# Patient Record
Sex: FEMALE | Race: WHITE | NOT HISPANIC OR LATINO | Employment: OTHER | URBAN - METROPOLITAN AREA
[De-identification: names, ages, dates, MRNs, and addresses within clinical notes are randomized per-mention and may not be internally consistent; named-entity substitution may affect disease eponyms.]

---

## 2019-10-18 ENCOUNTER — HOSPITAL ENCOUNTER (INPATIENT)
Facility: MEDICAL CENTER | Age: 59
LOS: 2 days | DRG: 247 | End: 2019-10-20
Attending: EMERGENCY MEDICINE | Admitting: HOSPITALIST
Payer: COMMERCIAL

## 2019-10-18 ENCOUNTER — PATIENT OUTREACH (OUTPATIENT)
Dept: HEALTH INFORMATION MANAGEMENT | Facility: OTHER | Age: 59
End: 2019-10-18

## 2019-10-18 ENCOUNTER — APPOINTMENT (OUTPATIENT)
Dept: RADIOLOGY | Facility: MEDICAL CENTER | Age: 59
DRG: 247 | End: 2019-10-18
Attending: EMERGENCY MEDICINE
Payer: COMMERCIAL

## 2019-10-18 ENCOUNTER — HOSPITAL ENCOUNTER (OUTPATIENT)
Facility: MEDICAL CENTER | Age: 59
End: 2019-10-18

## 2019-10-18 ENCOUNTER — APPOINTMENT (OUTPATIENT)
Dept: CARDIOLOGY | Facility: MEDICAL CENTER | Age: 59
DRG: 247 | End: 2019-10-18
Payer: COMMERCIAL

## 2019-10-18 ENCOUNTER — APPOINTMENT (OUTPATIENT)
Dept: CARDIOLOGY | Facility: MEDICAL CENTER | Age: 59
DRG: 247 | End: 2019-10-18
Attending: INTERNAL MEDICINE
Payer: COMMERCIAL

## 2019-10-18 ENCOUNTER — APPOINTMENT (OUTPATIENT)
Dept: CARDIOLOGY | Facility: MEDICAL CENTER | Age: 59
DRG: 247 | End: 2019-10-18
Attending: EMERGENCY MEDICINE
Payer: COMMERCIAL

## 2019-10-18 DIAGNOSIS — R07.9 CHEST PAIN, UNSPECIFIED TYPE: ICD-10-CM

## 2019-10-18 DIAGNOSIS — I21.02 ST ELEVATION MYOCARDIAL INFARCTION INVOLVING LEFT ANTERIOR DESCENDING (LAD) CORONARY ARTERY (HCC): ICD-10-CM

## 2019-10-18 PROBLEM — Z72.0 TOBACCO USE: Status: ACTIVE | Noted: 2019-10-18

## 2019-10-18 PROBLEM — E78.5 DYSLIPIDEMIA: Status: ACTIVE | Noted: 2019-10-18

## 2019-10-18 PROBLEM — I10 ESSENTIAL HYPERTENSION: Status: ACTIVE | Noted: 2019-10-18

## 2019-10-18 PROBLEM — I21.4 NSTEMI (NON-ST ELEVATED MYOCARDIAL INFARCTION) (HCC): Status: ACTIVE | Noted: 2019-10-18

## 2019-10-18 PROBLEM — I21.3 STEMI (ST ELEVATION MYOCARDIAL INFARCTION) (HCC): Status: ACTIVE | Noted: 2019-10-18

## 2019-10-18 PROBLEM — E66.3 OVERWEIGHT (BMI 25.0-29.9): Status: ACTIVE | Noted: 2019-10-18

## 2019-10-18 LAB
ACT BLD: 186 SEC (ref 74–137)
ACT BLD: 213 SEC (ref 74–137)
ALBUMIN SERPL BCP-MCNC: 3.9 G/DL (ref 3.2–4.9)
ALBUMIN/GLOB SERPL: 1.4 G/DL
ALP SERPL-CCNC: 47 U/L (ref 30–99)
ALT SERPL-CCNC: 27 U/L (ref 2–50)
ANION GAP SERPL CALC-SCNC: 10 MMOL/L (ref 0–11.9)
ANION GAP SERPL CALC-SCNC: 7 MMOL/L (ref 0–11.9)
APTT PPP: 70.1 SEC (ref 24.7–36)
AST SERPL-CCNC: 89 U/L (ref 12–45)
BASOPHILS # BLD AUTO: 0.2 % (ref 0–1.8)
BASOPHILS # BLD: 0.02 K/UL (ref 0–0.12)
BILIRUB SERPL-MCNC: 0.5 MG/DL (ref 0.1–1.5)
BUN SERPL-MCNC: 13 MG/DL (ref 8–22)
BUN SERPL-MCNC: 13 MG/DL (ref 8–22)
CALCIUM SERPL-MCNC: 7.8 MG/DL (ref 8.5–10.5)
CALCIUM SERPL-MCNC: 8.5 MG/DL (ref 8.5–10.5)
CHLORIDE SERPL-SCNC: 108 MMOL/L (ref 96–112)
CHLORIDE SERPL-SCNC: 113 MMOL/L (ref 96–112)
CO2 SERPL-SCNC: 19 MMOL/L (ref 20–33)
CO2 SERPL-SCNC: 22 MMOL/L (ref 20–33)
CREAT SERPL-MCNC: 0.75 MG/DL (ref 0.5–1.4)
CREAT SERPL-MCNC: 0.76 MG/DL (ref 0.5–1.4)
EKG IMPRESSION: NORMAL
EOSINOPHIL # BLD AUTO: 0.01 K/UL (ref 0–0.51)
EOSINOPHIL NFR BLD: 0.1 % (ref 0–6.9)
ERYTHROCYTE [DISTWIDTH] IN BLOOD BY AUTOMATED COUNT: 46.3 FL (ref 35.9–50)
ERYTHROCYTE [DISTWIDTH] IN BLOOD BY AUTOMATED COUNT: 48.2 FL (ref 35.9–50)
GLOBULIN SER CALC-MCNC: 2.7 G/DL (ref 1.9–3.5)
GLUCOSE SERPL-MCNC: 109 MG/DL (ref 65–99)
GLUCOSE SERPL-MCNC: 118 MG/DL (ref 65–99)
HCT VFR BLD AUTO: 41.3 % (ref 37–47)
HCT VFR BLD AUTO: 42 % (ref 37–47)
HGB BLD-MCNC: 13.4 G/DL (ref 12–16)
HGB BLD-MCNC: 14.4 G/DL (ref 12–16)
IMM GRANULOCYTES # BLD AUTO: 0.13 K/UL (ref 0–0.11)
IMM GRANULOCYTES NFR BLD AUTO: 1 % (ref 0–0.9)
INR PPP: 0.94 (ref 0.87–1.13)
LIPASE SERPL-CCNC: 112 U/L (ref 11–82)
LYMPHOCYTES # BLD AUTO: 1.07 K/UL (ref 1–4.8)
LYMPHOCYTES NFR BLD: 8.1 % (ref 22–41)
MCH RBC QN AUTO: 32.1 PG (ref 27–33)
MCH RBC QN AUTO: 33.8 PG (ref 27–33)
MCHC RBC AUTO-ENTMCNC: 32.4 G/DL (ref 33.6–35)
MCHC RBC AUTO-ENTMCNC: 34.3 G/DL (ref 33.6–35)
MCV RBC AUTO: 98.6 FL (ref 81.4–97.8)
MCV RBC AUTO: 98.8 FL (ref 81.4–97.8)
MONOCYTES # BLD AUTO: 1.34 K/UL (ref 0–0.85)
MONOCYTES NFR BLD AUTO: 10.2 % (ref 0–13.4)
NEUTROPHILS # BLD AUTO: 10.56 K/UL (ref 2–7.15)
NEUTROPHILS NFR BLD: 80.4 % (ref 44–72)
NRBC # BLD AUTO: 0 K/UL
NRBC BLD-RTO: 0 /100 WBC
NT-PROBNP SERPL IA-MCNC: 354 PG/ML (ref 0–125)
PLATELET # BLD AUTO: 216 K/UL (ref 164–446)
PLATELET # BLD AUTO: 247 K/UL (ref 164–446)
PMV BLD AUTO: 10.5 FL (ref 9–12.9)
PMV BLD AUTO: 9.9 FL (ref 9–12.9)
POTASSIUM SERPL-SCNC: 4 MMOL/L (ref 3.6–5.5)
POTASSIUM SERPL-SCNC: 4.6 MMOL/L (ref 3.6–5.5)
PROT SERPL-MCNC: 6.6 G/DL (ref 6–8.2)
PROTHROMBIN TIME: 12.8 SEC (ref 12–14.6)
RBC # BLD AUTO: 4.18 M/UL (ref 4.2–5.4)
RBC # BLD AUTO: 4.26 M/UL (ref 4.2–5.4)
SODIUM SERPL-SCNC: 137 MMOL/L (ref 135–145)
SODIUM SERPL-SCNC: 142 MMOL/L (ref 135–145)
TROPONIN T SERPL-MCNC: 2262 NG/L (ref 6–19)
TROPONIN T SERPL-MCNC: 6277 NG/L (ref 6–19)
WBC # BLD AUTO: 12.9 K/UL (ref 4.8–10.8)
WBC # BLD AUTO: 13.1 K/UL (ref 4.8–10.8)

## 2019-10-18 PROCEDURE — 700111 HCHG RX REV CODE 636 W/ 250 OVERRIDE (IP)

## 2019-10-18 PROCEDURE — 700105 HCHG RX REV CODE 258: Performed by: INTERNAL MEDICINE

## 2019-10-18 PROCEDURE — 96366 THER/PROPH/DIAG IV INF ADDON: CPT

## 2019-10-18 PROCEDURE — 700111 HCHG RX REV CODE 636 W/ 250 OVERRIDE (IP): Performed by: EMERGENCY MEDICINE

## 2019-10-18 PROCEDURE — 700102 HCHG RX REV CODE 250 W/ 637 OVERRIDE(OP): Performed by: HOSPITALIST

## 2019-10-18 PROCEDURE — 99291 CRITICAL CARE FIRST HOUR: CPT

## 2019-10-18 PROCEDURE — 93458 L HRT ARTERY/VENTRICLE ANGIO: CPT | Mod: XU

## 2019-10-18 PROCEDURE — 80048 BASIC METABOLIC PNL TOTAL CA: CPT

## 2019-10-18 PROCEDURE — 85027 COMPLETE CBC AUTOMATED: CPT

## 2019-10-18 PROCEDURE — 99255 IP/OBS CONSLTJ NEW/EST HI 80: CPT | Mod: 25 | Performed by: INTERNAL MEDICINE

## 2019-10-18 PROCEDURE — 71045 X-RAY EXAM CHEST 1 VIEW: CPT

## 2019-10-18 PROCEDURE — 700101 HCHG RX REV CODE 250

## 2019-10-18 PROCEDURE — 96368 THER/DIAG CONCURRENT INF: CPT

## 2019-10-18 PROCEDURE — 93005 ELECTROCARDIOGRAM TRACING: CPT | Performed by: EMERGENCY MEDICINE

## 2019-10-18 PROCEDURE — 4A023N7 MEASUREMENT OF CARDIAC SAMPLING AND PRESSURE, LEFT HEART, PERCUTANEOUS APPROACH: ICD-10-PCS | Performed by: INTERNAL MEDICINE

## 2019-10-18 PROCEDURE — 84484 ASSAY OF TROPONIN QUANT: CPT | Mod: 91

## 2019-10-18 PROCEDURE — A9270 NON-COVERED ITEM OR SERVICE: HCPCS | Performed by: HOSPITALIST

## 2019-10-18 PROCEDURE — 99152 MOD SED SAME PHYS/QHP 5/>YRS: CPT | Performed by: INTERNAL MEDICINE

## 2019-10-18 PROCEDURE — 770022 HCHG ROOM/CARE - ICU (200)

## 2019-10-18 PROCEDURE — 700117 HCHG RX CONTRAST REV CODE 255: Performed by: INTERNAL MEDICINE

## 2019-10-18 PROCEDURE — B2151ZZ FLUOROSCOPY OF LEFT HEART USING LOW OSMOLAR CONTRAST: ICD-10-PCS | Performed by: INTERNAL MEDICINE

## 2019-10-18 PROCEDURE — 99291 CRITICAL CARE FIRST HOUR: CPT | Performed by: INTERNAL MEDICINE

## 2019-10-18 PROCEDURE — B2111ZZ FLUOROSCOPY OF MULTIPLE CORONARY ARTERIES USING LOW OSMOLAR CONTRAST: ICD-10-PCS | Performed by: INTERNAL MEDICINE

## 2019-10-18 PROCEDURE — 85610 PROTHROMBIN TIME: CPT

## 2019-10-18 PROCEDURE — 80053 COMPREHEN METABOLIC PANEL: CPT

## 2019-10-18 PROCEDURE — 027034Z DILATION OF CORONARY ARTERY, ONE ARTERY WITH DRUG-ELUTING INTRALUMINAL DEVICE, PERCUTANEOUS APPROACH: ICD-10-PCS | Performed by: INTERNAL MEDICINE

## 2019-10-18 PROCEDURE — 99223 1ST HOSP IP/OBS HIGH 75: CPT | Performed by: HOSPITALIST

## 2019-10-18 PROCEDURE — 93010 ELECTROCARDIOGRAM REPORT: CPT | Mod: 77 | Performed by: INTERNAL MEDICINE

## 2019-10-18 PROCEDURE — 93010 ELECTROCARDIOGRAM REPORT: CPT | Performed by: INTERNAL MEDICINE

## 2019-10-18 PROCEDURE — 93005 ELECTROCARDIOGRAM TRACING: CPT | Performed by: INTERNAL MEDICINE

## 2019-10-18 PROCEDURE — 92941 PRQ TRLML REVSC TOT OCCL AMI: CPT | Mod: LD | Performed by: INTERNAL MEDICINE

## 2019-10-18 PROCEDURE — 85730 THROMBOPLASTIN TIME PARTIAL: CPT

## 2019-10-18 PROCEDURE — 96365 THER/PROPH/DIAG IV INF INIT: CPT

## 2019-10-18 PROCEDURE — 93005 ELECTROCARDIOGRAM TRACING: CPT | Performed by: HOSPITALIST

## 2019-10-18 PROCEDURE — C1769 GUIDE WIRE: HCPCS

## 2019-10-18 PROCEDURE — 85347 COAGULATION TIME ACTIVATED: CPT | Mod: 91

## 2019-10-18 PROCEDURE — 83880 ASSAY OF NATRIURETIC PEPTIDE: CPT

## 2019-10-18 PROCEDURE — 700111 HCHG RX REV CODE 636 W/ 250 OVERRIDE (IP): Performed by: HOSPITALIST

## 2019-10-18 PROCEDURE — 93458 L HRT ARTERY/VENTRICLE ANGIO: CPT | Mod: 26,59 | Performed by: INTERNAL MEDICINE

## 2019-10-18 PROCEDURE — 85025 COMPLETE CBC W/AUTO DIFF WBC: CPT

## 2019-10-18 PROCEDURE — 83690 ASSAY OF LIPASE: CPT

## 2019-10-18 RX ORDER — BISACODYL 10 MG
10 SUPPOSITORY, RECTAL RECTAL
Status: DISCONTINUED | OUTPATIENT
Start: 2019-10-18 | End: 2019-10-20 | Stop reason: HOSPADM

## 2019-10-18 RX ORDER — CLOPIDOGREL BISULFATE 75 MG/1
75 TABLET ORAL DAILY
Status: DISCONTINUED | OUTPATIENT
Start: 2019-10-19 | End: 2019-10-20 | Stop reason: HOSPADM

## 2019-10-18 RX ORDER — MORPHINE SULFATE 4 MG/ML
2-4 INJECTION, SOLUTION INTRAMUSCULAR; INTRAVENOUS
Status: DISCONTINUED | OUTPATIENT
Start: 2019-10-18 | End: 2019-10-19

## 2019-10-18 RX ORDER — ONDANSETRON 4 MG/1
4 TABLET, ORALLY DISINTEGRATING ORAL EVERY 4 HOURS PRN
Status: DISCONTINUED | OUTPATIENT
Start: 2019-10-18 | End: 2019-10-20 | Stop reason: HOSPADM

## 2019-10-18 RX ORDER — HEPARIN SODIUM 1000 [USP'U]/ML
4000 INJECTION, SOLUTION INTRAVENOUS; SUBCUTANEOUS PRN
Status: DISCONTINUED | OUTPATIENT
Start: 2019-10-18 | End: 2019-10-18

## 2019-10-18 RX ORDER — ZOLPIDEM TARTRATE 5 MG/1
5 TABLET ORAL NIGHTLY PRN
COMMUNITY
End: 2019-10-18

## 2019-10-18 RX ORDER — NITROGLYCERIN 20 MG/100ML
0-200 INJECTION INTRAVENOUS ONCE
Status: DISCONTINUED | OUTPATIENT
Start: 2019-10-18 | End: 2019-10-18

## 2019-10-18 RX ORDER — ASPIRIN 300 MG/1
300 SUPPOSITORY RECTAL DAILY
Status: DISCONTINUED | OUTPATIENT
Start: 2019-10-19 | End: 2019-10-18

## 2019-10-18 RX ORDER — HEPARIN SODIUM,PORCINE 1000/ML
VIAL (ML) INJECTION
Status: COMPLETED
Start: 2019-10-18 | End: 2019-10-18

## 2019-10-18 RX ORDER — ATORVASTATIN CALCIUM 20 MG/1
20 TABLET, FILM COATED ORAL NIGHTLY
Status: ON HOLD | COMMUNITY
End: 2019-10-20

## 2019-10-18 RX ORDER — LEVOTHYROXINE SODIUM 88 UG/1
88 TABLET ORAL
COMMUNITY

## 2019-10-18 RX ORDER — LIDOCAINE HYDROCHLORIDE 20 MG/ML
INJECTION, SOLUTION INFILTRATION; PERINEURAL
Status: COMPLETED
Start: 2019-10-18 | End: 2019-10-18

## 2019-10-18 RX ORDER — ASPIRIN 325 MG
325 TABLET ORAL DAILY
Status: DISCONTINUED | OUTPATIENT
Start: 2019-10-19 | End: 2019-10-18

## 2019-10-18 RX ORDER — ATORVASTATIN CALCIUM 20 MG/1
20 TABLET, FILM COATED ORAL NIGHTLY
Status: DISCONTINUED | OUTPATIENT
Start: 2019-10-18 | End: 2019-10-19

## 2019-10-18 RX ORDER — ONDANSETRON 2 MG/ML
4 INJECTION INTRAMUSCULAR; INTRAVENOUS EVERY 4 HOURS PRN
Status: DISCONTINUED | OUTPATIENT
Start: 2019-10-18 | End: 2019-10-20 | Stop reason: HOSPADM

## 2019-10-18 RX ORDER — LISINOPRIL 10 MG/1
10 TABLET ORAL DAILY
COMMUNITY
End: 2019-10-18

## 2019-10-18 RX ORDER — POLYETHYLENE GLYCOL 3350 17 G/17G
1 POWDER, FOR SOLUTION ORAL
Status: DISCONTINUED | OUTPATIENT
Start: 2019-10-18 | End: 2019-10-20 | Stop reason: HOSPADM

## 2019-10-18 RX ORDER — ASPIRIN 81 MG/1
324 TABLET, CHEWABLE ORAL DAILY
Status: DISCONTINUED | OUTPATIENT
Start: 2019-10-19 | End: 2019-10-18

## 2019-10-18 RX ORDER — MORPHINE SULFATE 4 MG/ML
4 INJECTION, SOLUTION INTRAMUSCULAR; INTRAVENOUS ONCE
Status: COMPLETED | OUTPATIENT
Start: 2019-10-18 | End: 2019-10-18

## 2019-10-18 RX ORDER — SODIUM CHLORIDE 9 MG/ML
INJECTION, SOLUTION INTRAVENOUS CONTINUOUS
Status: DISCONTINUED | OUTPATIENT
Start: 2019-10-18 | End: 2019-10-18

## 2019-10-18 RX ORDER — AMOXICILLIN 250 MG
2 CAPSULE ORAL 2 TIMES DAILY
Status: DISCONTINUED | OUTPATIENT
Start: 2019-10-18 | End: 2019-10-20 | Stop reason: HOSPADM

## 2019-10-18 RX ORDER — PROMETHAZINE HYDROCHLORIDE 25 MG/1
12.5-25 SUPPOSITORY RECTAL EVERY 4 HOURS PRN
Status: DISCONTINUED | OUTPATIENT
Start: 2019-10-18 | End: 2019-10-20 | Stop reason: HOSPADM

## 2019-10-18 RX ORDER — HEPARIN SODIUM 200 [USP'U]/100ML
INJECTION, SOLUTION INTRAVENOUS
Status: COMPLETED
Start: 2019-10-18 | End: 2019-10-18

## 2019-10-18 RX ORDER — VERAPAMIL HYDROCHLORIDE 2.5 MG/ML
INJECTION, SOLUTION INTRAVENOUS
Status: COMPLETED
Start: 2019-10-18 | End: 2019-10-18

## 2019-10-18 RX ORDER — MORPHINE SULFATE 4 MG/ML
INJECTION, SOLUTION INTRAMUSCULAR; INTRAVENOUS
Status: DISPENSED
Start: 2019-10-18 | End: 2019-10-18

## 2019-10-18 RX ORDER — NITROGLYCERIN 20 MG/100ML
0-200 INJECTION INTRAVENOUS CONTINUOUS
Status: DISCONTINUED | OUTPATIENT
Start: 2019-10-18 | End: 2019-10-19

## 2019-10-18 RX ORDER — NITROGLYCERIN 0.4 MG/1
0.4 TABLET SUBLINGUAL ONCE
Status: DISPENSED | OUTPATIENT
Start: 2019-10-18 | End: 2019-10-19

## 2019-10-18 RX ORDER — MIDAZOLAM HYDROCHLORIDE 1 MG/ML
INJECTION INTRAMUSCULAR; INTRAVENOUS
Status: COMPLETED
Start: 2019-10-18 | End: 2019-10-18

## 2019-10-18 RX ORDER — LISINOPRIL 10 MG/1
10 TABLET ORAL DAILY
Status: DISCONTINUED | OUTPATIENT
Start: 2019-10-19 | End: 2019-10-18

## 2019-10-18 RX ORDER — CLONIDINE HYDROCHLORIDE 0.1 MG/1
0.1 TABLET ORAL EVERY 6 HOURS PRN
Status: DISCONTINUED | OUTPATIENT
Start: 2019-10-18 | End: 2019-10-19

## 2019-10-18 RX ORDER — SODIUM CHLORIDE 9 MG/ML
INJECTION, SOLUTION INTRAVENOUS CONTINUOUS
Status: DISPENSED | OUTPATIENT
Start: 2019-10-18 | End: 2019-10-18

## 2019-10-18 RX ORDER — HEPARIN SODIUM 5000 [USP'U]/100ML
INJECTION, SOLUTION INTRAVENOUS CONTINUOUS
Status: DISCONTINUED | OUTPATIENT
Start: 2019-10-18 | End: 2019-10-18

## 2019-10-18 RX ORDER — VENLAFAXINE HYDROCHLORIDE 75 MG/1
75 CAPSULE, EXTENDED RELEASE ORAL DAILY
COMMUNITY

## 2019-10-18 RX ORDER — PROCHLORPERAZINE EDISYLATE 5 MG/ML
5-10 INJECTION INTRAMUSCULAR; INTRAVENOUS EVERY 4 HOURS PRN
Status: DISCONTINUED | OUTPATIENT
Start: 2019-10-18 | End: 2019-10-20 | Stop reason: HOSPADM

## 2019-10-18 RX ORDER — ACETAMINOPHEN 325 MG/1
650 TABLET ORAL EVERY 6 HOURS PRN
Status: DISCONTINUED | OUTPATIENT
Start: 2019-10-18 | End: 2019-10-20 | Stop reason: HOSPADM

## 2019-10-18 RX ORDER — NITROGLYCERIN 0.4 MG/1
0.4 TABLET SUBLINGUAL
Status: DISCONTINUED | OUTPATIENT
Start: 2019-10-18 | End: 2019-10-20 | Stop reason: HOSPADM

## 2019-10-18 RX ORDER — VENLAFAXINE HYDROCHLORIDE 75 MG/1
75 CAPSULE, EXTENDED RELEASE ORAL DAILY
Status: DISCONTINUED | OUTPATIENT
Start: 2019-10-19 | End: 2019-10-20 | Stop reason: HOSPADM

## 2019-10-18 RX ORDER — PROMETHAZINE HYDROCHLORIDE 25 MG/1
12.5-25 TABLET ORAL EVERY 4 HOURS PRN
Status: DISCONTINUED | OUTPATIENT
Start: 2019-10-18 | End: 2019-10-20 | Stop reason: HOSPADM

## 2019-10-18 RX ADMIN — NITROGLYCERIN 0.4 MG: 0.4 TABLET, ORALLY DISINTEGRATING SUBLINGUAL at 07:38

## 2019-10-18 RX ADMIN — NITROGLYCERIN 0.4 MG: 0.4 TABLET, ORALLY DISINTEGRATING SUBLINGUAL at 21:50

## 2019-10-18 RX ADMIN — HEPARIN SODIUM 2000 UNITS: 200 INJECTION, SOLUTION INTRAVENOUS at 07:41

## 2019-10-18 RX ADMIN — NITROGLYCERIN 10 ML: 20 INJECTION INTRAVENOUS at 08:02

## 2019-10-18 RX ADMIN — SODIUM CHLORIDE: 9 INJECTION, SOLUTION INTRAVENOUS at 06:49

## 2019-10-18 RX ADMIN — NITROGLYCERIN 0.4 MG: 0.4 TABLET, ORALLY DISINTEGRATING SUBLINGUAL at 17:11

## 2019-10-18 RX ADMIN — ATORVASTATIN CALCIUM 20 MG: 20 TABLET, FILM COATED ORAL at 19:49

## 2019-10-18 RX ADMIN — MIDAZOLAM HYDROCHLORIDE 1.5 MG: 1 INJECTION, SOLUTION INTRAMUSCULAR; INTRAVENOUS at 08:16

## 2019-10-18 RX ADMIN — ACETAMINOPHEN 650 MG: 325 TABLET, FILM COATED ORAL at 16:54

## 2019-10-18 RX ADMIN — HEPARIN SODIUM: 1000 INJECTION, SOLUTION INTRAVENOUS; SUBCUTANEOUS at 08:05

## 2019-10-18 RX ADMIN — FENTANYL CITRATE 75 MCG: 50 INJECTION INTRAMUSCULAR; INTRAVENOUS at 08:07

## 2019-10-18 RX ADMIN — NITROGLYCERIN 0.4 MG: 0.4 TABLET, ORALLY DISINTEGRATING SUBLINGUAL at 07:12

## 2019-10-18 RX ADMIN — MORPHINE SULFATE 4 MG: 4 INJECTION INTRAVENOUS at 07:11

## 2019-10-18 RX ADMIN — IOHEXOL 85 ML: 350 INJECTION, SOLUTION INTRAVENOUS at 08:11

## 2019-10-18 RX ADMIN — VERAPAMIL HYDROCHLORIDE 2.5 MG: 2.5 INJECTION INTRAVENOUS at 08:02

## 2019-10-18 RX ADMIN — LIDOCAINE HYDROCHLORIDE: 20 INJECTION, SOLUTION INFILTRATION; PERINEURAL at 08:01

## 2019-10-18 RX ADMIN — NITROGLYCERIN 5 MCG/MIN: 20 INJECTION INTRAVENOUS at 07:18

## 2019-10-18 RX ADMIN — HEPARIN SODIUM 950 UNITS: 5000 INJECTION, SOLUTION INTRAVENOUS at 06:00

## 2019-10-18 RX ADMIN — SODIUM CHLORIDE: 9 INJECTION, SOLUTION INTRAVENOUS at 09:01

## 2019-10-18 SDOH — HEALTH STABILITY: MENTAL HEALTH: HOW MANY STANDARD DRINKS CONTAINING ALCOHOL DO YOU HAVE ON A TYPICAL DAY?: 10 OR MORE

## 2019-10-18 SDOH — HEALTH STABILITY: MENTAL HEALTH: HOW OFTEN DO YOU HAVE 6 OR MORE DRINKS ON ONE OCCASION?: DAILY OR ALMOST DAILY

## 2019-10-18 SDOH — HEALTH STABILITY: MENTAL HEALTH: HOW OFTEN DO YOU HAVE A DRINK CONTAINING ALCOHOL?: 4 OR MORE TIMES A WEEK

## 2019-10-18 ASSESSMENT — COGNITIVE AND FUNCTIONAL STATUS - GENERAL
MOBILITY SCORE: 18
SUGGESTED CMS G CODE MODIFIER MOBILITY: CJ
STANDING UP FROM CHAIR USING ARMS: A LITTLE
SUGGESTED CMS G CODE MODIFIER MOBILITY: CK
DRESSING REGULAR LOWER BODY CLOTHING: A LITTLE
CLIMB 3 TO 5 STEPS WITH RAILING: A LITTLE
WALKING IN HOSPITAL ROOM: A LITTLE
STANDING UP FROM CHAIR USING ARMS: A LITTLE
TOILETING: A LITTLE
DAILY ACTIVITIY SCORE: 22
TURNING FROM BACK TO SIDE WHILE IN FLAT BAD: A LITTLE
MOVING FROM LYING ON BACK TO SITTING ON SIDE OF FLAT BED: A LITTLE
MOBILITY SCORE: 20
MOVING TO AND FROM BED TO CHAIR: A LITTLE
MOVING FROM LYING ON BACK TO SITTING ON SIDE OF FLAT BED: A LITTLE
MOVING TO AND FROM BED TO CHAIR: A LITTLE
CLIMB 3 TO 5 STEPS WITH RAILING: A LITTLE
SUGGESTED CMS G CODE MODIFIER DAILY ACTIVITY: CJ

## 2019-10-18 ASSESSMENT — ENCOUNTER SYMPTOMS
MUSCULOSKELETAL NEGATIVE: 1
CHILLS: 1
EYES NEGATIVE: 1
NEUROLOGICAL NEGATIVE: 1
DIAPHORESIS: 1
GASTROINTESTINAL NEGATIVE: 1
RESPIRATORY NEGATIVE: 1
PSYCHIATRIC NEGATIVE: 1

## 2019-10-18 ASSESSMENT — LIFESTYLE VARIABLES: EVER_SMOKED: YES

## 2019-10-18 NOTE — PROGRESS NOTES
Cardiovascular Nurse Navigator ([phone]) Note:    STEMI with PCI today. Left ventriculography in cath shows EF of 60%. No HF diagnosis.    Reviewed ACS medications:  · DAPT: aspirin + clopidogrel  · Beta-Blocker:  Not currently prescribed, will need to be addressed: prescribed or a provider note indicating why not prescribed, prior to discharge.  · Statin:  atorvastatin 20mg - this should be considered for high intensity criteria before dc  · Consider for aldosterone blockade?  no -- EF is 60%  · Consider for ACE-I/ARB/ARNI?  no -- EF is 60%    Meds to Beds BEDSIDE NURSING RESPONSIBILITIES:    Please initiate Meds to Beds for dual antiplatelet therapy:     1. Obtain outpatient order for P2Y12 inhibitor (ticagrelor, clopidogrel, prasugrel) from physician   2. Call [phone] (or, if after hours/weekend, [phone] and request 'on-call anti-coag pharmacist') patient should have med in hand at time of discharge.    Intensive Cardiac Rehab (ICR) Referral:  Referred on 10/18/19; has current inpatient orders for nutrition consult & PT for Phase I ICR    Demographics  Patient resides in: [place], [place]  Insurance: [facility]    Inpatient & Discharge Patient Education:  Bedside nursing to continually provide patient education on ACS meds, signs and symptoms to monitor for, and risk factor modification.     Also at discharge please complete the “Acute Coronary Syndrome” special instructions on the AVS.          Follow up care    Spoke with hospital Anton requested cardiology f/u.    Follow-up With  Details  Why  Contact Info   Cardiac Rehab       Your physician has referred you to Intensive Cardiac Rehab which is very important for your recovery. Please speak with your physician in your home town about a local cardiac rehab program that you can attend. Thank you.       Thank you and please call with questions.

## 2019-10-18 NOTE — CONSULTS
Reason of Consult: STEMI    Consulting Physician: Dr. Finley, ERP    HPI:  59-year-old female with a history of hypertension and hyperlipidemia as well as family history precocious CAD and tobacco abuse but no alcohol abuse presented to [facility] with 2 days of constant unremitting substernal burning chest discomfort she felt had been GERD.  It had a sudden and precipitous worsening chest prior to her presentation at [facility].  When she arrived she had anterior Q waves and ST elevation that were borderline for STEMI criteria.  Initial troponin after 2 days of symptoms was normal.  She received thrombolytic therapy, aspirin, 300 mg of Plavix, and heparin bolus and infusion prior to transport and contacting cardiology.  In route she had intermittent AI VR and transient left bundle branch block.  Her epigastric discomfort resolved.  On arrival she is asymptomatic her Q waves anteriorly persist however her ST segments have improved.  She is hemodynamically and electrically stable.    Past Medical History:   Diagnosis Date   • Essential hypertension    • Mixed hyperlipidemia        Social History     Socioeconomic History   • Marital status: Not on file     Spouse name: Not on file   • Number of children: Not on file   • Years of education: Not on file   • Highest education level: Not on file   Occupational History   • Not on file   Social Needs   • Financial resource strain: Not on file   • Food insecurity:     Worry: Not on file     Inability: Not on file   • Transportation needs:     Medical: Not on file     Non-medical: Not on file   Tobacco Use   • Smoking status: Not on file   Substance and Sexual Activity   • Alcohol use: Not on file   • Drug use: Not on file   • Sexual activity: Not on file   Lifestyle   • Physical activity:     Days per week: Not on file     Minutes per session: Not on file   • Stress: Not on file   Relationships   • Social connections:     Talks on phone: Not on file     Gets  "together: Not on file     Attends Tenriism service: Not on file     Active member of club or organization: Not on file     Attends meetings of clubs or organizations: Not on file     Relationship status: Not on file   • Intimate partner violence:     Fear of current or ex partner: Not on file     Emotionally abused: Not on file     Physically abused: Not on file     Forced sexual activity: Not on file   Other Topics Concern   • Not on file   Social History Narrative   • Not on file       No current facility-administered medications on file prior to encounter.      No current outpatient medications on file prior to encounter.       Current Facility-Administered Medications   Medication Dose Frequency Provider Last Rate Last Dose   • heparin injection 4,000 Units  4,000 Units PRN Franck Norton M.D.        And   • heparin infusion 25,000 units in 500 mL 0.45% NACL   Continuous Franck Norton M.D.         This patient's medications have not been reviewed.    Allergies: Codeine    Family History   Problem Relation Age of Onset   • Heart Disease Mother        ROS: As per HPI all other systems reviewed and negative     Physical Exam   Height 1.7 m (5' 6.93\"), weight 84 kg (185 lb 3 oz).    Constitutional: Morbidly obese.  Appears much older than stated age.  Appears well-developed.   HENT: Normocephalic and atraumatic. No scleral icterus.   Neck: No JVD present.   Cardiovascular: Normal rate. Exam reveals no gallop and no friction rub. No murmur heard.   Pulmonary/Chest: CTAB   Abdominal: S/NT/ND BS+   Musculoskeletal:  Pulses present. No atrophy. Strength normal.  Extremities: Exhibits no edema. No clubbing or cyanosis.   Skin: Skin is warm and dry.   Neuro: Non-focal, CN 2-12 intact grossly    No intake or output data in the 24 hours ending 10/18/19 0602                              EKG (10/18/2019 ):  I have personally reviewed the EKG this visit and discussed with the patient. It shows sinus rhythm with anterior " Q waves.  AIVR/LBBB    Imaging reviewed    Impressions:  1.  Acute coronary syndrome  2.  Morbid obesity  3.  Hypertension  4.  Tobacco abuse  5.  Hyperlipidemia    Recommendations:  It is unusual that her 2 days of chest discomfort did not result in an elevated troponin however she may be describing it poorly as it is after hours.  She did have a sudden worsening of her symptoms which resulted in her receiving thrombolytic therapy.  Her initial EKG from outside does show changes when compared with her more improved EKG after thrombolytic therapy.  I therefore think further evaluation and timely coronary angiography although not emergent is recommended.  She also requires aggressive guideline-directed medical therapy.    1.  Please keep n.p.o. for coronary angiography later today  2.  Aspirin, Plavix, high-dose statin, Lopressor, ACE inhibitor as tolerated  3.  Echocardiogram    Should she have recurrence of symptoms or any hemodynamic or electrical decompensation she will be taken urgently this morning for coronary angiography.  Otherwise we will plan within 12 hours.    Discussed with the referring physician and bedside nursing.    Thank you for this interesting consultation. It was my pleasure to see Marisol Brown today.    Jacky Capellan MD, FACC, [facility]  Division of Interventional Cardiology  [facility]

## 2019-10-18 NOTE — ED NOTES
Nae  from Lab called with critical result of Troponin 2262 at 0705. Critical lab result read back to Nae.   Dr. Norton notified of critical lab result at 0705.  Critical lab result read back by Dr. Norton.

## 2019-10-18 NOTE — DISCHARGE PLANNING
Medical Social Work     Referral: STEMI    SW responded to a STEMI. The pt was [facility]. The pt name is Marisol Brown (: 58). SW spoke to the pt in the trauma bay and the pt requested SW call her mom Marilyn [phone]. YOSVANY was able to make contact with the pt mother Marilyn and update her that the pt arrived to [facility].     Plan: YOSVANY will remain available for pt and family support.

## 2019-10-18 NOTE — ED NOTES
Patient denies CP and SOB, in NAD, on monitor showing PVC run,      Placed pads on patient, crash cart in room

## 2019-10-18 NOTE — ED NOTES
0700  Bedside report from Yunier RITTER  Pt started c/o severe mid chest pain radiating to her back.  MD notified. Repeat EKG done and shown to erp/Dr. Galvez  Medicated for pain per mar order. Placed on 3l/nc  0715  Cardiologist Dr. Dsouza at bedside also notified. ekg shown.  Cath lab paged. Pt prepped for cath lab assisted by ED tech.  Nitro gtt started.

## 2019-10-18 NOTE — H&P
Hospital Medicine History & Physical Note    Date of Service  10/18/2019    Primary Care Physician  No primary care provider on file.    Consultants  Cardiology Dr. Capellan    Code Status  Full code     Chief Complaint  Chest pressure     History of Presenting Illness  59 y.o. female with history of dyslipidemia on statin therapy, essential hypertension on medical regimen, and apparent hypothyroidism, was in her usual state of health until 2 days prior to admission.  She began to have chest pressure this is in the center of her chest, and was constant in nature with no exacerbating or alleviating factors.  On the morning of admission however, the pressure became severe, and woke her from sleep, was accompanied by diaphoresis and chills, and urge to defecate.  She was subsequently brought to an outside hospital, whereupon she was found to have a possible STEMI.  She was given tenecteplase, as well as started on a heparin infusion, and transferred to this facility for higher level of care.  On arrival, she did not have evidence of an ST elevated myocardial infarction so she was monitored in the emergency department.    In the interim, she developed again severe chest pain radiating down her arms, again associated with diaphoresis, nausea, and urge to defecate.  Repeat electrocardiogram was significant for ST elevated myocardial infarction.    Review of Systems  Review of Systems   Constitutional: Positive for chills and diaphoresis.   HENT: Negative.    Eyes: Negative.    Respiratory: Negative.    Cardiovascular: Positive for chest pain.   Gastrointestinal: Negative.    Genitourinary: Negative.    Musculoskeletal: Negative.    Skin: Negative.    Neurological: Negative.    Endo/Heme/Allergies: Negative.    Psychiatric/Behavioral: Negative.        Past Medical History   has a past medical history of Depression, Essential hypertension, Hypercholesteremia, Hypothyroidism, and Mixed hyperlipidemia.    Surgical History    has a past surgical history that includes tubal ligation; dental extraction(s); and carpal tunnel release.     Family History  family history includes Dementia in her father; Heart Disease in her mother; Hyperlipidemia in her brother and sister.     Social History   reports that she has been smoking cigarettes. She started smoking about 45 years ago. She has been smoking about 1.00 pack per day. She has never used smokeless tobacco. She reports that she drinks about 7.2 oz of alcohol per week. She reports that she has current or past drug history. Drug: Inhaled.    Allergies  Allergies   Allergen Reactions   • Codeine        Medications  Prior to Admission Medications   Prescriptions Last Dose Informant Patient Reported? Taking?   albuterol (PROVENTIL) 2.5mg/0.5ml Nebu Soln   Yes Yes   Si.5 mg by Nebulization route every four hours as needed for Shortness of Breath.   atorvastatin (LIPITOR) 20 MG Tab   Yes Yes   Sig: Take 20 mg by mouth every evening.   levothyroxine (SYNTHROID) 100 MCG Tab   Yes Yes   Sig: Take 85 mcg by mouth Every morning on an empty stomach.   lisinopril (PRINIVIL) 10 MG Tab   Yes Yes   Sig: Take 10 mg by mouth every day.   venlafaxine XR (EFFEXOR XR) 75 MG CAPSULE SR 24 HR   Yes Yes   Sig: Take 75 mg by mouth every day.   zolpidem (AMBIEN) 5 MG Tab   Yes Yes   Sig: Take 5 mg by mouth at bedtime as needed for Sleep.      Facility-Administered Medications: None       Physical Exam  Temp:  [36.1 °C (97 °F)] 36.1 °C (97 °F)  Pulse:  [76-87] 76  Resp:  [14-16] 14  BP: (116-184)/() 165/102  SpO2:  [94 %-97 %] 95 %    Physical Exam   Constitutional: She is oriented to person, place, and time. She appears well-developed and well-nourished. No distress.   HENT:   Head: Normocephalic and atraumatic.   Eyes: Pupils are equal, round, and reactive to light. Conjunctivae are normal.   Neck: Normal range of motion. Neck supple. No tracheal deviation present. No thyromegaly present.   Cardiovascular:  Normal rate, regular rhythm and normal heart sounds. Exam reveals no gallop and no friction rub.   No murmur heard.  Pulmonary/Chest: Effort normal and breath sounds normal. No respiratory distress. She has no wheezes. She has no rales.   Abdominal: Soft. Bowel sounds are normal. She exhibits no distension. There is no tenderness. There is no rebound.   Musculoskeletal: Normal range of motion. She exhibits no edema.   Lymphadenopathy:     She has no cervical adenopathy.   Neurological: She is alert and oriented to person, place, and time. No cranial nerve deficit.   Skin: Skin is warm and dry. She is not diaphoretic.   Psychiatric: She has a normal mood and affect.   Nursing note and vitals reviewed.      Laboratory:  Recent Labs     10/18/19  0549   WBC 13.1*   RBC 4.26   HEMOGLOBIN 14.4   HEMATOCRIT 42.0   MCV 98.6*   MCH 33.8*   MCHC 34.3   RDW 46.3   PLATELETCT 216   MPV 10.5     Recent Labs     10/18/19  0549   SODIUM 142   POTASSIUM 4.6   CHLORIDE 113*   CO2 19*   GLUCOSE 118*   BUN 13   CREATININE 0.75   CALCIUM 7.8*     Recent Labs     10/18/19  0549   ALTSGPT 27   ASTSGOT 89*   ALKPHOSPHAT 47   TBILIRUBIN 0.5   LIPASE 112*   GLUCOSE 118*     Recent Labs     10/18/19  0549   APTT 70.1*   INR 0.94     Recent Labs     10/18/19  0549   NTPROBNP 354*         Recent Labs     10/18/19  0549   TROPONINT 2262*       Urinalysis:    No results found     Imaging:  DX-CHEST-PORTABLE (1 VIEW)   Final Result         1.  No focal infiltrates.   2.  Perihilar interstitial prominence and bronchial wall cuffing, appearance suggests changes of underlying bronchial inflammation, consider bronchitis.      CL-LEFT HEART CATHETERIZATION WITH POSSIBLE INTERVENTION    (Results Pending)   CL-LEFT HEART CATHETERIZATION WITH POSSIBLE INTERVENTION    (Results Pending)         Assessment/Plan:  I anticipate this patient will require at least two midnights for appropriate medical management, necessitating inpatient admission.    * STEMI  (ST elevation myocardial infarction) (HCC)  Assessment & Plan  Initially called prior to arrival, but then called off due to lack of ST elevations.  In ED, post evaluation, actively having chest pain, with ST elevations in V2-3.  Plan for STAT cardiology reconsultation, initiating nitroglycerin infusion, moprhine, and heparin infusion ongoing.      Overweight (BMI 25.0-29.9)  Assessment & Plan  Body mass index is 29.07 kg/m².      Tobacco use  Assessment & Plan  Ongoing.  Monitor.  Will need cessation.     Essential hypertension  Assessment & Plan  Controlled with current medication regimen.     Dyslipidemia  Assessment & Plan  On statin therapy which will be continued.          VTE prophylaxis: SCD, heparin infusion

## 2019-10-18 NOTE — CATH LAB
Immediate Post-Operative Note      PreOp Diagnosis: STEMI  Proximal to mid LAD has 99% stenosis. S/p ROSS 3.0X16mm Synergy.  Non-obstructive residual disease.  Normal LVEF, 60%. LVEDP 33.    See full report in epic under results review (completed)        Jose Antonio Hoang M.D.  10/18/2019 10:24 AM

## 2019-10-18 NOTE — ASSESSMENT & PLAN NOTE
Dual antiplatelet therapy: Aspirin and Plavix  High intensity statin: Atorvastatin 80mg  Contineu Metoprolol and Lisinopril  Continuous hemodynamic monitoring as he is at risk for life-threatening arrhythmia after revascularization

## 2019-10-18 NOTE — ED NOTES
Cath lab pad placed. EKG dots applied to pt.  Placed on cardiac transport monitor.  Transported to Cath Lab by 2 RN's/ED tech and Cardiologist.

## 2019-10-18 NOTE — ED TRIAGE NOTES
Chief Complaint   Patient presents with   • Sent by MD     Flight STEMI transfer from [facility],      Patient roomed to Red 1, AOx4, in NAD,  Heprin drip started reviewed protocol with pharmacy at bedside.     Patient woke up to substernal chest pain approx. 0300.  EMS gave 2 dose SL nitor, and 324mg aspirin.  ED in [facility] gave 2mg morphine, 4mg Zorfan, 300mg Plavix, at 0447 Heparin 4000 unit bolus and heparin drip 8,4mL/hr (10units/kg),  And TNK 45mg.      4 PIV: 20g R hand, 20g L hand, 18g RAC, 18 LAC

## 2019-10-18 NOTE — PROGRESS NOTES
Patient was admitted this morning by my partner Dr. Galvez  Discussed with Dr. Dsouza of cardiology, now that patient has a stent in place stop heparin, continue dual antiplatelet therapy  All other plans as per Dr. Galvez's H&P

## 2019-10-18 NOTE — PROGRESS NOTES
Brief critical care cardiology progress note:    Called to bedside this morning for recurrent chest pain and ST elevations on EKG. EKG reviewed and did confirm recurrence of anterior ST elevations. Patient was noted to have worsening angina and hypertension.    I did active the STEMI alert and discussed with Dr. Hoang the need for emergency cardiac catheterization. The patient was severely ill and at risk of cardiogenic shock and death.    For her hypertensive emergency, I personally titrated nitroglycerin gtt and gave repeat sublingual nitroglyerin.     She underwent successful PCI to the proximal LAD and will be monitored in [facility] post-cath.     Cardiology Critical Care Documentation    This patient is critically ill secondary to STEMI.  I have spent a total of 36 minutes directly providing and coordinating critical care to this patient including carrying out the plan stated above in my note. There has been no overlap with other physicians or procedures.

## 2019-10-18 NOTE — ED PROVIDER NOTES
ED Provider Note    CHIEF COMPLAINT  Chief Complaint   Patient presents with   • Sent by MD Wong STEMI transfer from [place] campus  Marisol Brown is a 59 y.o. female who presents to the emergency department as a transfer from outside hospital with possible ST elevation MI.  Patient has had constant chest pain over the last 2 days.  She thought it was just indigestion.  It got worse last night.  She went into the other hospital.  She had EKG performed that were concerning.  She was given thrombolytics, nitroglycerin, heparin.  She was transferred here for cardiology availability.  She states she does not have any chest pain at this time.  She reports when she did have pain she had associated sweats and shortness of breath.  It was worse with exertion.  No tearing pain or radiation of the back.  No leg swelling or leg pain or hemoptysis.  No pleuritic symptoms.    REVIEW OF SYSTEMS  As per HPI, otherwise a 10 point review of systems is negative    PAST MEDICAL HISTORY  Past Medical History:   Diagnosis Date   • Depression    • Essential hypertension    • Hypercholesteremia    • Hypothyroidism    • Mixed hyperlipidemia        SOCIAL HISTORY  Social History     Tobacco Use   • Smoking status: Current Every Day Smoker     Packs/day: 1.00     Types: Cigarettes     Start date: 1974   • Smokeless tobacco: Never Used   Substance Use Topics   • Alcohol use: Yes     Alcohol/week: 7.2 oz     Types: 12 Cans of beer per week     Frequency: 4 or more times a week     Drinks per session: 10 or more     Binge frequency: Daily or almost daily   • Drug use: Yes     Types: Inhaled       SURGICAL HISTORY  Past Surgical History:   Procedure Laterality Date   • CARPAL TUNNEL RELEASE      Right   • DENTAL EXTRACTION(S)     • TUBAL LIGATION         CURRENT MEDICATIONS  Home Medications     Reviewed by Anamaria Sanchez (Pharmacy Tech) on 10/18/19 at 0716  Med List Status: Complete   Medication Last Dose Status  "  atorvastatin (LIPITOR) 20 MG Tab 10/17/2019 Active   levothyroxine (SYNTHROID) 88 MCG Tab 10/17/2019 Active   venlafaxine XR (EFFEXOR XR) 75 MG CAPSULE SR 24 HR 10/17/2019 Active                ALLERGIES  Allergies   Allergen Reactions   • Codeine        PHYSICAL EXAM  VITAL SIGNS: BP (!) 165/102   Pulse 76   Temp 36.1 °C (97 °F) (Temporal)   Resp 14   Ht 1.7 m (5' 6.93\")   Wt 84 kg (185 lb 3 oz)   SpO2 95%   BMI 29.07 kg/m²    Constitutional: Awake and alert  HENT:  Atraumatic, Normocephalic.  Eyes: Normal inspection  Neck: Supple  Cardiovascular: Normal heart rate, Normal rhythm.  Symmetric peripheral pulses.   Thorax & Lungs: No respiratory distress, No wheezing, No rales, No rhonchi, No chest tenderness.   Abdomen: Bowel sounds normal, soft, non-distended, nontender, no mass  Skin: Warm, Dry, No rash.   Back: No tenderness, No CVA tenderness.   Extremities: No clubbing, cyanosis, edema, no Homans or cords   Neurologic: Grossly normal   Psychiatric: Anxious appearing    RADIOLOGY/PROCEDURES  DX-CHEST-PORTABLE (1 VIEW)   Final Result         1.  No focal infiltrates.   2.  Perihilar interstitial prominence and bronchial wall cuffing, appearance suggests changes of underlying bronchial inflammation, consider bronchitis.      CL-LEFT HEART CATHETERIZATION WITH POSSIBLE INTERVENTION    (Results Pending)   CL-LEFT HEART CATHETERIZATION WITH POSSIBLE INTERVENTION    (Results Pending)        Imaging is interpreted by radiologist    Labs:  Results for orders placed or performed during the hospital encounter of 10/18/19   TROPONIN   Result Value Ref Range    Troponin T 2262 (H) 6 - 19 ng/L   proBrain Natriuretic Peptide, NT   Result Value Ref Range    NT-proBNP 354 (H) 0 - 125 pg/mL   CBC WITH DIFFERENTIAL   Result Value Ref Range    WBC 13.1 (H) 4.8 - 10.8 K/uL    RBC 4.26 4.20 - 5.40 M/uL    Hemoglobin 14.4 12.0 - 16.0 g/dL    Hematocrit 42.0 37.0 - 47.0 %    MCV 98.6 (H) 81.4 - 97.8 fL    MCH 33.8 (H) 27.0 - " 33.0 pg    MCHC 34.3 33.6 - 35.0 g/dL    RDW 46.3 35.9 - 50.0 fL    Platelet Count 216 164 - 446 K/uL    MPV 10.5 9.0 - 12.9 fL    Neutrophils-Polys 80.40 (H) 44.00 - 72.00 %    Lymphocytes 8.10 (L) 22.00 - 41.00 %    Monocytes 10.20 0.00 - 13.40 %    Eosinophils 0.10 0.00 - 6.90 %    Basophils 0.20 0.00 - 1.80 %    Immature Granulocytes 1.00 (H) 0.00 - 0.90 %    Nucleated RBC 0.00 /100 WBC    Neutrophils (Absolute) 10.56 (H) 2.00 - 7.15 K/uL    Lymphs (Absolute) 1.07 1.00 - 4.80 K/uL    Monos (Absolute) 1.34 (H) 0.00 - 0.85 K/uL    Eos (Absolute) 0.01 0.00 - 0.51 K/uL    Baso (Absolute) 0.02 0.00 - 0.12 K/uL    Immature Granulocytes (abs) 0.13 (H) 0.00 - 0.11 K/uL    NRBC (Absolute) 0.00 K/uL   COMP METABOLIC PANEL   Result Value Ref Range    Sodium 142 135 - 145 mmol/L    Potassium 4.6 3.6 - 5.5 mmol/L    Chloride 113 (H) 96 - 112 mmol/L    Co2 19 (L) 20 - 33 mmol/L    Anion Gap 10.0 0.0 - 11.9    Glucose 118 (H) 65 - 99 mg/dL    Bun 13 8 - 22 mg/dL    Creatinine 0.75 0.50 - 1.40 mg/dL    Calcium 7.8 (L) 8.5 - 10.5 mg/dL    AST(SGOT) 89 (H) 12 - 45 U/L    ALT(SGPT) 27 2 - 50 U/L    Alkaline Phosphatase 47 30 - 99 U/L    Total Bilirubin 0.5 0.1 - 1.5 mg/dL    Albumin 3.9 3.2 - 4.9 g/dL    Total Protein 6.6 6.0 - 8.2 g/dL    Globulin 2.7 1.9 - 3.5 g/dL    A-G Ratio 1.4 g/dL   LIPASE   Result Value Ref Range    Lipase 112 (H) 11 - 82 U/L   PROTHROMBIN TIME   Result Value Ref Range    PT 12.8 12.0 - 14.6 sec    INR 0.94 0.87 - 1.13   APTT   Result Value Ref Range    APTT 70.1 (H) 24.7 - 36.0 sec   ESTIMATED GFR   Result Value Ref Range    GFR If African American >60 >60 mL/min/1.73 m 2    GFR If Non African American >60 >60 mL/min/1.73 m 2   EKG   Result Value Ref Range    Report       [facility] Emergency Dept.    Test Date:  2019-10-18  Pt Name:    WESLEY ANN             Department: ER  MRN:        [id number]                      Room:       RD 01  Gender:     Female                        Technician: [id number]  :        1960                   Requested By:ER TRIAGE PROTOCOL  Order #:    [id number]                    Reading MD:    Measurements  Intervals                                Axis  Rate:       83                           P:          85  NJ:         125                          QRS:        87  QRSD:       123                          T:          -52  QT:         403  QTc:        474    Interpretive Statements  Sinus rhythm  Ventricular premature complex  Nonspecific intraventricular conduction delay  Abnormal lateral Q waves  Anterior infarct, old  Nonspecific repol abnormality, inferior leads  No previous ECG available for comparison         COURSE & MEDICAL DECISION MAKING  Patient presented to the ER with potential ST elevation MI.  Dr. Catie garzon, cardiology, was present on patient's arrival.  Reviewed outside EKGs that were more consistent with completed infarct.  Repeat EKG shows idioventricular rhythm.  Catheterization lab was canceled.  Her vital signs are stable.  She was chest pain-free after TNK.  We continued her heparin drip.    Laboratory data was collected showing significant increase in the troponin.  Repeat troponin 2200.  Previous troponin was 0.07.    Plan was for patient to go to catheterization lab today for non-STEMI.    While waiting for admission in the ER at about 7:20 AM the patient started having increased pain.  Repeat EKG was obtained that showed alarming ST elevation in the anterior lateral precordial leads.  I paged cardiology.  I ordered nitroglycerin tablet and patient's chest pain gradually improved.  Exchanged messages with Dr. Dsouza, cardiology, who reviewed repeat EKG.    Nitroglycerin tablet was administered and ordered nitroglycerin infusion.  The patient's chest pain gradually improved.    Dr. Dsouza evaluated the patient in the emergency department and the patient was subsequently transferred immediately to the catheterization lab.    FINAL  IMPRESSION  1.  ST elevation myocardial infarction    CRITICAL CARE TIME 35 minutes  There was a very real possibility of deterioration of the patient's condition.  This patient required the highest level of care.  I provided critical care services which included: review of the medical record, treatment orders, ordering and reviewing test results, frequent reevaluation of the patient's condition and response to treatment, as well as discussing the case with appropriate personnel and various consultants. The critical care time associated with the care of this patient is exclusive of any procedures or specific interventions.        This dictation was created using voice recognition software. The accuracy of the dictation is limited to the abilities of the software.  The nursing notes were reviewed and certain aspects of this information were incorporated into this note.      Electronically signed by: Franck Norton, 10/18/2019 7:44 AM

## 2019-10-18 NOTE — ED NOTES
Med Rec completed per patient and home pharmacy   Allergies reviewed  No ORAL antibiotics in last 14 days    Lisinopril 10 mg has not been picked up since 4/2019 for a 60 day supply

## 2019-10-19 LAB
ANION GAP SERPL CALC-SCNC: 7 MMOL/L (ref 0–11.9)
BASOPHILS # BLD AUTO: 0.1 % (ref 0–1.8)
BASOPHILS # BLD: 0.01 K/UL (ref 0–0.12)
BUN SERPL-MCNC: 11 MG/DL (ref 8–22)
CALCIUM SERPL-MCNC: 8.4 MG/DL (ref 8.5–10.5)
CHLORIDE SERPL-SCNC: 114 MMOL/L (ref 96–112)
CHOLEST SERPL-MCNC: 170 MG/DL (ref 100–199)
CO2 SERPL-SCNC: 18 MMOL/L (ref 20–33)
CREAT SERPL-MCNC: 0.74 MG/DL (ref 0.5–1.4)
EOSINOPHIL # BLD AUTO: 0.01 K/UL (ref 0–0.51)
EOSINOPHIL NFR BLD: 0.1 % (ref 0–6.9)
ERYTHROCYTE [DISTWIDTH] IN BLOOD BY AUTOMATED COUNT: 49.3 FL (ref 35.9–50)
GLUCOSE SERPL-MCNC: 104 MG/DL (ref 65–99)
HCT VFR BLD AUTO: 43.6 % (ref 37–47)
HDLC SERPL-MCNC: 48 MG/DL
HGB BLD-MCNC: 14.4 G/DL (ref 12–16)
IMM GRANULOCYTES # BLD AUTO: 0.04 K/UL (ref 0–0.11)
IMM GRANULOCYTES NFR BLD AUTO: 0.3 % (ref 0–0.9)
LDLC SERPL CALC-MCNC: 87 MG/DL
LYMPHOCYTES # BLD AUTO: 1.93 K/UL (ref 1–4.8)
LYMPHOCYTES NFR BLD: 16.5 % (ref 22–41)
MCH RBC QN AUTO: 33.6 PG (ref 27–33)
MCHC RBC AUTO-ENTMCNC: 33 G/DL (ref 33.6–35)
MCV RBC AUTO: 101.9 FL (ref 81.4–97.8)
MONOCYTES # BLD AUTO: 1.07 K/UL (ref 0–0.85)
MONOCYTES NFR BLD AUTO: 9.1 % (ref 0–13.4)
NEUTROPHILS # BLD AUTO: 8.67 K/UL (ref 2–7.15)
NEUTROPHILS NFR BLD: 73.9 % (ref 44–72)
NRBC # BLD AUTO: 0 K/UL
NRBC BLD-RTO: 0 /100 WBC
PLATELET # BLD AUTO: 234 K/UL (ref 164–446)
PMV BLD AUTO: 9.7 FL (ref 9–12.9)
POTASSIUM SERPL-SCNC: 3.7 MMOL/L (ref 3.6–5.5)
RBC # BLD AUTO: 4.28 M/UL (ref 4.2–5.4)
SODIUM SERPL-SCNC: 139 MMOL/L (ref 135–145)
TRIGL SERPL-MCNC: 173 MG/DL (ref 0–149)
TROPONIN T SERPL-MCNC: 3629 NG/L (ref 6–19)
WBC # BLD AUTO: 11.7 K/UL (ref 4.8–10.8)

## 2019-10-19 PROCEDURE — 90686 IIV4 VACC NO PRSV 0.5 ML IM: CPT | Performed by: HOSPITALIST

## 2019-10-19 PROCEDURE — 700102 HCHG RX REV CODE 250 W/ 637 OVERRIDE(OP): Performed by: HOSPITALIST

## 2019-10-19 PROCEDURE — 3E02340 INTRODUCTION OF INFLUENZA VACCINE INTO MUSCLE, PERCUTANEOUS APPROACH: ICD-10-PCS | Performed by: HOSPITALIST

## 2019-10-19 PROCEDURE — 80061 LIPID PANEL: CPT

## 2019-10-19 PROCEDURE — 700111 HCHG RX REV CODE 636 W/ 250 OVERRIDE (IP): Performed by: HOSPITALIST

## 2019-10-19 PROCEDURE — 700102 HCHG RX REV CODE 250 W/ 637 OVERRIDE(OP): Performed by: NURSE PRACTITIONER

## 2019-10-19 PROCEDURE — 84484 ASSAY OF TROPONIN QUANT: CPT

## 2019-10-19 PROCEDURE — A9270 NON-COVERED ITEM OR SERVICE: HCPCS | Performed by: HOSPITALIST

## 2019-10-19 PROCEDURE — A9270 NON-COVERED ITEM OR SERVICE: HCPCS | Performed by: INTERNAL MEDICINE

## 2019-10-19 PROCEDURE — 770020 HCHG ROOM/CARE - TELE (206)

## 2019-10-19 PROCEDURE — 99233 SBSQ HOSP IP/OBS HIGH 50: CPT | Performed by: HOSPITALIST

## 2019-10-19 PROCEDURE — 80048 BASIC METABOLIC PNL TOTAL CA: CPT

## 2019-10-19 PROCEDURE — 85025 COMPLETE CBC W/AUTO DIFF WBC: CPT

## 2019-10-19 PROCEDURE — 700102 HCHG RX REV CODE 250 W/ 637 OVERRIDE(OP): Performed by: INTERNAL MEDICINE

## 2019-10-19 PROCEDURE — A9270 NON-COVERED ITEM OR SERVICE: HCPCS | Performed by: NURSE PRACTITIONER

## 2019-10-19 PROCEDURE — 99233 SBSQ HOSP IP/OBS HIGH 50: CPT | Performed by: INTERNAL MEDICINE

## 2019-10-19 RX ORDER — CALCIUM CARBONATE 500 MG/1
500 TABLET, CHEWABLE ORAL EVERY 4 HOURS PRN
Status: DISCONTINUED | OUTPATIENT
Start: 2019-10-19 | End: 2019-10-20 | Stop reason: HOSPADM

## 2019-10-19 RX ORDER — FAMOTIDINE 20 MG/1
20 TABLET, FILM COATED ORAL DAILY
Status: DISCONTINUED | OUTPATIENT
Start: 2019-10-19 | End: 2019-10-20 | Stop reason: HOSPADM

## 2019-10-19 RX ORDER — ATORVASTATIN CALCIUM 80 MG/1
80 TABLET, FILM COATED ORAL NIGHTLY
Status: DISCONTINUED | OUTPATIENT
Start: 2019-10-19 | End: 2019-10-20 | Stop reason: HOSPADM

## 2019-10-19 RX ORDER — POTASSIUM CHLORIDE 20 MEQ/1
40 TABLET, EXTENDED RELEASE ORAL ONCE
Status: COMPLETED | OUTPATIENT
Start: 2019-10-19 | End: 2019-10-19

## 2019-10-19 RX ORDER — LISINOPRIL 5 MG/1
5 TABLET ORAL
Status: DISCONTINUED | OUTPATIENT
Start: 2019-10-19 | End: 2019-10-20 | Stop reason: HOSPADM

## 2019-10-19 RX ADMIN — LEVOTHYROXINE SODIUM 87.5 MCG: 25 TABLET ORAL at 06:11

## 2019-10-19 RX ADMIN — FAMOTIDINE 20 MG: 20 TABLET ORAL at 11:22

## 2019-10-19 RX ADMIN — CLOPIDOGREL BISULFATE 75 MG: 75 TABLET ORAL at 06:11

## 2019-10-19 RX ADMIN — VENLAFAXINE HYDROCHLORIDE 75 MG: 75 CAPSULE, EXTENDED RELEASE ORAL at 06:11

## 2019-10-19 RX ADMIN — METOPROLOL TARTRATE 25 MG: 25 TABLET, FILM COATED ORAL at 12:53

## 2019-10-19 RX ADMIN — INFLUENZA A VIRUS A/BRISBANE/02/2018 IVR-190 (H1N1) ANTIGEN (FORMALDEHYDE INACTIVATED), INFLUENZA A VIRUS A/KANSAS/14/2017 X-327 (H3N2) ANTIGEN (FORMALDEHYDE INACTIVATED), INFLUENZA B VIRUS B/PHUKET/3073/2013 ANTIGEN (FORMALDEHYDE INACTIVATED), AND INFLUENZA B VIRUS B/MARYLAND/15/2016 BX-69A ANTIGEN (FORMALDEHYDE INACTIVATED) 0.5 ML: 15; 15; 15; 15 INJECTION, SUSPENSION INTRAMUSCULAR at 12:55

## 2019-10-19 RX ADMIN — LISINOPRIL 5 MG: 5 TABLET ORAL at 12:53

## 2019-10-19 RX ADMIN — POTASSIUM CHLORIDE 40 MEQ: 1500 TABLET, EXTENDED RELEASE ORAL at 11:22

## 2019-10-19 RX ADMIN — ASPIRIN 81 MG: 81 TABLET, COATED ORAL at 06:11

## 2019-10-19 RX ADMIN — ATORVASTATIN CALCIUM 80 MG: 80 TABLET, FILM COATED ORAL at 20:10

## 2019-10-19 RX ADMIN — ANTACID TABLETS 500 MG: 500 TABLET, CHEWABLE ORAL at 08:56

## 2019-10-19 ASSESSMENT — ENCOUNTER SYMPTOMS
SPUTUM PRODUCTION: 0
COUGH: 0
ORTHOPNEA: 0
DIZZINESS: 0
STRIDOR: 0
NAUSEA: 0
CHOKING: 0
APNEA: 0
VOMITING: 0
HEMOPTYSIS: 0
CHILLS: 0
WHEEZING: 0
CHEST TIGHTNESS: 0
PALPITATIONS: 0
SHORTNESS OF BREATH: 0

## 2019-10-19 ASSESSMENT — PATIENT HEALTH QUESTIONNAIRE - PHQ9
6. FEELING BAD ABOUT YOURSELF - OR THAT YOU ARE A FAILURE OR HAVE LET YOURSELF OR YOUR FAMILY DOWN: NOT AL ALL
4. FEELING TIRED OR HAVING LITTLE ENERGY: NOT AT ALL
7. TROUBLE CONCENTRATING ON THINGS, SUCH AS READING THE NEWSPAPER OR WATCHING TELEVISION: NOT AT ALL
SUM OF ALL RESPONSES TO PHQ QUESTIONS 1-9: 4
3. TROUBLE FALLING OR STAYING ASLEEP OR SLEEPING TOO MUCH: NOT AT ALL
5. POOR APPETITE OR OVEREATING: NEARLY EVERY DAY
1. LITTLE INTEREST OR PLEASURE IN DOING THINGS: SEVERAL DAYS
SUM OF ALL RESPONSES TO PHQ9 QUESTIONS 1 AND 2: 1
8. MOVING OR SPEAKING SO SLOWLY THAT OTHER PEOPLE COULD HAVE NOTICED. OR THE OPPOSITE, BEING SO FIGETY OR RESTLESS THAT YOU HAVE BEEN MOVING AROUND A LOT MORE THAN USUAL: NOT AT ALL
9. THOUGHTS THAT YOU WOULD BE BETTER OFF DEAD, OR OF HURTING YOURSELF: NOT AT ALL
2. FEELING DOWN, DEPRESSED, IRRITABLE, OR HOPELESS: NOT AT ALL

## 2019-10-19 ASSESSMENT — LIFESTYLE VARIABLES
AVERAGE NUMBER OF DAYS PER WEEK YOU HAVE A DRINK CONTAINING ALCOHOL: 7
TOTAL SCORE: 2
EVER FELT BAD OR GUILTY ABOUT YOUR DRINKING: YES
DOES PATIENT WANT TO TALK TO SOMEONE ABOUT QUITTING: YES
HAVE PEOPLE ANNOYED YOU BY CRITICIZING YOUR DRINKING: NO
EVER HAD A DRINK FIRST THING IN THE MORNING TO STEADY YOUR NERVES TO GET RID OF A HANGOVER: NO
ALCOHOL_USE: YES
HOW MANY TIMES IN THE PAST YEAR HAVE YOU HAD 5 OR MORE DRINKS IN A DAY: 365
CONSUMPTION TOTAL: POSITIVE
DOES PATIENT WANT TO STOP DRINKING: YES
TOTAL SCORE: 2
ON A TYPICAL DAY WHEN YOU DRINK ALCOHOL HOW MANY DRINKS DO YOU HAVE: 6
HAVE YOU EVER FELT YOU SHOULD CUT DOWN ON YOUR DRINKING: YES
TOTAL SCORE: 2

## 2019-10-19 NOTE — PROGRESS NOTES
Pt received from T628. Tele monitor in place, monitors notified and confirmed pt is on the monitor. VSS. Pt oriented to room. Educated on use of the call light. Pt demonstrated use of the call light. Discussed POC. All questions answered. All personal belongings in duffle bag, including glasses and cell phone, brought with patient to new room.

## 2019-10-19 NOTE — PROGRESS NOTES
critical result of Troponin of 6277.  Dr. Marrero notified of critical lab result at 1834.  Critical lab result read back by Dr. Marrero.

## 2019-10-19 NOTE — PROGRESS NOTES
Cardiology Follow Up Progress Note    Date of Service  10/19/2019    Attending Physician  César Colin M.D.    Admitted with chest pressure accompanied by diaphoresis and chills, presented to [facility] with 2 days of constant substernal burning chest discomfort, found to have STEMI, received thrombolytics, was transferred to [facility] for further care      Cardiology consultation for ST elevation anterolateral MI      History of dyslipidemia, hypertension, hypothyroid, tobacco use, positive family history of CAD      Labs reviewed  Sodium, potassium, creatinine stable  Triglycerides 173  LDL 87  Troponin T peaked at 6200      Blood pressure 130/84  Sinus rhythm      Interim Events    10/19/19 sitting up in a chair, daughter at bedside, questions answered, no overnight cardiac events, denies angina, encourage ambulation, transfer to telemetry.    Review of Systems  Review of Systems   HENT: Negative for ear discharge.    Respiratory: Negative for apnea, cough, choking, chest tightness, shortness of breath, wheezing and stridor.    Cardiovascular: Negative for chest pain and leg swelling.       Vital signs in last 24 hours  Temp:  [35.8 °C (96.5 °F)-36.6 °C (97.8 °F)] 36.4 °C (97.6 °F)  Pulse:  [] 93  Resp:  [11-35] 19  BP: (105-148)/(62-91) 131/84  SpO2:  [91 %-98 %] 98 %    Physical Exam  Physical Exam   Constitutional: She is oriented to person, place, and time.   Crying, understandably emotional with recent heart attack   HENT:   Head: Normocephalic.   Eyes: Conjunctivae are normal.   Neck: No JVD present. No thyromegaly present.   Cardiovascular: Normal rate and regular rhythm.   Pulses:       Carotid pulses are 2+ on the right side, and 2+ on the left side.       Radial pulses are 2+ on the right side, and 2+ on the left side.   Pulmonary/Chest: She has no wheezes.   Abdominal: Soft.   Musculoskeletal: She exhibits no edema.   Neurological: She is alert and oriented to person, place, and time.   Skin:  Skin is warm and dry.   Right radial cath site without evidence of hematoma       Lab Review  Lab Results   Component Value Date/Time    WBC 11.7 (H) 10/19/2019 12:45 AM    RBC 4.28 10/19/2019 12:45 AM    HEMOGLOBIN 14.4 10/19/2019 12:45 AM    HEMATOCRIT 43.6 10/19/2019 12:45 AM    .9 (H) 10/19/2019 12:45 AM    MCH 33.6 (H) 10/19/2019 12:45 AM    MCHC 33.0 (L) 10/19/2019 12:45 AM    MPV 9.7 10/19/2019 12:45 AM      Lab Results   Component Value Date/Time    SODIUM 139 10/19/2019 12:45 AM    POTASSIUM 3.7 10/19/2019 12:45 AM    CHLORIDE 114 (H) 10/19/2019 12:45 AM    CO2 18 (L) 10/19/2019 12:45 AM    GLUCOSE 104 (H) 10/19/2019 12:45 AM    BUN 11 10/19/2019 12:45 AM    CREATININE 0.74 10/19/2019 12:45 AM      Lab Results   Component Value Date/Time    ASTSGOT 89 (H) 10/18/2019 05:49 AM    ALTSGPT 27 10/18/2019 05:49 AM     Lab Results   Component Value Date/Time    CHOLSTRLTOT 170 10/19/2019 12:45 AM    LDL 87 10/19/2019 12:45 AM    HDL 48 10/19/2019 12:45 AM    TRIGLYCERIDE 173 (H) 10/19/2019 12:45 AM    TROPONINT 3629 (H) 10/19/2019 12:45 AM       Recent Labs     10/18/19  0549   NTPROBNP 354*       Cardiac Imaging and Procedures Review      EKG: 10/18/19 ST elevation in the anterolateral leads    Echocardiogram: ordered this morning    Cardiac Catheterization: 10/18/19, single-vessel CAD, proximal to mid LAD 99% stenosis, distal LAD has diffuse moderate disease, left circumflex has 50% nonobstructive CAD, underwent PTCA/PCI to mid LAD, EF 60%    Imaging      Chest X-Ray:  10/18/19  Perihilar interstitial prominence and bronchial wall cuffing, appearance suggests changes of underlying bronchial inflammation, consider bronchitis.        Assessment/Plan    ST elevation anterior MI, received thrombolytics in outside facility  Underwent successful PTCA/PCI 10/18/19 to mid LAD, EF 60% (single-vessel CAD)  Hypertension,  add metoprolol and lisinopril  Hyperlipidemia, continue atorvastatin  Tobacco abuse (48-year  pack history), discussed cessation at length  Likely COPD, outpatient follow-up          Continue with DAPT ( ASA 81 mg & Plavix 75 mg )  High intensity statin, increase Lipitor to 80 mg  Add beta-blockers  Consider ACE-I  Follow-up on TTE  Transfer to telemetry  Plan for discharge on Monday  Cardiac rehab        Please contact me with any questions.    TAMMY Boyer.   Cardiologist, [facility]  [phone]

## 2019-10-19 NOTE — PROGRESS NOTES
Patient complaining of reflux pain in the middle of her chest that radiates to the same spot on her back.  She states that this is the same sensation she had last night before being ruled in for a STEMI.  Dr. Colin notified, stat EKG ordered, will administer nitro SL and inform cardiology.

## 2019-10-19 NOTE — PROGRESS NOTES
After one SL nitro, patient states symptoms are gone.  Dr. Marrero called back from cardiology.  MD updated, labs ordered.

## 2019-10-19 NOTE — PROGRESS NOTES
[facility] Medicine Daily Progress Note    Date of Service  10/19/2019    Chief Complaint  59 y.o. female admitted 10/18/2019 with chest pressure.    Hospital Course    Ms. Brown has a history of hypertension, dyslipidemia, and hypothyroidism.  Patient developed chest pressure 2 days prior to admission, this became more severe and she came to the emergency room at an outside facility.  Patient had evidence of ST segment myocardial infarction and she was treated with thrombolytics with resolution of the pain. She was transferred to [facility] for cardiology coverage and higher level of care.  The patient was evaluated with by cardiology and decision was made to defer catheterization as she was pain-free and EKG was more consistent with completed infarct.  Patient's pain returned later that morning, EKG was concerning for ongoing ischemia and She Was Taken to the cath lab.  Findings included a proximal to mid LAD 99% stenosis and a drug-eluting stent was placed.        Interval Problem Update  Some mild chest discomfort overnight, relief with nitroglycerin, no EKG changes  Breathing is fine, on room air, minimal cough  No swelling in LE  Radial access site is not painful or swollen  We discussed importance of dual antiplatelet therapy and likely follow-up with [facility] even though she lives in [place]    Consultants/Specialty  Cardiology    Code Status  Full Code    Disposition  OK to telemetry, orders written on 10/19/2109    Review of Systems  Review of Systems   Constitutional: Negative for chills and malaise/fatigue.   Respiratory: Negative for cough, hemoptysis and sputum production.    Cardiovascular: Positive for chest pain. Negative for palpitations and orthopnea.   Gastrointestinal: Negative for nausea and vomiting.   Genitourinary: Negative for dysuria and urgency.   Skin: Negative for itching and rash.   Neurological: Negative for dizziness.   All other systems reviewed and  are negative.       Physical Exam  Temp:  [35.7 °C (96.3 °F)-36.6 °C (97.8 °F)] 36.6 °C (97.8 °F)  Pulse:  [] 89  Resp:  [11-35] 23  BP: (105-148)/() 133/78  SpO2:  [91 %-98 %] 95 %    Physical Exam   Constitutional: She is oriented to person, place, and time. She appears well-developed and well-nourished. No distress.   HENT:   Head: Normocephalic and atraumatic.   Eyes: Conjunctivae are normal. Right eye exhibits no discharge. Left eye exhibits no discharge.   Neck: Normal range of motion. Neck supple.   Cardiovascular: Normal rate, regular rhythm and intact distal pulses.   No murmur heard.  Pulmonary/Chest: Effort normal and breath sounds normal. No respiratory distress. She has no wheezes. She has no rales.   Abdominal: Soft. Bowel sounds are normal. She exhibits no distension. There is no tenderness. There is no rebound.   Musculoskeletal: Normal range of motion. She exhibits no edema.   Right radial wrist site with no signs of hematoma   Neurological: She is alert and oriented to person, place, and time. No cranial nerve deficit.   Skin: She is not diaphoretic.   Psychiatric: She has a normal mood and affect. Her behavior is normal.       Fluids    Intake/Output Summary (Last 24 hours) at 10/19/2019 0750  Last data filed at 10/19/2019 0400  Gross per 24 hour   Intake 2157.69 ml   Output 1050 ml   Net 1107.69 ml       Laboratory  Recent Labs     10/18/19  0549 10/18/19  1744 10/19/19  0045   WBC 13.1* 12.9* 11.7*   RBC 4.26 4.18* 4.28   HEMOGLOBIN 14.4 13.4 14.4   HEMATOCRIT 42.0 41.3 43.6   MCV 98.6* 98.8* 101.9*   MCH 33.8* 32.1 33.6*   MCHC 34.3 32.4* 33.0*   RDW 46.3 48.2 49.3   PLATELETCT 216 247 234   MPV 10.5 9.9 9.7     Recent Labs     10/18/19  0549 10/18/19  1744 10/19/19  0045   SODIUM 142 137 139   POTASSIUM 4.6 4.0 3.7   CHLORIDE 113* 108 114*   CO2 19* 22 18*   GLUCOSE 118* 109* 104*   BUN 13 13 11   CREATININE 0.75 0.76 0.74   CALCIUM 7.8* 8.5 8.4*     Recent Labs     10/18/19  0549    APTT 70.1*   INR 0.94         Recent Labs     10/19/19  0045   TRIGLYCERIDE 173*   HDL 48   LDL 87       Imaging  CL-LEFT HEART CATHETERIZATION WITH POSSIBLE INTERVENTION   Final Result      DX-CHEST-PORTABLE (1 VIEW)   Final Result         1.  No focal infiltrates.   2.  Perihilar interstitial prominence and bronchial wall cuffing, appearance suggests changes of underlying bronchial inflammation, consider bronchitis.      EC-ECHOCARDIOGRAM COMPLETE W/O CONT    (Results Pending)        Assessment/Plan  * STEMI (ST elevation myocardial infarction) ([facility])- (present on admission)  Assessment & Plan  Dual antiplatelet therapy: Aspirin and Plavix  High intensity statin: Atorvastatin 80mg  Contineu Metoprolol and Lisinopril  Continuous hemodynamic monitoring as he is at risk for life-threatening arrhythmia after revascularization      Essential hypertension- (present on admission)  Assessment & Plan  Metoprolol and lisinopril    Dyslipidemia- (present on admission)  Assessment & Plan  Known hyperlipidemia, her statin therapy has been intensified    Overweight (BMI 25.0-29.9)- (present on admission)  Assessment & Plan  Body mass index is 29.07 kg/m².      Tobacco use- (present on admission)  Assessment & Plan  Cessation recommended       VTE prophylaxis: SCD's

## 2019-10-20 ENCOUNTER — APPOINTMENT (OUTPATIENT)
Dept: CARDIOLOGY | Facility: MEDICAL CENTER | Age: 59
DRG: 247 | End: 2019-10-20
Attending: NURSE PRACTITIONER
Payer: COMMERCIAL

## 2019-10-20 VITALS
HEART RATE: 78 BPM | HEIGHT: 67 IN | SYSTOLIC BLOOD PRESSURE: 106 MMHG | DIASTOLIC BLOOD PRESSURE: 74 MMHG | OXYGEN SATURATION: 97 % | BODY MASS INDEX: 30.9 KG/M2 | TEMPERATURE: 97.2 F | WEIGHT: 196.87 LBS | RESPIRATION RATE: 18 BRPM

## 2019-10-20 PROBLEM — I25.10 CORONARY ARTERY DISEASE DUE TO LIPID RICH PLAQUE: Status: ACTIVE | Noted: 2019-10-20

## 2019-10-20 PROBLEM — I25.83 CORONARY ARTERY DISEASE DUE TO LIPID RICH PLAQUE: Status: ACTIVE | Noted: 2019-10-20

## 2019-10-20 PROBLEM — I21.3 STEMI (ST ELEVATION MYOCARDIAL INFARCTION) (HCC): Status: RESOLVED | Noted: 2019-10-18 | Resolved: 2019-10-20

## 2019-10-20 LAB
ANION GAP SERPL CALC-SCNC: 7 MMOL/L (ref 0–11.9)
BUN SERPL-MCNC: 15 MG/DL (ref 8–22)
CALCIUM SERPL-MCNC: 8.5 MG/DL (ref 8.5–10.5)
CHLORIDE SERPL-SCNC: 109 MMOL/L (ref 96–112)
CO2 SERPL-SCNC: 20 MMOL/L (ref 20–33)
CREAT SERPL-MCNC: 0.83 MG/DL (ref 0.5–1.4)
EST. AVERAGE GLUCOSE BLD GHB EST-MCNC: 100 MG/DL
GLUCOSE SERPL-MCNC: 104 MG/DL (ref 65–99)
HBA1C MFR BLD: 5.1 % (ref 0–5.6)
LV EJECT FRACT  99904: 35
LV EJECT FRACT MOD 2C 99903: 41.68
LV EJECT FRACT MOD 4C 99902: 27.11
LV EJECT FRACT MOD BP 99901: 31.6
POTASSIUM SERPL-SCNC: 4 MMOL/L (ref 3.6–5.5)
SODIUM SERPL-SCNC: 136 MMOL/L (ref 135–145)

## 2019-10-20 PROCEDURE — 700102 HCHG RX REV CODE 250 W/ 637 OVERRIDE(OP): Performed by: NURSE PRACTITIONER

## 2019-10-20 PROCEDURE — 93306 TTE W/DOPPLER COMPLETE: CPT | Mod: 26 | Performed by: INTERNAL MEDICINE

## 2019-10-20 PROCEDURE — A9270 NON-COVERED ITEM OR SERVICE: HCPCS | Performed by: INTERNAL MEDICINE

## 2019-10-20 PROCEDURE — 700102 HCHG RX REV CODE 250 W/ 637 OVERRIDE(OP): Performed by: HOSPITALIST

## 2019-10-20 PROCEDURE — 93306 TTE W/DOPPLER COMPLETE: CPT

## 2019-10-20 PROCEDURE — 83036 HEMOGLOBIN GLYCOSYLATED A1C: CPT

## 2019-10-20 PROCEDURE — 700102 HCHG RX REV CODE 250 W/ 637 OVERRIDE(OP): Performed by: INTERNAL MEDICINE

## 2019-10-20 PROCEDURE — 80048 BASIC METABOLIC PNL TOTAL CA: CPT

## 2019-10-20 PROCEDURE — A9270 NON-COVERED ITEM OR SERVICE: HCPCS | Performed by: NURSE PRACTITIONER

## 2019-10-20 PROCEDURE — 99239 HOSP IP/OBS DSCHRG MGMT >30: CPT | Performed by: HOSPITALIST

## 2019-10-20 PROCEDURE — A9270 NON-COVERED ITEM OR SERVICE: HCPCS | Performed by: HOSPITALIST

## 2019-10-20 PROCEDURE — 99233 SBSQ HOSP IP/OBS HIGH 50: CPT | Performed by: INTERNAL MEDICINE

## 2019-10-20 PROCEDURE — 36415 COLL VENOUS BLD VENIPUNCTURE: CPT

## 2019-10-20 RX ORDER — CLOPIDOGREL BISULFATE 75 MG/1
75 TABLET ORAL DAILY
Qty: 30 TAB | Refills: 1 | Status: SHIPPED | OUTPATIENT
Start: 2019-10-20 | End: 2019-10-20 | Stop reason: SDUPTHER

## 2019-10-20 RX ORDER — CLOPIDOGREL BISULFATE 75 MG/1
75 TABLET ORAL DAILY
Qty: 30 TAB | Refills: 1 | Status: ON HOLD | OUTPATIENT
Start: 2019-10-20 | End: 2023-04-13

## 2019-10-20 RX ORDER — ATORVASTATIN CALCIUM 80 MG/1
80 TABLET, FILM COATED ORAL DAILY
Qty: 30 TAB | Refills: 0 | Status: ON HOLD | OUTPATIENT
Start: 2019-10-20 | End: 2023-04-11

## 2019-10-20 RX ORDER — METOPROLOL SUCCINATE 50 MG/1
50 TABLET, EXTENDED RELEASE ORAL EVERY EVENING
Status: DISCONTINUED | OUTPATIENT
Start: 2019-10-20 | End: 2019-10-20 | Stop reason: HOSPADM

## 2019-10-20 RX ORDER — SPIRONOLACTONE 25 MG/1
12.5 TABLET ORAL
Status: DISCONTINUED | OUTPATIENT
Start: 2019-10-20 | End: 2019-10-20 | Stop reason: HOSPADM

## 2019-10-20 RX ORDER — METOPROLOL SUCCINATE 50 MG/1
50 TABLET, EXTENDED RELEASE ORAL EVERY EVENING
Qty: 30 TAB | Refills: 11 | Status: ON HOLD | OUTPATIENT
Start: 2019-10-20 | End: 2023-04-11

## 2019-10-20 RX ORDER — NICOTINE 21 MG/24HR
21 PATCH, TRANSDERMAL 24 HOURS TRANSDERMAL
Status: DISCONTINUED | OUTPATIENT
Start: 2019-10-20 | End: 2019-10-20 | Stop reason: HOSPADM

## 2019-10-20 RX ORDER — ASPIRIN 81 MG/1
81 TABLET ORAL DAILY
Qty: 30 TAB | Refills: 0 | Status: ON HOLD | OUTPATIENT
Start: 2019-10-21 | End: 2023-04-10

## 2019-10-20 RX ORDER — LISINOPRIL 10 MG/1
10 TABLET ORAL DAILY
Qty: 30 TAB | Refills: 0 | Status: SHIPPED | OUTPATIENT
Start: 2019-10-21

## 2019-10-20 RX ORDER — SPIRONOLACTONE 25 MG/1
12.5 TABLET ORAL DAILY
Qty: 30 TAB | Refills: 11 | Status: SHIPPED | OUTPATIENT
Start: 2019-10-20

## 2019-10-20 RX ADMIN — METOPROLOL TARTRATE 25 MG: 25 TABLET, FILM COATED ORAL at 05:42

## 2019-10-20 RX ADMIN — CLOPIDOGREL BISULFATE 75 MG: 75 TABLET ORAL at 05:42

## 2019-10-20 RX ADMIN — NICOTINE TRANSDERMAL SYSTEM 21 MG: 21 PATCH, EXTENDED RELEASE TRANSDERMAL at 09:19

## 2019-10-20 RX ADMIN — FAMOTIDINE 20 MG: 20 TABLET ORAL at 05:43

## 2019-10-20 RX ADMIN — LISINOPRIL 5 MG: 5 TABLET ORAL at 05:43

## 2019-10-20 RX ADMIN — VENLAFAXINE HYDROCHLORIDE 75 MG: 75 CAPSULE, EXTENDED RELEASE ORAL at 05:43

## 2019-10-20 RX ADMIN — LEVOTHYROXINE SODIUM 87.5 MCG: 25 TABLET ORAL at 05:42

## 2019-10-20 RX ADMIN — ASPIRIN 81 MG: 81 TABLET, COATED ORAL at 05:43

## 2019-10-20 ASSESSMENT — ENCOUNTER SYMPTOMS
APNEA: 0
COUGH: 0
STRIDOR: 0
CHEST TIGHTNESS: 0
SHORTNESS OF BREATH: 0
CHOKING: 0
WHEEZING: 0

## 2019-10-20 NOTE — PROGRESS NOTES
Pt was discharged to home with family. Lines removed. Vital signs stable. Tele box removed. Discharge instructions reviewed and understood. All questions answered. All personal possessions with patient.

## 2019-10-20 NOTE — CARE PLAN
Problem: Communication  Goal: The ability to communicate needs accurately and effectively will improve  Outcome: PROGRESSING AS EXPECTED  Note:   Pt communicating needs appropriately. Oriented to the call light and to call for assistance.        Problem: Infection  Goal: Will remain free from infection  Outcome: PROGRESSING AS EXPECTED  Note:   Pt has no active s/s of infection at this time.

## 2019-10-20 NOTE — DISCHARGE PLANNING
[facility]    Received M2B order from [facility] on behalf of Dr. Dsouza for clopidogrel. Pt has [employer] and the [facility] is not contracted with [employer], so pt would have to pay cash price of $35.75 for a 30 day supply.     Informed [facility] and pt refused M2B service in hopes of having RX filled at preferred pharmacy back in [place], [place] where the medication is covered.    Yecenia Garcia, JermaineD

## 2019-10-20 NOTE — PROGRESS NOTES
Pts home pharmacy in [place] contacted to confirm stock of Plavix 75 mg. Per pharmacist there, they have plenty in stock to fill the pt's prescription.

## 2019-10-20 NOTE — PROGRESS NOTES
Assumed care of patient at bedside report from NOC RN. Updated on POC. Patient at edge of bed without signs or symptoms of discomfort or distress; on room air; up independently. Call light within reach. Whiteboard updated. Fall precautions in place. Bed locked and in lowest position. No other needs indicated at this time.

## 2019-10-20 NOTE — PROGRESS NOTES
0821 On call anticoagulation pharmacist paged regarding meds to beds for potential d/c today.    0825 Echo paged and informed that ordered echo is pending d/c. Echo to be completed after 10x10s are completed this am.

## 2019-10-20 NOTE — CARE PLAN
Problem: Communication  Goal: The ability to communicate needs accurately and effectively will improve  Outcome: PROGRESSING AS EXPECTED  Note:   Pt communicates needs effectively and calls for assistance appropriately.     Problem: Safety  Goal: Will remain free from falls  Outcome: PROGRESSING AS EXPECTED  Note:   Pt verbalizes understanding of fall precautions and calls for assistance appropriately. Bed in lowest position and locked. Pt wearing non-slip socks, call light within reach.

## 2019-10-20 NOTE — DISCHARGE INSTRUCTIONS
Discharge Instructions    Discharged to home by car with friend. Discharged via wheelchair, hospital escort: Yes.  Special equipment needed: Not Applicable    Be sure to schedule a follow-up appointment with your primary care doctor or any specialists as instructed.     Discharge Plan:   Diet Plan: Discussed  Activity Level: Discussed  Smoking Cessation Offered: Patient Counseled  Confirmed Follow up Appointment: Appointment Scheduled  Confirmed Symptoms Management: Discussed  Medication Reconciliation Updated: Yes  Influenza Vaccine Indication: Not indicated: Previously immunized this influenza season and > 8 years of age  Influenza Vaccine Given - only chart on this line when given: Influenza Vaccine Given (See MAR)    I understand that a diet low in cholesterol, fat, and sodium is recommended for good health. Unless I have been given specific instructions below for another diet, I accept this instruction as my diet prescription.   Other diet: Heart Healthy    Special Instructions: Diagnosis:  Acute Coronary Syndrome (ACS) is a diagnosis that encompasses cardiac-related chest pain and heart attack. ACS occurs when the blood flow to the heart muscle is severely reduced or cut off completely due to a slow process called atherosclerosis.  Atherosclerosis is a disease in which the coronary arteries become narrow from a buildup of fat, cholesterol, and other substances that combine to form plaque. If the plaque breaks, a blood clot will form and block the blood flow to the heart muscle. This lack of blood flow can cause damage or death to the heart muscle which is called a heart attack or Myocardial Infarction (MI). There are two different types of MIs:  ST Elevation Myocardial Infarction or STEMI (the most severe type of heart attack) and Non-ST Elevation Myocardial Infarction or NSTEMI.    Treatment Plan:  · Cardiac Diet  - Low fat, low salt, low cholesterol   · Cardiac Rehab  - Your doctor has ordered you a referral  to [facility].  Call [phone] to schedule an appointment.  · Attend my follow-up appointment with my Cardiologist.  · Take my medications as prescribed by my doctor  · Exercise daily  · Quit Smoking, Lower my bad cholesterol and raise my good cholesterol, lower my blood pressure and Reduce stress    Medications:  Certain medications are used to treat ACS.  Remember to always take medications as prescribed and never stop talking medications unless told by your doctor.    You have been prescribed the following medicatons:    Aspirin - Aspirin is used as a blood thinning medication and you will require this medication indefinitely.  Anti-platelet/blood thinner - Your Anti-platelet/Blood thinning medication is called Plavix, and is used in combination with aspirin to prevent clots from forming in your heart and/or around your stent.  Your doctor will determine how long you need to be on this medicine.  Beta-Blocker - Beta-Blocker metoprolol is used to lower blood pressure and heart rate, and/or helps your heart heal after a heart attack.  Statin - Statin Lipitor is used to lower cholesterol.    Discharge Instructions per César Colin M.D.    You were treated at [facility] for a heart attack  You have a stent (a small metal tube) in one of the arteries of your heart  It is important that you take plavix and aspirin each day to keep the stent open and prevent another heart attack, do not stop these medications unless directed to do so by a doctor, please seek medical care immediately if you run out of the medications  It is also important to take your other medications that are prescribe, prescriptions sent electronically to your pharmacy    DIET: Cardiac diet recommended    ACTIVITY: As tolerated    DIAGNOSIS: myocardial infarction    Return to ER if you develop chest pain or shortness of breath    · Is patient discharged on Warfarin / Coumadin?   No       HF Patient Discharge Instructions  · Monitor your weight daily,  and maintain a weight chart, to track your weight changes.   · Activity as tolerated, unless your Doctor has ordered otherwise..  · Follow a low fat, low cholesterol, low salt diet unless instructed otherwise by your Doctor. Read the labels on the back of food products and track your intake of fat, cholesterol and salt.   · Fluid Restriction No. If a Fluid Restriction has been ordered by your Doctor, measure fluids with a measuring cup to ensure that you are not exceeding the restriction.   · No smoking.  · Oxygen No. If your Doctor has ordered that you wear Oxygen at home, it is important to wear it as ordered.  · Did you receive an explanation from staff on the importance of taking each of your medications and why it is necessary to keep taking them unless your doctor says to stop? Yes  · Were all of your questions answered about how to manage your heart failure and what to do if you have increased signs and symptoms after you go home? Yes  · Do you feel like your heart failure care team involved you in the care treatment plan and allowed you to make decisions regarding your care while in the hospital and addressed any discharge needs you might have? Yes    See the educational handout provided at discharge for more information on monitoring your daily weight, activity and diet. This also explains more about Heart Failure, symptoms of a flare-up and some of the tests that you have undergone.     Warning Signs of a Flare-Up include:  · Swelling in the ankles or lower legs.  · Shortness of breath, while at rest, or while doing normal activities.   · Shortness of breath at night when in bed, or coughing in bed.   · Requiring more pillows to sleep at night, or needing to sit up at night to sleep.  · Feeling weak, dizzy or fatigued.     When to call your Doctor:  · Call [employer] seven days a week from 8:00 a.m. to 8:00 p.m. for medical questions [phone].  · Call your Primary Care Physician or  Cardiologist if:   1. You experience any pain radiating to your jaw or neck.  2. You have any difficulty breathing.  3. You experience weight gain of 3 lbs in a day or 5 lbs in a week.   4. You feel any palpitations or irregular heartbeats.  5. You become dizzy or lose consciousness.   If you have had an angiogram or had a pacemaker or AICD placed, and experience:  1. Bleeding, drainage or swelling at the surgical / puncture site.  2. Fever greater than 100.0 F  3. Shock from internal defibrillator.  4. Cool and / or numb extremities.      Heart Attack in Women  Heart attack (myocardial infarction) is one of the leading causes of sudden, unexpected death in women. A heart attack is damage to the heart that is not reversible. A heart attack usually occurs when a heart (coronary) artery becomes narrowed or blocked. The blockage cuts off blood supply to the heart muscle. When one or more of the coronary arteries becomes blocked, that area of the heart begins to die. This can cause pain felt during a heart attack.   If you think you might be having a heart attack, do not ignore your symptoms. Call your local emergency services (911 in [place] immediately. It is recommended that you take a 162 mg non-enteric coated aspirin if you do not have an aspirin allergy. Do not drive yourself to the hospital or wait to see if your symptoms go away. Early recognition of heart attack symptoms is critical. The sooner a heart attack is treated, the greater the amount of heart muscle saved. Time is muscle. It can save your life.  CAUSES   A heart attack can occur from coronary artery disease (CAD). CAD is a process in which the coronary arteries narrow or become blocked from the development of atherosclerosis. Atherosclerosis is a disease in which plaque builds up on the inside of the coronary arteries. Plaque is made up of fats (lipids), cholesterol, calcium, and fibrous tissue. A heart attack can occur due to:  · Plaque buildup that  "can severely narrow or block the coronary arteries and diminish blood flow.  · Plaque that can become unstable and \"rupture.\" Unstable plaque that ruptures within a coronary artery can form a clot and cause a sudden (acute) blockage of the coronary artery.  · A severe tightening (spasm) of the coronary artery. This is a less common cause of a heart attack. When a coronary artery spasms, it cuts off blood flow through the coronary artery. Spasms can occur in coronary arteries that do not have atherosclerosis.  RISK FACTORS  In women, as the level of estrogen in the blood decreases after menopause, the risk of a heart attack increases. Other risk factors of heart attack in women include:  · High blood pressure.  · High cholesterol levels.  · Menopause.  · Smoking.  · Obesity.  · Diabetes.  · Hysterectomy.  · Previous heart attack.  · Lack of regular exercise.  · Family history of heart attacks.  SYMPTOMS   In women, heart attack symptoms may be different than those in men. Women may not experience the typical chest discomfort or pain, which is considered the primary heart attack symptom in men. Women may describe a feeling of pressure, ache, or tightness in the chest. Women may experience new or different physical symptoms 1 month or more before a heart attack. Unusual, unexplained fatigue may be the most frequently identified symptom. Sleep disturbances and weakness in the arms may also be considered warning signs.   Other heart attack symptoms that may occur more often in women are:  · Unexplained feelings of nervousness or anxiety.  · Discomfort between the shoulder blades.  · Tingling in the hands and arms.  · Swollen arms.  · Headaches.  Heart attack symptoms for both men and women include:  · Pain or discomfort spreading to the neck, shoulder, arm, or jaw.  · Shortness of breath.  · Sudden cold sweats.  · Pain or discomfort in the abdomen.  · Heartburn or indigestion with or without vomiting.  · Sudden " "lightheadedness.  · Sudden fainting or blackout.  PREVENTION  The following healthy lifestyle habits may help decrease your risk of heart attacks:  · Quitting smoking.  · Keeping your blood pressure, blood sugar, and cholesterol levels within normal limits.  · Maintaining a healthy weight.  · Staying physically active and exercising regularly.  · Decreasing your salt intake.  · Eating a diet low in saturated fats and cholesterol.  · Increasing your fiber intake by including whole grains, vegetables, and fruits in your diet.  · Avoiding situations that cause stress, anger, or depression.  · Taking medicine as advised by your caregiver.  SEEK IMMEDIATE MEDICAL CARE IF:   · You have severe chest pain, especially if the pain is crushing or pressure-like and spreads to the arms, back, neck, or jaw. This is an emergency. Do not wait to see if the pain will go away. Call your local emergency services (911 in [place] immediately. Do not drive yourself to the hospital.  · You develop shortness of breath during rest, sleep, or with activity.  · You have sudden, unexplained sweating or clammy skin.  · You feel nauseous or vomit without cause.  · You become lightheaded or dizzy.  · You feel your heart beating rapidly or you notice your heart \"skipping\" beats.  MAKE SURE YOU:   · Understand these instructions.  · Will watch your condition.  · Will get help right away if you are not doing well or get worse.  Document Released: 06/15/2009 Document Revised: 06/18/2013 Document Reviewed: 03/21/2013  [employer]Care® Patient Information ©2014 [employer].      Steps to Quit Smoking  Smoking tobacco can be bad for your health. It can also affect almost every organ in your body. Smoking puts you and people around you at risk for many serious long-lasting (chronic) diseases. Quitting smoking is hard, but it is one of the best things that you can do for your health. It is never too late to quit.  What are the benefits of quitting smoking?  When " you quit smoking, you lower your risk for getting serious diseases and conditions. They can include:  · Lung cancer or lung disease.  · Heart disease.  · Stroke.  · Heart attack.  · Not being able to have children (infertility).  · Weak bones (osteoporosis) and broken bones (fractures).  If you have coughing, wheezing, and shortness of breath, those symptoms may get better when you quit. You may also get sick less often. If you are pregnant, quitting smoking can help to lower your chances of having a baby of low birth weight.  What can I do to help me quit smoking?  Talk with your doctor about what can help you quit smoking. Some things you can do (strategies) include:  · Quitting smoking totally, instead of slowly cutting back how much you smoke over a period of time.  · Going to in-person counseling. You are more likely to quit if you go to many counseling sessions.  · Using resources and support systems, such as:  ¨ Online chats with a counselor.  ¨ Phone quitlines.  ¨ Printed self-help materials.  ¨ Support groups or group counseling.  ¨ Text messaging programs.  ¨ Mobile phone apps or applications.  · Taking medicines. Some of these medicines may have nicotine in them. If you are pregnant or breastfeeding, do not take any medicines to quit smoking unless your doctor says it is okay. Talk with your doctor about counseling or other things that can help you.  Talk with your doctor about using more than one strategy at the same time, such as taking medicines while you are also going to in-person counseling. This can help make quitting easier.  What things can I do to make it easier to quit?  Quitting smoking might feel very hard at first, but there is a lot that you can do to make it easier. Take these steps:  · Talk to your family and friends. Ask them to support and encourage you.  · Call phone quitlines, reach out to support groups, or work with a counselor.  · Ask people who smoke to not smoke around  you.  · Avoid places that make you want (trigger) to smoke, such as:  ¨ Bars.  ¨ Parties.  ¨ Smoke-break areas at work.  · Spend time with people who do not smoke.  · Lower the stress in your life. Stress can make you want to smoke. Try these things to help your stress:  ¨ Getting regular exercise.  ¨ Deep-breathing exercises.  ¨ Yoga.  ¨ Meditating.  ¨ Doing a body scan. To do this, close your eyes, focus on one area of your body at a time from head to toe, and notice which parts of your body are tense. Try to relax the muscles in those areas.  · Download or buy apps on your mobile phone or tablet that can help you stick to your quit plan. There are many free apps, such as QuitGuide from the CDC (Centers for Disease Control and Prevention). You can find more support from smokefree.gov and other websites.  This information is not intended to replace advice given to you by your health care provider. Make sure you discuss any questions you have with your health care provider.  Document Released: 10/14/2010 Document Revised: 08/15/2017 Document Reviewed: 05/03/2016  [employer]) Interactive Patient Education © 2017 [employer].    Aspirin, ASA oral tablets  What is this medicine?  ASPIRIN (AS pir in) is a pain reliever. It is used to treat mild pain and fever. This medicine is also used as directed by a doctor to prevent and to treat heart attacks, to prevent strokes, and to treat arthritis or inflammation.  This medicine may be used for other purposes; ask your health care provider or pharmacist if you have questions.  COMMON BRAND NAME(S): Aspir-Low, Aspir-Vandana, Aspirtab, Jocelyn Advanced Aspirin, Jocelyn Aspirin, Jocelyn Aspirin Extra Strength, Jocelyn Aspirin Plus, Jocelyn Extra Strength, Jocelyn Extra Strength Plus, Jocelyn Genuine Aspirin, Jocelyn Womens Aspirin, Bufferin, Bufferin Extra Strength, Bufferin Low Dose  What should I tell my health care provider before I take this medicine?  They need to know if you have any of these  conditions:  -anemia  -asthma  -bleeding problems  -child with chickenpox, the flu, or other viral infection  -diabetes  -gout  -if you frequently drink alcohol containing drinks  -kidney disease  -liver disease  -low level of vitamin K  -lupus  -smoke tobacco  -stomach ulcers or other problems  -an unusual or allergic reaction to aspirin, tartrazine dye, other medicines, dyes, or preservatives  -pregnant or trying to get pregnant  -breast-feeding  How should I use this medicine?  Take this medicine by mouth with a glass of water. Follow the directions on the package or prescription label. You can take this medicine with or without food. If it upsets your stomach, take it with food. Do not take your medicine more often than directed.  Talk to your pediatrician regarding the use of this medicine in children. While this drug may be prescribed for children as young as 12 years of age for selected conditions, precautions do apply. Children and teenagers should not use this medicine to treat chicken pox or flu symptoms unless directed by a doctor.  Patients over 65 years old may have a stronger reaction and need a smaller dose.  Overdosage: If you think you have taken too much of this medicine contact a poison control center or emergency room at once.  NOTE: This medicine is only for you. Do not share this medicine with others.  What if I miss a dose?  If you are taking this medicine on a regular schedule and miss a dose, take it as soon as you can. If it is almost time for your next dose, take only that dose. Do not take double or extra doses.  What may interact with this medicine?  Do not take this medicine with any of the following medications:  -cidofovir  -ketorolac  -probenecid  This medicine may also interact with the following medications:  -alcohol  -alendronate  -bismuth subsalicylate  -flavocoxid  -herbal supplements like feverfew, garlic, jun, ginkgo biloba, horse chestnut  -medicines for diabetes or  glaucoma like acetazolamide, methazolamide  -medicines for gout  -medicines that treat or prevent blood clots like enoxaparin, heparin, ticlopidine, warfarin  -other aspirin and aspirin-like medicines  -NSAIDs, medicines for pain and inflammation, like ibuprofen or naproxen  -pemetrexed  -sulfinpyrazone  -varicella live vaccine  This list may not describe all possible interactions. Give your health care provider a list of all the medicines, herbs, non-prescription drugs, or dietary supplements you use. Also tell them if you smoke, drink alcohol, or use illegal drugs. Some items may interact with your medicine.  What should I watch for while using this medicine?  If you are treating yourself for pain, tell your doctor or health care professional if the pain lasts more than 10 days, if it gets worse, or if there is a new or different kind of pain. Tell your doctor if you see redness or swelling. Also, check with your doctor if you have a fever that lasts for more than 3 days. Only take this medicine to prevent heart attacks or blood clotting if prescribed by your doctor or health care professional.  Do not take aspirin or aspirin-like medicines with this medicine. Too much aspirin can be dangerous. Always read the labels carefully.  This medicine can irritate your stomach or cause bleeding problems. Do not smoke cigarettes or drink alcohol while taking this medicine. Do not lie down for 30 minutes after taking this medicine to prevent irritation to your throat.  If you are scheduled for any medical or dental procedure, tell your healthcare provider that you are taking this medicine. You may need to stop taking this medicine before the procedure.  This medicine may be used to treat migraines. If you take migraine medicines for 10 or more days a month, your migraines may get worse. Keep a diary of headache days and medicine use. Contact your healthcare professional if your migraine attacks occur more frequently.  What  side effects may I notice from receiving this medicine?  Side effects that you should report to your doctor or health care professional as soon as possible:  -allergic reactions like skin rash, itching or hives, swelling of the face, lips, or tongue  -breathing problems  -changes in hearing, ringing in the ears  -confusion  -general ill feeling or flu-like symptoms  -pain on swallowing  -redness, blistering, peeling or loosening of the skin, including inside the mouth or nose  -signs and symptoms of bleeding such as bloody or black, tarry stools; red or dark-brown urine; spitting up blood or brown material that looks like coffee grounds; red spots on the skin; unusual bruising or bleeding from the eye, gums, or nose  -trouble passing urine or change in the amount of urine  -unusually weak or tired  -yellowing of the eyes or skin  Side effects that usually do not require medical attention (report to your doctor or health care professional if they continue or are bothersome):  -diarrhea or constipation  -headache  -nausea, vomiting  -stomach gas, heartburn  This list may not describe all possible side effects. Call your doctor for medical advice about side effects. You may report side effects to FDA at 2-608-FDA-2043.  Where should I keep my medicine?  Keep out of the reach of children.  Store at room temperature between 15 and 30 degrees C (59 and 86 degrees F). Protect from heat and moisture. Do not use this medicine if it has a strong vinegar smell. Throw away any unused medicine after the expiration date.  NOTE: This sheet is a summary. It may not cover all possible information. If you have questions about this medicine, talk to your doctor, pharmacist, or health care provider.  © 2018 Elsevier/[employer] (2014-08-19 11:30:31)    Clopidogrel tablets  What is this medicine?  CLOPIDOGREL (kloh PID oh grel) helps to prevent blood clots. This medicine is used to prevent heart attack, stroke, or other vascular  events in people who are at high risk.  This medicine may be used for other purposes; ask your health care provider or pharmacist if you have questions.  COMMON BRAND NAME(S): Plavix  What should I tell my health care provider before I take this medicine?  They need to know if you have any of the following conditions:  -bleeding disorders  -bleeding in the brain  -having surgery  -history of stomach bleeding  -an unusual or allergic reaction to clopidogrel, other medicines, foods, dyes, or preservatives  -pregnant or trying to get pregnant  -breast-feeding  How should I use this medicine?  Take this medicine by mouth with a glass of water. Follow the directions on the prescription label. You may take this medicine with or without food. If it upsets your stomach, take it with food. Take your medicine at regular intervals. Do not take it more often than directed. Do not stop taking except on your doctor's advice.  A special MedGuide will be given to you by the pharmacist with each prescription and refill. Be sure to read this information carefully each time.  Talk to your pediatrician regarding the use of this medicine in children. Special care may be needed.  Overdosage: If you think you have taken too much of this medicine contact a poison control center or emergency room at once.  NOTE: This medicine is only for you. Do not share this medicine with others.  What if I miss a dose?  If you miss a dose, take it as soon as you can. If it is almost time for your next dose, take only that dose. Do not take double or extra doses.  What may interact with this medicine?  Do not take this medicine with the following medications:  -dasabuvir; ombitasvir; paritaprevir; ritonavir  -defibrotide  This medicine may also interact with the following medications:  -antiviral medicines for HIV or AIDS  -aspirin  -certain medicines for depression like citalopram, fluoxetine, fluvoxamine  -certain medicines for fungal infections like  ketoconazole, fluconazole, voriconazole  -certain medicines for seizures like felbamate, oxcarbazepine, phenytoin  -certain medicines for stomach problems like cimetidine, omeprazole, esomeprazole  -certain medicines that treat or prevent blood clots like warfarin, enoxaparin, dalteparin, apixaban, dabigatran, rivaroxaban, ticlopidine  -chloramphenicol  -cilostazol  -fluvastatin  -isoniazid  -modafinil  -nicardipine  -NSAIDS, medicines for pain and inflammation, like ibuprofen or naproxen  -quinine  -repaglinide  -tamoxifen  -tolbutamide  -topiramate  -torsemide  This list may not describe all possible interactions. Give your health care provider a list of all the medicines, herbs, non-prescription drugs, or dietary supplements you use. Also tell them if you smoke, drink alcohol, or use illegal drugs. Some items may interact with your medicine.  What should I watch for while using this medicine?  Visit your doctor or health care professional for regular check ups. Do not stop taking your medicine unless your doctor tells you to.  Notify your doctor or health care professional and seek emergency treatment if you develop breathing problems; changes in vision; chest pain; severe, sudden headache; pain, swelling, warmth in the leg; trouble speaking; sudden numbness or weakness of the face, arm or leg. These can be signs that your condition has gotten worse.  If you are going to have surgery or dental work, tell your doctor or health care professional that you are taking this medicine.  Certain genetic factors may reduce the effect of this medicine. Your doctor may use genetic tests to determine treatment.  What side effects may I notice from receiving this medicine?  Side effects that you should report to your doctor or health care professional as soon as possible:  -allergic reactions like skin rash, itching or hives, swelling of the face, lips, or tongue  -signs and symptoms of bleeding such as bloody or black, tarry  stools; red or dark-brown urine; spitting up blood or brown material that looks like coffee grounds; red spots on the skin; unusual bruising or bleeding from the eye, gums, or nose  -signs and symptoms of a blood clot such as breathing problems; changes in vision; chest pain; severe, sudden headache; pain, swelling, warmth in the leg; trouble speaking; sudden numbness or weakness of the face, arm or leg  Side effects that usually do not require medical attention (report to your doctor or health care professional if they continue or are bothersome):  -constipation  -diarrhea  -headache  -upset stomach  This list may not describe all possible side effects. Call your doctor for medical advice about side effects. You may report side effects to FDA at 9-021-FDA-8584.  Where should I keep my medicine?  Keep out of the reach of children.  Store at room temperature of 59 to 86 degrees F (15 to 30 degrees C). Throw away any unused medicine after the expiration date.  NOTE: This sheet is a summary. It may not cover all possible information. If you have questions about this medicine, talk to your doctor, pharmacist, or health care provider.  © 2018 Elsevier/Gold Standard (2016-09-22 10:00:44)    Lisinopril tablets  What is this medicine?  LISINOPRIL (lyse IN oh pril) is an ACE inhibitor. This medicine is used to treat high blood pressure and heart failure. It is also used to protect the heart immediately after a heart attack.  This medicine may be used for other purposes; ask your health care provider or pharmacist if you have questions.  COMMON BRAND NAME(S): Prinivil, Zestril  What should I tell my health care provider before I take this medicine?  They need to know if you have any of these conditions:  -diabetes  -heart or blood vessel disease  -kidney disease  -low blood pressure  -previous swelling of the tongue, face, or lips with difficulty breathing, difficulty swallowing, hoarseness, or tightening of the throat  -an  unusual or allergic reaction to lisinopril, other ACE inhibitors, insect venom, foods, dyes, or preservatives  -pregnant or trying to get pregnant  -breast-feeding  How should I use this medicine?  Take this medicine by mouth with a glass of water. Follow the directions on your prescription label. You may take this medicine with or without food. If it upsets your stomach, take it with food. Take your medicine at regular intervals. Do not take it more often than directed. Do not stop taking except on your doctor's advice.  Talk to your pediatrician regarding the use of this medicine in children. Special care may be needed. While this drug may be prescribed for children as young as 6 years of age for selected conditions, precautions do apply.  Overdosage: If you think you have taken too much of this medicine contact a poison control center or emergency room at once.  NOTE: This medicine is only for you. Do not share this medicine with others.  What if I miss a dose?  If you miss a dose, take it as soon as you can. If it is almost time for your next dose, take only that dose. Do not take double or extra doses.  What may interact with this medicine?  Do not take this medicine with any of the following medications:  -hymenoptera venom  -sacubitril; valsartan  This medicines may also interact with the following medications:  -aliskiren  -angiotensin receptor blockers, like losartan or valsartan  -certain medicines for diabetes  -diuretics  -everolimus  -gold compounds  -lithium  -NSAIDs, medicines for pain and inflammation, like ibuprofen or naproxen  -potassium salts or supplements  -salt substitutes  -sirolimus  -temsirolimus  This list may not describe all possible interactions. Give your health care provider a list of all the medicines, herbs, non-prescription drugs, or dietary supplements you use. Also tell them if you smoke, drink alcohol, or use illegal drugs. Some items may interact with your medicine.  What  should I watch for while using this medicine?  Visit your doctor or health care professional for regular check ups. Check your blood pressure as directed. Ask your doctor what your blood pressure should be, and when you should contact him or her.  Do not treat yourself for coughs, colds, or pain while you are using this medicine without asking your doctor or health care professional for advice. Some ingredients may increase your blood pressure.  Women should inform their doctor if they wish to become pregnant or think they might be pregnant. There is a potential for serious side effects to an unborn child. Talk to your health care professional or pharmacist for more information.  Check with your doctor or health care professional if you get an attack of severe diarrhea, nausea and vomiting, or if you sweat a lot. The loss of too much body fluid can make it dangerous for you to take this medicine.  You may get drowsy or dizzy. Do not drive, use machinery, or do anything that needs mental alertness until you know how this drug affects you. Do not stand or sit up quickly, especially if you are an older patient. This reduces the risk of dizzy or fainting spells. Alcohol can make you more drowsy and dizzy. Avoid alcoholic drinks.  Avoid salt substitutes unless you are told otherwise by your doctor or health care professional.  What side effects may I notice from receiving this medicine?  Side effects that you should report to your doctor or health care professional as soon as possible:  -allergic reactions like skin rash, itching or hives, swelling of the hands, feet, face, lips, throat, or tongue  -breathing problems  -signs and symptoms of kidney injury like trouble passing urine or change in the amount of urine  -signs and symptoms of increased potassium like muscle weakness; chest pain; or fast, irregular heartbeat  -signs and symptoms of liver injury like dark yellow or brown urine; general ill feeling or flu-like  symptoms; light-colored stools; loss of appetite; nausea; right upper belly pain; unusually weak or tired; yellowing of the eyes or skin  -signs and symptoms of low blood pressure like dizziness; feeling faint or lightheaded, falls; unusually weak or tired  -stomach pain with or without nausea and vomiting  Side effects that usually do not require medical attention (report to your doctor or health care professional if they continue or are bothersome):  -changes in taste  -cough  -dizziness  -fever  -headache  -sensitivity to light  This list may not describe all possible side effects. Call your doctor for medical advice about side effects. You may report side effects to FDA at 6-278-FDA-1285.  Where should I keep my medicine?  Keep out of the reach of children.  Store at room temperature between 15 and 30 degrees C (59 and 86 degrees F). Protect from moisture. Keep container tightly closed. Throw away any unused medicine after the expiration date.  NOTE: This sheet is a summary. It may not cover all possible information. If you have questions about this medicine, talk to your doctor, pharmacist, or health care provider.  © 2018 Elsevier/Gold Standard (2017-02-06 12:52:35)    Metoprolol tablets  What is this medicine?  METOPROLOL (me TOE proe lole) is a beta-blocker. Beta-blockers reduce the workload on the heart and help it to beat more regularly. This medicine is used to treat high blood pressure and to prevent chest pain. It is also used to after a heart attack and to prevent an additional heart attack from occurring.  This medicine may be used for other purposes; ask your health care provider or pharmacist if you have questions.  COMMON BRAND NAME(S): Lopressor  What should I tell my health care provider before I take this medicine?  They need to know if you have any of these conditions:  -diabetes  -heart or vessel disease like slow heart rate, worsening heart failure, heart block, sick sinus syndrome or  Raynaud's disease  -kidney disease  -liver disease  -lung or breathing disease, like asthma or emphysema  -pheochromocytoma  -thyroid disease  -an unusual or allergic reaction to metoprolol, other beta-blockers, medicines, foods, dyes, or preservatives  -pregnant or trying to get pregnant  -breast-feeding  How should I use this medicine?  Take this medicine by mouth with a drink of water. Follow the directions on the prescription label. Take this medicine immediately after meals. Take your doses at regular intervals. Do not take more medicine than directed. Do not stop taking this medicine suddenly. This could lead to serious heart-related effects.  Talk to your pediatrician regarding the use of this medicine in children. Special care may be needed.  Overdosage: If you think you have taken too much of this medicine contact a poison control center or emergency room at once.  NOTE: This medicine is only for you. Do not share this medicine with others.  What if I miss a dose?  If you miss a dose, take it as soon as you can. If it is almost time for your next dose, take only that dose. Do not take double or extra doses.  What may interact with this medicine?  This medicine may interact with the following medications:  -certain medicines for blood pressure, heart disease, irregular heart beat  -certain medicines for depression like monoamine oxidase (MAO) inhibitors, fluoxetine, or paroxetine  -clonidine  -dobutamine  -epinephrine  -isoproterenol  -reserpine  This list may not describe all possible interactions. Give your health care provider a list of all the medicines, herbs, non-prescription drugs, or dietary supplements you use. Also tell them if you smoke, drink alcohol, or use illegal drugs. Some items may interact with your medicine.  What should I watch for while using this medicine?  Visit your doctor or health care professional for regular check ups. Contact your doctor right away if your symptoms worsen. Check  your blood pressure and pulse rate regularly. Ask your health care professional what your blood pressure and pulse rate should be, and when you should contact them.  You may get drowsy or dizzy. Do not drive, use machinery, or do anything that needs mental alertness until you know how this medicine affects you. Do not sit or stand up quickly, especially if you are an older patient. This reduces the risk of dizzy or fainting spells. Contact your doctor if these symptoms continue. Alcohol may interfere with the effect of this medicine. Avoid alcoholic drinks.  What side effects may I notice from receiving this medicine?  Side effects that you should report to your doctor or health care professional as soon as possible:  -allergic reactions like skin rash, itching or hives  -cold or numb hands or feet  -depression  -difficulty breathing  -faint  -fever with sore throat  -irregular heartbeat, chest pain  -rapid weight gain  -swollen legs or ankles  Side effects that usually do not require medical attention (report to your doctor or health care professional if they continue or are bothersome):  -anxiety or nervousness  -change in sex drive or performance  -dry skin  -headache  -nightmares or trouble sleeping  -short term memory loss  -stomach upset or diarrhea  -unusually tired  This list may not describe all possible side effects. Call your doctor for medical advice about side effects. You may report side effects to FDA at 0-545-FDA-0234.  Where should I keep my medicine?  Keep out of the reach of children.  Store at room temperature between 15 and 30 degrees C (59 and 86 degrees F). Throw away any unused medicine after the expiration date.  NOTE: This sheet is a summary. It may not cover all possible information. If you have questions about this medicine, talk to your doctor, pharmacist, or health care provider.  © 2018 Elsevier/Gold Standard (2014-08-22 14:40:36)      How to Take Your Blood Pressure  HOW DO I GET A  "BLOOD PRESSURE MACHINE?  · You can buy an electronic home blood pressure machine at your local pharmacy. Insurance will sometimes cover the cost if you have a prescription.  · Ask your doctor what type of machine is best for you. There are different machines for your arm and your wrist.  · If you decide to buy a machine to check your blood pressure on your arm, first check the size of your arm so you can buy the right size cuff. To check the size of your arm:    ¨ Use a measuring tape that shows both inches and centimeters.    ¨ Wrap the measuring tape around the upper-middle part of your arm. You may need someone to help you measure.    ¨ Write down your arm measurement in both inches and centimeters.    · To measure your blood pressure correctly, it is important to have the right size cuff.    ¨ If your arm is up to 13 inches (up to 34 centimeters), get an adult cuff size.  ¨ If your arm is 13 to 17 inches (35 to 44 centimeters), get a large adult cuff size.    ¨  If your arm is 17 to 20 inches (45 to 52 centimeters), get an adult thigh cuff.    WHAT DO THE NUMBERS MEAN?   · There are two numbers that make up your blood pressure. For example: 120/80.  ¨ The first number (120 in our example) is called the \"systolic pressure.\" It is a measure of the pressure in your blood vessels when your heart is pumping blood.  ¨ The second number (80 in our example) is called the \"diastolic pressure.\" It is a measure of the pressure in your blood vessels when your heart is resting between beats.  · Your doctor will tell you what your blood pressure should be.  WHAT SHOULD I DO BEFORE I CHECK MY BLOOD PRESSURE?   · Try to rest or relax for at least 30 minutes before you check your blood pressure.  · Do not smoke.  · Do not have any drinks with caffeine, such as:  ¨ Soda.  ¨ Coffee.  ¨ Tea.  · Check your blood pressure in a quiet room.  · Sit down and stretch out your arm on a table. Keep your arm at about the level of your heart. " Let your arm relax.  · Make sure that your legs are not crossed.  HOW DO I CHECK MY BLOOD PRESSURE?  · Follow the directions that came with your machine.  · Make sure you remove any tight-fitting clothing from your arm or wrist. Wrap the cuff around your upper arm or wrist. You should be able to fit a finger between the cuff and your arm. If you cannot fit a finger between the cuff and your arm, it is too tight and should be removed and rewrapped.  · Some units require you to manually pump up the arm cuff.  · Automatic units inflate the cuff when you press a button.  · Cuff deflation is automatic in both models.  · After the cuff is inflated, the unit measures your blood pressure and pulse. The readings are shown on a monitor. Hold still and breathe normally while the cuff is inflated.  · Getting a reading takes less than a minute.  · Some models store readings in a memory. Some provide a printout of readings. If your machine does not store your readings, keep a written record.  · Take readings with you to your next visit with your doctor.  This information is not intended to replace advice given to you by your health care provider. Make sure you discuss any questions you have with your health care provider.  Document Released: 11/30/2009 Document Revised: 01/08/2016 Document Reviewed: 02/12/2015  Elsevier Interactive Patient Education © 2017 Elsevier Inc.          Depression / Suicide Risk    As you are discharged from this [facility] facility, it is important to learn how to keep safe from harming yourself.    Recognize the warning signs:  · Abrupt changes in personality, positive or negative- including increase in energy   · Giving away possessions  · Change in eating patterns- significant weight changes-  positive or negative  · Change in sleeping patterns- unable to sleep or sleeping all the time   · Unwillingness or inability to communicate  · Depression  · Unusual sadness, discouragement and  loneliness  · Talk of wanting to die  · Neglect of personal appearance   · Rebelliousness- reckless behavior  · Withdrawal from people/activities they love  · Confusion- inability to concentrate     If you or a loved one observes any of these behaviors or has concerns about self-harm, here's what you can do:  · Talk about it- your feelings and reasons for harming yourself  · Remove any means that you might use to hurt yourself (examples: pills, rope, extension cords, firearm)  · Get professional help from the community (Mental Health, Substance Abuse, psychological counseling)  · Do not be alone:Call your Safe Contact- someone whom you trust who will be there for you.  · Call your local CRISIS HOTLINE 879-1452 or 179-300-1862  · Call your local Children's Mobile Crisis Response Team [place] (211) 331-2681 or www.Ksplice  · Call the toll free National Suicide Prevention Hotlines   · National Suicide Prevention Lifeline 765-070-EVUP (0846)  · National Hope Line Network 800-SUICIDE (680-1596)

## 2019-10-20 NOTE — PROGRESS NOTES
Cardiology Follow Up Progress Note    Date of Service  10/20/2019    Attending Physician  César Colin M.D.    Admitted with chest pressure accompanied by diaphoresis and chills, presented to [facility] with 2 days of constant substernal burning chest discomfort, found to have STEMI, received thrombolytics, was transferred to [facility] for further care      Cardiology consultation for ST elevation anterolateral MI      History of dyslipidemia, hypertension, hypothyroid, tobacco use, positive family history of CAD      Labs reviewed  Sodium, potassium, creatinine stable ( 10/19/19 )  Triglycerides 173  LDL 87  Troponin T peaked at 6200      Blood pressure 106/74  Sinus rhythm      Interim Events    10/20/19 no overnight cardiac events, ambulated in the wan, asymptomatic, we discussed at length smoking cessation, we discussed cardiac rehab, we discussed compliance with medications and close follow-up with PCP and cardiologist, she is in agreement    10/19/19 sitting up in a chair, daughter at bedside, questions answered, no overnight cardiac events, denies angina, encourage ambulation, transfer to telemetry.    Review of Systems  Review of Systems   HENT: Negative for ear discharge.    Respiratory: Negative for apnea, cough, choking, chest tightness, shortness of breath, wheezing and stridor.    Cardiovascular: Negative for chest pain and leg swelling.       Vital signs in last 24 hours  Temp:  [36.1 °C (97 °F)-37.1 °C (98.8 °F)] 36.2 °C (97.2 °F)  Pulse:  [68-93] 78  Resp:  [16-19] 18  BP: ()/(68-84) 106/74  SpO2:  [95 %-98 %] 97 %    Physical Exam  Physical Exam   Constitutional: She is oriented to person, place, and time.   HENT:   Head: Normocephalic.   Eyes: Conjunctivae are normal.   Neck: No JVD present. No thyromegaly present.   Cardiovascular: Normal rate and regular rhythm.   Pulses:       Carotid pulses are 2+ on the right side, and 2+ on the left side.       Radial pulses are 2+ on the right side,  and 2+ on the left side.   Pulmonary/Chest: She has no wheezes.   Abdominal: Soft.   Musculoskeletal: She exhibits no edema.   Neurological: She is alert and oriented to person, place, and time.   Skin: Skin is warm and dry.   Right radial cath site without evidence of hematoma       Lab Review  Lab Results   Component Value Date/Time    WBC 11.7 (H) 10/19/2019 12:45 AM    RBC 4.28 10/19/2019 12:45 AM    HEMOGLOBIN 14.4 10/19/2019 12:45 AM    HEMATOCRIT 43.6 10/19/2019 12:45 AM    .9 (H) 10/19/2019 12:45 AM    MCH 33.6 (H) 10/19/2019 12:45 AM    MCHC 33.0 (L) 10/19/2019 12:45 AM    MPV 9.7 10/19/2019 12:45 AM      Lab Results   Component Value Date/Time    SODIUM 139 10/19/2019 12:45 AM    POTASSIUM 3.7 10/19/2019 12:45 AM    CHLORIDE 114 (H) 10/19/2019 12:45 AM    CO2 18 (L) 10/19/2019 12:45 AM    GLUCOSE 104 (H) 10/19/2019 12:45 AM    BUN 11 10/19/2019 12:45 AM    CREATININE 0.74 10/19/2019 12:45 AM      Lab Results   Component Value Date/Time    ASTSGOT 89 (H) 10/18/2019 05:49 AM    ALTSGPT 27 10/18/2019 05:49 AM     Lab Results   Component Value Date/Time    CHOLSTRLTOT 170 10/19/2019 12:45 AM    LDL 87 10/19/2019 12:45 AM    HDL 48 10/19/2019 12:45 AM    TRIGLYCERIDE 173 (H) 10/19/2019 12:45 AM    TROPONINT 3629 (H) 10/19/2019 12:45 AM       Recent Labs     10/18/19  0549   NTPROBNP 354*       Cardiac Imaging and Procedures Review      EKG: 10/18/19 ST elevation in the anterolateral leads    Echocardiogram: pending DC    Cardiac Catheterization: 10/18/19, single-vessel CAD, proximal to mid LAD 99% stenosis, distal LAD has diffuse moderate disease, left circumflex has 50% nonobstructive CAD, underwent PTCA/PCI to mid LAD, EF 60%    Imaging      Chest X-Ray:  10/18/19  Perihilar interstitial prominence and bronchial wall cuffing, appearance suggests changes of underlying bronchial inflammation, consider bronchitis.        Assessment/Plan    ST elevation anterior MI, received thrombolytics in outside  facility  Underwent successful PTCA/PCI 10/18/19 to mid LAD, EF 60% (single-vessel CAD)  Hypertension, well-controlled on metoprolol and lisinopril  Hyperlipidemia, continue atorvastatin  Tobacco abuse (48-year pack history), discussed cessation at length  Likely COPD, outpatient follow-up          Continue with DAPT ( ASA 81 mg & Plavix 75 mg )  High intensity statin,  Lipitor to 80 mg  Continue with metoprolol and lisinopril   follow-up on TTE prior to discharge today  Cardiac rehab  Patient lives in [place], will follow-up with PCP in 7 to 10 days and cardiologist is Brianda/Alex in 2 to 3 weeks.        Please contact me with any questions.    TAMMY Boyer.   Cardiologist, [facility]  [phone]

## 2019-10-20 NOTE — DISCHARGE SUMMARY
Discharge Summary    CHIEF COMPLAINT ON ADMISSION  Chief Complaint   Patient presents with   • Sent by MD     Flight STEMI transfer from [facility],        Reason for Admission  Chest pain    Admission Date  10/18/2019    CODE STATUS  Full Code    HPI & HOSPITAL COURSE  This is a 59 y.o. female here with chest pain.     Ms. Brown has a history of hypertension, dyslipidemia, and hypothyroidism.  Patient developed chest pressure 2 days prior to admission, this became more severe and she came to the emergency room at an outside facility.  Patient had evidence of ST segment myocardial infarction and she was treated with thrombolytics with resolution of the pain. She was transferred to [facility] for cardiology coverage and higher level of care.  The patient was evaluated with by cardiology and decision was made to defer catheterization as she was pain-free and EKG was more consistent with completed infarct.  Patient's pain returned later that morning, EKG was concerning for ongoing ischemia and she was taken to the cath lab.  Findings included a proximal to mid LAD 99% stenosis and a drug-eluting stent was placed.       The patient is recovering well, she is ambulatory with no signs of chest pain or shortness of breath, she can be discharged home.  Compliance with medications including dual antiplatelet therapy discussed at length.  Smoke cessation counseling also given.    Therefore, she is discharged in fair and stable condition to home with close outpatient follow-up.    The patient met 2-midnight criteria for an inpatient stay at the time of discharge.    Discharge Date  10/20/2019    FOLLOW UP ITEMS POST DISCHARGE  Follow up with PCP, follow up with [facility] Cardiology    DISCHARGE DIAGNOSES  Principal Problem (Resolved):    STEMI (ST elevation myocardial infarction) (HCC) POA: Yes  Active Problems:    Dyslipidemia POA: Yes    Essential hypertension POA: Yes    Coronary artery disease due to  lipid rich plaque POA: Unknown    Tobacco use POA: Yes    Overweight (BMI 25.0-29.9) POA: Yes      FOLLOW UP  No future appointments.  Cardiac Rehab   Your physician has referred you to Intensive Cardiac Rehab which is very important for your recovery. Please speak with your physician in your home town about a local cardiac rehab program that you can attend. Thank you.        REINA King  [address] [place] [address]  [phone]    Go on 11/21/2019  Please arrive at 1:45 pm  for your hospital follow up at 2 pm with Roselyn Espinoza. You will need to get a referral for Cardiology. Thank you       MEDICATIONS ON DISCHARGE     Medication List      START taking these medications      Instructions   aspirin 81 MG EC tablet  Start taking on:  10/21/2019   Take 1 Tab by mouth every day.  Dose:  81 mg     clopidogrel 75 MG Tabs  Commonly known as:  PLAVIX   Doctor's comments:  Meds to beds  Take 1 Tab by mouth every day.  Dose:  75 mg     lisinopril 10 MG Tabs  Start taking on:  10/21/2019  Commonly known as:  PRINIVIL   Take 1 Tab by mouth every day.  Dose:  10 mg     metoprolol SR 50 MG Tb24  Commonly known as:  TOPROL XL   Take 1 Tab by mouth every evening.  Dose:  50 mg     spironolactone 25 MG Tabs  Commonly known as:  ALDACTONE   Take 0.5 Tabs by mouth every day.  Dose:  12.5 mg        CHANGE how you take these medications      Instructions   atorvastatin 80 MG tablet  What changed:    · medication strength  · how much to take  · when to take this  Commonly known as:  LIPITOR   Take 1 Tab by mouth every day.  Dose:  80 mg        CONTINUE taking these medications      Instructions   levothyroxine 88 MCG Tabs  Commonly known as:  SYNTHROID   Take 88 mcg by mouth Every morning on an empty stomach.  Dose:  88 mcg     venlafaxine XR 75 MG Cp24  Commonly known as:  EFFEXOR XR   Take 75 mg by mouth every day.  Dose:  75 mg            Allergies  Allergies   Allergen Reactions   • Codeine         DIET  Orders Placed This Encounter   Procedures   • Diet Order Cardiac     Standing Status:   Standing     Number of Occurrences:   1     Order Specific Question:   Diet:     Answer:   Cardiac [6]       ACTIVITY  Light duty.  Weight bearing as tolerated    CONSULTATIONS  Cardiology    PROCEDURES  Cardiac Cath 10/18/2019:    1. Single vessel disease. proximal to mid LAD has 99% stenosis. Distal LAD  has diffuse moderate disease. LCX has 50% non-obstructive diseasae.    2. Mid Left Anterior Descending was treated with a TREK 2.5 X 12mm balloon,  and Synergy 3.00mm x 16mm drug eluting stent.        LABORATORY  Lab Results   Component Value Date    SODIUM 139 10/19/2019    POTASSIUM 3.7 10/19/2019    CHLORIDE 114 (H) 10/19/2019    CO2 18 (L) 10/19/2019    GLUCOSE 104 (H) 10/19/2019    BUN 11 10/19/2019    CREATININE 0.74 10/19/2019        Lab Results   Component Value Date    WBC 11.7 (H) 10/19/2019    HEMOGLOBIN 14.4 10/19/2019    HEMATOCRIT 43.6 10/19/2019    PLATELETCT 234 10/19/2019        Total time of the discharge process exceeds 42 minutes.

## 2019-10-20 NOTE — DIETARY
Nutrition Services: Update     RD reconsulted for Cardiac rehab diet education. RD provided Cardiac rehab/ CAD education on 10/18. Noted pt verbalized understanding with all questions answered.     Please consult as needed and indicate if pt requesting more information/ has questions.    RD available PRN.

## 2019-10-20 NOTE — PROGRESS NOTES
Confirmed with patient that she received her stent card post cath procedure and is with her personal belongings. Educated on importance of carrying her stent card with her at all times. Pt acknowledged and demonstrated understanding.

## 2021-08-09 ENCOUNTER — HOSPITAL ENCOUNTER (INPATIENT)
Facility: MEDICAL CENTER | Age: 61
LOS: 2 days | DRG: 379 | End: 2021-08-11
Attending: STUDENT IN AN ORGANIZED HEALTH CARE EDUCATION/TRAINING PROGRAM | Admitting: STUDENT IN AN ORGANIZED HEALTH CARE EDUCATION/TRAINING PROGRAM
Payer: COMMERCIAL

## 2021-08-09 PROBLEM — F17.210 DEPENDENCE ON NICOTINE FROM CIGARETTES: Status: ACTIVE | Noted: 2021-08-09

## 2021-08-09 PROBLEM — K92.2 GI BLEEDING: Status: ACTIVE | Noted: 2021-08-09

## 2021-08-09 PROBLEM — E03.9 HYPOTHYROIDISM: Status: ACTIVE | Noted: 2021-08-09

## 2021-08-09 LAB
ABO GROUP BLD: NORMAL
ALBUMIN SERPL BCP-MCNC: 3.6 G/DL (ref 3.2–4.9)
ALBUMIN/GLOB SERPL: 1.1 G/DL
ALP SERPL-CCNC: 64 U/L (ref 30–99)
ALT SERPL-CCNC: 11 U/L (ref 2–50)
ANION GAP SERPL CALC-SCNC: 11 MMOL/L (ref 7–16)
APTT PPP: 25.2 SEC (ref 24.7–36)
AST SERPL-CCNC: 20 U/L (ref 12–45)
BARCODED ABORH UBTYP: 7300
BARCODED PRD CODE UBPRD: NORMAL
BARCODED UNIT NUM UBUNT: NORMAL
BASOPHILS # BLD AUTO: 0.9 % (ref 0–1.8)
BASOPHILS # BLD: 0.06 K/UL (ref 0–0.12)
BILIRUB SERPL-MCNC: 0.6 MG/DL (ref 0.1–1.5)
BLD GP AB SCN SERPL QL: NORMAL
BUN SERPL-MCNC: 13 MG/DL (ref 8–22)
CALCIUM SERPL-MCNC: 8.6 MG/DL (ref 8.4–10.2)
CHLORIDE SERPL-SCNC: 104 MMOL/L (ref 96–112)
CO2 SERPL-SCNC: 22 MMOL/L (ref 20–33)
COMMENT 1642: NORMAL
COMPONENT R 8504R: NORMAL
CREAT SERPL-MCNC: 0.78 MG/DL (ref 0.5–1.4)
EOSINOPHIL # BLD AUTO: 0.02 K/UL (ref 0–0.51)
EOSINOPHIL NFR BLD: 0.3 % (ref 0–6.9)
ERYTHROCYTE [DISTWIDTH] IN BLOOD BY AUTOMATED COUNT: 46.6 FL (ref 35.9–50)
GLOBULIN SER CALC-MCNC: 3.2 G/DL (ref 1.9–3.5)
GLUCOSE SERPL-MCNC: 92 MG/DL (ref 65–99)
HCT VFR BLD AUTO: 24.8 % (ref 37–47)
HGB BLD-MCNC: 6.7 G/DL (ref 12–16)
HGB BLD-MCNC: 7.2 G/DL (ref 12–16)
IMM GRANULOCYTES # BLD AUTO: 0.03 K/UL (ref 0–0.11)
IMM GRANULOCYTES NFR BLD AUTO: 0.4 % (ref 0–0.9)
INR PPP: 0.98 (ref 0.87–1.13)
LACTATE BLD-SCNC: 1 MMOL/L (ref 0.5–2)
LACTATE BLD-SCNC: 1.3 MMOL/L (ref 0.5–2)
LYMPHOCYTES # BLD AUTO: 1.77 K/UL (ref 1–4.8)
LYMPHOCYTES NFR BLD: 25.7 % (ref 22–41)
MAGNESIUM SERPL-MCNC: 2.4 MG/DL (ref 1.5–2.5)
MCH RBC QN AUTO: 19.6 PG (ref 27–33)
MCHC RBC AUTO-ENTMCNC: 29 G/DL (ref 33.6–35)
MCV RBC AUTO: 67.4 FL (ref 81.4–97.8)
MONOCYTES # BLD AUTO: 0.96 K/UL (ref 0–0.85)
MONOCYTES NFR BLD AUTO: 14 % (ref 0–13.4)
NEUTROPHILS # BLD AUTO: 4.04 K/UL (ref 2–7.15)
NEUTROPHILS NFR BLD: 58.7 % (ref 44–72)
NRBC # BLD AUTO: 0 K/UL
NRBC BLD-RTO: 0 /100 WBC
PLATELET # BLD AUTO: 562 K/UL (ref 164–446)
PMV BLD AUTO: 9.1 FL (ref 9–12.9)
POTASSIUM SERPL-SCNC: 3.8 MMOL/L (ref 3.6–5.5)
PRODUCT TYPE UPROD: NORMAL
PROT SERPL-MCNC: 6.8 G/DL (ref 6–8.2)
PROTHROMBIN TIME: 12.2 SEC (ref 12–14.6)
RBC # BLD AUTO: 3.68 M/UL (ref 4.2–5.4)
RH BLD: NORMAL
SODIUM SERPL-SCNC: 137 MMOL/L (ref 135–145)
UNIT STATUS USTAT: NORMAL
WBC # BLD AUTO: 6.9 K/UL (ref 4.8–10.8)

## 2021-08-09 PROCEDURE — 86901 BLOOD TYPING SEROLOGIC RH(D): CPT

## 2021-08-09 PROCEDURE — 85730 THROMBOPLASTIN TIME PARTIAL: CPT

## 2021-08-09 PROCEDURE — 85018 HEMOGLOBIN: CPT

## 2021-08-09 PROCEDURE — 85025 COMPLETE CBC W/AUTO DIFF WBC: CPT

## 2021-08-09 PROCEDURE — 83735 ASSAY OF MAGNESIUM: CPT

## 2021-08-09 PROCEDURE — 85610 PROTHROMBIN TIME: CPT

## 2021-08-09 PROCEDURE — 770020 HCHG ROOM/CARE - TELE (206)

## 2021-08-09 PROCEDURE — 83605 ASSAY OF LACTIC ACID: CPT | Mod: 91

## 2021-08-09 PROCEDURE — 80053 COMPREHEN METABOLIC PANEL: CPT

## 2021-08-09 PROCEDURE — 94760 N-INVAS EAR/PLS OXIMETRY 1: CPT

## 2021-08-09 PROCEDURE — 86900 BLOOD TYPING SEROLOGIC ABO: CPT

## 2021-08-09 PROCEDURE — 86850 RBC ANTIBODY SCREEN: CPT

## 2021-08-09 PROCEDURE — 99223 1ST HOSP IP/OBS HIGH 75: CPT | Performed by: STUDENT IN AN ORGANIZED HEALTH CARE EDUCATION/TRAINING PROGRAM

## 2021-08-09 RX ORDER — SODIUM CHLORIDE 9 MG/ML
INJECTION, SOLUTION INTRAVENOUS CONTINUOUS
Status: ACTIVE | OUTPATIENT
Start: 2021-08-10 | End: 2021-08-10

## 2021-08-09 RX ORDER — NICOTINE 21 MG/24HR
14 PATCH, TRANSDERMAL 24 HOURS TRANSDERMAL
Status: DISCONTINUED | OUTPATIENT
Start: 2021-08-10 | End: 2021-08-11 | Stop reason: HOSPADM

## 2021-08-09 RX ORDER — ACETAMINOPHEN 325 MG/1
650 TABLET ORAL EVERY 6 HOURS PRN
Status: DISCONTINUED | OUTPATIENT
Start: 2021-08-09 | End: 2021-08-11 | Stop reason: HOSPADM

## 2021-08-09 RX ORDER — AMOXICILLIN 250 MG
2 CAPSULE ORAL 2 TIMES DAILY
Status: DISCONTINUED | OUTPATIENT
Start: 2021-08-09 | End: 2021-08-11 | Stop reason: HOSPADM

## 2021-08-09 RX ORDER — BISACODYL 10 MG
10 SUPPOSITORY, RECTAL RECTAL
Status: DISCONTINUED | OUTPATIENT
Start: 2021-08-09 | End: 2021-08-11 | Stop reason: HOSPADM

## 2021-08-09 RX ORDER — ATORVASTATIN CALCIUM 40 MG/1
80 TABLET, FILM COATED ORAL DAILY
Status: DISCONTINUED | OUTPATIENT
Start: 2021-08-10 | End: 2021-08-11 | Stop reason: HOSPADM

## 2021-08-09 RX ORDER — ONDANSETRON 2 MG/ML
4 INJECTION INTRAMUSCULAR; INTRAVENOUS EVERY 4 HOURS PRN
Status: DISCONTINUED | OUTPATIENT
Start: 2021-08-09 | End: 2021-08-09

## 2021-08-09 RX ORDER — VENLAFAXINE HYDROCHLORIDE 75 MG/1
75 CAPSULE, EXTENDED RELEASE ORAL DAILY
Status: DISCONTINUED | OUTPATIENT
Start: 2021-08-10 | End: 2021-08-11 | Stop reason: HOSPADM

## 2021-08-09 RX ORDER — PROMETHAZINE HYDROCHLORIDE 25 MG/1
12.5-25 TABLET ORAL EVERY 4 HOURS PRN
Status: DISCONTINUED | OUTPATIENT
Start: 2021-08-09 | End: 2021-08-11 | Stop reason: HOSPADM

## 2021-08-09 RX ORDER — ONDANSETRON 4 MG/1
4 TABLET, ORALLY DISINTEGRATING ORAL EVERY 4 HOURS PRN
Status: DISCONTINUED | OUTPATIENT
Start: 2021-08-09 | End: 2021-08-11 | Stop reason: HOSPADM

## 2021-08-09 RX ORDER — PROMETHAZINE HYDROCHLORIDE 25 MG/1
12.5-25 SUPPOSITORY RECTAL EVERY 4 HOURS PRN
Status: DISCONTINUED | OUTPATIENT
Start: 2021-08-09 | End: 2021-08-11 | Stop reason: HOSPADM

## 2021-08-09 RX ORDER — LABETALOL HYDROCHLORIDE 5 MG/ML
10 INJECTION, SOLUTION INTRAVENOUS EVERY 4 HOURS PRN
Status: ACTIVE | OUTPATIENT
Start: 2021-08-09 | End: 2021-08-09

## 2021-08-09 RX ORDER — PROCHLORPERAZINE EDISYLATE 5 MG/ML
5-10 INJECTION INTRAMUSCULAR; INTRAVENOUS EVERY 4 HOURS PRN
Status: DISCONTINUED | OUTPATIENT
Start: 2021-08-09 | End: 2021-08-11 | Stop reason: HOSPADM

## 2021-08-09 RX ORDER — LEVOTHYROXINE SODIUM 88 UG/1
88 TABLET ORAL
Status: DISCONTINUED | OUTPATIENT
Start: 2021-08-10 | End: 2021-08-11 | Stop reason: HOSPADM

## 2021-08-09 RX ORDER — ONDANSETRON 2 MG/ML
4 INJECTION INTRAMUSCULAR; INTRAVENOUS EVERY 4 HOURS PRN
Status: DISCONTINUED | OUTPATIENT
Start: 2021-08-09 | End: 2021-08-11 | Stop reason: HOSPADM

## 2021-08-09 RX ORDER — ACETAMINOPHEN 325 MG/1
650 TABLET ORAL EVERY 6 HOURS PRN
Status: ACTIVE | OUTPATIENT
Start: 2021-08-09 | End: 2021-08-09

## 2021-08-09 RX ORDER — POLYETHYLENE GLYCOL 3350 17 G/17G
1 POWDER, FOR SOLUTION ORAL
Status: DISCONTINUED | OUTPATIENT
Start: 2021-08-09 | End: 2021-08-11 | Stop reason: HOSPADM

## 2021-08-09 ASSESSMENT — COGNITIVE AND FUNCTIONAL STATUS - GENERAL
MOBILITY SCORE: 24
SUGGESTED CMS G CODE MODIFIER DAILY ACTIVITY: CH
SUGGESTED CMS G CODE MODIFIER MOBILITY: CH
DAILY ACTIVITIY SCORE: 24

## 2021-08-09 ASSESSMENT — ENCOUNTER SYMPTOMS
WEAKNESS: 1
MYALGIAS: 0
DIZZINESS: 1
NAUSEA: 0
FEVER: 0
COUGH: 0
SHORTNESS OF BREATH: 0
BLOOD IN STOOL: 1
HEADACHES: 0
EYES NEGATIVE: 1
CHILLS: 0
VOMITING: 0

## 2021-08-09 ASSESSMENT — LIFESTYLE VARIABLES
EVER FELT BAD OR GUILTY ABOUT YOUR DRINKING: YES
EVER HAD A DRINK FIRST THING IN THE MORNING TO STEADY YOUR NERVES TO GET RID OF A HANGOVER: NO
CONSUMPTION TOTAL: POSITIVE
TOTAL SCORE: 2
HAVE YOU EVER FELT YOU SHOULD CUT DOWN ON YOUR DRINKING: YES
HOW MANY TIMES IN THE PAST YEAR HAVE YOU HAD 5 OR MORE DRINKS IN A DAY: 30
HAVE PEOPLE ANNOYED YOU BY CRITICIZING YOUR DRINKING: NO
TOTAL SCORE: 2
TOTAL SCORE: 2
ALCOHOL_USE: YES
AVERAGE NUMBER OF DAYS PER WEEK YOU HAVE A DRINK CONTAINING ALCOHOL: 6
ON A TYPICAL DAY WHEN YOU DRINK ALCOHOL HOW MANY DRINKS DO YOU HAVE: 4

## 2021-08-09 ASSESSMENT — PATIENT HEALTH QUESTIONNAIRE - PHQ9
SUM OF ALL RESPONSES TO PHQ9 QUESTIONS 1 AND 2: 0
1. LITTLE INTEREST OR PLEASURE IN DOING THINGS: NOT AT ALL
2. FEELING DOWN, DEPRESSED, IRRITABLE, OR HOPELESS: NOT AT ALL

## 2021-08-09 ASSESSMENT — FIBROSIS 4 INDEX: FIB4 SCORE: 4.54

## 2021-08-09 ASSESSMENT — COPD QUESTIONNAIRES
DO YOU EVER COUGH UP ANY MUCUS OR PHLEGM?: NO/ONLY WITH OCCASIONAL COLDS OR INFECTIONS
COPD SCREENING SCORE: 2
HAVE YOU SMOKED AT LEAST 100 CIGARETTES IN YOUR ENTIRE LIFE: NO/DON'T KNOW
DURING THE PAST 4 WEEKS HOW MUCH DID YOU FEEL SHORT OF BREATH: NONE/LITTLE OF THE TIME

## 2021-08-09 NOTE — PROGRESS NOTES
[facility] HOSPITALIST TRIAGE OFFICER DIRECT ADMISSION REPORT  Transferring facility: [facility]    Transferring physician: Dr Baumann    Transferring facility/physician contact number: [facility]    Chief complaint: Melena    Pertinent history & patient course: Patient is 61-year-old female with significant history coronary artery disease status post coronary artery stenting in approximately 2019 and since that time is been on aspirin and also Plavix dual antiplatelet therapy who was in her usual state of health until approximate 1 week ago when she began experiencing melena and also bright red blood per rectum.  Patient became progressively more weak throughout the duration of this and progressively more symptomatic patient sought care at [facility] for further evaluation.    Evaluation at [facility] patient was found to have a hemoglobin of approximately 7.6 normal coag elation panel vital signs were essentially stable.  [facility] it should be noted does not have gastroenterology service available.  Provider at [facility] discussed with her general surgery on the side and it was determined that patient would likely benefit from gastroenterology intervention.  Dr. Julio was contacted by GI consultants and is agreeable to having the patient go to [facility] for likely endoscopy and also colonoscopy depending on clinical evaluation.    It should be noted that patient should have hemoglobin is trended every 6 hours upon arrival to [facility] in addition patient should also have PRBC transfused for hemoglobin less than 8 given her history of recent ischemic cardiac disease however will defer this to hospitalist at [facility].    Pertinent imaging & lab results: Hemoglobin 7.6    Code Status: Full per transferring provider, I personally verified with the transferring provider patient's code status and the transferring provider has confirmed this with the  patient.    Further work up or recommendations per triage officer prior to transfer: None    Consultants called prior to transfer and pertinent input from consultants: Dr. Fonseca    Patient accepted for transfer: Yes    Consultants to be called upon arrival: Marian    Admission status: Observation.     Floor requested: Telemetry    If ICU transfer, name of intensivist case discussed with and pertinent input from critical care: Not applicable    11-20 min spent with >50% of total time discussing plan of care with requesting physician.    Please inform the triage officer upon arrival of the patient to [facility] for assignment of a hospitalist to perform admission.     For any question or concerns regarding the care of this patient, please reach out to the assigned hospitalist.

## 2021-08-10 LAB
ABO + RH BLD: NORMAL
CHOLEST SERPL-MCNC: 128 MG/DL (ref 100–199)
EST. AVERAGE GLUCOSE BLD GHB EST-MCNC: 100 MG/DL
HBA1C MFR BLD: 5.1 % (ref 4–5.6)
HDLC SERPL-MCNC: 41 MG/DL
HGB BLD-MCNC: 8.2 G/DL (ref 12–16)
HGB BLD-MCNC: 8.4 G/DL (ref 12–16)
HGB BLD-MCNC: 8.6 G/DL (ref 12–16)
LACTATE BLD-SCNC: 1 MMOL/L (ref 0.5–2)
LACTATE BLD-SCNC: 1.1 MMOL/L (ref 0.5–2)
LDLC SERPL CALC-MCNC: 60 MG/DL
TRIGL SERPL-MCNC: 134 MG/DL (ref 0–149)

## 2021-08-10 PROCEDURE — 700111 HCHG RX REV CODE 636 W/ 250 OVERRIDE (IP): Performed by: STUDENT IN AN ORGANIZED HEALTH CARE EDUCATION/TRAINING PROGRAM

## 2021-08-10 PROCEDURE — 80061 LIPID PANEL: CPT

## 2021-08-10 PROCEDURE — 94760 N-INVAS EAR/PLS OXIMETRY 1: CPT

## 2021-08-10 PROCEDURE — 30233N1 TRANSFUSION OF NONAUTOLOGOUS RED BLOOD CELLS INTO PERIPHERAL VEIN, PERCUTANEOUS APPROACH: ICD-10-PCS | Performed by: INTERNAL MEDICINE

## 2021-08-10 PROCEDURE — 700111 HCHG RX REV CODE 636 W/ 250 OVERRIDE (IP)

## 2021-08-10 PROCEDURE — 99233 SBSQ HOSP IP/OBS HIGH 50: CPT | Performed by: INTERNAL MEDICINE

## 2021-08-10 PROCEDURE — 86923 COMPATIBILITY TEST ELECTRIC: CPT

## 2021-08-10 PROCEDURE — 36430 TRANSFUSION BLD/BLD COMPNT: CPT

## 2021-08-10 PROCEDURE — 700105 HCHG RX REV CODE 258: Performed by: STUDENT IN AN ORGANIZED HEALTH CARE EDUCATION/TRAINING PROGRAM

## 2021-08-10 PROCEDURE — 83605 ASSAY OF LACTIC ACID: CPT

## 2021-08-10 PROCEDURE — 700102 HCHG RX REV CODE 250 W/ 637 OVERRIDE(OP): Performed by: INTERNAL MEDICINE

## 2021-08-10 PROCEDURE — C9113 INJ PANTOPRAZOLE SODIUM, VIA: HCPCS | Performed by: STUDENT IN AN ORGANIZED HEALTH CARE EDUCATION/TRAINING PROGRAM

## 2021-08-10 PROCEDURE — 83550 IRON BINDING TEST: CPT

## 2021-08-10 PROCEDURE — 85018 HEMOGLOBIN: CPT

## 2021-08-10 PROCEDURE — A9270 NON-COVERED ITEM OR SERVICE: HCPCS | Performed by: INTERNAL MEDICINE

## 2021-08-10 PROCEDURE — 700102 HCHG RX REV CODE 250 W/ 637 OVERRIDE(OP): Performed by: STUDENT IN AN ORGANIZED HEALTH CARE EDUCATION/TRAINING PROGRAM

## 2021-08-10 PROCEDURE — 96365 THER/PROPH/DIAG IV INF INIT: CPT

## 2021-08-10 PROCEDURE — 82607 VITAMIN B-12: CPT

## 2021-08-10 PROCEDURE — C9113 INJ PANTOPRAZOLE SODIUM, VIA: HCPCS

## 2021-08-10 PROCEDURE — P9016 RBC LEUKOCYTES REDUCED: HCPCS

## 2021-08-10 PROCEDURE — 770020 HCHG ROOM/CARE - TELE (206)

## 2021-08-10 PROCEDURE — 83036 HEMOGLOBIN GLYCOSYLATED A1C: CPT

## 2021-08-10 PROCEDURE — 83540 ASSAY OF IRON: CPT

## 2021-08-10 PROCEDURE — A9270 NON-COVERED ITEM OR SERVICE: HCPCS | Performed by: STUDENT IN AN ORGANIZED HEALTH CARE EDUCATION/TRAINING PROGRAM

## 2021-08-10 PROCEDURE — 96366 THER/PROPH/DIAG IV INF ADDON: CPT

## 2021-08-10 PROCEDURE — 82728 ASSAY OF FERRITIN: CPT

## 2021-08-10 RX ORDER — PANTOPRAZOLE SODIUM 40 MG/10ML
INJECTION, POWDER, LYOPHILIZED, FOR SOLUTION INTRAVENOUS
Status: COMPLETED
Start: 2021-08-10 | End: 2021-08-10

## 2021-08-10 RX ORDER — PEG-3350, SODIUM SULFATE, SODIUM CHLORIDE, POTASSIUM CHLORIDE, SODIUM ASCORBATE AND ASCORBIC ACID 7.5-2.691G
100 KIT ORAL 2 TIMES DAILY
Status: COMPLETED | OUTPATIENT
Start: 2021-08-10 | End: 2021-08-11

## 2021-08-10 RX ADMIN — SODIUM CHLORIDE 8 MG/HR: 9 INJECTION, SOLUTION INTRAVENOUS at 23:57

## 2021-08-10 RX ADMIN — PANTOPRAZOLE SODIUM 80 MG: 40 INJECTION, POWDER, LYOPHILIZED, FOR SOLUTION INTRAVENOUS at 00:57

## 2021-08-10 RX ADMIN — SODIUM CHLORIDE: 9 INJECTION, SOLUTION INTRAVENOUS at 00:56

## 2021-08-10 RX ADMIN — ATORVASTATIN CALCIUM 80 MG: 40 TABLET, FILM COATED ORAL at 05:33

## 2021-08-10 RX ADMIN — POLYETHYLENE GLYCOL 3350, SODIUM SULFATE, SODIUM CHLORIDE, POTASSIUM CHLORIDE, ASCORBIC ACID, SODIUM ASCORBATE 100 G: KIT at 17:55

## 2021-08-10 RX ADMIN — SODIUM CHLORIDE 8 MG/HR: 9 INJECTION, SOLUTION INTRAVENOUS at 10:56

## 2021-08-10 RX ADMIN — VENLAFAXINE HYDROCHLORIDE 75 MG: 75 CAPSULE, EXTENDED RELEASE ORAL at 05:33

## 2021-08-10 RX ADMIN — NICOTINE 14 MG: 14 PATCH TRANSDERMAL at 10:57

## 2021-08-10 RX ADMIN — LEVOTHYROXINE SODIUM 88 MCG: 88 TABLET ORAL at 05:33

## 2021-08-10 ASSESSMENT — ENCOUNTER SYMPTOMS
VOMITING: 0
INSOMNIA: 0
NERVOUS/ANXIOUS: 0
DIARRHEA: 0
WEAKNESS: 0
DIZZINESS: 0
HEARTBURN: 0
FEVER: 0
CONSTIPATION: 0
ABDOMINAL PAIN: 0
SENSORY CHANGE: 0
SORE THROAT: 0
PHOTOPHOBIA: 0
HEADACHES: 0
SPEECH CHANGE: 0
BLURRED VISION: 0
COUGH: 0
CLAUDICATION: 0
BLOOD IN STOOL: 1
DEPRESSION: 0
MYALGIAS: 0
SHORTNESS OF BREATH: 0
CHILLS: 0

## 2021-08-10 NOTE — PROGRESS NOTES
Tele strip at 1307 shows SR at 73.      Measurements from am strip were as follows:  AZ=0.14  QRS=0.10  QT=0.40    Tele Shift Summary:    Rhythm : SR  Rate : 64-73  Ectopy : Per CCT Shanthi, pt had rare PVCs.     Telemetry monitoring strips placed in pt's chart.

## 2021-08-10 NOTE — CARE PLAN
The patient is Watcher - Medium risk of patient condition declining or worsening    Shift Goals  Clinical Goals: Monitor s/s of GI bleed, Trend H/H  Patient Goals: Rest comfortably    Progress made toward(s) clinical / shift goals:  Patient is resting comfortably. Reports a 0/10 pain and no nausea or vomiting. Gastroenterology was at bedside and explained plan of care moving forward and answered all questions.       Problem: Knowledge Deficit - Standard  Goal: Patient and family/care givers will demonstrate understanding of plan of care, disease process/condition, diagnostic tests and medications  8/10/2021 1436 by Mikki Valadez R.N.  Outcome: Progressing

## 2021-08-10 NOTE — HOSPITAL COURSE
Marisol Brown is a 61 y.o. female with h/o HTN, CAB, s/p stents (on DAPT) who transferred from [facility] 8/9/2021 with GI bleeding. Patient was having melena for one week, also had bright red blood per rectum, became lightheaded. Patient transferred to [facility] for EGD/colonoscopy. GI consulted and recommending EGD and colonoscopy.  PPI infusion has been started also.

## 2021-08-10 NOTE — CONSULTS
Gastroenterology Consult Note:    Curtis Nunez M.D.  Date & Time note created:    8/10/2021   10:10 AM     Patient ID:  Name:             Marisol Brown    YOB: 1960  Age:                 61 y.o.  female  MRN:               [id number]    Referring MD:  Dr. Suh                                                             Chief Complaint(s):      Melena and BRBPR    History of Present Illness:    This is a very pleasant 61 y.o. female with the past medical history of HTN, CAB, s/p stents (on DAPT) who transferred from [facility] 8/9/2021 with GI bleeding. Patient was having melena for one week, also had bright red blood per rectum, became lightheaded. Patient transferred to [facility] for EGD/colonoscopy. Dr. Julio was consulted prior transfer and he approved it. Patient denies fever, chills, chest pain, nausea, dysuria. She had a COL in 2010, which was neg. Denied FH of GI cancer. She is a current smoker, 1 pack every other day. She also has dysphagia in the neck area.     On average, the patient has 1 BM a day or every other day, mostly type 3 on the [place] stool chart. The patient does take NSAIDs (ASA 81 mg daily for heart).     Otherwise the patient is doing fine without complaints of fever/chills/weight loss/appetite change/odynophagia/heartburn/nausea/vomiting/bloating/constipation/diarrhea or abdominal pain.    Review of Systems:      Constitutional: Denies fevers, weight changes  Eyes: Denies changes in vision, jaundice  Ears/Nose/Throat/Mouth: Denies nasal congestion or sore throat   Cardiovascular: Denies chest pain or palpitations   Respiratory: Denies shortness of breath, denies cough  Gastrointestinal/Hepatic: Denies abdominal pain, nausea, vomiting, diarrhea, constipation; pos GI bleeding   Genitourinary: Denies dysuria or frequency  Musculoskeletal/Rheum: Denies  joint pain and swelling, edema  Skin: Denies rash  Neurological: Denies headache, confusion, memory loss or focal  weakness/parasthesias  Psychiatric: denies mood disorder   Endocrine: Bev thyroid problems  Heme/Oncology/Lymph Nodes: Denies enlarged lymph nodes, denies brusing or known bleeding disorder  All other systems were reviewed and are negative (AMA/CMS criteria)            ROS    Past Medical History:   Past Medical History:   Diagnosis Date   • Depression    • Essential hypertension    • Hypercholesteremia    • Hypothyroidism    • Mixed hyperlipidemia      Active Hospital Problems    Diagnosis    • GI bleeding [K92.2]    • Hypothyroidism [E03.9]    • Dependence on nicotine from cigarettes [F17.210]    • Dyslipidemia [E78.5]    • Essential hypertension [I10]        Past Surgical History:  Past Surgical History:   Procedure Laterality Date   • CARPAL TUNNEL RELEASE      Right   • DENTAL EXTRACTION(S)     • TUBAL LIGATION         Hospital Medications:  Current Facility-Administered Medications   Medication Dose Frequency Provider Last Rate Last Admin   • atorvastatin (LIPITOR) tablet 80 mg  80 mg DAILY Liyah Loera M.D.   80 mg at 08/10/21 0533   • levothyroxine (SYNTHROID) tablet 88 mcg  88 mcg AM ES Liyah Loera M.D.   88 mcg at 08/10/21 0533   • venlafaxine XR (EFFEXOR XR) capsule 75 mg  75 mg DAILY Liyah Loera M.D.   75 mg at 08/10/21 0533   • senna-docusate (PERICOLACE or SENOKOT S) 8.6-50 MG per tablet 2 tablet  2 tablet BID Liyah Loera M.D.        And   • polyethylene glycol/lytes (MIRALAX) PACKET 1 Packet  1 Packet QDAY PRN Liyah Loera M.D.        And   • magnesium hydroxide (MILK OF MAGNESIA) suspension 30 mL  30 mL QDAY PRN Liyah Loera M.D.        And   • bisacodyl (DULCOLAX) suppository 10 mg  10 mg QDAY PRN Liyah Loera M.D.       • Respiratory Therapy Consult   Continuous RT Liyah Loera M.D.       • acetaminophen (Tylenol) tablet 650 mg  650 mg Q6HRS PRN Liyah Loera M.D.       • ondansetron (ZOFRAN) syringe/vial injection 4 mg  4 mg Q4HRS PRN  Liyah Loera M.D.       • ondansetron (ZOFRAN ODT) dispertab 4 mg  4 mg Q4HRS PRN Liyah Loera M.D.       • promethazine (PHENERGAN) tablet 12.5-25 mg  12.5-25 mg Q4HRS PRN Liyah Loera M.D.       • promethazine (PHENERGAN) suppository 12.5-25 mg  12.5-25 mg Q4HRS PRN Liyah Loera M.D.       • prochlorperazine (COMPAZINE) injection 5-10 mg  5-10 mg Q4HRS PRN Liyah Loera M.D.       • nicotine (NICODERM) 14 MG/24HR 14 mg  14 mg Daily-0600 Liyah Loera M.D.        And   • nicotine polacrilex (NICORETTE) 2 MG piece 2 mg  2 mg Q HOUR PRN Liyah Loera M.D.       • pantoprazole (PROTONIX) 80 mg in  mL Infusion  8 mg/hr Continuous Liyah Loera M.D.       • NS infusion   Continuous Liyah Loera M.D. 30 mL/hr at 08/10/21 0056 New Bag at 08/10/21 0056   Last reviewed on 8/9/2021  5:02 PM by Keya Hedrick R.N.      Current Outpatient Medications:  Medications Prior to Admission   Medication Sig Dispense Refill Last Dose   • atorvastatin (LIPITOR) 80 MG tablet Take 1 Tab by mouth every day. 30 Tab 0 8/8/2021 at Unknown time   • aspirin EC 81 MG EC tablet Take 1 Tab by mouth every day. 30 Tab 0 8/8/2021 at Unknown time   • lisinopril (PRINIVIL) 10 MG Tab Take 1 Tab by mouth every day. 30 Tab 0 8/8/2021 at Unknown time   • clopidogrel (PLAVIX) 75 MG Tab Take 1 Tab by mouth every day. 30 Tab 1 8/8/2021 at Unknown time   • metoprolol SR (TOPROL XL) 50 MG TABLET SR 24 HR Take 1 Tab by mouth every evening. (Patient not taking: Reported on 8/9/2021) 30 Tab 11 Not Taking at Unknown time   • spironolactone (ALDACTONE) 25 MG Tab Take 0.5 Tabs by mouth every day. 30 Tab 11 8/8/2021 at Unknown time   • levothyroxine (SYNTHROID) 88 MCG Tab Take 88 mcg by mouth Every morning on an empty stomach.   8/8/2021 at Unknown time   • venlafaxine XR (EFFEXOR XR) 75 MG CAPSULE SR 24 HR Take 75 mg by mouth every day.   8/8/2021 at Unknown time       Medication Allergy:  Allergies    Allergen Reactions   • Codeine        Family History:  Family History   Problem Relation Age of Onset   • Heart Disease Mother    • Dementia Father    • Hyperlipidemia Sister    • Hyperlipidemia Brother        Social History:  Social History     Socioeconomic History   • Marital status:      Spouse name: Not on file   • Number of children: Not on file   • Years of education: Not on file   • Highest education level: Not on file   Occupational History   • Not on file   Tobacco Use   • Smoking status: Current Every Day Smoker     Packs/day: 1.00     Types: Cigarettes     Start date: 1974   • Smokeless tobacco: Never Used   Vaping Use   • Vaping Use: Never used   Substance and Sexual Activity   • Alcohol use: Yes     Alcohol/week: 7.2 oz     Types: 12 Cans of beer per week   • Drug use: Yes     Types: Inhaled   • Sexual activity: Not on file   Other Topics Concern   • Not on file   Social History Narrative   • Not on file     Social Determinants of Health     Financial Resource Strain:    • Difficulty of Paying Living Expenses:    Food Insecurity:    • Worried About Running Out of Food in the Last Year:    • Ran Out of Food in the Last Year:    Transportation Needs:    • Lack of Transportation (Medical):    • Lack of Transportation (Non-Medical):    Physical Activity:    • Days of Exercise per Week:    • Minutes of Exercise per Session:    Stress:    • Feeling of Stress :    Social Connections:    • Frequency of Communication with Friends and Family:    • Frequency of Social Gatherings with Friends and Family:    • Attends [employer] Services:    • Active Member of Clubs or Organizations:    • Attends Club or Organization Meetings:    • Marital Status:    Intimate Partner Violence:    • Fear of Current or Ex-Partner:    • Emotionally Abused:    • Physically Abused:    • Sexually Abused:        Physical Exam:  Weight/BMI: Body mass index is 24.56 kg/m².  /69   Pulse 72   Temp 37.4 °C (99.3 °F) (Oral)    "Resp 18   Ht 1.702 m (5' 7\")   Wt 71.1 kg (156 lb 12.8 oz)   SpO2 96%   Vitals:    08/10/21 0211 08/10/21 0321 08/10/21 0537 08/10/21 0800   BP: 114/55 108/61 123/63 120/69   Pulse: 82 75 68 72   Resp: 17 15 16 18   Temp: 36.3 °C (97.3 °F) 36.9 °C (98.4 °F) 37.2 °C (98.9 °F) 37.4 °C (99.3 °F)   TempSrc: Oral Temporal Oral Oral   SpO2: 97% 96% 96% 96%   Weight:       Height:         Oxygen Therapy:  Pulse Oximetry: 96 %, O2 (LPM): 0, O2 Delivery Device: None - Room Air    Intake/Output Summary (Last 24 hours) at 8/10/2021 1010  Last data filed at 8/10/2021 0537  Gross per 24 hour   Intake 483.33 ml   Output --   Net 483.33 ml     Physical Exam     Constitutional:   Well developed, well nourished, no acute distress  HEENT:  Normocephalic, Atraumatic, Conjunctiva not pale, Sclera not icteric, Oropharynx moist mucous membranes, No oral exudates, Nose normal.  No thyromegaly.  Neck:  Normal range of motion, No cervical tenderness,  no JVD.  Chest/Lungs:  Symmetric expansion, no spider angioma, breath sounds clear to auscultation bilaterally,  no crackles, no wheezing.   Cardiovascular:  Normal heart rate, Normal rhythm, No murmurs, No rubs, No gallops.    Abdomen: Bowel sounds normal, Soft, No tenderness, No guarding, No rebound, No masses, No hepatosplenomegaly.  Extremities: No cyanosis/clubbing/edema/palmar erythema/flapping tremor  Skin: Warm, Dry, No erythema, No rash, no induration.    MDM (Data Review):     Records reviewed and summarized in current documentation    Lab Data Review:  Recent Results (from the past 24 hour(s))   CBC with Differential    Collection Time: 08/09/21  6:56 PM   Result Value Ref Range    WBC 6.9 4.8 - 10.8 K/uL    RBC 3.68 (L) 4.20 - 5.40 M/uL    Hemoglobin 7.2 (L) 12.0 - 16.0 g/dL    Hematocrit 24.8 (L) 37.0 - 47.0 %    MCV 67.4 (L) 81.4 - 97.8 fL    MCH 19.6 (L) 27.0 - 33.0 pg    MCHC 29.0 (L) 33.6 - 35.0 g/dL    RDW 46.6 35.9 - 50.0 fL    Platelet Count 562 (H) 164 - 446 K/uL    MPV " 9.1 9.0 - 12.9 fL    Neutrophils-Polys 58.70 44.00 - 72.00 %    Lymphocytes 25.70 22.00 - 41.00 %    Monocytes 14.00 (H) 0.00 - 13.40 %    Eosinophils 0.30 0.00 - 6.90 %    Basophils 0.90 0.00 - 1.80 %    Immature Granulocytes 0.40 0.00 - 0.90 %    Nucleated RBC 0.00 /100 WBC    Neutrophils (Absolute) 4.04 2.00 - 7.15 K/uL    Lymphs (Absolute) 1.77 1.00 - 4.80 K/uL    Monos (Absolute) 0.96 (H) 0.00 - 0.85 K/uL    Eos (Absolute) 0.02 0.00 - 0.51 K/uL    Baso (Absolute) 0.06 0.00 - 0.12 K/uL    Immature Granulocytes (abs) 0.03 0.00 - 0.11 K/uL    NRBC (Absolute) 0.00 K/uL   Comp Metabolic Panel (CMP)    Collection Time: 08/09/21  6:56 PM   Result Value Ref Range    Sodium 137 135 - 145 mmol/L    Potassium 3.8 3.6 - 5.5 mmol/L    Chloride 104 96 - 112 mmol/L    Co2 22 20 - 33 mmol/L    Anion Gap 11.0 7.0 - 16.0    Glucose 92 65 - 99 mg/dL    Bun 13 8 - 22 mg/dL    Creatinine 0.78 0.50 - 1.40 mg/dL    Calcium 8.6 8.4 - 10.2 mg/dL    AST(SGOT) 20 12 - 45 U/L    ALT(SGPT) 11 2 - 50 U/L    Alkaline Phosphatase 64 30 - 99 U/L    Total Bilirubin 0.6 0.1 - 1.5 mg/dL    Albumin 3.6 3.2 - 4.9 g/dL    Total Protein 6.8 6.0 - 8.2 g/dL    Globulin 3.2 1.9 - 3.5 g/dL    A-G Ratio 1.1 g/dL   Magnesium    Collection Time: 08/09/21  6:56 PM   Result Value Ref Range    Magnesium 2.4 1.5 - 2.5 mg/dL   COD - Adult (Type and Screen)    Collection Time: 08/09/21  6:56 PM   Result Value Ref Range    ABO Grouping Only B     Rh Grouping Only POS     Antibody Screen-Cod NEG     Component R       R99                 Red Cells, LR       [id number]   issued       08/10/21   01:33      Product Type R99     Dispense Status issued     Unit Number (Barcoded) [id number]     Product Code (Barcoded) [id number]     Blood Type (Barcoded) 7300    Prothrombin Time    Collection Time: 08/09/21  6:56 PM   Result Value Ref Range    PT 12.2 12.0 - 14.6 sec    INR 0.98 0.87 - 1.13   APTT    Collection Time: 08/09/21  6:56 PM   Result Value Ref Range    APTT  25.2 24.7 - 36.0 sec   LACTIC ACID    Collection Time: 08/09/21  6:56 PM   Result Value Ref Range    Lactic Acid 1.0 0.5 - 2.0 mmol/L   ESTIMATED GFR    Collection Time: 08/09/21  6:56 PM   Result Value Ref Range    GFR If African American >60 >60 mL/min/1.73 m 2    GFR If Non African American >60 >60 mL/min/1.73 m 2   DIFFERENTIAL COMMENT    Collection Time: 08/09/21  6:56 PM   Result Value Ref Range    Comments-Diff see below    LACTIC ACID    Collection Time: 08/09/21 10:58 PM   Result Value Ref Range    Lactic Acid 1.3 0.5 - 2.0 mmol/L   HGB    Collection Time: 08/09/21 10:58 PM   Result Value Ref Range    Hemoglobin 6.7 (L) 12.0 - 16.0 g/dL   ABO Rh Confirm    Collection Time: 08/09/21 10:58 PM   Result Value Ref Range    ABO Rh Confirm B POS    LACTIC ACID    Collection Time: 08/10/21  4:09 AM   Result Value Ref Range    Lactic Acid 1.0 0.5 - 2.0 mmol/L   Lipid Profile    Collection Time: 08/10/21  4:09 AM   Result Value Ref Range    Cholesterol,Tot 128 100 - 199 mg/dL    Triglycerides 134 0 - 149 mg/dL    HDL 41 >=40 mg/dL    LDL 60 <100 mg/dL   HGB    Collection Time: 08/10/21  5:45 AM   Result Value Ref Range    Hemoglobin 8.6 (L) 12.0 - 16.0 g/dL   LACTIC ACID    Collection Time: 08/10/21  7:42 AM   Result Value Ref Range    Lactic Acid 1.1 0.5 - 2.0 mmol/L       MDM (Assessment and Plan):     Active Hospital Problems    Diagnosis    • GI bleeding [K92.2]    • Hypothyroidism [E03.9]    • Dependence on nicotine from cigarettes [F17.210]    • Dyslipidemia [E78.5]    • Essential hypertension [I10]        Imaging/Procedures Review:    No orders to display       Assessment  - Melena   - BRBPR  - Dysphagia  - Hypertension  - PVD  - Hypothyroidism  - Smoker    Plan  -  Admission to the hospital by the hospitalist with gastroenterology following closely. Hospitalist to manage comorbid conditions.  -  Pantoprazole 80 mg IV bolus followed by 8 mg IV per hour.  -  Cl liq diet, no red  -  NPO after midnight  -   Moviprep today  -  Monitor H/H and vitals. Serial hemoglobin and hematocrit q 6-8 hours with transfusions as needed per primary team for hemoglobin less than 7 or hematocrit less than 21.  -  EGD with Bx and Savary dilation, possible hemostasis, and colonoscopy with intervention tomorrow    Risks, benefits, and alternatives were discussed with patient. Consenting persons were given an opportunity to ask questions and discuss other options. Risks including but not limited to failed or incomplete endoscopy, ineffective therapy, perforation, infection, bleeding, missed lesion(s), cardiac and/or pulmonary event, aspiration, stroke, possible need for surgery, hospitalization possibly prolonged, discomfort, unsuccessful and/or incomplete procedure, possible need for repeat procedures and/or additional testings, damage to adjacent organs and/or vascular structures, medication reaction, disability, death, and other adverse events possibly life-threatening. Discussion was undertaken with Layman's terms. Consenting persons stated understanding and acceptance of these risks, and wished to proceed. Consent was given in clear state of mind. All questions were answered.    Thank you very much for allowing me to participate in the care of your patient.  Please feel free to contact me anytime at [phone].     Curtis Nunez M.D.    Core Quality Measures   Reviewed items::  Labs, Medications and Radiology reports reviewed

## 2021-08-10 NOTE — PROGRESS NOTES
[facility] Medicine Daily Progress Note    Date of Service  8/10/2021    Chief Complaint  Marisol Brown is a 61 y.o. female admitted 8/9/2021 with dark and bloody stools.    Hospital Course  Marisol Brown is a 61 y.o. female with h/o HTN, CAB, s/p stents (on DAPT) who transferred from [facility] 8/9/2021 with GI bleeding. Patient was having melena for one week, also had bright red blood per rectum, became lightheaded. Patient transferred to [facility] for EGD/colonoscopy. GI consulted and recommending EGD and colonoscopy.  PPI infusion has been started also.          Interval Problem Update  8/10 Patient without complaint, states she hasn't had a bowel movement since admission.  She denies weakness.  HGB dropped to 6.7 so she received 1 unit of blood and now HGB is up to 8.6.      I have personally seen and examined the patient at bedside. I discussed the plan of care with patient, bedside RN, charge RN,  and pharmacy.    Consultants/Specialty  GI    Code Status  Full Code    Disposition  Patient is not medically cleared.   Anticipate discharge to to home with close outpatient follow-up.  I have placed the appropriate orders for post-discharge needs.    Review of Systems  Review of Systems   Constitutional: Negative for chills and fever.   HENT: Negative for congestion and sore throat.    Eyes: Negative for blurred vision and photophobia.   Respiratory: Negative for cough and shortness of breath.    Cardiovascular: Negative for chest pain, claudication and leg swelling.   Gastrointestinal: Positive for blood in stool and melena. Negative for abdominal pain, constipation, diarrhea, heartburn and vomiting.   Genitourinary: Negative for dysuria and hematuria.   Musculoskeletal: Negative for joint pain and myalgias.   Skin: Negative for itching and rash.   Neurological: Negative for dizziness, sensory change, speech change, weakness and headaches.   Psychiatric/Behavioral: Negative for depression. The  patient is not nervous/anxious and does not have insomnia.         Physical Exam  Temp:  [36.3 °C (97.3 °F)-37.8 °C (100.1 °F)] 37.3 °C (99.2 °F)  Pulse:  [68-84] 70  Resp:  [15-18] 18  BP: ()/(44-69) 113/67  SpO2:  [96 %-99 %] 98 %    Physical Exam  Vitals and nursing note reviewed.   Constitutional:       General: She is not in acute distress.     Appearance: Normal appearance. She is not ill-appearing.   HENT:      Head: Normocephalic and atraumatic.      Nose: Nose normal.   Eyes:      General: No scleral icterus.  Cardiovascular:      Rate and Rhythm: Normal rate and regular rhythm.      Heart sounds: Normal heart sounds. No murmur heard.     Pulmonary:      Effort: Pulmonary effort is normal.      Breath sounds: Normal breath sounds.   Abdominal:      General: Bowel sounds are normal. There is no distension.      Palpations: Abdomen is soft.   Musculoskeletal:         General: No swelling or tenderness.      Cervical back: Neck supple.   Skin:     General: Skin is warm and dry.   Neurological:      General: No focal deficit present.      Mental Status: She is alert and oriented to person, place, and time.   Psychiatric:         Mood and Affect: Mood normal.         Fluids    Intake/Output Summary (Last 24 hours) at 8/10/2021 1429  Last data filed at 8/10/2021 0537  Gross per 24 hour   Intake 483.33 ml   Output --   Net 483.33 ml       Laboratory  Recent Labs     08/09/21  1856 08/09/21  1856 08/09/21  2258 08/10/21  0545 08/10/21  1348   WBC 6.9  --   --   --   --    RBC 3.68*  --   --   --   --    HEMOGLOBIN 7.2*   < > 6.7* 8.6* 8.4*   HEMATOCRIT 24.8*  --   --   --   --    MCV 67.4*  --   --   --   --    MCH 19.6*  --   --   --   --    MCHC 29.0*  --   --   --   --    RDW 46.6  --   --   --   --    PLATELETCT 562*  --   --   --   --    MPV 9.1  --   --   --   --     < > = values in this interval not displayed.     Recent Labs     08/09/21  1856   SODIUM 137   POTASSIUM 3.8   CHLORIDE 104   CO2 22    GLUCOSE 92   BUN 13   CREATININE 0.78   CALCIUM 8.6     Recent Labs     08/09/21  1856   APTT 25.2   INR 0.98         Recent Labs     08/10/21  0409   TRIGLYCERIDE 134   HDL 41   LDL 60       Imaging  No orders to display        Assessment/Plan  * GI bleeding  Assessment & Plan  Hold aspirin, plavix  Monitor H&H  Received 1 unit PRBCs - 8/9 d/t HGB 6.7  IV protonix drip  GI consulted - Dr Nunez  EGD and colonoscopy tomorrow.      Dependence on nicotine from cigarettes  Assessment & Plan  Nicotine replacement  Smoking cessation counseling     Hypothyroidism  Assessment & Plan  Continue home levothyroxine 88 mcg    Coronary artery disease due to lipid rich plaque- (present on admission)  Assessment & Plan  ASA and plavix on hold.       Essential hypertension- (present on admission)  Assessment & Plan  BP normotensive   Hold lisinopril   IVF    Dyslipidemia- (present on admission)  Assessment & Plan  Continue atorvastatin         VTE prophylaxis: pharmacologic prophylaxis contraindicated due to GI bleeding.    I have performed a physical exam and reviewed and updated ROS and Plan today (8/10/2021). In review of yesterday's note (8/9/2021), there are no changes except as documented above.

## 2021-08-10 NOTE — ASSESSMENT & PLAN NOTE
Hold aspirin, plavix  Stable H&H  Received 1 unit PRBCs - 8/9 d/t HGB 6.7  IV protonix drip  GI consulted - Dr Nunez  EGD and colonoscopy today.

## 2021-08-10 NOTE — DISCHARGE PLANNING
"Anticipated Discharge Disposition:   Home when medically cleared     Action:   Chart review complete.     Discussed patient's plan of care and plans for discharge during rounds. Per MD, this patient was admitted for a GI bleed. Per GI note from 7/10,  \"EGD with Bx and Savary dilation, possible hemostasis, and colonoscopy with intervention tomorrow.\" procedure scheduled for 1500 tomorrow.     Medical clearance anticipated in 2-3 days. RN CM will continue to follow and assist as needed.     Barriers to Discharge:   Medical Clearance    Plan:   Hospital care management will continue to assist with discharge planning needs.     "

## 2021-08-10 NOTE — H&P
Hospital Medicine History & Physical Note    Date of Service  8/9/2021    Primary Care Physician  MY King.    Consultants  GI    Specialist Names: Dr. Julio    Code Status  Full Code    Chief Complaint  No chief complaint on file.      History of Presenting Illness  Marisol Brown is a 61 y.o. female with h/o HTN, CAB, s/p stents (on DAPT) who transferred from [facility] 8/9/2021 with GI bleeding. Patient was having melena for one week, also had bright red blood per rectum, became lightheaded. Patient transferred to [facility] for EGD/colonoscopy. Dr. Julio was consulted prior transfer and he approved it. Patient denies fever, chills, chest pain, nausea, dysuria.    I discussed the plan of care with patient and bedside RN.    Review of Systems  Review of Systems   Constitutional: Positive for malaise/fatigue. Negative for chills and fever.   HENT: Negative for hearing loss.    Eyes: Negative.    Respiratory: Negative for cough and shortness of breath.    Cardiovascular: Negative for chest pain.   Gastrointestinal: Positive for blood in stool and melena. Negative for nausea and vomiting.   Genitourinary: Negative for dysuria.   Musculoskeletal: Negative for myalgias.   Skin: Negative for rash.   Neurological: Positive for dizziness and weakness. Negative for headaches.       Past Medical History   has a past medical history of Depression, Essential hypertension, Hypercholesteremia, Hypothyroidism, and Mixed hyperlipidemia.    Surgical History   has a past surgical history that includes tubal ligation; dental extraction(s); and carpal tunnel release.     Family History  family history includes Dementia in her father; Heart Disease in her mother; Hyperlipidemia in her brother and sister.   Family history reviewed with patient. There is no family history that is pertinent to the chief complaint.     Social History   reports that she has been smoking cigarettes. She started smoking about 47 years ago.  She has been smoking about 1.00 pack per day. She has never used smokeless tobacco. She reports current alcohol use of about 7.2 oz of alcohol per week. She reports current drug use. Drug: Inhaled.    Allergies  Allergies   Allergen Reactions   • Codeine        Medications  Prior to Admission Medications   Prescriptions Last Dose Informant Patient Reported? Taking?   aspirin EC 81 MG EC tablet 8/8/2021 at Unknown time  No No   Sig: Take 1 Tab by mouth every day.   atorvastatin (LIPITOR) 80 MG tablet 8/8/2021 at Unknown time  No No   Sig: Take 1 Tab by mouth every day.   clopidogrel (PLAVIX) 75 MG Tab 8/8/2021 at Unknown time  No No   Sig: Take 1 Tab by mouth every day.   levothyroxine (SYNTHROID) 88 MCG Tab 8/8/2021 at Unknown time Patient's Home Pharmacy Yes No   Sig: Take 88 mcg by mouth Every morning on an empty stomach.   lisinopril (PRINIVIL) 10 MG Tab 8/8/2021 at Unknown time  No No   Sig: Take 1 Tab by mouth every day.   metoprolol SR (TOPROL XL) 50 MG TABLET SR 24 HR Not Taking at Unknown time  No No   Sig: Take 1 Tab by mouth every evening.   Patient not taking: Reported on 8/9/2021   spironolactone (ALDACTONE) 25 MG Tab 8/8/2021 at Unknown time  No No   Sig: Take 0.5 Tabs by mouth every day.   venlafaxine XR (EFFEXOR XR) 75 MG CAPSULE SR 24 HR 8/8/2021 at Unknown time Patient's Home Pharmacy Yes No   Sig: Take 75 mg by mouth every day.      Facility-Administered Medications: None       Physical Exam  Temp:  [36.9 °C (98.5 °F)-37.8 °C (100.1 °F)] 36.9 °C (98.5 °F)  Pulse:  [78-84] 78  Resp:  [16-18] 16  BP: ()/(44-56) 116/45  SpO2:  [96 %-99 %] 98 %    Physical Exam  Vitals and nursing note reviewed.   Constitutional:       Appearance: She is ill-appearing.   HENT:      Head: Normocephalic.      Mouth/Throat:      Mouth: Mucous membranes are moist.      Pharynx: Oropharynx is clear.   Eyes:      Pupils: Pupils are equal, round, and reactive to light.   Cardiovascular:      Rate and Rhythm: Normal rate  and regular rhythm.      Pulses: Normal pulses.      Heart sounds: Normal heart sounds.   Pulmonary:      Effort: Pulmonary effort is normal. No respiratory distress.      Breath sounds: Normal breath sounds. No wheezing.   Abdominal:      General: Abdomen is flat. Bowel sounds are normal.      Tenderness: There is abdominal tenderness.   Musculoskeletal:         General: Normal range of motion.      Cervical back: Normal range of motion.   Skin:     Coloration: Skin is pale.   Neurological:      General: No focal deficit present.      Mental Status: She is alert and oriented to person, place, and time.         Laboratory:  Recent Labs     08/09/21 1856 08/09/21 2258   WBC 6.9  --    RBC 3.68*  --    HEMOGLOBIN 7.2* 6.7*   HEMATOCRIT 24.8*  --    MCV 67.4*  --    MCH 19.6*  --    MCHC 29.0*  --    RDW 46.6  --    PLATELETCT 562*  --    MPV 9.1  --      Recent Labs     08/09/21 1856   SODIUM 137   POTASSIUM 3.8   CHLORIDE 104   CO2 22   GLUCOSE 92   BUN 13   CREATININE 0.78   CALCIUM 8.6     Recent Labs     08/09/21 1856   ALTSGPT 11   ASTSGOT 20   ALKPHOSPHAT 64   TBILIRUBIN 0.6   GLUCOSE 92     Recent Labs     08/09/21 1856   APTT 25.2   INR 0.98     No results for input(s): NTPROBNP in the last 72 hours.      No results for input(s): TROPONINT in the last 72 hours.    Imaging:  No orders to display         Assessment/Plan:  I anticipate this patient will require at least two midnights for appropriate medical management, necessitating inpatient admission.    * GI bleeding  Assessment & Plan  Hold aspirin, plavix  Monitor H&H  Transfuse if Hb<7  IV protonix drip  GI consult in the am  Clear liquid diet    Dependence on nicotine from cigarettes  Assessment & Plan  Nicotine replacement  Smoking cessation counseling     Hypothyroidism  Assessment & Plan  Continue home levothyroxine 88 mcg    Essential hypertension- (present on admission)  Assessment & Plan  BP lower site  Hold lisinopril, hole  metoprolol  IVF    Dyslipidemia- (present on admission)  Assessment & Plan  Continue atorvastatin      VTE prophylaxis: SCDs/TEDs

## 2021-08-10 NOTE — PROGRESS NOTES
Pt reported she received 2 doses of the Covid vaccine at [facility] in [place] in February.

## 2021-08-10 NOTE — PROGRESS NOTES
0145: Blood obtained from Lab. Blood tubing set up. Pt's pre-transfusion vitals completed.     0150: 2 RN verification completed with STONE Ventura.     0155: Blood transfusion initiated.     0211: 15 min post administration vitals completed. Pt denies flank pain, chills, shortness of breath, or rash. Blood transfusion continues to run at 125 ml/hr. Pt has call light within reach and denies any further needs at this time.

## 2021-08-10 NOTE — PROGRESS NOTES
Report received from STONE Laura. Plan of care discussed at patient's bedside. Whiteboard has been updated. Pt is resting comfortably in bed and is requesting a warm blanket. Kersey brought to bedside. Safety precautions in place and call light within reach.

## 2021-08-10 NOTE — PROGRESS NOTES
Telemetry Shift Summary      Rhythm: SR  HR Range: 65-79  Ectopy: R-O PVC  Measurements: .12/.10/.42    Normal Values:  Rhythm: SR  HR Range:   Measurements: 0.12-0.20/ 0.06-0.10/ 0.30-0.52

## 2021-08-10 NOTE — PROGRESS NOTES
4 Eyes Skin Assessment Completed by Keya, STONE and STONE Spring.    Head WDL  Ears WDL  Nose WDL  Mouth WDL  Neck WDL  Breast/Chest WDL  Shoulder Blades WDL  Spine WDL  (R) Arm/Elbow/Hand WDL  (L) Arm/Elbow/Hand WDL  Abdomen WDL  Groin WDL  Scrotum/Coccyx/Buttocks WDL  (R) Leg WDL  (L) Leg WDL  (R) Heel/Foot/Toe WDL  (L) Heel/Foot/Toe WDL          Devices In Places ECG and Tele Box      Interventions In Place N/A    Possible Skin Injury No    Pictures Uploaded Into Epic N/A  Wound Consult Placed N/A  RN Wound Prevention Protocol Ordered No

## 2021-08-10 NOTE — PROGRESS NOTES
Pt's Hgb is 6.7. Per nursing communication ok to transfuse 1 unit PRBC's if Hgb is < 7. Blood Bank called and Bryant from Lab notified that order is in.

## 2021-08-11 ENCOUNTER — ANESTHESIA (OUTPATIENT)
Dept: SURGERY | Facility: MEDICAL CENTER | Age: 61
DRG: 379 | End: 2021-08-11
Payer: COMMERCIAL

## 2021-08-11 ENCOUNTER — ANESTHESIA EVENT (OUTPATIENT)
Dept: SURGERY | Facility: MEDICAL CENTER | Age: 61
DRG: 379 | End: 2021-08-11
Payer: COMMERCIAL

## 2021-08-11 VITALS
WEIGHT: 162.04 LBS | SYSTOLIC BLOOD PRESSURE: 110 MMHG | RESPIRATION RATE: 20 BRPM | HEIGHT: 67 IN | TEMPERATURE: 97.5 F | OXYGEN SATURATION: 98 % | DIASTOLIC BLOOD PRESSURE: 61 MMHG | BODY MASS INDEX: 25.43 KG/M2 | HEART RATE: 81 BPM

## 2021-08-11 LAB
ANION GAP SERPL CALC-SCNC: 10 MMOL/L (ref 7–16)
BUN SERPL-MCNC: 7 MG/DL (ref 8–22)
CALCIUM SERPL-MCNC: 8.3 MG/DL (ref 8.4–10.2)
CHLORIDE SERPL-SCNC: 109 MMOL/L (ref 96–112)
CO2 SERPL-SCNC: 20 MMOL/L (ref 20–33)
CREAT SERPL-MCNC: 0.59 MG/DL (ref 0.5–1.4)
ERYTHROCYTE [DISTWIDTH] IN BLOOD BY AUTOMATED COUNT: 51.2 FL (ref 35.9–50)
FERRITIN SERPL-MCNC: 16.5 NG/ML (ref 10–291)
GLUCOSE SERPL-MCNC: 88 MG/DL (ref 65–99)
HCT VFR BLD AUTO: 29.1 % (ref 37–47)
HGB BLD-MCNC: 8.6 G/DL (ref 12–16)
HGB BLD-MCNC: 9.7 G/DL (ref 12–16)
IRON SATN MFR SERPL: 6 % (ref 15–55)
IRON SERPL-MCNC: 17 UG/DL (ref 40–170)
MCH RBC QN AUTO: 20.9 PG (ref 27–33)
MCHC RBC AUTO-ENTMCNC: 29.6 G/DL (ref 33.6–35)
MCV RBC AUTO: 70.8 FL (ref 81.4–97.8)
PATHOLOGY CONSULT NOTE: NORMAL
PLATELET # BLD AUTO: 533 K/UL (ref 164–446)
PMV BLD AUTO: 9.3 FL (ref 9–12.9)
POTASSIUM SERPL-SCNC: 3.9 MMOL/L (ref 3.6–5.5)
RBC # BLD AUTO: 4.11 M/UL (ref 4.2–5.4)
SARS-COV+SARS-COV-2 AG RESP QL IA.RAPID: NOTDETECTED
SODIUM SERPL-SCNC: 139 MMOL/L (ref 135–145)
SPECIMEN SOURCE: NORMAL
TIBC SERPL-MCNC: 289 UG/DL (ref 250–450)
UIBC SERPL-MCNC: 272 UG/DL (ref 110–370)
VIT B12 SERPL-MCNC: 299 PG/ML (ref 211–911)
WBC # BLD AUTO: 7 K/UL (ref 4.8–10.8)

## 2021-08-11 PROCEDURE — 160208 HCHG ENDO MINUTES - EA ADDL 1 MIN LEVEL 4: Performed by: INTERNAL MEDICINE

## 2021-08-11 PROCEDURE — 85018 HEMOGLOBIN: CPT

## 2021-08-11 PROCEDURE — 700111 HCHG RX REV CODE 636 W/ 250 OVERRIDE (IP): Performed by: ANESTHESIOLOGY

## 2021-08-11 PROCEDURE — 700105 HCHG RX REV CODE 258: Performed by: INTERNAL MEDICINE

## 2021-08-11 PROCEDURE — 700111 HCHG RX REV CODE 636 W/ 250 OVERRIDE (IP): Performed by: INTERNAL MEDICINE

## 2021-08-11 PROCEDURE — 0DB18ZX EXCISION OF UPPER ESOPHAGUS, VIA NATURAL OR ARTIFICIAL OPENING ENDOSCOPIC, DIAGNOSTIC: ICD-10-PCS | Performed by: INTERNAL MEDICINE

## 2021-08-11 PROCEDURE — 700102 HCHG RX REV CODE 250 W/ 637 OVERRIDE(OP): Performed by: INTERNAL MEDICINE

## 2021-08-11 PROCEDURE — 88312 SPECIAL STAINS GROUP 1: CPT | Mod: 59

## 2021-08-11 PROCEDURE — 700102 HCHG RX REV CODE 250 W/ 637 OVERRIDE(OP): Performed by: STUDENT IN AN ORGANIZED HEALTH CARE EDUCATION/TRAINING PROGRAM

## 2021-08-11 PROCEDURE — 160203 HCHG ENDO MINUTES - 1ST 30 MINS LEVEL 4: Performed by: INTERNAL MEDICINE

## 2021-08-11 PROCEDURE — 700105 HCHG RX REV CODE 258: Performed by: STUDENT IN AN ORGANIZED HEALTH CARE EDUCATION/TRAINING PROGRAM

## 2021-08-11 PROCEDURE — 80048 BASIC METABOLIC PNL TOTAL CA: CPT

## 2021-08-11 PROCEDURE — 160009 HCHG ANES TIME/MIN: Performed by: INTERNAL MEDICINE

## 2021-08-11 PROCEDURE — 0DB28ZX EXCISION OF MIDDLE ESOPHAGUS, VIA NATURAL OR ARTIFICIAL OPENING ENDOSCOPIC, DIAGNOSTIC: ICD-10-PCS | Performed by: INTERNAL MEDICINE

## 2021-08-11 PROCEDURE — A9270 NON-COVERED ITEM OR SERVICE: HCPCS | Performed by: INTERNAL MEDICINE

## 2021-08-11 PROCEDURE — 502240 HCHG MISC OR SUPPLY RC 0272: Performed by: INTERNAL MEDICINE

## 2021-08-11 PROCEDURE — 160035 HCHG PACU - 1ST 60 MINS PHASE I: Performed by: INTERNAL MEDICINE

## 2021-08-11 PROCEDURE — 160048 HCHG OR STATISTICAL LEVEL 1-5: Performed by: INTERNAL MEDICINE

## 2021-08-11 PROCEDURE — 0DBN8ZX EXCISION OF SIGMOID COLON, VIA NATURAL OR ARTIFICIAL OPENING ENDOSCOPIC, DIAGNOSTIC: ICD-10-PCS | Performed by: INTERNAL MEDICINE

## 2021-08-11 PROCEDURE — 160036 HCHG PACU - EA ADDL 30 MINS PHASE I: Performed by: INTERNAL MEDICINE

## 2021-08-11 PROCEDURE — 96367 TX/PROPH/DG ADDL SEQ IV INF: CPT

## 2021-08-11 PROCEDURE — 501629 HCHG TUBE, LUKI TRAP STERILE DISP: Performed by: INTERNAL MEDICINE

## 2021-08-11 PROCEDURE — 0DB98ZX EXCISION OF DUODENUM, VIA NATURAL OR ARTIFICIAL OPENING ENDOSCOPIC, DIAGNOSTIC: ICD-10-PCS | Performed by: INTERNAL MEDICINE

## 2021-08-11 PROCEDURE — C9113 INJ PANTOPRAZOLE SODIUM, VIA: HCPCS | Performed by: STUDENT IN AN ORGANIZED HEALTH CARE EDUCATION/TRAINING PROGRAM

## 2021-08-11 PROCEDURE — 0DB78ZX EXCISION OF STOMACH, PYLORUS, VIA NATURAL OR ARTIFICIAL OPENING ENDOSCOPIC, DIAGNOSTIC: ICD-10-PCS | Performed by: INTERNAL MEDICINE

## 2021-08-11 PROCEDURE — 700111 HCHG RX REV CODE 636 W/ 250 OVERRIDE (IP): Performed by: STUDENT IN AN ORGANIZED HEALTH CARE EDUCATION/TRAINING PROGRAM

## 2021-08-11 PROCEDURE — 160002 HCHG RECOVERY MINUTES (STAT): Performed by: INTERNAL MEDICINE

## 2021-08-11 PROCEDURE — A9270 NON-COVERED ITEM OR SERVICE: HCPCS | Performed by: STUDENT IN AN ORGANIZED HEALTH CARE EDUCATION/TRAINING PROGRAM

## 2021-08-11 PROCEDURE — 700101 HCHG RX REV CODE 250: Performed by: ANESTHESIOLOGY

## 2021-08-11 PROCEDURE — 85027 COMPLETE CBC AUTOMATED: CPT

## 2021-08-11 PROCEDURE — 88305 TISSUE EXAM BY PATHOLOGIST: CPT | Mod: 59

## 2021-08-11 PROCEDURE — 87426 SARSCOV CORONAVIRUS AG IA: CPT

## 2021-08-11 PROCEDURE — 96366 THER/PROPH/DIAG IV INF ADDON: CPT

## 2021-08-11 PROCEDURE — 99239 HOSP IP/OBS DSCHRG MGMT >30: CPT | Performed by: INTERNAL MEDICINE

## 2021-08-11 PROCEDURE — 0DBN8ZZ EXCISION OF SIGMOID COLON, VIA NATURAL OR ARTIFICIAL OPENING ENDOSCOPIC: ICD-10-PCS | Performed by: INTERNAL MEDICINE

## 2021-08-11 PROCEDURE — C1769 GUIDE WIRE: HCPCS | Performed by: INTERNAL MEDICINE

## 2021-08-11 RX ORDER — FLUCONAZOLE 200 MG/1
200 TABLET ORAL ONCE
Status: COMPLETED | OUTPATIENT
Start: 2021-08-11 | End: 2021-08-11

## 2021-08-11 RX ORDER — HALOPERIDOL 5 MG/ML
1 INJECTION INTRAMUSCULAR
Status: DISCONTINUED | OUTPATIENT
Start: 2021-08-11 | End: 2021-08-11 | Stop reason: HOSPADM

## 2021-08-11 RX ORDER — OMEPRAZOLE 20 MG/1
20 TABLET, DELAYED RELEASE ORAL DAILY
Qty: 30 TABLET | Refills: 3 | Status: ON HOLD | OUTPATIENT
Start: 2021-08-11 | End: 2023-04-13 | Stop reason: SDUPTHER

## 2021-08-11 RX ORDER — DEXAMETHASONE SODIUM PHOSPHATE 4 MG/ML
INJECTION, SOLUTION INTRA-ARTICULAR; INTRALESIONAL; INTRAMUSCULAR; INTRAVENOUS; SOFT TISSUE PRN
Status: DISCONTINUED | OUTPATIENT
Start: 2021-08-11 | End: 2021-08-11 | Stop reason: HOSPADM

## 2021-08-11 RX ORDER — LIDOCAINE HYDROCHLORIDE 20 MG/ML
INJECTION, SOLUTION EPIDURAL; INFILTRATION; INTRACAUDAL; PERINEURAL PRN
Status: DISCONTINUED | OUTPATIENT
Start: 2021-08-11 | End: 2021-08-11 | Stop reason: SURG

## 2021-08-11 RX ORDER — SODIUM CHLORIDE, SODIUM LACTATE, POTASSIUM CHLORIDE, CALCIUM CHLORIDE 600; 310; 30; 20 MG/100ML; MG/100ML; MG/100ML; MG/100ML
1000 INJECTION, SOLUTION INTRAVENOUS CONTINUOUS
Status: DISCONTINUED | OUTPATIENT
Start: 2021-08-11 | End: 2021-08-11 | Stop reason: HOSPADM

## 2021-08-11 RX ORDER — MIDAZOLAM HYDROCHLORIDE 1 MG/ML
INJECTION INTRAMUSCULAR; INTRAVENOUS PRN
Status: DISCONTINUED | OUTPATIENT
Start: 2021-08-11 | End: 2021-08-11 | Stop reason: HOSPADM

## 2021-08-11 RX ORDER — FLUCONAZOLE 100 MG/1
100 TABLET ORAL DAILY
Status: DISCONTINUED | OUTPATIENT
Start: 2021-08-12 | End: 2021-08-11 | Stop reason: HOSPADM

## 2021-08-11 RX ORDER — MIDAZOLAM HYDROCHLORIDE 1 MG/ML
1 INJECTION INTRAMUSCULAR; INTRAVENOUS
Status: DISCONTINUED | OUTPATIENT
Start: 2021-08-11 | End: 2021-08-11 | Stop reason: HOSPADM

## 2021-08-11 RX ORDER — SODIUM CHLORIDE, SODIUM LACTATE, POTASSIUM CHLORIDE, CALCIUM CHLORIDE 600; 310; 30; 20 MG/100ML; MG/100ML; MG/100ML; MG/100ML
INJECTION, SOLUTION INTRAVENOUS CONTINUOUS
Status: DISCONTINUED | OUTPATIENT
Start: 2021-08-11 | End: 2021-08-11 | Stop reason: HOSPADM

## 2021-08-11 RX ORDER — DIPHENHYDRAMINE HYDROCHLORIDE 50 MG/ML
12.5 INJECTION INTRAMUSCULAR; INTRAVENOUS
Status: DISCONTINUED | OUTPATIENT
Start: 2021-08-11 | End: 2021-08-11 | Stop reason: HOSPADM

## 2021-08-11 RX ORDER — ONDANSETRON 2 MG/ML
4 INJECTION INTRAMUSCULAR; INTRAVENOUS
Status: DISCONTINUED | OUTPATIENT
Start: 2021-08-11 | End: 2021-08-11 | Stop reason: HOSPADM

## 2021-08-11 RX ADMIN — PROPOFOL 50 MG: 10 INJECTION, EMULSION INTRAVENOUS at 15:16

## 2021-08-11 RX ADMIN — SODIUM CHLORIDE, POTASSIUM CHLORIDE, SODIUM LACTATE AND CALCIUM CHLORIDE 1000 ML: 600; 310; 30; 20 INJECTION, SOLUTION INTRAVENOUS at 14:15

## 2021-08-11 RX ADMIN — SODIUM CHLORIDE 8 MG/HR: 9 INJECTION, SOLUTION INTRAVENOUS at 11:28

## 2021-08-11 RX ADMIN — PROPOFOL 50 MG: 10 INJECTION, EMULSION INTRAVENOUS at 15:11

## 2021-08-11 RX ADMIN — NICOTINE 14 MG: 14 PATCH TRANSDERMAL at 06:27

## 2021-08-11 RX ADMIN — DEXAMETHASONE SODIUM PHOSPHATE 4 MG: 4 INJECTION, SOLUTION INTRAMUSCULAR; INTRAVENOUS at 14:51

## 2021-08-11 RX ADMIN — PROPOFOL 25 MG: 10 INJECTION, EMULSION INTRAVENOUS at 15:02

## 2021-08-11 RX ADMIN — PROPOFOL 50 MG: 10 INJECTION, EMULSION INTRAVENOUS at 15:08

## 2021-08-11 RX ADMIN — PROPOFOL 50 MG: 10 INJECTION, EMULSION INTRAVENOUS at 14:51

## 2021-08-11 RX ADMIN — FLUCONAZOLE 200 MG: 200 TABLET ORAL at 16:45

## 2021-08-11 RX ADMIN — LEVOTHYROXINE SODIUM 88 MCG: 88 TABLET ORAL at 06:15

## 2021-08-11 RX ADMIN — PROPOFOL 50 MG: 10 INJECTION, EMULSION INTRAVENOUS at 15:04

## 2021-08-11 RX ADMIN — PROPOFOL 50 MG: 10 INJECTION, EMULSION INTRAVENOUS at 15:20

## 2021-08-11 RX ADMIN — SODIUM CHLORIDE 125 MG: 9 INJECTION, SOLUTION INTRAVENOUS at 17:13

## 2021-08-11 RX ADMIN — VENLAFAXINE HYDROCHLORIDE 75 MG: 75 CAPSULE, EXTENDED RELEASE ORAL at 06:15

## 2021-08-11 RX ADMIN — LIDOCAINE HYDROCHLORIDE 100 MG: 20 INJECTION, SOLUTION EPIDURAL; INFILTRATION; INTRACAUDAL; PERINEURAL at 14:51

## 2021-08-11 RX ADMIN — ATORVASTATIN CALCIUM 80 MG: 40 TABLET, FILM COATED ORAL at 06:16

## 2021-08-11 RX ADMIN — MIDAZOLAM HYDROCHLORIDE 2 MG: 1 INJECTION, SOLUTION INTRAMUSCULAR; INTRAVENOUS at 14:51

## 2021-08-11 RX ADMIN — PROPOFOL 25 MG: 10 INJECTION, EMULSION INTRAVENOUS at 15:00

## 2021-08-11 RX ADMIN — POLYETHYLENE GLYCOL 3350, SODIUM SULFATE, SODIUM CHLORIDE, POTASSIUM CHLORIDE, ASCORBIC ACID, SODIUM ASCORBATE 100 G: KIT at 06:18

## 2021-08-11 RX ADMIN — FENTANYL CITRATE 100 MCG: 50 INJECTION, SOLUTION INTRAMUSCULAR; INTRAVENOUS at 14:51

## 2021-08-11 ASSESSMENT — PAIN DESCRIPTION - PAIN TYPE
TYPE: ACUTE PAIN
TYPE: ACUTE PAIN

## 2021-08-11 ASSESSMENT — ENCOUNTER SYMPTOMS
PHOTOPHOBIA: 0
SPEECH CHANGE: 0
WEAKNESS: 0
VOMITING: 0
DIARRHEA: 0
ABDOMINAL PAIN: 0
CONSTIPATION: 0
DIZZINESS: 0
CHILLS: 0
HEADACHES: 0
SENSORY CHANGE: 0
MYALGIAS: 0
SORE THROAT: 0
HEARTBURN: 1
CLAUDICATION: 0
COUGH: 0
SHORTNESS OF BREATH: 0
BLOOD IN STOOL: 0
DEPRESSION: 0
BLURRED VISION: 0
NERVOUS/ANXIOUS: 0
INSOMNIA: 0
FEVER: 0

## 2021-08-11 ASSESSMENT — FIBROSIS 4 INDEX: FIB4 SCORE: 0.67

## 2021-08-11 NOTE — ANESTHESIA TIME REPORT
Anesthesia Start and Stop Event Times     Date Time Event    8/11/2021 1434 Ready for Procedure     1441 Anesthesia Start     1531 Anesthesia Stop        Responsible Staff  08/11/21    Name Role Begin End    Meet Franz M.D. Anesth 1441 1531        Preop Diagnosis (Free Text):  Pre-op Diagnosis     anemia        Preop Diagnosis (Codes):  Diagnosis Information     Diagnosis Code(s): Colon polyp [K63.5]        Post op Diagnosis  Anemia  GIB    Premium Reason  A. 3PM - 7AM    Comments:

## 2021-08-11 NOTE — CARE PLAN
The patient is Stable - Low risk of patient condition declining or worsening    Shift Goals  Clinical Goals: Go to colonoscopy   Patient Goals: Pt will remain comfortabe and rest     Progress made toward(s) clinical / shift goals:  Patient had colonoscopy with no complications.       Problem: Knowledge Deficit - Standard  Goal: Patient and family/care givers will demonstrate understanding of plan of care, disease process/condition, diagnostic tests and medications  Outcome: Progressing

## 2021-08-11 NOTE — ANESTHESIA POSTPROCEDURE EVALUATION
Patient: Marisol Brown    Procedure Summary     Date: 08/11/21 Room / Location:  ENDOSCOPIC ULTRASOUND ROOM / SURGERY [facility]    Anesthesia Start: 1441 Anesthesia Stop: 1531    Procedures:       GASTROSCOPY (N/A )      COLONOSCOPY (N/A ) Diagnosis:       Colon polyp      (Colon polyp [[id number]])    Surgeons: Curtis Nunez M.D. Responsible Provider: Meet Franz M.D.    Anesthesia Type: general ASA Status: 3          Final Anesthesia Type: general  Last vitals  BP   Blood Pressure: 119/76    Temp   36.3 °C (97.3 °F)    Pulse   67   Resp   16    SpO2   96 %      Anesthesia Post Evaluation    Patient location during evaluation: PACU  Patient participation: complete - patient participated  Level of consciousness: awake and alert    Airway patency: patent  Anesthetic complications: no  Cardiovascular status: hemodynamically stable  Respiratory status: acceptable  Hydration status: euvolemic    PONV: none          No complications documented.     Nurse Pain Score: 0 (NPRS)

## 2021-08-11 NOTE — DISCHARGE PLANNING
Anticipated Discharge Disposition:   Home when medically cleared     Action:   Chart review complete.     Discussed patient's plan of care and plans for discharge during rounds. Per MD, this patient to go for an upper and lower endoscopy today at 1500.    Patient anticipated to have no needs upon discharge. RN CM will continue to follow as needed.     Barriers to Discharge:   Medical Clearance    Plan:   Hospital care management will continue to assist with discharge planning needs.

## 2021-08-11 NOTE — OR NURSING
1529: To PACU post EGD. Pt is awake and alert. No pain or nausea.  1535: Dr. Nunez at bedside.  1647: No change. Meets criteria for transfer to room.

## 2021-08-11 NOTE — PROGRESS NOTES
[facility] Medicine Daily Progress Note    Date of Service  8/11/2021    Chief Complaint  Marisol Brown is a 61 y.o. female admitted 8/9/2021 with dark and bloody stools.    Hospital Course  Marisol rBown is a 61 y.o. female with h/o HTN, CAB, s/p stents (on DAPT) who transferred from [facility] 8/9/2021 with GI bleeding. Patient was having melena for one week, also had bright red blood per rectum, became lightheaded. Patient transferred to [facility] for EGD/colonoscopy. GI consulted and recommending EGD and colonoscopy.  PPI infusion has been started also.          Interval Problem Update  8/10 Patient without complaint, states she hasn't had a bowel movement since admission.  She denies weakness.  HGB dropped to 6.7 so she received 1 unit of blood and now HGB is up to 8.6.    8/11 Patient feeling okay, stool is now clear with bowel prep and she hasn't passed any blood.  EGD/Colonoscopy this afternoon.     I have personally seen and examined the patient at bedside. I discussed the plan of care with patient, bedside RN, charge RN,  and pharmacy.    Consultants/Specialty  GI    Code Status  Full Code    Disposition  Patient is not medically cleared.   Anticipate discharge to to home with close outpatient follow-up.  I have placed the appropriate orders for post-discharge needs.    Review of Systems  Review of Systems   Constitutional: Negative for chills and fever.   HENT: Negative for congestion and sore throat.    Eyes: Negative for blurred vision and photophobia.   Respiratory: Negative for cough and shortness of breath.    Cardiovascular: Negative for chest pain, claudication and leg swelling.   Gastrointestinal: Positive for heartburn. Negative for abdominal pain, blood in stool, constipation, diarrhea, melena and vomiting.   Genitourinary: Negative for dysuria and hematuria.   Musculoskeletal: Negative for joint pain and myalgias.   Skin: Negative for itching and rash.   Neurological: Negative  for dizziness, sensory change, speech change, weakness and headaches.   Psychiatric/Behavioral: Negative for depression. The patient is not nervous/anxious and does not have insomnia.         Physical Exam  Temp:  [36.3 °C (97.3 °F)-37.1 °C (98.7 °F)] 36.3 °C (97.3 °F)  Pulse:  [66-91] 67  Resp:  [16-18] 16  BP: (113-141)/(52-85) 119/76  SpO2:  [96 %-99 %] 96 %    Physical Exam  Vitals and nursing note reviewed.   Constitutional:       General: She is not in acute distress.     Appearance: Normal appearance. She is not ill-appearing.   HENT:      Head: Normocephalic and atraumatic.      Nose: Nose normal.   Eyes:      General: No scleral icterus.  Cardiovascular:      Rate and Rhythm: Normal rate and regular rhythm.      Heart sounds: Normal heart sounds. No murmur heard.     Pulmonary:      Effort: Pulmonary effort is normal.      Breath sounds: Normal breath sounds.   Abdominal:      General: Bowel sounds are normal. There is no distension.      Palpations: Abdomen is soft.   Musculoskeletal:         General: No swelling or tenderness.      Cervical back: Neck supple.   Skin:     General: Skin is warm and dry.   Neurological:      General: No focal deficit present.      Mental Status: She is alert and oriented to person, place, and time.   Psychiatric:         Mood and Affect: Mood normal.         Fluids    Intake/Output Summary (Last 24 hours) at 8/11/2021 1358  Last data filed at 8/10/2021 1500  Gross per 24 hour   Intake 120 ml   Output --   Net 120 ml       Laboratory  Recent Labs     08/09/21  1856 08/09/21  2258 08/10/21  1348 08/10/21  2225 08/11/21  0444   WBC 6.9  --   --   --  7.0   RBC 3.68*  --   --   --  4.11*   HEMOGLOBIN 7.2*   < > 8.4* 8.2* 8.6*   HEMATOCRIT 24.8*  --   --   --  29.1*   MCV 67.4*  --   --   --  70.8*   MCH 19.6*  --   --   --  20.9*   MCHC 29.0*  --   --   --  29.6*   RDW 46.6  --   --   --  51.2*   PLATELETCT 562*  --   --   --  533*   MPV 9.1  --   --   --  9.3    < > = values in  this interval not displayed.     Recent Labs     08/09/21  1856 08/11/21  0444   SODIUM 137 139   POTASSIUM 3.8 3.9   CHLORIDE 104 109   CO2 22 20   GLUCOSE 92 88   BUN 13 7*   CREATININE 0.78 0.59   CALCIUM 8.6 8.3*     Recent Labs     08/09/21  1856   APTT 25.2   INR 0.98         Recent Labs     08/10/21  0409   TRIGLYCERIDE 134   HDL 41   LDL 60       Imaging  No orders to display        Assessment/Plan  * GI bleeding  Assessment & Plan  Hold aspirin, plavix  Stable H&H  Received 1 unit PRBCs - 8/9 d/t HGB 6.7  IV protonix drip  GI consulted - Dr Nunez  EGD and colonoscopy today.      Dependence on nicotine from cigarettes  Assessment & Plan  Nicotine replacement  Smoking cessation counseling     Hypothyroidism  Assessment & Plan  Continue home levothyroxine 88 mcg    Coronary artery disease due to lipid rich plaque- (present on admission)  Assessment & Plan  ASA and plavix on hold.       Essential hypertension- (present on admission)  Assessment & Plan  BP normotensive   Hold lisinopril   IVF    Dyslipidemia- (present on admission)  Assessment & Plan  Continue atorvastatin       VTE prophylaxis: pharmacologic prophylaxis contraindicated due to GI bleeding.    I have performed a physical exam and reviewed and updated ROS and Plan today (8/11/2021). In review of yesterday's note (8/10/2021), there are no changes except as documented above.

## 2021-08-11 NOTE — DISCHARGE SUMMARY
Discharge Summary    CHIEF COMPLAINT ON ADMISSION  No chief complaint on file.      Reason for Admission  GI bleed     Admission Date  8/9/2021    CODE STATUS  Full Code    HPI & HOSPITAL COURSE  Marisol Brown is a 61 y.o. female with h/o HTN, CAB, s/p stents (on DAPT) who transferred from [facility] 8/9/2021 with GI bleeding. Patient was having melena for one week, also had bright red blood per rectum, became lightheaded.  GI consulted and recommending EGD and colonoscopy.  This was done and no evidence of acute bleeding seen but with gastritis, duodenitis, and esophagitis post dilation.  There was evidence of possible ischemic colitis in sigmoid colon that was biopsied.  She has been cleared to discharge home from a GI perspective.  She will be placed on daily PPI due to complaints of severe GERD.  This has been sent to her pharmacy.        Therefore, she is discharged in good and stable condition to home with close outpatient follow-up.    The patient met 2-midnight criteria for an inpatient stay at the time of discharge.    Discharge Date  8/11/2021    FOLLOW UP ITEMS POST DISCHARGE  PCP and GI for biopsy results.     DISCHARGE DIAGNOSES  Principal Problem:    GI bleeding POA: Unknown  Active Problems:    Dyslipidemia POA: Yes    Essential hypertension POA: Yes    Coronary artery disease due to lipid rich plaque POA: Yes    Hypothyroidism POA: Unknown    Dependence on nicotine from cigarettes POA: Unknown  Resolved Problems:    * No resolved hospital problems. *      FOLLOW UP  No future appointments.  RANDOLPH KingNAilynP.  [address] [place] [address] [phone]            MEDICATIONS ON DISCHARGE     Medication List      START taking these medications      Instructions   omeprazole 20 MG tablet  Commonly known as: PriLOSEC OTC   Take 1 Tablet by mouth every day.  Dose: 20 mg        CONTINUE taking these medications      Instructions   aspirin 81 MG EC tablet   Take 1 Tab by mouth every  day.  Dose: 81 mg     atorvastatin 80 MG tablet  Commonly known as: LIPITOR   Take 1 Tab by mouth every day.  Dose: 80 mg     clopidogrel 75 MG Tabs  Commonly known as: PLAVIX   Doctor's comments: Meds to beds  Take 1 Tab by mouth every day.  Dose: 75 mg     levothyroxine 88 MCG Tabs  Commonly known as: SYNTHROID   Take 88 mcg by mouth Every morning on an empty stomach.  Dose: 88 mcg     lisinopril 10 MG Tabs  Commonly known as: PRINIVIL   Take 1 Tab by mouth every day.  Dose: 10 mg     metoprolol SR 50 MG Tb24  Commonly known as: TOPROL XL   Take 1 Tab by mouth every evening.  Dose: 50 mg     spironolactone 25 MG Tabs  Commonly known as: ALDACTONE   Take 0.5 Tabs by mouth every day.  Dose: 12.5 mg     venlafaxine XR 75 MG Cp24  Commonly known as: EFFEXOR XR   Take 75 mg by mouth every day.  Dose: 75 mg            Allergies  Allergies   Allergen Reactions   • Codeine        DIET  Orders Placed This Encounter   Procedures   • Diet Order Diet: Low Fiber(GI Soft); Second Modifier: (optional): Cardiac     Standing Status:   Standing     Number of Occurrences:   1     Order Specific Question:   Diet:     Answer:   Low Fiber(GI Soft) [2]     Order Specific Question:   Second Modifier: (optional)     Answer:   Cardiac [6]       ACTIVITY  As tolerated.  Weight bearing as tolerated    CONSULTATIONS  Gi - Dr Hernandez    PROCEDURES  8/11 - David - EGD and colonoscopy    LABORATORY  Lab Results   Component Value Date    SODIUM 139 08/11/2021    POTASSIUM 3.9 08/11/2021    CHLORIDE 109 08/11/2021    CO2 20 08/11/2021    GLUCOSE 88 08/11/2021    BUN 7 (L) 08/11/2021    CREATININE 0.59 08/11/2021        Lab Results   Component Value Date    WBC 7.0 08/11/2021    HEMOGLOBIN 8.6 (L) 08/11/2021    HEMATOCRIT 29.1 (L) 08/11/2021    PLATELETCT 533 (H) 08/11/2021        Total time of the discharge process exceeds 35 minutes.

## 2021-08-11 NOTE — CARE PLAN
The patient is Watcher - Medium risk of patient condition declining or worsening    Shift Goals  Clinical Goals: Continue colonoscopy prep, trend h/h  Patient Goals: Remain comfortable, rest    Progress made toward(s) clinical / shift goals:  Pt was able to tolerate the first dose of colonoscopy prep appropriately. Pt's hgb has remained >7 but has had a slow downtrend. Pt continues to deny dizziness and nausea despite having blood present in stool. Pt has been able to remain comfortable and with no acute events overnight.      Problem: Knowledge Deficit - Standard  Goal: Patient and family/care givers will demonstrate understanding of plan of care, disease process/condition, diagnostic tests and medications  Outcome: Progressing

## 2021-08-11 NOTE — OP REPORT
OP Note  Procedure date: 8/11/2021     PreOp Diagnosis: Anemia melena BRBPR    PostOp Diagnosis:   EGD:  Esophagus:   - LA-A esophagitis s/p cold forceps biopsy   - Multiple whitish plaques were noted in the middle and upper esophagus, s/p cold forceps biopsy, s/p Savary 54 FR dilation with good result  Stomach:  - Hiatal hernia, small, 35-38 cm  - Patchy gastritis in antrum, s/p cold forceps biopsy   Duodenum:  - Patchy duodenitis in bulb and duodenal 2nd portion, s/p cold forceps biopsy     COL:   Terminal ileum: normal  Colon:   - A 8 mm sessile polyp was seen in the sigmoid colon, s/p cold snare polypectomy, completely removed and retrieved  - Sigmoid diverticulosis, moderately severe, mixed size  - Mucosal erythema and erosion was noted in the sigmoid colon about 30 cm above anal verge, s/p cold forceps biopsy r/o ischemic colitis  - Internal hemorrhoids, medium, non-bleeding    No fresh blood nor blood clot was seen in the entire exam.    Procedure(s):  GASTROSCOPY - Wound Class: Clean Contaminated  COLONOSCOPY - Wound Class: Clean Contaminated    Surgeon(s):  Curtis Nunez M.D.    Anesthesiologist/Type of Anesthesia:  Anesthesiologist: Meet Franz M.D./MAC    Surgical Staff:  Circulator: Yousuf Nelson R.N.  Endoscopy Technician: Denice Duffy; Genna Xiao    Specimens removed if any:  ID Type Source Tests Collected by Time Destination   A :  Tissue Duodenum PATHOLOGY SPECIMEN Curtis Nunez M.D. 8/11/2021  2:56 PM    B :  Tissue Gastric PATHOLOGY SPECIMEN Curtis Nunez M.D. 8/11/2021  2:56 PM    C : GE junction bx Tissue Esophagus PATHOLOGY SPECIMEN Curtis Nunez M.D. 8/11/2021  2:58 PM    D : upper proximal esophagus bx Tissue Esophagus PATHOLOGY SPECIMEN Curtis Nunez M.D. 8/11/2021  3:00 PM    E : polyp bx Tissue Colon - Sigmoid PATHOLOGY SPECIMEN Curtis Nunez M.D. 8/11/2021  3:10 PM    F : r/o ischemic colitis Tissue Colon - Sigmoid PATHOLOGY SPECIMEN Curtis  GRICELDA Nunez 8/11/2021  3:20 PM        Estimated Blood Loss: minimal    Anesthesia/Medications:  see anesthesia note     COMPLICATIONS:  No immediate complications.    PROCEDURE IN DETAIL, Findings and ENDOSCOPIC DIAGNOSIS:      -Prior to the procedure, a History and Physical was performed, and patient medications and allergies were reviewed. The patient’s tolerance of previous anesthesia was also reviewed. The risks and benefits of the procedure and the sedation options and risks were discussed with the patient. All questions were answered, and informed consent was obtained. The patient was deemed in satisfactory condition to undergo the procedure.    -Prior Anticoagulants: the patient has taken no previous anticoagulant or antiplatelet agents.    -ASA Grade Assessment: see anesthesia note     -The patient was placed in the left lateral decubitus position. The scope was passed under direct vision. Continuous oxygen was provided via nasal cannula and intravenous sedation was administered in divided doses throughout the procedure. The patient’s blood pressure, pulse, and oxygen saturation were monitored continuously throughout the procedure.    -The gastroscope was gently advanced under direct visualization over the tongue, down the esophagus, through the stomach and into the 2nd portion of the duodenum. The color, texture, mucosa and anatomy of esophagus, stomach and duodenum were carefully examined with the scope. The scope was then withdrawn from the patient and the procedure terminated. Further details are in the finding section, based on anatomical location.    -The colonoscope was gently introduced through the anus and advanced to the cecum, identified by appendiceal orifice & ileocecal valve. The scope was then fully withdrawn while examining the color, texture, anatomy and integrity of the mucosa from the cecum to the anal canal. The scope was completely retrieved upon exiting the anal canal and the procedure  was terminated. The colonoscopy was performed without difficulty. The patient tolerated the procedure well. The quality of the bowel preparation was good. Further details are in the finding paragraph, based on anatomical location.    -The patient tolerated the procedure well and there were no immediate complications. The patient was then transferred to the recovery room in stable condition.    Findings:  EGD:  Esophagus:   - LA-A esophagitis s/p cold forceps biopsy   - Multiple whitish plaques were noted in the middle and upper esophagus, s/p cold forceps biopsy, s/p Savary 54 FR dilation with good result  Stomach:  - Hiatal hernia, small, 35-38 cm  - Patchy gastritis in antrum, s/p cold forceps biopsy   Duodenum:  - Patchy duodenitis in bulb and duodenal 2nd portion, s/p cold forceps biopsy     COL:   Terminal ileum: normal  Colon:   - A 8 mm sessile polyp was seen in the sigmoid colon, s/p cold snare polypectomy, completely removed and retrieved  - Sigmoid diverticulosis, moderately severe, mixed size  - Mucosal erythema and erosion was noted in the sigmoid colon about 30 cm above anal verge, s/p cold forceps biopsy r/o ischemic colitis  - Internal hemorrhoids, medium, non-bleeding    No fresh blood nor blood clot was seen in the entire exam.      RECOMMENDATIONS:    1. A letter will be sent regarding to the biopsy result in about 2 weeks.  2. Resume diet  3. Monitor H/H and vitals  4. OK to discharge home once tolerating diet with stable H/H  5. Start Fluconazole  6. F/u at [facility] in 4-6 weeks.      8/11/2021 3:30 PM Curtis Nunez M.D.

## 2021-08-11 NOTE — PROGRESS NOTES
Patient seen and examined prior to going back to [facility].  Indication of anemia melena BRBPR.    Risks, benefits, and alternatives were discussed with patient.  Consenting person was given an opportunity to ask questions and discuss other options.  Risks including but not limited to perforation, infection, bleeding, missed lesion(s), cardiac and/or pulmonary event, aspiration, stroke, possible need for surgery, hospitalization possibly prolonged, discomfort, unsuccessful and/or incomplete procedure, ineffective therapy or persistent symptoms, possible need for repeat procedures and/or additional testings, damage to adjacent organs and/or vascular structures, medication reaction, disability, death, and other adverse events possibly life-threatening.  Discussion was undertaken with Layman's terms.  Consenting person stated understanding and acceptance of these risks, and wished to proceed.  Consent was given in clear state of mind.

## 2021-08-11 NOTE — ANESTHESIA PROCEDURE NOTES
Peripheral IV  Performed by: Meet Franz M.D.  Authorized by: Meet Franz M.D.     Size:  20 G  Laterality:  Left  Local Anesthetic:  None  Site Prep:  Chlorhexidine  Technique:  Direct puncture  Attempts:  2

## 2021-08-11 NOTE — PROGRESS NOTES
Telemetry Shift Summary      Rhythm: SR   HR Range: 68-82  Ectopy: R PVC  Measurements: .14/.08/.32    Normal Values:  Rhythm: SR  HR Range:   Measurements: 0.12-0.20/ 0.06-0.10/ 0.30-0.52

## 2021-08-11 NOTE — PROGRESS NOTES
Report received from STONE Vega and STONE Kaye. Plan of care discussed at patient's bedside. Whiteboard has been updated. Pt is resting comfortably in bed and declines any further needs at this time. Safety precautions in place and call light within reach.

## 2021-08-11 NOTE — ANESTHESIA PREPROCEDURE EVALUATION
Relevant Problems   CARDIAC   (positive) Coronary artery disease due to lipid rich plaque   (positive) Essential hypertension      ENDO   (positive) Hypothyroidism      Other   (positive) Dependence on nicotine from cigarettes   (positive) Dyslipidemia   (positive) GI bleeding       Physical Exam    Airway   Mallampati: II  TM distance: >3 FB  Neck ROM: full       Cardiovascular - normal exam  Rhythm: regular  Rate: normal  (-) murmur     Dental - normal exam           Pulmonary - normal exam  Breath sounds clear to auscultation     Abdominal    Neurological - normal exam                 Anesthesia Plan    ASA 3   ASA physical status 3 criteria: CAD/stents (> 3 months)    Plan - general       Airway plan will be natural airway          Induction: intravenous      Pertinent diagnostic labs and testing reviewed    Informed Consent:    Anesthetic plan and risks discussed with patient.

## 2021-08-12 NOTE — DISCHARGE INSTRUCTIONS
Discharge Instructions    Discharged to home by car with relative. Discharged via wheelchair, hospital escort: Yes.  Special equipment needed: Not Applicable    Be sure to schedule a follow-up appointment with your primary care doctor or any specialists as instructed.     Discharge Plan:   Diet Plan: Discussed  Activity Level: Discussed  Confirmed Follow up Appointment: Patient to Call and Schedule Appointment  Confirmed Symptoms Management: Discussed  Medication Reconciliation Updated: Yes    I understand that a diet low in cholesterol, fat, and sodium is recommended for good health. Unless I have been given specific instructions below for another diet, I accept this instruction as my diet prescription.   Other diet: Home diet, Soft foods     Special Instructions: None    · Is patient discharged on Warfarin / Coumadin?   No     Depression / Suicide Risk    As you are discharged from this [facility] facility, it is important to learn how to keep safe from harming yourself.    Recognize the warning signs:  · Abrupt changes in personality, positive or negative- including increase in energy   · Giving away possessions  · Change in eating patterns- significant weight changes-  positive or negative  · Change in sleeping patterns- unable to sleep or sleeping all the time   · Unwillingness or inability to communicate  · Depression  · Unusual sadness, discouragement and loneliness  · Talk of wanting to die  · Neglect of personal appearance   · Rebelliousness- reckless behavior  · Withdrawal from people/activities they love  · Confusion- inability to concentrate     If you or a loved one observes any of these behaviors or has concerns about self-harm, here's what you can do:  · Talk about it- your feelings and reasons for harming yourself  · Remove any means that you might use to hurt yourself (examples: pills, rope, extension cords, firearm)  · Get professional help from the community (Mental Health, Substance Abuse,  psychological counseling)  · Do not be alone:Call your Safe Contact- someone whom you trust who will be there for you.  · Call your local CRISIS HOTLINE 518-0366 or 937-893-4108  · Call your local Children's Mobile Crisis Response Team [place] (162) 441-0146 or www.Virtual DBS  · Call the toll free National Suicide Prevention Hotlines   · National Suicide Prevention Lifeline 432-345-WZEH (0403)  · [employer] Network 800-SUICIDE (117-7662)      Gastrointestinal Bleeding  Gastrointestinal (GI) bleeding is bleeding somewhere along the path that food travels through the body (digestive tract). This path is anywhere between the mouth and the opening of the butt (anus). You may have blood in your poop (stool) or have black poop. If you throw up (vomit), there may be blood in it.  This condition can be mild, serious, or even life-threatening. If you have a lot of bleeding, you may need to stay in the hospital.  What are the causes?  This condition may be caused by:  · Irritation and swelling of the esophagus (esophagitis). The esophagus is part of the body that moves food from your mouth to your stomach.  · Swollen veins in the butt (hemorrhoids).  · Areas of painful tearing in the opening of the butt (anal fissures). These are often caused by passing hard poop.  · Pouches that form on the colon over time (diverticulosis).  · Irritation and swelling (diverticulitis) in areas where pouches have formed on the colon.  · Growths (polyps) or cancer. Colon cancer often starts out as growths that are not cancer.  · Irritation of the stomach lining (gastritis).  · Sores (ulcers) in the stomach.  What increases the risk?  You are more likely to develop this condition if you:  · Have a certain type of infection in your stomach (Helicobacter pylori infection).  · Take certain medicines.  · Smoke.  · Drink alcohol.  What are the signs or symptoms?  Common symptoms of this condition include:  · Throwing up (vomiting)  material that has bright red blood in it. It may look like coffee grounds.  · Changes in your poop. The poop may:  ? Have red blood in it.  ? Be black, look like tar, and smell stronger than normal.  ? Be red.  · Pain or cramping in the belly (abdomen).  How is this treated?  Treatment for this condition depends on the cause of the bleeding. For example:  · Sometimes, the bleeding can be stopped during a procedure that is done to find the problem (endoscopy or colonoscopy).  · Medicines can be used to:  ? Help control irritation, swelling, or infection.  ? Reduce acid in your stomach.  · Certain problems can be treated with:  ? Creams.  ? Medicines that are put in the butt (suppositories).  ? Warm baths.  · Surgery is sometimes needed.  · If you lose a lot of blood, you may need a blood transfusion.  If bleeding is mild, you may be allowed to go home. If there is a lot of bleeding, you will need to stay in the hospital.  Follow these instructions at home:    · Take over-the-counter and prescription medicines only as told by your doctor.  · Eat foods that have a lot of fiber in them. These foods include beans, whole grains, and fresh fruits and vegetables. You can also try eating 1-3 prunes each day.  · Drink enough fluid to keep your pee (urine) pale yellow.  · Keep all follow-up visits as told by your doctor. This is important.  Contact a doctor if:  · Your symptoms do not get better.  Get help right away if:  · Your bleeding does not stop.  · You feel dizzy or you pass out (faint).  · You feel weak.  · You have very bad cramps in your back or belly.  · You pass large clumps of blood (clots) in your poop.  · Your symptoms are getting worse.  · You have chest pain or fast heartbeats.  Summary  · GI bleeding is bleeding somewhere along the path that food travels through the body (digestive tract).  · This bleeding can be caused by many things. Treatment depends on the cause of the bleeding.  · Take medicines only as  told by your doctor.  · Keep all follow-up visits as told by your doctor. This is important.  This information is not intended to replace advice given to you by your health care provider. Make sure you discuss any questions you have with your health care provider.  Document Released: 09/26/2009 Document Revised: 07/31/2019 Document Reviewed: 07/31/2019  [employer] Patient Education © 2020 [employer]        Colonoscopy, Adult, Care After  This sheet gives you information about how to care for yourself after your procedure. Your doctor may also give you more specific instructions. If you have problems or questions, call your doctor.  What can I expect after the procedure?  After the procedure, it is common to have:  · A small amount of blood in your poop for 24 hours.  · Some gas.  · Mild cramping or bloating in your belly.  Follow these instructions at home:  General instructions  · For the first 24 hours after the procedure:  ? Do not drive or use machinery.  ? Do not sign important documents.  ? Do not drink alcohol.  ? Do your daily activities more slowly than normal.  ? Eat foods that are soft and easy to digest.  · Take over-the-counter or prescription medicines only as told by your doctor.  To help cramping and bloating:    · Try walking around.  · Put heat on your belly (abdomen) as told by your doctor. Use a heat source that your doctor recommends, such as a moist heat pack or a heating pad.  ? Put a towel between your skin and the heat source.  ? Leave the heat on for 20-30 minutes.  ? Remove the heat if your skin turns bright red. This is especially important if you cannot feel pain, heat, or cold. You can get burned.  Eating and drinking    · Drink enough fluid to keep your pee (urine) clear or pale yellow.  · Return to your normal diet as told by your doctor. Avoid heavy or fried foods that are hard to digest.  · Avoid drinking alcohol for as long as told by your doctor.  Contact a doctor if:  · You have  blood in your poop (stool) 2-3 days after the procedure.  Get help right away if:  · You have more than a small amount of blood in your poop.  · You see large clumps of tissue (blood clots) in your poop.  · Your belly is swollen.  · You feel sick to your stomach (nauseous).  · You throw up (vomit).  · You have a fever.  · You have belly pain that gets worse, and medicine does not help your pain.  Summary  · After the procedure, it is common to have a small amount of blood in your poop. You may also have mild cramping and bloating in your belly.  · For the first 24 hours after the procedure, do not drive or use machinery, do not sign important documents, and do not drink alcohol.  · Get help right away if you have a lot of blood in your poop, feel sick to your stomach, have a fever, or have more belly pain.  This information is not intended to replace advice given to you by your health care provider. Make sure you discuss any questions you have with your health care provider.  Document Released: 01/20/2012 Document Revised: 10/18/2018 Document Reviewed: 09/11/2017  [employer] Patient Education © 2020 [employer]

## 2021-08-12 NOTE — PROGRESS NOTES
Tele strip at 1323 shows SR at 69.      Measurements from am strip were as follows:  MS=0.16  QRS=0.08  QT=0.44    Tele Shift Summary:    Rhythm : SR  Rate : 72-95  Ectopy : Per CCT Shanthi, pt had occasional PVCs and rare bigeminy.     Telemetry monitoring strips placed in pt's chart.

## 2021-08-29 ENCOUNTER — HOSPITAL ENCOUNTER (INPATIENT)
Facility: MEDICAL CENTER | Age: 61
LOS: 9 days | DRG: 379 | End: 2021-09-07
Attending: EMERGENCY MEDICINE | Admitting: STUDENT IN AN ORGANIZED HEALTH CARE EDUCATION/TRAINING PROGRAM
Payer: COMMERCIAL

## 2021-08-29 DIAGNOSIS — K63.0 INTESTINAL DIVERTICULAR ABSCESS: Primary | ICD-10-CM

## 2021-08-29 DIAGNOSIS — K57.92 DIVERTICULITIS: ICD-10-CM

## 2021-08-29 PROBLEM — D72.829 LEUKOCYTOSIS: Status: ACTIVE | Noted: 2021-08-29

## 2021-08-29 LAB
ALBUMIN SERPL BCP-MCNC: 4.3 G/DL (ref 3.2–4.9)
ALBUMIN/GLOB SERPL: 2.2 G/DL
ALP SERPL-CCNC: 62 U/L (ref 30–99)
ALT SERPL-CCNC: 8 U/L (ref 2–50)
ANION GAP SERPL CALC-SCNC: 9 MMOL/L (ref 7–16)
ANISOCYTOSIS BLD QL SMEAR: ABNORMAL
AST SERPL-CCNC: 17 U/L (ref 12–45)
BASOPHILS # BLD AUTO: 0.5 % (ref 0–1.8)
BASOPHILS # BLD: 0.07 K/UL (ref 0–0.12)
BILIRUB SERPL-MCNC: 0.5 MG/DL (ref 0.1–1.5)
BUN SERPL-MCNC: 7 MG/DL (ref 8–22)
BURR CELLS BLD QL SMEAR: NORMAL
CALCIUM SERPL-MCNC: 8.3 MG/DL (ref 8.5–10.5)
CHLORIDE SERPL-SCNC: 109 MMOL/L (ref 96–112)
CO2 SERPL-SCNC: 20 MMOL/L (ref 20–33)
COMMENT 1642: NORMAL
CREAT SERPL-MCNC: 0.74 MG/DL (ref 0.5–1.4)
EOSINOPHIL # BLD AUTO: 0 K/UL (ref 0–0.51)
EOSINOPHIL NFR BLD: 0 % (ref 0–6.9)
ERYTHROCYTE [DISTWIDTH] IN BLOOD BY AUTOMATED COUNT: 58 FL (ref 35.9–50)
GLOBULIN SER CALC-MCNC: 2 G/DL (ref 1.9–3.5)
GLUCOSE SERPL-MCNC: 113 MG/DL (ref 65–99)
HCT VFR BLD AUTO: 28.1 % (ref 37–47)
HGB BLD-MCNC: 8.3 G/DL (ref 12–16)
HYPOCHROMIA BLD QL SMEAR: ABNORMAL
IMM GRANULOCYTES # BLD AUTO: 0.06 K/UL (ref 0–0.11)
IMM GRANULOCYTES NFR BLD AUTO: 0.5 % (ref 0–0.9)
LACTATE BLD-SCNC: 1 MMOL/L (ref 0.5–2)
LYMPHOCYTES # BLD AUTO: 1.96 K/UL (ref 1–4.8)
LYMPHOCYTES NFR BLD: 14.8 % (ref 22–41)
MCH RBC QN AUTO: 21.2 PG (ref 27–33)
MCHC RBC AUTO-ENTMCNC: 29.5 G/DL (ref 33.6–35)
MCV RBC AUTO: 71.7 FL (ref 81.4–97.8)
MICROCYTES BLD QL SMEAR: ABNORMAL
MONOCYTES # BLD AUTO: 0.81 K/UL (ref 0–0.85)
MONOCYTES NFR BLD AUTO: 6.1 % (ref 0–13.4)
MORPHOLOGY BLD-IMP: NORMAL
NEUTROPHILS # BLD AUTO: 10.33 K/UL (ref 2–7.15)
NEUTROPHILS NFR BLD: 78.1 % (ref 44–72)
NRBC # BLD AUTO: 0 K/UL
NRBC BLD-RTO: 0 /100 WBC
PLATELET # BLD AUTO: 446 K/UL (ref 164–446)
PLATELET BLD QL SMEAR: NORMAL
PMV BLD AUTO: 9.3 FL (ref 9–12.9)
POIKILOCYTOSIS BLD QL SMEAR: NORMAL
POTASSIUM SERPL-SCNC: 3.9 MMOL/L (ref 3.6–5.5)
PROT SERPL-MCNC: 6.3 G/DL (ref 6–8.2)
RBC # BLD AUTO: 3.92 M/UL (ref 4.2–5.4)
RBC BLD AUTO: PRESENT
SCHISTOCYTES BLD QL SMEAR: NORMAL
SODIUM SERPL-SCNC: 138 MMOL/L (ref 135–145)
TARGETS BLD QL SMEAR: NORMAL
WBC # BLD AUTO: 13.2 K/UL (ref 4.8–10.8)

## 2021-08-29 PROCEDURE — 80053 COMPREHEN METABOLIC PANEL: CPT

## 2021-08-29 PROCEDURE — 85025 COMPLETE CBC W/AUTO DIFF WBC: CPT

## 2021-08-29 PROCEDURE — 36415 COLL VENOUS BLD VENIPUNCTURE: CPT

## 2021-08-29 PROCEDURE — 83605 ASSAY OF LACTIC ACID: CPT

## 2021-08-29 PROCEDURE — 770006 HCHG ROOM/CARE - MED/SURG/GYN SEMI*

## 2021-08-29 PROCEDURE — 99223 1ST HOSP IP/OBS HIGH 75: CPT | Performed by: STUDENT IN AN ORGANIZED HEALTH CARE EDUCATION/TRAINING PROGRAM

## 2021-08-29 PROCEDURE — 99285 EMERGENCY DEPT VISIT HI MDM: CPT

## 2021-08-29 RX ORDER — ONDANSETRON 2 MG/ML
4 INJECTION INTRAMUSCULAR; INTRAVENOUS EVERY 4 HOURS PRN
Status: DISCONTINUED | OUTPATIENT
Start: 2021-08-29 | End: 2021-09-07 | Stop reason: HOSPADM

## 2021-08-29 RX ORDER — SODIUM CHLORIDE, SODIUM LACTATE, POTASSIUM CHLORIDE, CALCIUM CHLORIDE 600; 310; 30; 20 MG/100ML; MG/100ML; MG/100ML; MG/100ML
INJECTION, SOLUTION INTRAVENOUS CONTINUOUS
Status: DISCONTINUED | OUTPATIENT
Start: 2021-08-29 | End: 2021-08-30

## 2021-08-29 RX ORDER — CLONIDINE HYDROCHLORIDE 0.1 MG/1
0.1 TABLET ORAL EVERY 6 HOURS PRN
Status: DISCONTINUED | OUTPATIENT
Start: 2021-08-29 | End: 2021-08-30

## 2021-08-29 RX ORDER — POLYETHYLENE GLYCOL 3350 17 G/17G
1 POWDER, FOR SOLUTION ORAL
Status: DISCONTINUED | OUTPATIENT
Start: 2021-08-29 | End: 2021-09-07 | Stop reason: HOSPADM

## 2021-08-29 RX ORDER — ONDANSETRON 4 MG/1
4 TABLET, ORALLY DISINTEGRATING ORAL EVERY 4 HOURS PRN
Status: DISCONTINUED | OUTPATIENT
Start: 2021-08-29 | End: 2021-09-07 | Stop reason: HOSPADM

## 2021-08-29 RX ORDER — PROCHLORPERAZINE EDISYLATE 5 MG/ML
5-10 INJECTION INTRAMUSCULAR; INTRAVENOUS EVERY 4 HOURS PRN
Status: DISCONTINUED | OUTPATIENT
Start: 2021-08-29 | End: 2021-09-07 | Stop reason: HOSPADM

## 2021-08-29 RX ORDER — ACETAMINOPHEN 325 MG/1
650 TABLET ORAL EVERY 6 HOURS PRN
Status: DISCONTINUED | OUTPATIENT
Start: 2021-08-29 | End: 2021-09-07 | Stop reason: HOSPADM

## 2021-08-29 RX ORDER — ENALAPRILAT 1.25 MG/ML
1.25 INJECTION INTRAVENOUS EVERY 6 HOURS PRN
Status: DISCONTINUED | OUTPATIENT
Start: 2021-08-29 | End: 2021-08-30

## 2021-08-29 RX ORDER — BISACODYL 10 MG
10 SUPPOSITORY, RECTAL RECTAL
Status: DISCONTINUED | OUTPATIENT
Start: 2021-08-29 | End: 2021-09-07 | Stop reason: HOSPADM

## 2021-08-29 RX ORDER — PROMETHAZINE HYDROCHLORIDE 25 MG/1
12.5-25 SUPPOSITORY RECTAL EVERY 4 HOURS PRN
Status: DISCONTINUED | OUTPATIENT
Start: 2021-08-29 | End: 2021-09-07 | Stop reason: HOSPADM

## 2021-08-29 RX ORDER — AMOXICILLIN 250 MG
2 CAPSULE ORAL 2 TIMES DAILY
Status: DISCONTINUED | OUTPATIENT
Start: 2021-08-29 | End: 2021-09-07 | Stop reason: HOSPADM

## 2021-08-29 RX ORDER — LABETALOL HYDROCHLORIDE 5 MG/ML
10 INJECTION, SOLUTION INTRAVENOUS EVERY 4 HOURS PRN
Status: DISCONTINUED | OUTPATIENT
Start: 2021-08-29 | End: 2021-08-30

## 2021-08-29 RX ORDER — PROMETHAZINE HYDROCHLORIDE 25 MG/1
12.5-25 TABLET ORAL EVERY 4 HOURS PRN
Status: DISCONTINUED | OUTPATIENT
Start: 2021-08-29 | End: 2021-09-07 | Stop reason: HOSPADM

## 2021-08-29 RX ORDER — NICOTINE 21 MG/24HR
21 PATCH, TRANSDERMAL 24 HOURS TRANSDERMAL
Status: DISCONTINUED | OUTPATIENT
Start: 2021-08-30 | End: 2021-09-07 | Stop reason: HOSPADM

## 2021-08-29 ASSESSMENT — ENCOUNTER SYMPTOMS: ABDOMINAL PAIN: 1

## 2021-08-29 ASSESSMENT — FIBROSIS 4 INDEX: FIB4 SCORE: 0.7

## 2021-08-30 ENCOUNTER — HOSPITAL ENCOUNTER (OUTPATIENT)
Dept: RADIOLOGY | Facility: MEDICAL CENTER | Age: 61
End: 2021-08-30
Payer: COMMERCIAL

## 2021-08-30 PROBLEM — K57.20 DIVERTICULITIS OF LARGE INTESTINE WITH ABSCESS: Status: ACTIVE | Noted: 2021-08-30

## 2021-08-30 PROBLEM — K21.9 GASTROESOPHAGEAL REFLUX DISEASE WITHOUT ESOPHAGITIS: Status: ACTIVE | Noted: 2021-08-30

## 2021-08-30 PROBLEM — Z72.0 TOBACCO USE: Status: RESOLVED | Noted: 2019-10-18 | Resolved: 2021-08-30

## 2021-08-30 PROBLEM — D50.0 IRON DEFICIENCY ANEMIA DUE TO CHRONIC BLOOD LOSS: Status: ACTIVE | Noted: 2021-08-30

## 2021-08-30 PROBLEM — Z95.5 STATUS POST CORONARY ARTERY STENT PLACEMENT: Status: ACTIVE | Noted: 2021-08-30

## 2021-08-30 PROBLEM — Z86.79 HX OF CORONARY ARTERY DISEASE: Status: ACTIVE | Noted: 2021-08-30

## 2021-08-30 PROBLEM — Z72.0 TOBACCO ABUSE: Status: ACTIVE | Noted: 2021-08-09

## 2021-08-30 PROBLEM — F41.1 GENERALIZED ANXIETY DISORDER: Status: ACTIVE | Noted: 2021-08-30

## 2021-08-30 LAB
CHOLEST SERPL-MCNC: 178 MG/DL (ref 100–199)
EST. AVERAGE GLUCOSE BLD GHB EST-MCNC: 88 MG/DL
FERRITIN SERPL-MCNC: 24.5 NG/ML (ref 10–291)
HBA1C MFR BLD: 4.7 % (ref 4–5.6)
HDLC SERPL-MCNC: 57 MG/DL
IRON SATN MFR SERPL: 6 % (ref 15–55)
IRON SERPL-MCNC: 17 UG/DL (ref 40–170)
LDLC SERPL CALC-MCNC: 102 MG/DL
MAGNESIUM SERPL-MCNC: 2.3 MG/DL (ref 1.5–2.5)
TIBC SERPL-MCNC: 272 UG/DL (ref 250–450)
TRIGL SERPL-MCNC: 96 MG/DL (ref 0–149)
TSH SERPL DL<=0.005 MIU/L-ACNC: 2.48 UIU/ML (ref 0.38–5.33)
UIBC SERPL-MCNC: 255 UG/DL (ref 110–370)

## 2021-08-30 PROCEDURE — 83550 IRON BINDING TEST: CPT

## 2021-08-30 PROCEDURE — 700102 HCHG RX REV CODE 250 W/ 637 OVERRIDE(OP): Performed by: STUDENT IN AN ORGANIZED HEALTH CARE EDUCATION/TRAINING PROGRAM

## 2021-08-30 PROCEDURE — 99232 SBSQ HOSP IP/OBS MODERATE 35: CPT | Performed by: STUDENT IN AN ORGANIZED HEALTH CARE EDUCATION/TRAINING PROGRAM

## 2021-08-30 PROCEDURE — 82728 ASSAY OF FERRITIN: CPT

## 2021-08-30 PROCEDURE — 83036 HEMOGLOBIN GLYCOSYLATED A1C: CPT

## 2021-08-30 PROCEDURE — 96366 THER/PROPH/DIAG IV INF ADDON: CPT

## 2021-08-30 PROCEDURE — 700105 HCHG RX REV CODE 258: Performed by: STUDENT IN AN ORGANIZED HEALTH CARE EDUCATION/TRAINING PROGRAM

## 2021-08-30 PROCEDURE — 84443 ASSAY THYROID STIM HORMONE: CPT

## 2021-08-30 PROCEDURE — 87040 BLOOD CULTURE FOR BACTERIA: CPT | Mod: 91

## 2021-08-30 PROCEDURE — 700111 HCHG RX REV CODE 636 W/ 250 OVERRIDE (IP): Performed by: STUDENT IN AN ORGANIZED HEALTH CARE EDUCATION/TRAINING PROGRAM

## 2021-08-30 PROCEDURE — 770006 HCHG ROOM/CARE - MED/SURG/GYN SEMI*

## 2021-08-30 PROCEDURE — 83540 ASSAY OF IRON: CPT

## 2021-08-30 PROCEDURE — 83735 ASSAY OF MAGNESIUM: CPT

## 2021-08-30 PROCEDURE — 80061 LIPID PANEL: CPT

## 2021-08-30 PROCEDURE — 96365 THER/PROPH/DIAG IV INF INIT: CPT

## 2021-08-30 PROCEDURE — A9270 NON-COVERED ITEM OR SERVICE: HCPCS | Performed by: STUDENT IN AN ORGANIZED HEALTH CARE EDUCATION/TRAINING PROGRAM

## 2021-08-30 RX ORDER — VENLAFAXINE HYDROCHLORIDE 75 MG/1
75 CAPSULE, EXTENDED RELEASE ORAL DAILY
Status: DISCONTINUED | OUTPATIENT
Start: 2021-08-30 | End: 2021-09-07 | Stop reason: HOSPADM

## 2021-08-30 RX ORDER — LEVOTHYROXINE SODIUM 88 UG/1
88 TABLET ORAL
Status: DISCONTINUED | OUTPATIENT
Start: 2021-08-30 | End: 2021-09-07 | Stop reason: HOSPADM

## 2021-08-30 RX ORDER — OMEPRAZOLE 20 MG/1
20 CAPSULE, DELAYED RELEASE ORAL DAILY
Status: DISCONTINUED | OUTPATIENT
Start: 2021-08-30 | End: 2021-09-07 | Stop reason: HOSPADM

## 2021-08-30 RX ORDER — SPIRONOLACTONE 25 MG/1
12.5 TABLET ORAL DAILY
Status: DISCONTINUED | OUTPATIENT
Start: 2021-08-30 | End: 2021-09-07 | Stop reason: HOSPADM

## 2021-08-30 RX ORDER — ATORVASTATIN CALCIUM 40 MG/1
80 TABLET, FILM COATED ORAL DAILY
Status: DISCONTINUED | OUTPATIENT
Start: 2021-08-30 | End: 2021-09-07 | Stop reason: HOSPADM

## 2021-08-30 RX ORDER — CLOPIDOGREL BISULFATE 75 MG/1
75 TABLET ORAL DAILY
Status: DISCONTINUED | OUTPATIENT
Start: 2021-08-30 | End: 2021-09-07 | Stop reason: HOSPADM

## 2021-08-30 RX ORDER — METOPROLOL SUCCINATE 25 MG/1
50 TABLET, EXTENDED RELEASE ORAL EVERY EVENING
Status: DISCONTINUED | OUTPATIENT
Start: 2021-08-30 | End: 2021-09-07 | Stop reason: HOSPADM

## 2021-08-30 RX ORDER — LISINOPRIL 10 MG/1
10 TABLET ORAL DAILY
Status: DISCONTINUED | OUTPATIENT
Start: 2021-08-30 | End: 2021-09-07 | Stop reason: HOSPADM

## 2021-08-30 RX ADMIN — PIPERACILLIN AND TAZOBACTAM 3.38 G: 3; .375 INJECTION, POWDER, LYOPHILIZED, FOR SOLUTION INTRAVENOUS; PARENTERAL at 01:35

## 2021-08-30 RX ADMIN — ATORVASTATIN CALCIUM 80 MG: 40 TABLET, FILM COATED ORAL at 05:11

## 2021-08-30 RX ADMIN — PIPERACILLIN AND TAZOBACTAM 3.38 G: 3; .375 INJECTION, POWDER, LYOPHILIZED, FOR SOLUTION INTRAVENOUS; PARENTERAL at 21:19

## 2021-08-30 RX ADMIN — SPIRONOLACTONE 12.5 MG: 25 TABLET ORAL at 05:10

## 2021-08-30 RX ADMIN — METOPROLOL SUCCINATE 50 MG: 25 TABLET, EXTENDED RELEASE ORAL at 17:49

## 2021-08-30 RX ADMIN — VENLAFAXINE HYDROCHLORIDE 75 MG: 75 CAPSULE, EXTENDED RELEASE ORAL at 05:12

## 2021-08-30 RX ADMIN — SODIUM CHLORIDE, POTASSIUM CHLORIDE, SODIUM LACTATE AND CALCIUM CHLORIDE: 600; 310; 30; 20 INJECTION, SOLUTION INTRAVENOUS at 02:19

## 2021-08-30 RX ADMIN — CLOPIDOGREL BISULFATE 75 MG: 75 TABLET ORAL at 05:11

## 2021-08-30 RX ADMIN — LISINOPRIL 10 MG: 10 TABLET ORAL at 05:11

## 2021-08-30 RX ADMIN — PIPERACILLIN AND TAZOBACTAM 3.38 G: 3; .375 INJECTION, POWDER, LYOPHILIZED, FOR SOLUTION INTRAVENOUS; PARENTERAL at 05:10

## 2021-08-30 RX ADMIN — ACETAMINOPHEN 650 MG: 325 TABLET, FILM COATED ORAL at 16:08

## 2021-08-30 RX ADMIN — PIPERACILLIN AND TAZOBACTAM 3.38 G: 3; .375 INJECTION, POWDER, LYOPHILIZED, FOR SOLUTION INTRAVENOUS; PARENTERAL at 13:40

## 2021-08-30 RX ADMIN — ASPIRIN 81 MG: 81 TABLET, COATED ORAL at 05:11

## 2021-08-30 RX ADMIN — SODIUM CHLORIDE, POTASSIUM CHLORIDE, SODIUM LACTATE AND CALCIUM CHLORIDE: 600; 310; 30; 20 INJECTION, SOLUTION INTRAVENOUS at 11:38

## 2021-08-30 RX ADMIN — LEVOTHYROXINE SODIUM 88 MCG: 0.09 TABLET ORAL at 05:12

## 2021-08-30 RX ADMIN — OMEPRAZOLE 20 MG: 20 CAPSULE, DELAYED RELEASE ORAL at 05:11

## 2021-08-30 RX ADMIN — NICOTINE TRANSDERMAL SYSTEM 21 MG: 21 PATCH, EXTENDED RELEASE TRANSDERMAL at 05:13

## 2021-08-30 ASSESSMENT — COGNITIVE AND FUNCTIONAL STATUS - GENERAL
MOBILITY SCORE: 23
SUGGESTED CMS G CODE MODIFIER DAILY ACTIVITY: CH
DAILY ACTIVITIY SCORE: 24
SUGGESTED CMS G CODE MODIFIER MOBILITY: CI
CLIMB 3 TO 5 STEPS WITH RAILING: A LITTLE

## 2021-08-30 ASSESSMENT — LIFESTYLE VARIABLES
AVERAGE NUMBER OF DAYS PER WEEK YOU HAVE A DRINK CONTAINING ALCOHOL: 7
TOTAL SCORE: 3
DOES PATIENT WANT TO TALK TO SOMEONE ABOUT QUITTING: NO
DOES PATIENT WANT TO STOP DRINKING: YES
HAVE YOU EVER FELT YOU SHOULD CUT DOWN ON YOUR DRINKING: YES
TOTAL SCORE: 3
ON A TYPICAL DAY WHEN YOU DRINK ALCOHOL HOW MANY DRINKS DO YOU HAVE: 3
EVER FELT BAD OR GUILTY ABOUT YOUR DRINKING: YES
CONSUMPTION TOTAL: POSITIVE
HAVE PEOPLE ANNOYED YOU BY CRITICIZING YOUR DRINKING: YES
EVER HAD A DRINK FIRST THING IN THE MORNING TO STEADY YOUR NERVES TO GET RID OF A HANGOVER: NO
TOTAL SCORE: 3
ALCOHOL_USE: YES
HOW MANY TIMES IN THE PAST YEAR HAVE YOU HAD 5 OR MORE DRINKS IN A DAY: 0

## 2021-08-30 ASSESSMENT — ENCOUNTER SYMPTOMS
SORE THROAT: 0
DIAPHORESIS: 1
EYE PAIN: 0
BACK PAIN: 0
CONSTIPATION: 0
FEVER: 0
FALLS: 0
DIARRHEA: 1
DIZZINESS: 1
MYALGIAS: 0
ABDOMINAL PAIN: 1
BLOOD IN STOOL: 0
NERVOUS/ANXIOUS: 0
HEMOPTYSIS: 0
DEPRESSION: 1
VOMITING: 0
NECK PAIN: 0
CHILLS: 0
NAUSEA: 0
SHORTNESS OF BREATH: 0
SINUS PAIN: 0
HEADACHES: 0
LOSS OF CONSCIOUSNESS: 0

## 2021-08-30 ASSESSMENT — PATIENT HEALTH QUESTIONNAIRE - PHQ9
7. TROUBLE CONCENTRATING ON THINGS, SUCH AS READING THE NEWSPAPER OR WATCHING TELEVISION: NOT AT ALL
1. LITTLE INTEREST OR PLEASURE IN DOING THINGS: MORE THAN HALF THE DAYS
SUM OF ALL RESPONSES TO PHQ QUESTIONS 1-9: 7
SUM OF ALL RESPONSES TO PHQ9 QUESTIONS 1 AND 2: 5
6. FEELING BAD ABOUT YOURSELF - OR THAT YOU ARE A FAILURE OR HAVE LET YOURSELF OR YOUR FAMILY DOWN: MORE THAN HALF THE DAYS
5. POOR APPETITE OR OVEREATING: NOT AT ALL
9. THOUGHTS THAT YOU WOULD BE BETTER OFF DEAD, OR OF HURTING YOURSELF: NOT AT ALL
2. FEELING DOWN, DEPRESSED, IRRITABLE, OR HOPELESS: NEARLY EVERY DAY
3. TROUBLE FALLING OR STAYING ASLEEP OR SLEEPING TOO MUCH: NOT AT ALL
8. MOVING OR SPEAKING SO SLOWLY THAT OTHER PEOPLE COULD HAVE NOTICED. OR THE OPPOSITE, BEING SO FIGETY OR RESTLESS THAT YOU HAVE BEEN MOVING AROUND A LOT MORE THAN USUAL: NOT AT ALL
4. FEELING TIRED OR HAVING LITTLE ENERGY: NOT AT ALL

## 2021-08-30 ASSESSMENT — PAIN DESCRIPTION - PAIN TYPE
TYPE: ACUTE PAIN

## 2021-08-30 ASSESSMENT — FIBROSIS 4 INDEX: FIB4 SCORE: 0.84

## 2021-08-30 NOTE — ED NOTES
Lab called for add ons, states will start now. Pt currently resting in bed, no complaints at this time. Call light within reach. Will continue to monitor

## 2021-08-30 NOTE — PROGRESS NOTES
[facility]    Date of Service  8/30/2021    Chief Complaint  Marisol Brown is a 61 y.o. female with diverticulitis, HTN, hypothyroidism, anxiety transferred from [facility] ED 8/29/2021 with diverticular abscess.    Hospital Course  No notes on file    Interval Problem Update  She was transferred from [place] overnight for surgical consult.  Receiving General Surgeon (Dr. Aleman) advised IR consultation based on 4.2 cm abscess.  She reports that she developed large volume of loose nonbloody stool after returning home and felt lightheaded.  She's been having night sweats and chills but denies fever.  In the ED at [place] she underwent CT which showed a 4.2 cm diverticular abscess.  Antibiotics and cultures were not performed at [place], but were drawn and started overnight.  She reports mild abdominal discomfort and bladder irritation.  She had diarrhea prior to coming in but denies hematochezia, melena.  Her appetite has been good and she denies N/V.  She denies dyspnea, syncope, weakness, falls, bleeding, other pain.  She has been emotionally distraught due to the setback of being admitted after discharging earlier this month.    She has not had anything to eat/drink after midnight in case of drain / surgery.    I have personally seen and examined the patient at bedside. I discussed the plan of care with patient,  and general surgery, infectious disease and interventional radiology.    Discussed POC with general surgeon Dr. Aleman, who advised that Dr. Rosa will consult today.  Discussed POC with IR Dr. Capone, who advised it is too flat and anatomy is not amenable for drain placement.  Discussed POC with general surgeon Dr. Rosa, who advised nonoperative management with IV antibiotics.  Discussed POC with ID Dr. Rowland, who agreed with zosyn and will consult tomorrow.    Consultants/Specialty  general surgery, infectious disease and IR    Code Status  Full  Code    Disposition  Patient is not medically cleared.   Anticipate discharge to to home with organized home healthcare and close outpatient follow-up.  I have placed the appropriate orders for post-discharge needs.    Review of Systems  Review of Systems   Constitutional: Positive for diaphoresis. Negative for chills and fever.   HENT: Negative for ear pain, nosebleeds, sinus pain and sore throat.    Eyes: Negative for pain.   Respiratory: Negative for hemoptysis and shortness of breath.    Cardiovascular: Negative for chest pain.   Gastrointestinal: Positive for abdominal pain and diarrhea. Negative for blood in stool, constipation, melena, nausea and vomiting.   Musculoskeletal: Negative for back pain, falls, joint pain, myalgias and neck pain.   Neurological: Positive for dizziness. Negative for loss of consciousness and headaches.   Psychiatric/Behavioral: Positive for depression. The patient is not nervous/anxious.         Physical Exam  Temp:  [36.4 °C (97.5 °F)-37.4 °C (99.3 °F)] 37.4 °C (99.3 °F)  Pulse:  [69-98] 72  Resp:  [12-21] 15  BP: (108-168)/(55-79) 108/55  SpO2:  [92 %-98 %] 98 %    Physical Exam  Vitals and nursing note reviewed.   Constitutional:       General: She is in acute distress (Emotional distress).      Appearance: She is not ill-appearing, toxic-appearing or diaphoretic.   HENT:      Head: Normocephalic.      Nose: Nose normal.      Mouth/Throat:      Mouth: Mucous membranes are dry.      Pharynx: Oropharynx is clear. No oropharyngeal exudate or posterior oropharyngeal erythema.   Eyes:      General: No scleral icterus.     Conjunctiva/sclera: Conjunctivae normal.   Cardiovascular:      Rate and Rhythm: Normal rate and regular rhythm.      Pulses: Normal pulses.      Heart sounds: Normal heart sounds. No murmur heard.   No friction rub. No gallop.    Pulmonary:      Effort: Pulmonary effort is normal. No respiratory distress.      Breath sounds: Normal breath sounds. No wheezing,  rhonchi or rales.   Abdominal:      General: Abdomen is flat. Bowel sounds are decreased. There is no distension.      Palpations: Abdomen is soft.      Tenderness: There is abdominal tenderness (Mild LLQ). There is no guarding or rebound.   Genitourinary:     Comments: No yancey  Musculoskeletal:      Cervical back: Neck supple.      Right lower leg: No edema.      Left lower leg: No edema.   Skin:     General: Skin is warm and dry.   Neurological:      Mental Status: She is alert.      Comments: Appropriately conversant   Psychiatric:         Mood and Affect: Mood normal.         Behavior: Behavior normal.         Thought Content: Thought content normal.         Judgment: Judgment normal.         Fluids    Intake/Output Summary (Last 24 hours) at 8/30/2021 1553  Last data filed at 8/30/2021 0219  Gross per 24 hour   Intake 100 ml   Output --   Net 100 ml       Laboratory  Recent Labs     08/29/21  2201   WBC 13.2*   RBC 3.92*   HEMOGLOBIN 8.3*   HEMATOCRIT 28.1*   MCV 71.7*   MCH 21.2*   MCHC 29.5*   RDW 58.0*   PLATELETCT 446   MPV 9.3     Recent Labs     08/29/21  2201   SODIUM 138   POTASSIUM 3.9   CHLORIDE 109   CO2 20   GLUCOSE 113*   BUN 7*   CREATININE 0.74   CALCIUM 8.3*             Recent Labs     08/30/21  0048   TRIGLYCERIDE 96   HDL 57   *       Imaging  OUTSIDE IMAGES-CT ABDOMEN /PELVIS   Final Result      OUTSIDE IMAGES-CT ABDOMEN /PELVIS   Final Result           Assessment/Plan  Diverticulitis of large intestine with abscess- (present on admission)  Assessment & Plan  Leukocytosis without other SIRS criteria  Recent colonoscopy with biopsy for hematochezia  Presented to [facility] ED with diarrhea and presyncope  CT demonstrated 4.2 cm abscess adjacent to rectal wall  [facility] prior to initiation of antitiobics  General Surgery consulted, recommended nonoperative management with IV antibiotics and IR drain placement  IR consulted, not amenable to drainage due to discoid  shape and adjacent anatomy  ID consulted, will review with IR tomorrow and determine antibiotic plan  Continue zosyn    Iron deficiency anemia due to chronic blood loss- (present on admission)  Assessment & Plan  Presents with microcytic anemia  Ferritin 25, Fe 6% consistent with iron deficiency  In setting of GI losses, possibly silent in addition to prior evaluation for hematochezia  IV Fe deferred in setting of active infection (diverticular abscess)  Transfuse for Hgb <7    Hx of coronary artery disease- (present on admission)  Assessment & Plan  STEMI 10/18/19 requiring stent placement  Continue aspirin, plavix, atorvastatin, and metoprolol  Uncertain why she is on DAPT beyond one year unless other stents placed    Gastroesophageal reflux disease without esophagitis- (present on admission)  Assessment & Plan  Continue omeprazole    Generalized anxiety disorder- (present on admission)  Assessment & Plan  Continue venlafaxine    Leukocytosis- (present on admission)  Assessment & Plan  Due to intestinal abscess  Meets no other sepsis criteria    Tobacco use- (present on admission)  Assessment & Plan  NRT patch + lozenges    Hypothyroidism- (present on admission)  Assessment & Plan  TSH WNL  Continue levothyroxine    Essential hypertension- (present on admission)  Assessment & Plan  Continue amlodipine, spironolactone, and metoprolol    Dyslipidemia- (present on admission)  Assessment & Plan  Continue high-intensity statin and ASA for ASCVD risk reduction       VTE prophylaxis: SCDs/TEDs and pharmacologic prophylaxis contraindicated due to pat-procedure evaluation    I have performed a physical exam and reviewed and updated ROS and Plan today (8/30/2021). In review of yesterday's note (8/29/2021), there are no changes except as documented above.

## 2021-08-30 NOTE — PROGRESS NOTES
Received report from ED RN. Pt arrived via wheelchair transport. Pt ambulated to bed with steady gait. Pt A&Ox4 resting in bed on room air. POC discussed with pt. Pt oriented to room and call light. Pt reminded of NPO status.

## 2021-08-30 NOTE — ASSESSMENT & PLAN NOTE
Presents with microcytic anemia  Ferritin 25, Fe 6% consistent with iron deficiency  In setting of GI losses, possibly silent in addition to prior evaluation for hematochezia  IV Fe deferred in setting of active infection (diverticular abscess)  Transfuse for Hgb <7

## 2021-08-30 NOTE — THERAPY
08/30/21 1155   Interdisciplinary Plan of Care Collaboration   Collaboration Comments Met w/ pt and nsg regarding PT services in the acute care setting.  Pt reports that she is able to mobilize w/o issue and nsg agrees, allowing pt to be up self.  No acute PT needs.  PT for d/c needs only.

## 2021-08-30 NOTE — ASSESSMENT & PLAN NOTE
STEMI 10/18/19 requiring stent placement  Continue aspirin, plavix, atorvastatin, and metoprolol  Uncertain why she is on DAPT beyond one year unless other stents placed

## 2021-08-30 NOTE — ED NOTES
Pt medicated per MAR with morning meds. Tolerated well. No complaints at this time. Pt understands NPO status. Call light within reach. Will continue to monitor

## 2021-08-30 NOTE — ED NOTES
Pt up to restroom with steady gait. Back in bed, call light within reach. VSS. Will continue to monitor

## 2021-08-30 NOTE — ED NOTES
Med rec updated and complete. Allergies reviewed.  Pt denies antibiotic use  In last 30 days at home.    Home pharmacy [facility] [phone]         Medication Sig Dispense Refill   • omeprazole (PRILOSEC OTC) 20 MG tablet Take 1 Tablet by mouth every day. 30 Tablet 3   • atorvastatin (LIPITOR) 80 MG tablet Take 1 Tab by mouth every day. 30 Tab 0   • aspirin EC 81 MG EC tablet Take 1 Tab by mouth every day. 30 Tab 0   • lisinopril (PRINIVIL) 10 MG Tab Take 1 Tab by mouth every day. 30 Tab 0   • clopidogrel (PLAVIX) 75 MG Tab Take 1 Tab by mouth every day. 30 Tab 1   • metoprolol SR (TOPROL XL) 50 MG TABLET SR 24 HR Take 1 Tab by mouth every evening. 30 Tab 11   • spironolactone (ALDACTONE) 25 MG Tab Take 0.5 Tabs by mouth every day. 30 Tab 11   • levothyroxine (SYNTHROID) 88 MCG Tab Take 88 mcg by mouth Every morning on an empty stomach.     • venlafaxine XR (EFFEXOR XR) 75 MG CAPSULE SR 24 HR Take 75 mg by mouth every day.

## 2021-08-30 NOTE — ED PROVIDER NOTES
ER Provider Note     Scribed for Danny Gonzalez M.D. by Getachew Adame. 8/29/2021, 10:03 PM.    Primary Care Provider: NATHANAEL King  Means of Arrival: EMS   History obtained from: Patient  History limited by: None     CHIEF COMPLAINT  Chief Complaint   Patient presents with    Sent by MD     transfer from [facility]    Abdominal Pain       HPI  Marisol Brown is a 61 y.o. female who presents to the Emergency Department via EMS as a transfer from [facility] for surgical consultation. She has been having abdominal pain and dry heaving, which prompted her to go to the ED for evaluation. While at [facility] she was found to have diverticulitis with a diverticular abscess. Earlier this month she had a colonoscopy and upper GI endoscopy following a GI bleed. No fevers. She is vaccinated against COVID-19.     REVIEW OF SYSTEMS  See HPI for further details. All other systems are negative.     PAST MEDICAL HISTORY   has a past medical history of Depression, Essential hypertension, Hypercholesteremia, Hypothyroidism, and Mixed hyperlipidemia.    SURGICAL HISTORY   has a past surgical history that includes tubal ligation; dental extraction(s); carpal tunnel release; upper gi endoscopy,diagnosis (N/A, 8/11/2021); and colonoscopy,diagnostic (N/A, 8/11/2021).    SOCIAL HISTORY  Social History     Tobacco Use    Smoking status: Current Every Day Smoker     Packs/day: 1.00     Types: Cigarettes     Start date: 1974    Smokeless tobacco: Never Used   Vaping Use    Vaping Use: Never used   Substance Use Topics    Alcohol use: Yes     Alcohol/week: 7.2 oz     Types: 12 Cans of beer per week    Drug use: Yes     Types: Inhaled      Social History     Substance and Sexual Activity   Drug Use Yes    Types: Inhaled       FAMILY HISTORY  Family History   Problem Relation Age of Onset    Heart Disease Mother     Dementia Father     Hyperlipidemia Sister     Hyperlipidemia Brother        CURRENT MEDICATIONS  Home  "Medications       Reviewed by Anamaria Parham (Pharmacy Tech) on 08/29/21 at 2246  Med List Status: Complete     Medication Last Dose Status   aspirin EC 81 MG EC tablet 8/23/2021 Active   atorvastatin (LIPITOR) 80 MG tablet 8/23/2021 Active   clopidogrel (PLAVIX) 75 MG Tab 8/23/2021 Active   levothyroxine (SYNTHROID) 88 MCG Tab 8/23/2021 Active   lisinopril (PRINIVIL) 10 MG Tab 8/23/2021 Active   metoprolol SR (TOPROL XL) 50 MG TABLET SR 24 HR 8/23/2021 Active   omeprazole (PRILOSEC OTC) 20 MG tablet 8/23/2021 Active   spironolactone (ALDACTONE) 25 MG Tab 8/23/2021 Active   venlafaxine XR (EFFEXOR XR) 75 MG CAPSULE SR 24 HR 8/23/2021 Active                    ALLERGIES  Allergies   Allergen Reactions    Codeine        PHYSICAL EXAM  VITAL SIGNS: /72   Pulse 88   Temp 36.8 °C (98.3 °F) (Temporal)   Resp 18   Ht 1.676 m (5' 6\")   Wt 69.5 kg (153 lb 3.5 oz)   SpO2 96%   BMI 24.73 kg/m²      Constitutional: Alert in no apparent distress.  HENT: No signs of trauma, Bilateral external ears normal, Nose normal.   Eyes: Pupils are equal and reactive, Conjunctiva normal, Non-icteric.   Neck: Normal range of motion, No tenderness, Supple, No stridor.   Lymphatic: No lymphadenopathy noted.   Cardiovascular: Regular rate and rhythm, no palpable thrill  Thorax & Lungs: No respiratory distress,  No chest tenderness.   Abdomen: Tenderness to palpation of left lower quadrant, Bowel sounds normal, Soft, No masses, No pulsatile masses. No peritoneal signs.  Skin: Warm, Dry, No erythema, No rash.   Back: No bony tenderness, No CVA tenderness.   Extremities: Intact distal pulses, No edema, No tenderness, No cyanosis.  Musculoskeletal: Good range of motion in all major joints. No tenderness to palpation or major deformities noted.   Neurologic: Alert , Normal motor function, Normal sensory function, No focal deficits noted.   Psychiatric: Affect normal, Judgment normal, Mood normal.     DIAGNOSTIC STUDIES / " "PROCEDURES    LABS  Labs Reviewed   CBC WITH DIFFERENTIAL - Abnormal; Notable for the following components:       Result Value    WBC 13.2 (*)     RBC 3.92 (*)     Hemoglobin 8.3 (*)     Hematocrit 28.1 (*)     MCV 71.7 (*)     MCH 21.2 (*)     MCHC 29.5 (*)     RDW 58.0 (*)     Neutrophils-Polys 78.10 (*)     Lymphocytes 14.80 (*)     Neutrophils (Absolute) 10.33 (*)     Hypochromia 2+ (*)     All other components within normal limits   COMP METABOLIC PANEL - Abnormal; Notable for the following components:    Glucose 113 (*)     Bun 7 (*)     Calcium 8.3 (*)     All other components within normal limits   LIPID PROFILE - Abnormal; Notable for the following components:     (*)     All other components within normal limits   IRON/TOTAL IRON BIND - Abnormal; Notable for the following components:    Iron 17 (*)     % Saturation 6 (*)     All other components within normal limits   LACTIC ACID   BLOOD CULTURE    Narrative:     Per Hospital Policy: Only change Specimen Src: to \"Line\" if  specified by physician order.   MAGNESIUM   TSH WITH REFLEX TO FT4   ESTIMATED GFR   HEMOGLOBIN A1C   PERIPHERAL SMEAR REVIEW   PLATELET ESTIMATE   MORPHOLOGY   DIFFERENTIAL COMMENT   FERRITIN   BLOOD CULTURE     All labs reviewed by me.    Reviewed labs from [facility]:  WBC 18  No significant electrolyte derangement  Urine nitrite positive with 6-10 white count    RADIOLOGY    Reviewed CT from [facility]:  Abscess along sigmoid colon likely from diverticula    COURSE & MEDICAL DECISION MAKING  Pertinent Labs & Imaging studies reviewed. (See chart for details)    This is a 61 y.o. female that presents to the emergency department as a transfer in for a diverticular abscess.  The patient has tenderness in the left lower quadrant.  Surgery was consulted.  We will repeat labs and we will admit the patient..     10:03 PM - Patient seen and examined at bedside. Ordered lactic acid, CBC with differential, CMP, and blood " culture.    10:24 PM - Paged Surgery and Hospitalist.    10:33 PM - I discussed the patient's case and the above findings with Dr. Aleman (Surgery) who would like IR to place a drain.    10:34 PM - I discussed the patient's case and the above findings with Dr. Roper (Hospitalist) who agrees to evaluate the patient for hospitalization.    Patient was found to have no lactic acidosis.  The patient is a white count of 13.  She is still having some pain.  I consulted surgery.  Will admit the patient.  Will need to the hospitalist.    DISPOSITION:  Patient will be hospitalized by Dr. Roper in guarded condition.    FINAL IMPRESSION  1. Diverticulitis    2. Intestinal diverticular abscess          I, Getachew Adame (Scribterence), am scribing for, and in the presence of, Danny Gonzalez M.D..    Electronically signed by: Getachew Adame (Akbar), 8/29/2021    IDanny M.D. personally performed the services described in this documentation, as scribed by Getachew Adame in my presence, and it is both accurate and complete.     The note accurately reflects work and decisions made by me.  Danny Gonzalez M.D.  8/30/2021  3:02 AM

## 2021-08-30 NOTE — ASSESSMENT & PLAN NOTE
Dr. Aleman with general surgery recommends IR drain placement in a.m.  1 dose of Zosyn given at outside hospital  We will continue IV fluids and antibiotics

## 2021-08-30 NOTE — ASSESSMENT & PLAN NOTE
Leukocytosis without other SIRS criteria  Recent colonoscopy with biopsy for hematochezia  Presented to [facility] ED with diarrhea and presyncope  CT demonstrated 4.2 cm abscess adjacent to rectal wall  Transferred to [facility]  BCx 8/30 drawn prior to initiation of antitiobics  General Surgery consulted, recommended nonoperative management with IV antibiotics and IR drain placement  IR consulted, not amenable to drainage due to discoid shape and adjacent anatomy  ID consulted  Per ID - Continue IV Zosyn , initiated on empiric p.o. vancomycin 125 mg twice daily given history of C. Difficile. Will plan a 2-week course of IV antibiotics - Stop date 9/13/2021.   Because of insurance issue, home health and home antibiotic infusion plan is impossible.  Because of the size of diverticula abscess, unsafe to switch to oral antibiotics.   Another option is IV ertapenem 1 g daily at the [facility] and  these antibiotic orders will need to come from her primary care physician from [place].   Case Mx will coordinate with [facility] & pt's PCP office.

## 2021-08-30 NOTE — CONSULTS
DATE OF CONSULTATION:  8/30/2021     REFERRING PHYSICIAN:   Danny Gonzalez M.D.     CONSULTING PHYSICIAN:  Masoud Rosa M.D.     REASON FOR CONSULTATION:  Pelvic pain.   I have been asked by  to see the patient in surgical consultation for evaluation of pelvic pain / diverticular abscess.    HISTORY OF PRESENT ILLNESS: The patient is a 61 year-old White elderly woman who presents to the Emergency Department with a ~3 day history of moderate suprapubic abdominal pain. The pain is associated with nausea, vomiting and malaise. The patient denies any recent or intercurrent illness. admits a history of multilpe episodes of diverticulitis  -most recent was earlier this month.. The patient has undergone tubal ligation. The patient denies any previous surgery for obstructive symptoms..     PAST MEDICAL HISTORY:  has a past medical history of Depression, Essential hypertension, Hypercholesteremia, Hypothyroidism, and Mixed hyperlipidemia.    PAST SURGICAL HISTORY:  has a past surgical history that includes tubal ligation; dental extraction(s); carpal tunnel release; upper gi endoscopy,diagnosis (N/A, 8/11/2021); and colonoscopy,diagnostic (N/A, 8/11/2021).     ALLERGIES:   Allergies   Allergen Reactions   • Codeine         CURRENT MEDICATIONS:   Home Medications     Reviewed by Anamaria Parham (Pharmacy Tech) on 08/29/21 at 2246  Med List Status: Complete   Medication Last Dose Status   aspirin EC 81 MG EC tablet 8/23/2021 Active   atorvastatin (LIPITOR) 80 MG tablet 8/23/2021 Active   clopidogrel (PLAVIX) 75 MG Tab 8/23/2021 Active   levothyroxine (SYNTHROID) 88 MCG Tab 8/23/2021 Active   lisinopril (PRINIVIL) 10 MG Tab 8/23/2021 Active   metoprolol SR (TOPROL XL) 50 MG TABLET SR 24 HR 8/23/2021 Active   omeprazole (PRILOSEC OTC) 20 MG tablet 8/23/2021 Active   spironolactone (ALDACTONE) 25 MG Tab 8/23/2021 Active   venlafaxine XR (EFFEXOR XR) 75 MG CAPSULE SR 24 HR 8/23/2021 Active           "      FAMILY HISTORY:   Family History   Problem Relation Age of Onset   • Heart Disease Mother    • Dementia Father    • Hyperlipidemia Sister    • Hyperlipidemia Brother        SOCIAL HISTORY:   Social History     Tobacco Use   • Smoking status: Current Every Day Smoker     Packs/day: 1.00     Types: Cigarettes     Start date: 1974   • Smokeless tobacco: Never Used   Vaping Use   • Vaping Use: Never used   Substance and Sexual Activity   • Alcohol use: Yes     Alcohol/week: 7.2 oz     Types: 12 Cans of beer per week   • Drug use: Yes     Types: Inhaled   • Sexual activity: Not on file       REVIEW OF SYSTEMS: Comprehensive review of systems is negative with the exception of the aforementioned HPI, PMH, and PSH bullets in accordance with CMS guidelines.     PHYSICAL EXAMINATION:   General Appearance: The patient is a pleasant , cooperative and calm appearing elderly woman in mild distress.  VITAL SIGNS: /62   Pulse 78   Temp 36.8 °C (98.3 °F) (Temporal)   Resp 20   Ht 1.676 m (5' 6\")   Wt 69.5 kg (153 lb 3.5 oz)   SpO2 93%   HEAD AND NECK: Demonstrates symmetric, reactive pupils. Extraocular muscles   are intact. Nares and oropharynx are clear.   NECK: Supple. No adenopathy.  CHEST:    Inspection: Unlabored respirations, no intercostal retractions, paradoxical motion, or accessory muscle use.   Palpation:  The chest is nontender.    Auscultation: normal.  CARDIOVASCULAR:   Inspection: The skin is warm, dry and well purfused.  Auscultation: Regular rate and rhythm.   Peripheral Pulses: Normal.    ABDOMEN:   Inspection: Abdominal inspection reveals no abrasions, contusions, lacerations or penetrating wounds.   Palpation: Palpation is remarkable for mild tenderness in the left lower quadrant and suprapubic region. No abdominal wall hernias. No peritoneal signs.  EXTREMITIES:   Examination of the upper and lower extremities demonstrates No cyanosis, edema, or clubbing of the nails.  NEUROLOGIC:   Neurologic " examination reveals no focal deficits noted.    LABORATORY VALUES:   Recent Labs     08/29/21 2201   WBC 13.2*   RBC 3.92*   HEMOGLOBIN 8.3*   HEMATOCRIT 28.1*   MCV 71.7*   MCH 21.2*   MCHC 29.5*   RDW 58.0*   PLATELETCT 446   MPV 9.3     Recent Labs     08/29/21 2201   SODIUM 138   POTASSIUM 3.9   CHLORIDE 109   CO2 20   GLUCOSE 113*   BUN 7*   CREATININE 0.74   CALCIUM 8.3*     Recent Labs     08/29/21 2201   ASTSGOT 17   ALTSGPT 8   TBILIRUBIN 0.5   ALKPHOSPHAT 62   GLOBULIN 2.0            IMAGING:   OUTSIDE IMAGES-CT ABDOMEN /PELVIS   Final Result      OUTSIDE IMAGES-CT ABDOMEN /PELVIS   Final Result      IR-CONSULT AND TREAT    (Results Pending)       IMPRESSION AND PLAN:  Diverticulitis of large intestine with abscess- (present on admission)  Assessment & Plan  Lower abdominal pain  WBC 13.2  CT shows abscess posterior to bladder  IV abx / NPO  IR drainage           ____________________________________     Masoud Rosa M.D.    DD: 8/30/2021  8:11 AM

## 2021-08-30 NOTE — ED TRIAGE NOTES
Chief Complaint   Patient presents with   • Sent by MD     transfer from [facility]   • Abdominal Pain     Pt BIB EMS for above. Pt reports abd pain and was told she has diverticulitis with an abscess.

## 2021-08-30 NOTE — H&P
Hospital Medicine History & Physical Note    Date of Service  8/29/2021    Primary Care Physician  MY King.    Consultants  Dr. Aleman, general surgery    Code Status  Prior    Chief Complaint  Chief Complaint   Patient presents with   • Sent by MD     transfer from [facility]   • Abdominal Pain       History of Presenting Illness  Marisol Brown is a 61 y.o. female with a past medical history of hypertension, hyperlipidemia, hypothyroidism, depression, nicotine dependence who presented 8/29/2021 as a transfer from [facility] for surgical consultation. She has been having abdominal pain and dry heaving, which prompted her to go to the ED for evaluation. While at [facility] she was found to have diverticulitis with a diverticular abscess. Earlier this month she had a colonoscopy and upper GI endoscopy following a GI bleed. No fevers. She is vaccinated against COVID-19.      WBC 15 at OSH  Dr. Aleman with general surgery was consulted in ED and advises IR placement of drain.  I discussed the plan of care with patient.    Review of Systems  Review of Systems   Gastrointestinal: Positive for abdominal pain.   All other systems reviewed and are negative.      Past Medical History   has a past medical history of Depression, Essential hypertension, Hypercholesteremia, Hypothyroidism, and Mixed hyperlipidemia. reviewed.    Surgical History   has a past surgical history that includes tubal ligation; dental extraction(s); carpal tunnel release; pr upper gi endoscopy,diagnosis (N/A, 8/11/2021); and pr colonoscopy,diagnostic (N/A, 8/11/2021).   Reviewed.  Family History  family history includes Dementia in her father; Heart Disease in her mother; Hyperlipidemia in her brother and sister.   Family history reviewed with patient. There is no family history that is pertinent to the chief complaint.     Social History   reports that she has been smoking cigarettes. She started smoking about 47 years ago. She has  been smoking about 1.00 pack per day. She has never used smokeless tobacco. She reports current alcohol use of about 7.2 oz of alcohol per week. She reports current drug use. Drug: Inhaled. reviewed.    Allergies  Allergies   Allergen Reactions   • Codeine        Medications  Prior to Admission Medications   Prescriptions Last Dose Informant Patient Reported? Taking?   aspirin EC 81 MG EC tablet   No No   Sig: Take 1 Tab by mouth every day.   atorvastatin (LIPITOR) 80 MG tablet   No No   Sig: Take 1 Tab by mouth every day.   clopidogrel (PLAVIX) 75 MG Tab   No No   Sig: Take 1 Tab by mouth every day.   levothyroxine (SYNTHROID) 88 MCG Tab  Patient's Home Pharmacy Yes No   Sig: Take 88 mcg by mouth Every morning on an empty stomach.   lisinopril (PRINIVIL) 10 MG Tab   No No   Sig: Take 1 Tab by mouth every day.   metoprolol SR (TOPROL XL) 50 MG TABLET SR 24 HR   No No   Sig: Take 1 Tab by mouth every evening.   Patient not taking: Reported on 8/9/2021   omeprazole (PRILOSEC OTC) 20 MG tablet   No No   Sig: Take 1 Tablet by mouth every day.   spironolactone (ALDACTONE) 25 MG Tab   No No   Sig: Take 0.5 Tabs by mouth every day.   venlafaxine XR (EFFEXOR XR) 75 MG CAPSULE SR 24 HR  Patient's Home Pharmacy Yes No   Sig: Take 75 mg by mouth every day.      Facility-Administered Medications: None       Physical Exam  Temp:  [36.8 °C (98.3 °F)] 36.8 °C (98.3 °F)  Pulse:  [88] 88  Resp:  [18] 18  BP: (148)/(72) 148/72  SpO2:  [96 %] 96 %    Physical Exam     Constitutional: Resting comfortably in NAD   HENT: Normocephalic, no obvious evidence of acute trauma.  Eyes: No scleral icterus. Normal conjunctiva   Neck: Comfortable movement without any obvious restriction in the range of motion.  Cardiovascular: Upon ascultation I appreciate a regular heart rhythm and a normal rate with no murmurs, rubs or gallops  Thorax & Lungs: No respiratory distress. Reduced breath sounds in all lung fields.  there is no obvious chest wall  tenderness. I appreciate normal air movement throughout.   Abdomen: The abdomen is not visibly distended. TTP at LLQ.  No mass appreciated.  Skin: The exposed portions of skin reveal no obvious rash or other abnormalities.  Extremities/Musculoskeletal: no lower extremity edema with no asymmetry.  Neurologic: Alert & oriented. No focal deficits observed.   Psychiatric: Normal affect appropriate for the clinical situation.    Laboratory:          No results for input(s): ALTSGPT, ASTSGOT, ALKPHOSPHAT, TBILIRUBIN, DBILIRUBIN, GAMMAGT, AMYLASE, LIPASE, ALB, PREALBUMIN, GLUCOSE in the last 72 hours.      No results for input(s): NTPROBNP in the last 72 hours.      No results for input(s): TROPONINT in the last 72 hours.    Imaging:  No orders to display       Assessment/Plan:  I anticipate this patient will require at least two midnights for appropriate medical management, necessitating inpatient admission.    Intestinal diverticular abscess- (present on admission)  Assessment & Plan  Dr. Aleman with general surgery recommends IR drain placement in a.m.  1 dose of Zosyn given at outside hospital  We will continue IV fluids and antibiotics    Leukocytosis- (present on admission)  Assessment & Plan  WBC 15 at outside hospital  We will repeat labs  Continue IV fluids and IV antibiotics  Continue to monitor    Dependence on nicotine from cigarettes- (present on admission)  Assessment & Plan  40py hx of smoking and current 1/2 ppd smoker  Smoking education discussed and assistance in smoking cessation offered >5 minutes  Nicotine patch ordered    Hypothyroidism- (present on admission)  Assessment & Plan  TSH/T4 ordered to assess  Continue home meds    Essential hypertension- (present on admission)  Assessment & Plan  Continue home meds  Normotensive on admission    Dyslipidemia- (present on admission)  Assessment & Plan  Lipid panel ordered to assess  Continue home meds      VTE prophylaxis: SCDs/TEDs

## 2021-08-31 DIAGNOSIS — K57.20 DIVERTICULITIS OF LARGE INTESTINE WITH ABSCESS WITHOUT BLEEDING: ICD-10-CM

## 2021-08-31 LAB
ANION GAP SERPL CALC-SCNC: 8 MMOL/L (ref 7–16)
ANISOCYTOSIS BLD QL SMEAR: ABNORMAL
BASOPHILS # BLD AUTO: 0.9 % (ref 0–1.8)
BASOPHILS # BLD: 0.05 K/UL (ref 0–0.12)
BUN SERPL-MCNC: 6 MG/DL (ref 8–22)
CALCIUM SERPL-MCNC: 8.7 MG/DL (ref 8.5–10.5)
CHLORIDE SERPL-SCNC: 112 MMOL/L (ref 96–112)
CO2 SERPL-SCNC: 23 MMOL/L (ref 20–33)
COMMENT 1642: NORMAL
CREAT SERPL-MCNC: 0.72 MG/DL (ref 0.5–1.4)
EOSINOPHIL # BLD AUTO: 0.1 K/UL (ref 0–0.51)
EOSINOPHIL NFR BLD: 1.8 % (ref 0–6.9)
ERYTHROCYTE [DISTWIDTH] IN BLOOD BY AUTOMATED COUNT: 60 FL (ref 35.9–50)
GLUCOSE SERPL-MCNC: 92 MG/DL (ref 65–99)
HCT VFR BLD AUTO: 28.1 % (ref 37–47)
HGB BLD-MCNC: 8.2 G/DL (ref 12–16)
HYPOCHROMIA BLD QL SMEAR: ABNORMAL
IMM GRANULOCYTES # BLD AUTO: 0.03 K/UL (ref 0–0.11)
IMM GRANULOCYTES NFR BLD AUTO: 0.5 % (ref 0–0.9)
LYMPHOCYTES # BLD AUTO: 1.38 K/UL (ref 1–4.8)
LYMPHOCYTES NFR BLD: 24.9 % (ref 22–41)
MCH RBC QN AUTO: 21.4 PG (ref 27–33)
MCHC RBC AUTO-ENTMCNC: 29.2 G/DL (ref 33.6–35)
MCV RBC AUTO: 73.4 FL (ref 81.4–97.8)
MICROCYTES BLD QL SMEAR: ABNORMAL
MONOCYTES # BLD AUTO: 0.75 K/UL (ref 0–0.85)
MONOCYTES NFR BLD AUTO: 13.5 % (ref 0–13.4)
MORPHOLOGY BLD-IMP: NORMAL
NEUTROPHILS # BLD AUTO: 3.23 K/UL (ref 2–7.15)
NEUTROPHILS NFR BLD: 58.4 % (ref 44–72)
NRBC # BLD AUTO: 0 K/UL
NRBC BLD-RTO: 0 /100 WBC
OVALOCYTES BLD QL SMEAR: NORMAL
PLATELET # BLD AUTO: 424 K/UL (ref 164–446)
PLATELET BLD QL SMEAR: NORMAL
PMV BLD AUTO: 9.4 FL (ref 9–12.9)
POIKILOCYTOSIS BLD QL SMEAR: NORMAL
POLYCHROMASIA BLD QL SMEAR: NORMAL
POTASSIUM SERPL-SCNC: 3.8 MMOL/L (ref 3.6–5.5)
RBC # BLD AUTO: 3.83 M/UL (ref 4.2–5.4)
RBC BLD AUTO: PRESENT
SODIUM SERPL-SCNC: 143 MMOL/L (ref 135–145)
WBC # BLD AUTO: 5.5 K/UL (ref 4.8–10.8)

## 2021-08-31 PROCEDURE — 99254 IP/OBS CNSLTJ NEW/EST MOD 60: CPT | Performed by: INTERNAL MEDICINE

## 2021-08-31 PROCEDURE — 770006 HCHG ROOM/CARE - MED/SURG/GYN SEMI*

## 2021-08-31 PROCEDURE — 700102 HCHG RX REV CODE 250 W/ 637 OVERRIDE(OP): Performed by: STUDENT IN AN ORGANIZED HEALTH CARE EDUCATION/TRAINING PROGRAM

## 2021-08-31 PROCEDURE — 36415 COLL VENOUS BLD VENIPUNCTURE: CPT

## 2021-08-31 PROCEDURE — 700111 HCHG RX REV CODE 636 W/ 250 OVERRIDE (IP): Performed by: STUDENT IN AN ORGANIZED HEALTH CARE EDUCATION/TRAINING PROGRAM

## 2021-08-31 PROCEDURE — A9270 NON-COVERED ITEM OR SERVICE: HCPCS | Performed by: INTERNAL MEDICINE

## 2021-08-31 PROCEDURE — 700105 HCHG RX REV CODE 258: Performed by: STUDENT IN AN ORGANIZED HEALTH CARE EDUCATION/TRAINING PROGRAM

## 2021-08-31 PROCEDURE — 99233 SBSQ HOSP IP/OBS HIGH 50: CPT | Performed by: STUDENT IN AN ORGANIZED HEALTH CARE EDUCATION/TRAINING PROGRAM

## 2021-08-31 PROCEDURE — 85025 COMPLETE CBC W/AUTO DIFF WBC: CPT

## 2021-08-31 PROCEDURE — 700102 HCHG RX REV CODE 250 W/ 637 OVERRIDE(OP): Performed by: INTERNAL MEDICINE

## 2021-08-31 PROCEDURE — 80048 BASIC METABOLIC PNL TOTAL CA: CPT

## 2021-08-31 PROCEDURE — A9270 NON-COVERED ITEM OR SERVICE: HCPCS | Performed by: STUDENT IN AN ORGANIZED HEALTH CARE EDUCATION/TRAINING PROGRAM

## 2021-08-31 RX ADMIN — PIPERACILLIN AND TAZOBACTAM 3.38 G: 3; .375 INJECTION, POWDER, LYOPHILIZED, FOR SOLUTION INTRAVENOUS; PARENTERAL at 04:46

## 2021-08-31 RX ADMIN — SPIRONOLACTONE 12.5 MG: 25 TABLET ORAL at 04:45

## 2021-08-31 RX ADMIN — LISINOPRIL 10 MG: 10 TABLET ORAL at 04:45

## 2021-08-31 RX ADMIN — OMEPRAZOLE 20 MG: 20 CAPSULE, DELAYED RELEASE ORAL at 04:46

## 2021-08-31 RX ADMIN — CLOPIDOGREL BISULFATE 75 MG: 75 TABLET ORAL at 04:45

## 2021-08-31 RX ADMIN — DOCUSATE SODIUM 50 MG AND SENNOSIDES 8.6 MG 2 TABLET: 8.6; 5 TABLET, FILM COATED ORAL at 04:45

## 2021-08-31 RX ADMIN — PIPERACILLIN AND TAZOBACTAM 3.38 G: 3; .375 INJECTION, POWDER, LYOPHILIZED, FOR SOLUTION INTRAVENOUS; PARENTERAL at 20:49

## 2021-08-31 RX ADMIN — VENLAFAXINE HYDROCHLORIDE 75 MG: 75 CAPSULE, EXTENDED RELEASE ORAL at 04:46

## 2021-08-31 RX ADMIN — VANCOMYCIN HYDROCHLORIDE 125 MG: KIT ORAL at 23:00

## 2021-08-31 RX ADMIN — LEVOTHYROXINE SODIUM 88 MCG: 0.09 TABLET ORAL at 04:45

## 2021-08-31 RX ADMIN — ATORVASTATIN CALCIUM 80 MG: 40 TABLET, FILM COATED ORAL at 20:49

## 2021-08-31 RX ADMIN — ASPIRIN 81 MG: 81 TABLET, COATED ORAL at 04:46

## 2021-08-31 RX ADMIN — NICOTINE TRANSDERMAL SYSTEM 21 MG: 21 PATCH, EXTENDED RELEASE TRANSDERMAL at 04:46

## 2021-08-31 RX ADMIN — PIPERACILLIN AND TAZOBACTAM 3.38 G: 3; .375 INJECTION, POWDER, LYOPHILIZED, FOR SOLUTION INTRAVENOUS; PARENTERAL at 13:06

## 2021-08-31 RX ADMIN — VANCOMYCIN HYDROCHLORIDE 125 MG: KIT ORAL at 11:59

## 2021-08-31 RX ADMIN — METOPROLOL SUCCINATE 50 MG: 25 TABLET, EXTENDED RELEASE ORAL at 17:45

## 2021-08-31 ASSESSMENT — ENCOUNTER SYMPTOMS
SHORTNESS OF BREATH: 0
DIZZINESS: 0
HEMOPTYSIS: 0
EYE PAIN: 0
SORE THROAT: 0
CONSTIPATION: 0
BACK PAIN: 0
FALLS: 0
NAUSEA: 0
DEPRESSION: 1
ABDOMINAL PAIN: 1
DIARRHEA: 1
NECK PAIN: 0
HEADACHES: 0
MYALGIAS: 0
BLOOD IN STOOL: 0
VOMITING: 0
NERVOUS/ANXIOUS: 0
FEVER: 0
DIAPHORESIS: 0
LOSS OF CONSCIOUSNESS: 0
SINUS PAIN: 0
CHILLS: 0

## 2021-08-31 NOTE — DISCHARGE PLANNING
Anticipated Discharge Disposition: Home with HH and home infusion    Action: Orders for HH and home infusion received. Spoke with the patient at the bedside. The patient is agreeable to referral to be sent to [facility] for HH. The patient is also agreeable to referrals to be sent to 1. [facility]  2. [facility]    Choice forms faxed to Alexandra AYALA at [phone].    Barriers to Discharge: Medical clearance, Infusion set up, HH acceptance    Plan: Will need to follow up on HH and infusion

## 2021-08-31 NOTE — PROGRESS NOTES
Pt had one episode of bloody poop. This RN examined her stool and it appeared dark red/brown. Pt states she has experienced this before and went to [facility] recently for this problem.  NOC RN aware and will monitor for more bloody stool.

## 2021-08-31 NOTE — CARE PLAN
The patient is Stable - Low risk of patient condition declining or worsening    Shift Goals  Clinical Goals: monitor stools overnight    Progress made toward(s) clinical / shift goals: blood noted in hat in the bathroom, awaiting scheduled morning labs

## 2021-08-31 NOTE — PROGRESS NOTES
Alert and able to let her needs known. Will monitor overnight for blood colored stool, vitals and morning labs. Cont plan of care, call light within reach

## 2021-08-31 NOTE — PROGRESS NOTES
Received report from NOC RN and assumed care of pt. Pt is A&Ox4 resting in bed on room air. Pt reports no pain. POC discussed with pt; all questions answered. Pt had one episode of bloody stool this morning. No other needs at this time. Bed locked in lowest position, call light within reach.

## 2021-08-31 NOTE — DISCHARGE PLANNING
Received Choice form at 1517  Agency/Facility Name: [facility]  Referral sent per Choice form @ 1600    Received Choice form at 1518  Agency/Facility Name: [facility]  Referral sent per Choice form @ 1604

## 2021-08-31 NOTE — PROGRESS NOTES
"Surgical Progress Note:    S:  - Feeling ok - pain remains present in LLQ  - Tolerating diet, no n/v  - Passing gas/stools  - Pain well managed  - Ambulating  - No chest pain, extremity pain, or difficulty breathing    Vitals:  /66   Pulse 62   Temp 36.6 °C (97.8 °F) (Temporal)   Resp 16   Ht 1.676 m (5' 6\")   Wt 72 kg (158 lb 11.7 oz)   SpO2 96%   BMI 25.62 kg/m²     I/O: No intake or output data in the 24 hours ending 08/31/21 0810    P/E:  Constitutional: Appears well, no distress  Head/Neck: Normocephalic, atraumatic, neck supple  Cardiovascular: Normal rate and rhythm  Pulmonary/Chest: Normal respiratory effort, no wheezes  Abdominal: Soft, moderately tender in LLQ, otherwise nointender, no distension  Musculoskeletal: No deficits  Neurological:  Alert, oriented  Skin:  Warm and dry, no erythema  Extremities: No edema, no evidence of DVT    Labs:  Recent Labs     08/29/21 2201 08/31/21  0426   WBC 13.2* 5.5   RBC 3.92* 3.83*   HEMOGLOBIN 8.3* 8.2*   HEMATOCRIT 28.1* 28.1*   MCV 71.7* 73.4*   MCH 21.2* 21.4*   RDW 58.0* 60.0*   PLATELETCT 446 424   MPV 9.3 9.4   NEUTSPOLYS 78.10*  --    LYMPHOCYTES 14.80*  --    MONOCYTES 6.10  --    EOSINOPHILS 0.00  --    BASOPHILS 0.50  --    RBCMORPHOLO Present  --      Recent Labs     08/29/21 2201 08/31/21  0426   SODIUM 138 143   POTASSIUM 3.9 3.8   CHLORIDE 109 112   CO2 20 23   GLUCOSE 113* 92   BUN 7* 6*         A/P:   - Stay on clears, not ready to advance  - WBC normal  - Continue Zosyn  - Incentive spirometry q1h while awake  - Ambulate at least QID, up in chair for all meals  - Multimodal analgesia, try to avoid narcotics  - Lovenox/SCDs  - If worsens plan for repeat CT to reassess abscess        Arianna Bingham M.D.  [facility]  [phone]  "

## 2021-08-31 NOTE — CONSULTS
COLON AND RECTAL SURGERY CONSULT NOTE    PATIENT ID  Name:             Marisol Brown     YOB: 1960  Age:                 61 y.o.  female   MRN:               [id number]    CHIEF COMPLAINT:        Abdominal pain    HISTORY OF PRESENT ILLNESS:  61-year-old female presenting with acute diverticulitis.  She was transferred from [facility] with a CT scan that demonstrated diverticular abscess.  She was transferred here for drainage however IR has said that it is too small for drainage.    The patient reports a 2-day history of left lower quadrant pain.  This was associated with nausea and vomiting.  She reports her pain is only 2-3 out of 10.  Her pain is similar to previous episodes of diverticulitis.  Her most recent episode was approximately 5 years ago.  She reports multiple previous episodes requiring prolonged stay in hospital but no previous surgery or drain placement.  Her most recent colonoscopy was 2 weeks ago and she reports that it was normal.    REVIEW OF SYSTEMS:   Constitutional: Denies unintended weight change, night sweats, fatigue  Eyes:   Denies eye pain, redness, discharge, vision changes  Ears/Nose/Throat/Mouth: Denies hearing changes, ear pain, nasal congestion, sore throat, dysphagia  Cardiovascular:  Denies Chest pain, shortness of breath, orthopnea, palpitations, claudication  Respiratory:  Denies cough, sputum, wheezing, dyspnea  Gastrointestinal/Hepatic:  Denies dysphagia, melena, jaundice, hematochezia, changing heartburn  Genitourinary:  Denies dysuria, increased frequency, hematuria, urgency  Musculoskeletal/Rheum: Denies changing arthralgias, myalgias, joint swelling, joint stiffness  Skin/Breast: Denies changing skin lesions, pruritis, nipple discharge, hair changes  Neurological: Denies weakness, numbness, paresthesia, syncope, dizziness  Pyschiatric: Denies acute depression, anxiety, insomnia, personality changes, delusions  Endocrine: Denies temperature  intolerance, polydipsia, polyuria  Heme/Oncology/Lymph Nodes: Denies easy bruising, bleeding, lymphadenopathy  All other systems were reviewed and are negative                 Past Medical History:   Past Medical History:   Diagnosis Date   • Depression    • Essential hypertension    • Hypercholesteremia    • Hypothyroidism    • Mixed hyperlipidemia        Past Surgical History:  Past Surgical History:   Procedure Laterality Date   • PB UPPER GI ENDOSCOPY,DIAGNOSIS N/A 8/11/2021    Procedure: GASTROSCOPY;  Surgeon: Curtis Nunez M.D.;  Location: SURGERY [facility];  Service: Gastroenterology   • PB COLONOSCOPY,DIAGNOSTIC N/A 8/11/2021    Procedure: COLONOSCOPY;  Surgeon: Curtis Nunez M.D.;  Location: SURGERY [facility];  Service: Gastroenterology   • CARPAL TUNNEL RELEASE      Right   • DENTAL EXTRACTION(S)     • TUBAL LIGATION         Current Outpatient Medications:  No current facility-administered medications on file prior to encounter.     Current Outpatient Medications on File Prior to Encounter   Medication Sig Dispense Refill   • omeprazole (PRILOSEC OTC) 20 MG tablet Take 1 Tablet by mouth every day. 30 Tablet 3   • atorvastatin (LIPITOR) 80 MG tablet Take 1 Tab by mouth every day. 30 Tab 0   • aspirin EC 81 MG EC tablet Take 1 Tab by mouth every day. 30 Tab 0   • lisinopril (PRINIVIL) 10 MG Tab Take 1 Tab by mouth every day. 30 Tab 0   • clopidogrel (PLAVIX) 75 MG Tab Take 1 Tab by mouth every day. 30 Tab 1   • metoprolol SR (TOPROL XL) 50 MG TABLET SR 24 HR Take 1 Tab by mouth every evening. 30 Tab 11   • spironolactone (ALDACTONE) 25 MG Tab Take 0.5 Tabs by mouth every day. 30 Tab 11   • levothyroxine (SYNTHROID) 88 MCG Tab Take 88 mcg by mouth Every morning on an empty stomach.     • venlafaxine XR (EFFEXOR XR) 75 MG CAPSULE SR 24 HR Take 75 mg by mouth every day.         Current Inpatient Medications:   Current Facility-Administered Medications   Medication Last Admin   • aspirin EC (ECOTRIN)  tablet 81 mg 81 mg at 08/30/21 0511   • atorvastatin (LIPITOR) tablet 80 mg 80 mg at 08/30/21 0511   • clopidogrel (PLAVIX) tablet 75 mg 75 mg at 08/30/21 0511   • levothyroxine (SYNTHROID) tablet 88 mcg 88 mcg at 08/30/21 0512   • omeprazole (PRILOSEC) capsule 20 mg 20 mg at 08/30/21 0511   • venlafaxine XR (EFFEXOR XR) capsule 75 mg 75 mg at 08/30/21 0512   • metoprolol SR (TOPROL XL) tablet 50 mg 50 mg at 08/30/21 1749   • spironolactone (ALDACTONE) tablet 12.5 mg 12.5 mg at 08/30/21 0510   • lisinopril (PRINIVIL) tablet 10 mg 10 mg at 08/30/21 0511   • senna-docusate (PERICOLACE or SENOKOT S) 8.6-50 MG per tablet 2 Tablet      And   • polyethylene glycol/lytes (MIRALAX) PACKET 1 Packet      And   • magnesium hydroxide (MILK OF MAGNESIA) suspension 30 mL      And   • bisacodyl (DULCOLAX) suppository 10 mg     • acetaminophen (Tylenol) tablet 650 mg 650 mg at 08/30/21 1608   • ondansetron (ZOFRAN) syringe/vial injection 4 mg     • ondansetron (ZOFRAN ODT) dispertab 4 mg     • promethazine (PHENERGAN) tablet 12.5-25 mg     • promethazine (PHENERGAN) suppository 12.5-25 mg     • prochlorperazine (COMPAZINE) injection 5-10 mg     • nicotine (NICODERM) 21 MG/24HR 21 mg 21 mg at 08/30/21 0513    And   • nicotine polacrilex (NICORETTE) 2 MG piece 2 mg     • piperacillin-tazobactam (ZOSYN) 3.375 g in  mL IVPB 3.375 g at 08/30/21 1340       Medication Allergy/Sensitivities:  Allergies   Allergen Reactions   • Codeine        Family History:  Family History   Problem Relation Age of Onset   • Heart Disease Mother    • Dementia Father    • Hyperlipidemia Sister    • Hyperlipidemia Brother        Social History:  PCP: NATHANAEL King  Social History     Tobacco Use   Smoking Status Current Every Day Smoker   • Packs/day: 1.00   • Types: Cigarettes   • Start date: 1974   Smokeless Tobacco Never Used     Social History     Substance and Sexual Activity   Alcohol Use Yes   • Alcohol/week: 7.2 oz   • Types: 12 Cans of  beer per week     Social History     Substance and Sexual Activity   Drug Use Yes   • Types: Inhaled       PHYSICAL EXAM:  Weight/BMI: Body mass index is 25.62 kg/m².  Vitals:    08/30/21 0831 08/30/21 1100 08/30/21 1535 08/30/21 1749   BP: 134/59 130/64 108/55 122/75   Pulse: 80 98 72 72   Resp:  16 15    Temp:  36.4 °C (97.5 °F) 37.4 °C (99.3 °F)    TempSrc:  Temporal Temporal    SpO2: 96% 96% 98%    Weight:  72 kg (158 lb 11.7 oz)     Height:         Oxygen Therapy:  Pulse Oximetry: 98 %, O2 (LPM): 0, O2 Delivery Device: None - Room Air    Constitutional: Well developed, Well nourished, No acute distress, Non-toxic appearance.    HENMT: Normocephalic, Atraumatic, Bilateral external ears normal, Oropharynx moist mucous membranes, No oral exudates, Nose normal.  No thyromegaly.   Eyes: PERRLA, EOMI, Conjunctiva normal, No discharge.   Neck: Normal range of motion, No cervical tenderness, Supple, No stridor, no JVD.  Cardiovascular: Normal heart rate, Normal rhythm, No murmurs, No rubs, No gallops.   Extremites with intact distal pulses, no cyanosis, clubbing or edema.   Lungs:  Respiratory effort is normal. Normal breath sounds, breath sounds clear to auscultation bilaterally,  no rales, no rhonchi, no wheezing.   Abdomen: Bowel sounds normal, Soft, No tenderness, No guarding, No rebound, No masses, No hepatosplenomegaly.  Skin: Warm, Dry, No erythema, No rash, no induration or crepitus.        Neurologic: Alert & oriented x 3, Normal motor function, Normal sensory function, No focal deficits noted, cranial nerves II through XII are normal.  Psychiatric: Affect normal, Judgment normal, Mood normal.     LAB DATA REVIEWED:  Recent Results (from the past 24 hour(s))   Lactic acid (lactate)    Collection Time: 08/29/21 10:01 PM   Result Value Ref Range    Lactic Acid 1.0 0.5 - 2.0 mmol/L   CBC WITH DIFFERENTIAL    Collection Time: 08/29/21 10:01 PM   Result Value Ref Range    WBC 13.2 (H) 4.8 - 10.8 K/uL    RBC 3.92  (L) 4.20 - 5.40 M/uL    Hemoglobin 8.3 (L) 12.0 - 16.0 g/dL    Hematocrit 28.1 (L) 37.0 - 47.0 %    MCV 71.7 (L) 81.4 - 97.8 fL    MCH 21.2 (L) 27.0 - 33.0 pg    MCHC 29.5 (L) 33.6 - 35.0 g/dL    RDW 58.0 (H) 35.9 - 50.0 fL    Platelet Count 446 164 - 446 K/uL    MPV 9.3 9.0 - 12.9 fL    Neutrophils-Polys 78.10 (H) 44.00 - 72.00 %    Lymphocytes 14.80 (L) 22.00 - 41.00 %    Monocytes 6.10 0.00 - 13.40 %    Eosinophils 0.00 0.00 - 6.90 %    Basophils 0.50 0.00 - 1.80 %    Immature Granulocytes 0.50 0.00 - 0.90 %    Nucleated RBC 0.00 /100 WBC    Neutrophils (Absolute) 10.33 (H) 2.00 - 7.15 K/uL    Lymphs (Absolute) 1.96 1.00 - 4.80 K/uL    Monos (Absolute) 0.81 0.00 - 0.85 K/uL    Eos (Absolute) 0.00 0.00 - 0.51 K/uL    Baso (Absolute) 0.07 0.00 - 0.12 K/uL    Immature Granulocytes (abs) 0.06 0.00 - 0.11 K/uL    NRBC (Absolute) 0.00 K/uL    Hypochromia 2+ (A)     Anisocytosis 1+     Microcytosis 1+    COMP METABOLIC PANEL    Collection Time: 08/29/21 10:01 PM   Result Value Ref Range    Sodium 138 135 - 145 mmol/L    Potassium 3.9 3.6 - 5.5 mmol/L    Chloride 109 96 - 112 mmol/L    Co2 20 20 - 33 mmol/L    Anion Gap 9.0 7.0 - 16.0    Glucose 113 (H) 65 - 99 mg/dL    Bun 7 (L) 8 - 22 mg/dL    Creatinine 0.74 0.50 - 1.40 mg/dL    Calcium 8.3 (L) 8.5 - 10.5 mg/dL    AST(SGOT) 17 12 - 45 U/L    ALT(SGPT) 8 2 - 50 U/L    Alkaline Phosphatase 62 30 - 99 U/L    Total Bilirubin 0.5 0.1 - 1.5 mg/dL    Albumin 4.3 3.2 - 4.9 g/dL    Total Protein 6.3 6.0 - 8.2 g/dL    Globulin 2.0 1.9 - 3.5 g/dL    A-G Ratio 2.2 g/dL   ESTIMATED GFR    Collection Time: 08/29/21 10:01 PM   Result Value Ref Range    GFR If African American >60 >60 mL/min/1.73 m 2    GFR If Non African American >60 >60 mL/min/1.73 m 2   PERIPHERAL SMEAR REVIEW    Collection Time: 08/29/21 10:01 PM   Result Value Ref Range    Peripheral Smear Review see below    PLATELET ESTIMATE    Collection Time: 08/29/21 10:01 PM   Result Value Ref Range    Plt Estimation  Increased    MORPHOLOGY    Collection Time: 08/29/21 10:01 PM   Result Value Ref Range    RBC Morphology Present     Poikilocytosis 1+     Schistocytes 1+     Target Cells 1+     Echinocytes 1+    DIFFERENTIAL COMMENT    Collection Time: 08/29/21 10:01 PM   Result Value Ref Range    Comments-Diff see below    Magnesium    Collection Time: 08/30/21 12:48 AM   Result Value Ref Range    Magnesium 2.3 1.5 - 2.5 mg/dL   TSH WITH REFLEX TO FT4    Collection Time: 08/30/21 12:48 AM   Result Value Ref Range    TSH 2.480 0.380 - 5.330 uIU/mL   HEMOGLOBIN A1C    Collection Time: 08/30/21 12:48 AM   Result Value Ref Range    Glycohemoglobin 4.7 4.0 - 5.6 %    Est Avg Glucose 88 mg/dL   Lipid Profile    Collection Time: 08/30/21 12:48 AM   Result Value Ref Range    Cholesterol,Tot 178 100 - 199 mg/dL    Triglycerides 96 0 - 149 mg/dL    HDL 57 >=40 mg/dL     (H) <100 mg/dL   IRON/TOTAL IRON BIND    Collection Time: 08/30/21 12:48 AM   Result Value Ref Range    Iron 17 (L) 40 - 170 ug/dL    Total Iron Binding 272 250 - 450 ug/dL    Unsat Iron Binding 255 110 - 370 ug/dL    % Saturation 6 (L) 15 - 55 %   FERRITIN    Collection Time: 08/30/21 12:48 AM   Result Value Ref Range    Ferritin 24.5 10.0 - 291.0 ng/mL       IMAGING:   Images Independently Reviewed   OUTSIDE IMAGES-CT ABDOMEN /PELVIS   Final Result      OUTSIDE IMAGES-CT ABDOMEN /PELVIS   Final Result          ASSESSMENT/PLAN     61-year-old female with mild white blood cell count elevation and CT demonstrating sigmoid diverticulitis with pelvic abscess that is too small to be drained.  She will be admitted to hospital for IV antibiotics, bowel rest and IV fluids.  We will plan to start with clear fluids as she has almost no abdominal pain.  I will plan to follow the patient while in hospital.    Patient will require elective resection due to high risk of recurrence of up to 60 to 70% due to her abscess.  I discussed this with the patient and we will plan to  continue this discussion as an outpatient.    Arianna Bingham MD  General Surgery  Colon and Rectal Surgery  [facility]

## 2021-08-31 NOTE — CONSULTS
INFECTIOUS DISEASES INPATIENT CONSULT NOTE     Date of Service: 8/31/2021    Consult Requested By: Maikol Yang M.D.    Reason for Consultation: Diverticular abscess    History of Present Illness:   Marisol Brown is a 61 y.o. woman with a history of C. difficile diarrhea, hyperlipidemia, diverticulitis, hypertension and hypothyroidism admitted 8/29/2021 from [facility] where she presented with abdominal pain and dry emesis.  Patient states that she has been suffering from diverticulitis for the past 7 years.  Recently, she has been experiencing some left lower quadrant discomfort when she urinates denies any dysuria.  Approximately 1 month ago she also noted blood-tinged stools.  As a result, she underwent a colonoscopy and endoscopy.  Since she has not had any recurrent hematochezia.  She denied any recent nausea or diarrhea.  She was found to have diverticulitis with a diverticular abscess on imaging and thus was transferred to [facility] for higher level of care and surgical consultation.  On presentation here, she was afebrile with a leukocytosis of 13.2.  She was started on Zosyn.  General surgery was consulted and recommended IR drain placement however per IR, the fluid collection is flat and anatomy is not amenable for drain placement.  There are also no plans for surgery this time and general surgery is advising nonoperative management with IV antibiotics.  Blood cultures on 8/30 are negative to date.  Infectious disease service consulted for further antibiotic recommendations.      All other review of systems reviewed and negative except those documented above in the HPI.     PMH:   Past Medical History:   Diagnosis Date   • Depression    • Essential hypertension    • Hypercholesteremia    • Hypothyroidism    • Mixed hyperlipidemia    Diverticulitis  Clostridiodes difficile diarrhea    PSH:  Past Surgical History:   Procedure Laterality Date   • PB UPPER GI ENDOSCOPY,DIAGNOSIS N/A 8/11/2021     Procedure: GASTROSCOPY;  Surgeon: Curtis Nunez M.D.;  Location: SURGERY [facility];  Service: Gastroenterology   • PB COLONOSCOPY,DIAGNOSTIC N/A 8/11/2021    Procedure: COLONOSCOPY;  Surgeon: Curtis Nunez M.D.;  Location: SURGERY [facility];  Service: Gastroenterology   • CARPAL TUNNEL RELEASE      Right   • DENTAL EXTRACTION(S)     • TUBAL LIGATION         FAMILY HX:  Family History   Problem Relation Age of Onset   • Heart Disease Mother    • Dementia Father    • Hyperlipidemia Sister    • Hyperlipidemia Brother        SOCIAL HX:  Social History     Socioeconomic History   • Marital status:      Spouse name: Not on file   • Number of children: Not on file   • Years of education: Not on file   • Highest education level: Not on file   Occupational History   • Not on file   Tobacco Use   • Smoking status: Current Every Day Smoker     Packs/day: 1.00     Types: Cigarettes     Start date: 1974   • Smokeless tobacco: Never Used   Vaping Use   • Vaping Use: Never used   Substance and Sexual Activity   • Alcohol use: Yes     Alcohol/week: 7.2 oz     Types: 12 Cans of beer per week   • Drug use: Yes     Types: Inhaled   • Sexual activity: Not on file   Other Topics Concern   • Not on file   Social History Narrative   • Not on file     Social Determinants of Health     Financial Resource Strain:    • Difficulty of Paying Living Expenses:    Food Insecurity:    • Worried About Running Out of Food in the Last Year:    • Ran Out of Food in the Last Year:    Transportation Needs:    • Lack of Transportation (Medical):    • Lack of Transportation (Non-Medical):    Physical Activity:    • Days of Exercise per Week:    • Minutes of Exercise per Session:    Stress:    • Feeling of Stress :    Social Connections:    • Frequency of Communication with Friends and Family:    • Frequency of Social Gatherings with Friends and Family:    • Attends [employer] Services:    • Active Member of Clubs or Organizations:    •  Attends Club or Organization Meetings:    • Marital Status:    Intimate Partner Violence:    • Fear of Current or Ex-Partner:    • Emotionally Abused:    • Physically Abused:    • Sexually Abused:      Social History     Tobacco Use   Smoking Status Current Every Day Smoker   • Packs/day: 1.00   • Types: Cigarettes   • Start date: 1974   Smokeless Tobacco Never Used     Social History     Substance and Sexual Activity   Alcohol Use Yes   • Alcohol/week: 7.2 oz   • Types: 12 Cans of beer per week       Allergies/Intolerances:  Allergies   Allergen Reactions   • Codeine        History reviewed with the patient     Other Current Medications:    Current Facility-Administered Medications:   •  aspirin EC (ECOTRIN) tablet 81 mg, 81 mg, Oral, DAILY, Jazmyne Roper M.D., 81 mg at 08/31/21 0446  •  atorvastatin (LIPITOR) tablet 80 mg, 80 mg, Oral, DAILY, Jazmyne Roper M.D., 80 mg at 08/30/21 0511  •  clopidogrel (PLAVIX) tablet 75 mg, 75 mg, Oral, DAILY, Jazmyne Roper M.D., 75 mg at 08/31/21 0445  •  levothyroxine (SYNTHROID) tablet 88 mcg, 88 mcg, Oral, AM ES, Jazmyne Roper M.D., 88 mcg at 08/31/21 0445  •  omeprazole (PRILOSEC) capsule 20 mg, 20 mg, Oral, DAILY, Jazmyne Roper M.D., 20 mg at 08/31/21 0446  •  venlafaxine XR (EFFEXOR XR) capsule 75 mg, 75 mg, Oral, DAILY, Jazmyne Roper M.D., 75 mg at 08/31/21 0446  •  metoprolol SR (TOPROL XL) tablet 50 mg, 50 mg, Oral, Q EVENING, Jazmyne Roper M.D., 50 mg at 08/30/21 1749  •  spironolactone (ALDACTONE) tablet 12.5 mg, 12.5 mg, Oral, DAILY, Jazmyne Roper M.D., 12.5 mg at 08/31/21 0445  •  lisinopril (PRINIVIL) tablet 10 mg, 10 mg, Oral, DAILY, Jazmyne Roper M.D., 10 mg at 08/31/21 0445  •  senna-docusate (PERICOLACE or SENOKOT S) 8.6-50 MG per tablet 2 Tablet, 2 Tablet, Oral, BID, 2 Tablet at 08/31/21 0445 **AND** polyethylene glycol/lytes (MIRALAX) PACKET 1 Packet, 1 Packet, Oral, QDAY PRN **AND** magnesium hydroxide (MILK OF MAGNESIA)  "suspension 30 mL, 30 mL, Oral, QDAY PRN **AND** bisacodyl (DULCOLAX) suppository 10 mg, 10 mg, Rectal, QDAY PRN, Jazmyne Roper M.D.  •  acetaminophen (Tylenol) tablet 650 mg, 650 mg, Oral, Q6HRS PRN, Jazmyne Roper M.D., 650 mg at 21 1608  •  ondansetron (ZOFRAN) syringe/vial injection 4 mg, 4 mg, Intravenous, Q4HRS PRN, Jazmyne Roper M.D.  •  ondansetron (ZOFRAN ODT) dispertab 4 mg, 4 mg, Oral, Q4HRS PRN, Jazmyne Roper M.D.  •  promethazine (PHENERGAN) tablet 12.5-25 mg, 12.5-25 mg, Oral, Q4HRS PRN, Jazmyne Roper M.D.  •  promethazine (PHENERGAN) suppository 12.5-25 mg, 12.5-25 mg, Rectal, Q4HRS PRN, Jazmyne Roper M.D.  •  prochlorperazine (COMPAZINE) injection 5-10 mg, 5-10 mg, Intravenous, Q4HRS PRN, Jazmyne Roper M.D.  •  nicotine (NICODERM) 21 MG/24HR 21 mg, 21 mg, Transdermal, Daily-0600, 21 mg at 21 0446 **AND** Nicotine Replacement Patient Education Materials, , , Once **AND** nicotine polacrilex (NICORETTE) 2 MG piece 2 mg, 2 mg, Oral, Q HOUR PRN, Jazmyne Roper M.D.  •  [COMPLETED] piperacillin-tazobactam (ZOSYN) 3.375 g in  mL IVPB, 3.375 g, Intravenous, Once, Stopped at 21 0219 **AND** piperacillin-tazobactam (ZOSYN) 3.375 g in  mL IVPB, 3.375 g, Intravenous, Q8HRS, Jazmyne Roper M.D., Last Rate: 25 mL/hr at 21 0446, 3.375 g at 21 0446  [unfilled]    Most Recent Vital Signs:  /66   Pulse 62   Temp 36.6 °C (97.8 °F) (Temporal)   Resp 16   Ht 1.676 m (5' 6\")   Wt 72 kg (158 lb 11.7 oz)   SpO2 96%   BMI 25.62 kg/m²   Temp  Av.8 °C (98.3 °F)  Min: 36.3 °C (97.3 °F)  Max: 37.8 °C (100.1 °F)    Physical Exam:  General: well nourished, no diaphoresis, well-appearing, no acute distress, nontoxic  HEENT: sclera anicteric, PERRL, extraocular muscles intact, mucous membranes moist, oropharynx clear and moist, no oral lesions or exudate  Neck: supple, no lymphadenopathy  Chest: CTAB, no rales, rhonchi or wheezes, " "normal work of breathing.  Cardiac: regular rate and rhythm, normal S1 S2, no murmurs, rubs or gallops  Abdomen: + bowel sounds, soft, left lower quadrant tenderness to palpation, no guarding, non-distended, no hepatosplenomegaly  Extremities: WWP, no edema, 2+ pedal pulses  Skin: warm and dry, no rashes or worrisome lesions  Neuro: Alert and oriented times 3, non-focal exam, speech fluent, full range of motion to bilateral upper and lower extremities  Psych: normal mood and behavior, pleasant; memory intact, normal judgement    Pertinent Lab Results:  Recent Labs     08/29/21 2201 08/31/21  0426   WBC 13.2* 5.5      Recent Labs     08/29/21 2201 08/31/21  0426   HEMOGLOBIN 8.3* 8.2*   HEMATOCRIT 28.1* 28.1*   MCV 71.7* 73.4*   MCH 21.2* 21.4*   ANISOCYTOSIS 1+ 1+   PLATELETCT 446 424         Recent Labs     08/29/21 2201 08/31/21 0426   SODIUM 138 143   POTASSIUM 3.9 3.8   CHLORIDE 109 112   CO2 20 23   CREATININE 0.74 0.72        Recent Labs     08/29/21 2201   ALBUMIN 4.3        Pertinent Micro:  Results     Procedure Component Value Units Date/Time    BLOOD CULTURE [[id number]] Collected: 08/30/21 0059    Order Status: Completed Specimen: Blood from Peripheral Updated: 08/31/21 0722     Significant Indicator NEG     Source BLD     Site PERIPHERAL     Culture Result No Growth  Note: Blood cultures are incubated for 5 days and  are monitored continuously.Positive blood cultures  are called to the RN and reported as soon as  they are identified.      Narrative:      Per Hospital Policy: Only change Specimen Src: to \"Line\" if  specified by physician order.  Right Hand    BLOOD CULTURE [[id number]] Collected: 08/30/21 0048    Order Status: Completed Specimen: Blood from Peripheral Updated: 08/31/21 0722     Significant Indicator NEG     Source BLD     Site PERIPHERAL     Culture Result No Growth  Note: Blood cultures are incubated for 5 days and  are monitored continuously.Positive blood cultures  are called to " "the RN and reported as soon as  they are identified.      Narrative:      Per Hospital Policy: Only change Specimen Src: to \"Line\" if  specified by physician order.  No site indicated        No results found for: BLOODCULTU, BLDCULT, BCHOLD     Studies:  No results found.    IMPRESSION:   1.  Diverticular abscess   2.  Diverticulitis  3.  History of C. difficile diarrhea      PLAN:   Marisol Brown is a 61 y.o. woman with a history of C. difficile diarrhea, diverticulitis, hypertension and hyperlipidemia transferred from [facility] where she presented with abdominal pain and was found to have diverticulitis with a diverticular abscess on imaging.  She has been evaluated by both surgery and IR.  The abscess is not amenable to drainage given the anatomy and surgery is recommending nonoperative management with IV antibiotics at this time.  Patient is otherwise hemodynamically stable without fevers and resolution of her leukocytosis.    -Continue IV Zosyn  -Start empiric p.o. vancomycin 125 mg twice daily given history of C. difficile  -Will plan a 2-week course of IV antibiotics with repeat imaging prior to completion of her antibiotic course.  Stop date 9/13/2021  -If however her abdominal pain worsens or she has increasing leukocytosis with fevers, recommend repeating imaging immediately  -Midline ordered today  -Home IV antibiotic orders for IV Zosyn 13 g continuous infusion done today  -Outpatient CT scan abdomen and pelvis with contrast ordered      Disposition: Home IV antibiotics.  If home IV antibiotics not feasible, she can do ertapenem 1 g daily at the [facility] infusion center.  However these antibiotic orders will need to come from her primary care physician    Follow-up in the ID clinic      Plan of care discussed with KELL Yang M.D. and .  ID signing off.  Please reconsult if needed    Jojo Rowland M.D.      Please note that this dictation was created using voice " recognition software. I have worked with technical experts from [facility] to optimize the interface.  I have made every reasonable attempt to correct obvious errors, but there may be errors of grammar and possibly content that I did not discover before finalizing the note.

## 2021-08-31 NOTE — CARE PLAN
The patient is Watcher - Medium risk of patient condition declining or worsening    Shift Goals  Clinical Goals: monitor for bloody stool    Progress made toward(s) clinical / shift goals:  Monitor pt fof bloody stool. Provide assistance to patient up to bathroom as needed.     Patient is not progressing towards the following goals:

## 2021-08-31 NOTE — PROGRESS NOTES
[facility] Medicine Daily Progress Note    Date of Service  8/31/2021    Chief Complaint  Marisol Brown is a 61 y.o. female with diverticulitis, HTN, hypothyroidism, anxiety transferred from [facility] ED 8/29/2021 with diverticular abscess.  Hospital Course  Marisol Brown is a 61 y.o. female with diverticulitis, HTN, hypothyroidism, anxiety transferred from [facility] ED 8/29/2021 with diverticular abscess.  Pt was transferred from [place] overnight for surgical consult.  Cosnulted General Surgeon (Dr. Aleman) advised IR consultation based on 4.2 cm abscess.  In the ED at [place] she underwent CT which showed a 4.2 cm diverticular abscess.  IR unable to drain the abscess   ID also consulted.         Interval Problem Update  8/31 - Patient was seen and examined at bedside.  Complain of bloody diarrhea after having mac & cheese for dinner last night. Now on clear liquid diet.   Abdominal pain improving.  General surgery following.   ID on board. Per ID - Continue IV Zosyn , initiated on empiric p.o. vancomycin 125 mg twice daily given history of C. Difficile. Will plan a 2-week course of IV antibiotics - Stop date 9/13/2021. Midline ordered today. Home IV antibiotic orders for IV Zosyn 13 g continuous infusion placed by ID. Outpatient CT scan abdomen and pelvis with contrast ordered by ID.   HH order placed.   Pending clinical improvement & surgery clearance for discharge.      I have personally seen and examined the patient at bedside. I discussed the plan of care with patient, bedside RN, charge RN,  and infectious disease.    Consultants/Specialty  general surgery, infectious disease and IR    Code Status  Full Code    Disposition  Patient is not medically cleared.   Anticipate discharge to to home with organized home healthcare and close outpatient follow-up.  I have placed the appropriate orders for post-discharge needs.    Review of Systems  Review of Systems   Constitutional: Negative for  chills, diaphoresis and fever.   HENT: Negative for ear pain, nosebleeds, sinus pain and sore throat.    Eyes: Negative for pain.   Respiratory: Negative for hemoptysis and shortness of breath.    Cardiovascular: Negative for chest pain.   Gastrointestinal: Positive for abdominal pain and diarrhea. Negative for blood in stool, constipation, melena, nausea and vomiting.   Musculoskeletal: Negative for back pain, falls, joint pain, myalgias and neck pain.   Neurological: Negative for dizziness, loss of consciousness and headaches.   Psychiatric/Behavioral: Positive for depression. The patient is not nervous/anxious.         Physical Exam  Temp:  [36.6 °C (97.8 °F)-37.4 °C (99.3 °F)] 36.6 °C (97.8 °F)  Pulse:  [62-84] 62  Resp:  [15-18] 16  BP: (108-124)/(55-89) 108/66  SpO2:  [92 %-98 %] 96 %    Physical Exam  Vitals and nursing note reviewed.   Constitutional:       General: She is in acute distress (Emotional distress).      Appearance: She is not ill-appearing, toxic-appearing or diaphoretic.   HENT:      Head: Normocephalic.      Nose: Nose normal.      Mouth/Throat:      Mouth: Mucous membranes are dry.      Pharynx: Oropharynx is clear. No oropharyngeal exudate or posterior oropharyngeal erythema.   Eyes:      General: No scleral icterus.     Conjunctiva/sclera: Conjunctivae normal.   Cardiovascular:      Rate and Rhythm: Normal rate and regular rhythm.      Pulses: Normal pulses.      Heart sounds: Normal heart sounds. No murmur heard.   No friction rub. No gallop.    Pulmonary:      Effort: Pulmonary effort is normal. No respiratory distress.      Breath sounds: Normal breath sounds. No wheezing, rhonchi or rales.   Abdominal:      General: Abdomen is flat. Bowel sounds are decreased. There is no distension.      Palpations: Abdomen is soft.      Tenderness: There is abdominal tenderness (Mild LLQ). There is no guarding or rebound.   Genitourinary:     Comments: No yancey  Musculoskeletal:      Cervical back:  Neck supple.      Right lower leg: No edema.      Left lower leg: No edema.   Skin:     General: Skin is warm and dry.   Neurological:      Mental Status: She is alert.      Comments: Appropriately conversant   Psychiatric:         Mood and Affect: Mood normal.         Behavior: Behavior normal.         Thought Content: Thought content normal.         Judgment: Judgment normal.         Fluids  No intake or output data in the 24 hours ending 08/31/21 1241    Laboratory  Recent Labs     08/29/21 2201 08/31/21  0426   WBC 13.2* 5.5   RBC 3.92* 3.83*   HEMOGLOBIN 8.3* 8.2*   HEMATOCRIT 28.1* 28.1*   MCV 71.7* 73.4*   MCH 21.2* 21.4*   MCHC 29.5* 29.2*   RDW 58.0* 60.0*   PLATELETCT 446 424   MPV 9.3 9.4     Recent Labs     08/29/21 2201 08/31/21  0426   SODIUM 138 143   POTASSIUM 3.9 3.8   CHLORIDE 109 112   CO2 20 23   GLUCOSE 113* 92   BUN 7* 6*   CREATININE 0.74 0.72   CALCIUM 8.3* 8.7             Recent Labs     08/30/21  0048   TRIGLYCERIDE 96   HDL 57   *       Imaging  OUTSIDE IMAGES-CT ABDOMEN /PELVIS   Final Result      OUTSIDE IMAGES-CT ABDOMEN /PELVIS   Final Result           Assessment/Plan  Diverticulitis of large intestine with abscess- (present on admission)  Assessment & Plan  Leukocytosis without other SIRS criteria  Recent colonoscopy with biopsy for hematochezia  Presented to [facility] ED with diarrhea and presyncope  CT demonstrated 4.2 cm abscess adjacent to rectal wall  Transferred to [facility]  BCx 8/30 drawn prior to initiation of antitiobics  General Surgery consulted, recommended nonoperative management with IV antibiotics and IR drain placement  IR consulted, not amenable to drainage due to discoid shape and adjacent anatomy  ID consulted  Per ID - Continue IV Zosyn , initiated on empiric p.o. vancomycin 125 mg twice daily given history of C. Difficile. Will plan a 2-week course of IV antibiotics - Stop date 9/13/2021. Midline ordered today. Home IV antibiotic orders for IV Zosyn 13 g  continuous infusion placed by ID. Outpatient CT scan abdomen and pelvis with contrast ordered by ID.   General surgery following  Pending clinical improvement & surgery clearance for discharge.          Iron deficiency anemia due to chronic blood loss- (present on admission)  Assessment & Plan  Presents with microcytic anemia  Ferritin 25, Fe 6% consistent with iron deficiency  In setting of GI losses, possibly silent in addition to prior evaluation for hematochezia  IV Fe deferred in setting of active infection (diverticular abscess)  Transfuse for Hgb <7    Hx of coronary artery disease- (present on admission)  Assessment & Plan  STEMI 10/18/19 requiring stent placement  Continue aspirin, plavix, atorvastatin, and metoprolol  Uncertain why she is on DAPT beyond one year unless other stents placed    Gastroesophageal reflux disease without esophagitis- (present on admission)  Assessment & Plan  Continue omeprazole    Generalized anxiety disorder- (present on admission)  Assessment & Plan  Continue venlafaxine    Leukocytosis- (present on admission)  Assessment & Plan  Due to intestinal abscess  Meets no other sepsis criteria    Tobacco use- (present on admission)  Assessment & Plan  NRT patch + lozenges    Hypothyroidism- (present on admission)  Assessment & Plan  TSH WNL  Continue levothyroxine    Essential hypertension- (present on admission)  Assessment & Plan  Continue amlodipine, spironolactone, and metoprolol    Dyslipidemia- (present on admission)  Assessment & Plan  Continue high-intensity statin and ASA for ASCVD risk reduction       VTE prophylaxis: SCDs/TEDs and pharmacologic prophylaxis contraindicated due to diverticular bleed     I have performed a physical exam and reviewed and updated ROS and Plan today (8/31/2021). In review of yesterday's note (8/30/2021), there are no changes except as documented above.

## 2021-08-31 NOTE — FACE TO FACE
Face to Face Supporting Documentation - Home Health    The encounter with this patient was in whole or in part the primary reason for home health admission.    Date of encounter:   Patient:                    MRN:                       YOB: 2022  Marisol Brown  [id number]  1960     Home health to see patient for:  Skilled Nursing care for assessment, interventions & education    Skilled need for:  New Onset Medical Diagnosis Diverticular Abscess     Skilled nursing interventions to include:  Line/Drain/Airway education and care    Homebound status evidenced by:  Need the aid of supportive devices such as crutches, canes, wheelchairs or walkers or Needs the assistance of another person in order to leave the home. Leaving home requires a considerable and taxing effort. There is a normal inability to leave the home.    Community Physician to provide follow up care: NATHANAEL King     Optional Interventions? No      I certify the face to face encounter for this home health care referral meets the CMS requirements and the encounter/clinical assessment with the patient was, in whole, or in part, for the medical condition(s) listed above, which is the primary reason for home health care. Based on my clinical findings: the service(s) are medically necessary, support the need for home health care, and the homebound criteria are met.  I certify that this patient has had a face to face encounter by myself.  Maikol Yang M.D. - NPI: [id number]

## 2021-08-31 NOTE — PROGRESS NOTES
1 RN Skin Assessment completed.   Patient's risk of skin breakdown: Low    Devices in place and skin assessed under:   [] Pulse Ox  [] PIV [] Central Line [] SCDs []Purewick  [] Blancas  []Condom Cath   [] BMS        []  Cortrak   []  Oxymask   [] Bipap    [] Nasal Canula   [] N/A   [] Other(specify):     Right Ear  [x] WDL                or           [] Skin issue present (please provide assessment/description below)    Left Ear  [x] WDL                or           [] Skin issue present (please provide assessment/description below)    Right Elbow:  [x] WDL               or           [] Skin issue present (please provide assessment/description below)    Left Elbow:   [] WDL                or           [] Skin issue present (please provide assessment/description below)    Coccyx/Buttocks:  [] WDL                or           [x] Skin issue present (please provide assessment/description below)pink/blanching red/moisture to gluteal fold  Right Heel/Foot/Toes:  [x] WDL                or           [] Skin issue present (please provide assessment/description below)    Left Heel/Foot/Toes:   [x] WDL              or           [] Skin issue present (please provide assessment/description below)      **Describe any other skin issues in areas not already listed from above list:   [] N/A    Interventions In Place: Pillows

## 2021-08-31 NOTE — PROGRESS NOTES
4 Eyes Skin Assessment Completed by STONE Benton and STONE Escalera.    Head WDL  Ears WDL  Nose WDL  Mouth WDL  Neck WDL  Breast/Chest WDL  Shoulder Blades WDL  Spine WDL  (R) Arm/Elbow/Hand Bruising  (L) Arm/Elbow/Hand Bruising  Abdomen WDL  Groin WDL  Scrotum/Coccyx/Buttocks WDL  (R) Leg WDL  (L) Leg WDL  (R) Heel/Foot/Toe Discoloration, dry, flaky  (L) Heel/Foot/Toe Discoloration, dry, flaky          Devices In Places PIV      Interventions In Place Pillows and Pressure Redistribution Mattress, encourage pt to get out of bed, encourage repositioning    Possible Skin Injury No    Pictures Uploaded Into Epic N/A  Wound Consult Placed N/A  RN Wound Prevention Protocol Ordered No

## 2021-08-31 NOTE — CARE PLAN
The patient is Watcher - Medium risk of patient condition declining or worsening    Shift Goals  Clinical Goals: pts pain will be well managed    Progress made toward(s) clinical / shift goals: Assess pain frequently. Encourage pt to alert nursing staff to patient's pain. Administer pain medication per MAR.       Patient is not progressing towards the following goals:

## 2021-09-01 ENCOUNTER — APPOINTMENT (OUTPATIENT)
Dept: RADIOLOGY | Facility: MEDICAL CENTER | Age: 61
DRG: 379 | End: 2021-09-01
Attending: STUDENT IN AN ORGANIZED HEALTH CARE EDUCATION/TRAINING PROGRAM
Payer: COMMERCIAL

## 2021-09-01 PROCEDURE — 700111 HCHG RX REV CODE 636 W/ 250 OVERRIDE (IP): Performed by: INTERNAL MEDICINE

## 2021-09-01 PROCEDURE — A9270 NON-COVERED ITEM OR SERVICE: HCPCS | Performed by: STUDENT IN AN ORGANIZED HEALTH CARE EDUCATION/TRAINING PROGRAM

## 2021-09-01 PROCEDURE — A9270 NON-COVERED ITEM OR SERVICE: HCPCS | Performed by: INTERNAL MEDICINE

## 2021-09-01 PROCEDURE — 99233 SBSQ HOSP IP/OBS HIGH 50: CPT | Performed by: STUDENT IN AN ORGANIZED HEALTH CARE EDUCATION/TRAINING PROGRAM

## 2021-09-01 PROCEDURE — 05HY33Z INSERTION OF INFUSION DEVICE INTO UPPER VEIN, PERCUTANEOUS APPROACH: ICD-10-PCS | Performed by: STUDENT IN AN ORGANIZED HEALTH CARE EDUCATION/TRAINING PROGRAM

## 2021-09-01 PROCEDURE — 700105 HCHG RX REV CODE 258: Performed by: STUDENT IN AN ORGANIZED HEALTH CARE EDUCATION/TRAINING PROGRAM

## 2021-09-01 PROCEDURE — 770001 HCHG ROOM/CARE - MED/SURG/GYN PRIV*

## 2021-09-01 PROCEDURE — 700102 HCHG RX REV CODE 250 W/ 637 OVERRIDE(OP): Performed by: INTERNAL MEDICINE

## 2021-09-01 PROCEDURE — 700105 HCHG RX REV CODE 258: Performed by: INTERNAL MEDICINE

## 2021-09-01 PROCEDURE — 76937 US GUIDE VASCULAR ACCESS: CPT

## 2021-09-01 PROCEDURE — 700102 HCHG RX REV CODE 250 W/ 637 OVERRIDE(OP): Performed by: STUDENT IN AN ORGANIZED HEALTH CARE EDUCATION/TRAINING PROGRAM

## 2021-09-01 PROCEDURE — 700111 HCHG RX REV CODE 636 W/ 250 OVERRIDE (IP): Performed by: STUDENT IN AN ORGANIZED HEALTH CARE EDUCATION/TRAINING PROGRAM

## 2021-09-01 RX ADMIN — ASPIRIN 81 MG: 81 TABLET, COATED ORAL at 04:54

## 2021-09-01 RX ADMIN — LEVOTHYROXINE SODIUM 88 MCG: 0.09 TABLET ORAL at 04:54

## 2021-09-01 RX ADMIN — NICOTINE TRANSDERMAL SYSTEM 21 MG: 21 PATCH, EXTENDED RELEASE TRANSDERMAL at 04:55

## 2021-09-01 RX ADMIN — METOPROLOL SUCCINATE 50 MG: 25 TABLET, EXTENDED RELEASE ORAL at 18:20

## 2021-09-01 RX ADMIN — VANCOMYCIN HYDROCHLORIDE 125 MG: KIT ORAL at 18:20

## 2021-09-01 RX ADMIN — DOCUSATE SODIUM 50 MG AND SENNOSIDES 8.6 MG 2 TABLET: 8.6; 5 TABLET, FILM COATED ORAL at 04:54

## 2021-09-01 RX ADMIN — ACETAMINOPHEN 650 MG: 325 TABLET, FILM COATED ORAL at 13:32

## 2021-09-01 RX ADMIN — OMEPRAZOLE 20 MG: 20 CAPSULE, DELAYED RELEASE ORAL at 04:54

## 2021-09-01 RX ADMIN — PIPERACILLIN AND TAZOBACTAM 3.38 G: 3; .375 INJECTION, POWDER, LYOPHILIZED, FOR SOLUTION INTRAVENOUS; PARENTERAL at 04:55

## 2021-09-01 RX ADMIN — SPIRONOLACTONE 12.5 MG: 25 TABLET ORAL at 04:55

## 2021-09-01 RX ADMIN — PIPERACILLIN AND TAZOBACTAM 3.38 G: 3; .375 INJECTION, POWDER, LYOPHILIZED, FOR SOLUTION INTRAVENOUS; PARENTERAL at 20:29

## 2021-09-01 RX ADMIN — VANCOMYCIN HYDROCHLORIDE 125 MG: KIT ORAL at 04:54

## 2021-09-01 RX ADMIN — LISINOPRIL 10 MG: 10 TABLET ORAL at 04:55

## 2021-09-01 RX ADMIN — PIPERACILLIN AND TAZOBACTAM 3.38 G: 3; .375 INJECTION, POWDER, LYOPHILIZED, FOR SOLUTION INTRAVENOUS; PARENTERAL at 13:34

## 2021-09-01 RX ADMIN — CLOPIDOGREL BISULFATE 75 MG: 75 TABLET ORAL at 04:54

## 2021-09-01 RX ADMIN — VENLAFAXINE HYDROCHLORIDE 75 MG: 75 CAPSULE, EXTENDED RELEASE ORAL at 04:54

## 2021-09-01 RX ADMIN — ATORVASTATIN CALCIUM 80 MG: 40 TABLET, FILM COATED ORAL at 20:28

## 2021-09-01 ASSESSMENT — ENCOUNTER SYMPTOMS
EYE PAIN: 0
NAUSEA: 0
BACK PAIN: 0
FALLS: 0
HEMOPTYSIS: 0
SHORTNESS OF BREATH: 0
FEVER: 0
CHILLS: 0
DEPRESSION: 1
SINUS PAIN: 0
CONSTIPATION: 0
DIAPHORESIS: 0
DIARRHEA: 1
LOSS OF CONSCIOUSNESS: 0
NERVOUS/ANXIOUS: 0
BLOOD IN STOOL: 0
DIZZINESS: 0
MYALGIAS: 0
VOMITING: 0
ABDOMINAL PAIN: 1
NECK PAIN: 0
HEADACHES: 0
SORE THROAT: 0

## 2021-09-01 ASSESSMENT — PAIN DESCRIPTION - PAIN TYPE
TYPE: ACUTE PAIN
TYPE: ACUTE PAIN

## 2021-09-01 NOTE — PROGRESS NOTES
Received report from NOC RN and assumed care of pt. Pt is A&Ox4 resting in bed on room air. Pt reports no pain. Patient on a full liquid diet. POC discussed with pt; all questions answered. No other needs at this time. Bed locked in lowest position, call light within reach, bed alarm on.

## 2021-09-01 NOTE — CARE PLAN
The patient is Stable - Low risk of patient condition declining or worsening    Shift Goals  Clinical Goals: monitor for bloody stool    Progress made toward(s) clinical / shift goals: Patient remains on a liquid diet progressing towards goal     Patient is not progressing towards the following goals: n/a

## 2021-09-01 NOTE — PROGRESS NOTES
1 RN Skin Assessment completed.   Patient's risk of skin breakdown: Low    Devices in place and skin assessed under:   [] Pulse Ox  [] PIV [] Central Line [] SCDs []Purewick  [] Blancas  []Condom Cath   [] BMS        []  Cortrak   []  Oxymask   [] Bipap    [] Nasal Canula   [] N/A   [] Other(specify):     Right Ear  [x] WDL                or           [] Skin issue present (please provide assessment/description below)    Left Ear  [x] WDL                or           [] Skin issue present (please provide assessment/description below)    Right Elbow:  [x] WDL               or           [] Skin issue present (please provide assessment/description below)    Left Elbow:   [x] WDL                or           [] Skin issue present (please provide assessment/description below)    Coccyx/Buttocks:  [] WDL                or           [x] Skin issue present (please provide assessment/description below) pink, blanching    Right Heel/Foot/Toes:  [x] WDL                or           [] Skin issue present (please provide assessment/description below)    Left Heel/Foot/Toes:   [x] WDL              or           [] Skin issue present (please provide assessment/description below)      **Describe any other skin issues in areas not already listed from above list:   [] N/A    Interventions In Place: Pillows, patient ambulates, turns and repositions in bed

## 2021-09-01 NOTE — PROCEDURES
Vascular Access Team    Date of Insertion: 9/1/21  Arm Circumference: 27.5  Internal length: 14  External Length: 0  Vein Occupancy %: 37  Reason for Midline: antibiotic therapy   Labs: WBC 5.5, , BUN 6, Cr 0.72, GFR >60, INR na    Orders confirmed, vessel patency confirmed with ultrasound. Risks and benefits of procedure explained to patient and education regarding line associated bloodstream infections provided. Questions answered.     Power Midline placed in LUE per licensed provider order with ultrasound guidance. 4 Fr, single lumen Power Midline placed in brachial vein after 1 attempt(s). 2 mL of 1% lidocaine injected intradermally, 21 gauge microintroducer needle and modified Seldinger technique used. 14 cm catheter inserted with good blood return. Secured at 0 cm marker. Each lumen flushed without resistance with 10 mL 0.9% normal saline. Midline secured with Biopatch and Tegaderm.     Midline placement is confirmed by nurse using ultrasound and ability to flush and draw blood. Midline is appropriate for use at this time.  Patient tolerated procedure well, without complications.  No X-ray is needed for placement confirmation.  Patient condition relayed to unit RN or ordering physician via this post procedure note in the EMR.     Ultrasound images uploaded to PACS and viewable in the EMR - yes  Ultrasound imaged printed and placed in paper chart - no     BARD Power Midline ref # [id number], Lot # [id number], Expiration Date 09/30/2022

## 2021-09-01 NOTE — DISCHARGE PLANNING
Agency/Facility Name: [facility]   Outcome: Declined via faxed response.    Non-contracted insurance provider.    RORY Novoa notified via Teams.

## 2021-09-01 NOTE — DISCHARGE PLANNING
Anticipated Discharge Disposition: SNF to complete infusions vs home infusion    Action: [facility]  denied pt due to insurance. Pt was discussed in rounds, pt will require ivabx for following 2 weeks, 3x daily ivabx administration and oral abx. LSW placed call to [facility] in [place], to discuss if they are able to accomodate pt's infusion of 3x/daily, lvm (called back several times, no answer).     PCP will need to write the order due to physician not having [place] license.     Barriers to Discharge: Unable to obtain home infusion, likely requiring SNF stay to complete infusions (unsure if SNF can accomodate as well)     Plan: LSW follow up tomorrow morning with [facility]

## 2021-09-01 NOTE — PROGRESS NOTES
Ambulated around the unit. Cooperative and compliant with care. Denies pain at present- all needs currently meet. Cont plan of care, call light within reach

## 2021-09-01 NOTE — PROGRESS NOTES
Hospital Medicine Daily Progress Note    Date of Service  9/1/2021    Chief Complaint  Marisol Brown is a 61 y.o. female with diverticulitis, HTN, hypothyroidism, anxiety transferred from [facility] ED 8/29/2021 with diverticular abscess.  Hospital Course  Marisol Brown is a 61 y.o. female with diverticulitis, HTN, hypothyroidism, anxiety transferred from [facility] ED 8/29/2021 with diverticular abscess.  Pt was transferred from [place] overnight for surgical consult.  Cosnulted General Surgeon (Dr. Aleman) advised IR consultation based on 4.2 cm abscess.  In the ED at [place] she underwent CT which showed a 4.2 cm diverticular abscess.  IR unable to drain the abscess   ID also consulted.  Per ID - Continue IV Zosyn , initiated on empiric p.o. vancomycin 125 mg twice daily given history of C. Difficile. Will plan a 2-week course of IV antibiotics - Stop date 9/13/2021.       Interval Problem Update  9/1 - Patient was seen and examined at bedside.  Still Complain of bloody diarrhea.   Abdominal pain improved.      General surgery following. Advance to full liquid diet for surgery.     Per ID - Continue IV Zosyn , initiated on empiric p.o. vancomycin 125 mg twice daily given history of C. Difficile. Will plan a 2-week course of IV antibiotics - Stop date 9/13/2021.   Because of insurance issue, home health and home antibiotic infusion plan is impossible.  Discussed with infectious disease again today. Because of the size of abscess, unsafe to switch to oral antibiotics. Another option is IV ertapenem 1 g daily at the [facility] and  these antibiotic orders will need to come from her primary care physician from [place].   Called to pt's PCP office, no one .   Case Mx will call [facility] infusion [place] & pt's PCP office.   Midline ordered today.        I have personally seen and examined the patient at bedside. I discussed the plan of care with patient, bedside RN, charge RN,   and infectious disease.    Consultants/Specialty  general surgery, infectious disease and IR    Code Status  Full Code    Disposition  Patient is not medically cleared.   Anticipate discharge to to home with organized home healthcare and close outpatient follow-up.  I have placed the appropriate orders for post-discharge needs.    Review of Systems  Review of Systems   Constitutional: Negative for chills, diaphoresis and fever.   HENT: Negative for ear pain, nosebleeds, sinus pain and sore throat.    Eyes: Negative for pain.   Respiratory: Negative for hemoptysis and shortness of breath.    Cardiovascular: Negative for chest pain.   Gastrointestinal: Positive for abdominal pain and diarrhea. Negative for blood in stool, constipation, melena, nausea and vomiting.   Musculoskeletal: Negative for back pain, falls, joint pain, myalgias and neck pain.   Neurological: Negative for dizziness, loss of consciousness and headaches.   Psychiatric/Behavioral: Positive for depression. The patient is not nervous/anxious.         Physical Exam  Temp:  [36.1 °C (97 °F)-37 °C (98.6 °F)] 36.2 °C (97.1 °F)  Pulse:  [60-66] 60  Resp:  [15-17] 16  BP: ()/(47-67) 113/65  SpO2:  [97 %-98 %] 98 %    Physical Exam  Vitals and nursing note reviewed.   Constitutional:       General: She is in acute distress (Emotional distress).      Appearance: She is not ill-appearing, toxic-appearing or diaphoretic.   HENT:      Head: Normocephalic.      Nose: Nose normal.      Mouth/Throat:      Mouth: Mucous membranes are dry.      Pharynx: Oropharynx is clear. No oropharyngeal exudate or posterior oropharyngeal erythema.   Eyes:      General: No scleral icterus.     Conjunctiva/sclera: Conjunctivae normal.   Cardiovascular:      Rate and Rhythm: Normal rate and regular rhythm.      Pulses: Normal pulses.      Heart sounds: Normal heart sounds. No murmur heard.   No friction rub. No gallop.    Pulmonary:      Effort: Pulmonary effort  is normal. No respiratory distress.      Breath sounds: Normal breath sounds. No wheezing, rhonchi or rales.   Abdominal:      General: Abdomen is flat. Bowel sounds are decreased. There is no distension.      Palpations: Abdomen is soft.      Tenderness: There is abdominal tenderness (Mild LLQ). There is no guarding or rebound.   Genitourinary:     Comments: No yancey  Musculoskeletal:      Cervical back: Neck supple.      Right lower leg: No edema.      Left lower leg: No edema.   Skin:     General: Skin is warm and dry.   Neurological:      Mental Status: She is alert.      Comments: Appropriately conversant   Psychiatric:         Mood and Affect: Mood normal.         Behavior: Behavior normal.         Thought Content: Thought content normal.         Judgment: Judgment normal.         Fluids    Intake/Output Summary (Last 24 hours) at 9/1/2021 1212  Last data filed at 9/1/2021 0900  Gross per 24 hour   Intake 480 ml   Output --   Net 480 ml       Laboratory  Recent Labs     08/29/21 2201 08/31/21  0426   WBC 13.2* 5.5   RBC 3.92* 3.83*   HEMOGLOBIN 8.3* 8.2*   HEMATOCRIT 28.1* 28.1*   MCV 71.7* 73.4*   MCH 21.2* 21.4*   MCHC 29.5* 29.2*   RDW 58.0* 60.0*   PLATELETCT 446 424   MPV 9.3 9.4     Recent Labs     08/29/21 2201 08/31/21  0426   SODIUM 138 143   POTASSIUM 3.9 3.8   CHLORIDE 109 112   CO2 20 23   GLUCOSE 113* 92   BUN 7* 6*   CREATININE 0.74 0.72   CALCIUM 8.3* 8.7             Recent Labs     08/30/21  0048   TRIGLYCERIDE 96   HDL 57   *       Imaging  OUTSIDE IMAGES-CT ABDOMEN /PELVIS   Final Result      OUTSIDE IMAGES-CT ABDOMEN /PELVIS   Final Result           Assessment/Plan  Diverticulitis of large intestine with abscess- (present on admission)  Assessment & Plan  Leukocytosis without other SIRS criteria  Recent colonoscopy with biopsy for hematochezia  Presented to [place] ED with diarrhea and presyncope  CT demonstrated 4.2 cm abscess adjacent to rectal wall  Transferred to [facility]  BCx 8/30  drawn prior to initiation of antitiobics  General Surgery consulted, recommended nonoperative management with IV antibiotics and IR drain placement  IR consulted, not amenable to drainage due to discoid shape and adjacent anatomy  ID consulted  Per ID - Continue IV Zosyn , initiated on empiric p.o. vancomycin 125 mg twice daily given history of C. Difficile. Will plan a 2-week course of IV antibiotics - Stop date 9/13/2021.   Because of insurance issue, home health and home antibiotic infusion plan is impossible.  Discussed with infectious disease again today. Because of the size of abscess, unsafe to switch to oral antibiotics. Another option is IV ertapenem 1 g daily at the [facility] and  these antibiotic orders will need to come from her primary care physician from [place].   Called to pt's PCP office, no one .   Case Mx will call [facility] & pt's PCP office.           Iron deficiency anemia due to chronic blood loss- (present on admission)  Assessment & Plan  Presents with microcytic anemia  Ferritin 25, Fe 6% consistent with iron deficiency  In setting of GI losses, possibly silent in addition to prior evaluation for hematochezia  IV Fe deferred in setting of active infection (diverticular abscess)  Transfuse for Hgb <7    Hx of coronary artery disease- (present on admission)  Assessment & Plan  STEMI 10/18/19 requiring stent placement  Continue aspirin, plavix, atorvastatin, and metoprolol  Uncertain why she is on DAPT beyond one year unless other stents placed    Gastroesophageal reflux disease without esophagitis- (present on admission)  Assessment & Plan  Continue omeprazole    Generalized anxiety disorder- (present on admission)  Assessment & Plan  Continue venlafaxine    Leukocytosis- (present on admission)  Assessment & Plan  Due to intestinal abscess  Meets no other sepsis criteria    Tobacco use- (present on admission)  Assessment & Plan  NRT patch +  lozenges    Hypothyroidism- (present on admission)  Assessment & Plan  TSH WNL  Continue levothyroxine    Essential hypertension- (present on admission)  Assessment & Plan  Continue amlodipine, spironolactone, and metoprolol    Dyslipidemia- (present on admission)  Assessment & Plan  Continue high-intensity statin and ASA for ASCVD risk reduction       VTE prophylaxis: SCDs/TEDs and pharmacologic prophylaxis contraindicated due to diverticular bleed     I have performed a physical exam and reviewed and updated ROS and Plan today (9/1/2021). In review of yesterday's note (8/31/2021), there are no changes except as documented above.

## 2021-09-01 NOTE — CARE PLAN
The patient is Stable - Low risk of patient condition declining or worsening    Shift Goals  Clinical Goals: monitor for bloody stools    Progress made toward(s) clinical / shift goals:  remains on clear liquid diet, stools with blood remain

## 2021-09-01 NOTE — PROGRESS NOTES
"Surgical Progress Note:    S:  - Feeling well  -reports that pain has resolved  - Tolerating diet, no n/v  - Passing gas/liquid stools  - Ambulating  - No chest pain, extremity pain, or difficulty breathing    Vitals:  /65   Pulse 60   Temp 36.2 °C (97.1 °F) (Temporal)   Resp 16   Ht 1.676 m (5' 6\")   Wt 72 kg (158 lb 11.7 oz)   SpO2 98%   BMI 25.62 kg/m²     I/O: No intake or output data in the 24 hours ending 08/31/21 0810    P/E:  Constitutional: Appears well, no distress  Head/Neck: Normocephalic, atraumatic, neck supple  Cardiovascular: Normal rate and rhythm  Pulmonary/Chest: Normal respiratory effort, no wheezes  Abdominal: Soft, nontender, otherwise nointender, no distension  Musculoskeletal: No deficits  Neurological:  Alert, oriented  Skin:  Warm and dry, no erythema  Extremities: No edema, no evidence of DVT    Labs:  Recent Labs     08/29/21 2201 08/31/21  0426   WBC 13.2* 5.5   RBC 3.92* 3.83*   HEMOGLOBIN 8.3* 8.2*   HEMATOCRIT 28.1* 28.1*   MCV 71.7* 73.4*   MCH 21.2* 21.4*   RDW 58.0* 60.0*   PLATELETCT 446 424   MPV 9.3 9.4   NEUTSPOLYS 78.10* 58.40   LYMPHOCYTES 14.80* 24.90   MONOCYTES 6.10 13.50*   EOSINOPHILS 0.00 1.80   BASOPHILS 0.50 0.90   RBCMORPHOLO Present Present     Recent Labs     08/29/21 2201 08/31/21  0426   SODIUM 138 143   POTASSIUM 3.9 3.8   CHLORIDE 109 112   CO2 20 23   GLUCOSE 113* 92   BUN 7* 6*         A/P:   - advance to full fluids  - WBC normal  - Zosyn, consider transition to by mouth antibiotics and discharged home on full fluid diet with outpatient follow-up          Arianna Bingham M.D.  [facility]  [phone]  "

## 2021-09-02 PROBLEM — D72.829 LEUKOCYTOSIS: Status: RESOLVED | Noted: 2021-08-29 | Resolved: 2021-09-02

## 2021-09-02 LAB
HCT VFR BLD AUTO: 25.7 % (ref 37–47)
HGB BLD-MCNC: 7.7 G/DL (ref 12–16)

## 2021-09-02 PROCEDURE — 700111 HCHG RX REV CODE 636 W/ 250 OVERRIDE (IP): Performed by: INTERNAL MEDICINE

## 2021-09-02 PROCEDURE — 770001 HCHG ROOM/CARE - MED/SURG/GYN PRIV*

## 2021-09-02 PROCEDURE — 85014 HEMATOCRIT: CPT

## 2021-09-02 PROCEDURE — 36415 COLL VENOUS BLD VENIPUNCTURE: CPT

## 2021-09-02 PROCEDURE — A9270 NON-COVERED ITEM OR SERVICE: HCPCS | Performed by: STUDENT IN AN ORGANIZED HEALTH CARE EDUCATION/TRAINING PROGRAM

## 2021-09-02 PROCEDURE — 85018 HEMOGLOBIN: CPT

## 2021-09-02 PROCEDURE — 700102 HCHG RX REV CODE 250 W/ 637 OVERRIDE(OP): Performed by: INTERNAL MEDICINE

## 2021-09-02 PROCEDURE — RXMED WILLOW AMBULATORY MEDICATION CHARGE: Performed by: STUDENT IN AN ORGANIZED HEALTH CARE EDUCATION/TRAINING PROGRAM

## 2021-09-02 PROCEDURE — 99233 SBSQ HOSP IP/OBS HIGH 50: CPT | Performed by: STUDENT IN AN ORGANIZED HEALTH CARE EDUCATION/TRAINING PROGRAM

## 2021-09-02 PROCEDURE — 700102 HCHG RX REV CODE 250 W/ 637 OVERRIDE(OP): Performed by: STUDENT IN AN ORGANIZED HEALTH CARE EDUCATION/TRAINING PROGRAM

## 2021-09-02 PROCEDURE — A9270 NON-COVERED ITEM OR SERVICE: HCPCS | Performed by: INTERNAL MEDICINE

## 2021-09-02 PROCEDURE — 700105 HCHG RX REV CODE 258: Performed by: INTERNAL MEDICINE

## 2021-09-02 RX ORDER — VANCOMYCIN HYDROCHLORIDE 50 MG/ML
125 KIT ORAL EVERY 12 HOURS
Qty: 150 ML | Refills: 0 | Status: SHIPPED | OUTPATIENT
Start: 2021-09-02 | End: 2021-09-07 | Stop reason: SDUPTHER

## 2021-09-02 RX ADMIN — ATORVASTATIN CALCIUM 80 MG: 40 TABLET, FILM COATED ORAL at 20:22

## 2021-09-02 RX ADMIN — METOPROLOL SUCCINATE 50 MG: 25 TABLET, EXTENDED RELEASE ORAL at 17:47

## 2021-09-02 RX ADMIN — VENLAFAXINE HYDROCHLORIDE 75 MG: 75 CAPSULE, EXTENDED RELEASE ORAL at 04:59

## 2021-09-02 RX ADMIN — NICOTINE TRANSDERMAL SYSTEM 21 MG: 21 PATCH, EXTENDED RELEASE TRANSDERMAL at 04:59

## 2021-09-02 RX ADMIN — LEVOTHYROXINE SODIUM 88 MCG: 0.09 TABLET ORAL at 04:59

## 2021-09-02 RX ADMIN — PIPERACILLIN AND TAZOBACTAM 3.38 G: 3; .375 INJECTION, POWDER, LYOPHILIZED, FOR SOLUTION INTRAVENOUS; PARENTERAL at 04:58

## 2021-09-02 RX ADMIN — VANCOMYCIN HYDROCHLORIDE 125 MG: KIT ORAL at 05:00

## 2021-09-02 RX ADMIN — VANCOMYCIN HYDROCHLORIDE 125 MG: KIT ORAL at 17:47

## 2021-09-02 RX ADMIN — PIPERACILLIN AND TAZOBACTAM 3.38 G: 3; .375 INJECTION, POWDER, LYOPHILIZED, FOR SOLUTION INTRAVENOUS; PARENTERAL at 13:06

## 2021-09-02 RX ADMIN — ASPIRIN 81 MG: 81 TABLET, COATED ORAL at 04:58

## 2021-09-02 RX ADMIN — PIPERACILLIN AND TAZOBACTAM 3.38 G: 3; .375 INJECTION, POWDER, LYOPHILIZED, FOR SOLUTION INTRAVENOUS; PARENTERAL at 20:22

## 2021-09-02 RX ADMIN — OMEPRAZOLE 20 MG: 20 CAPSULE, DELAYED RELEASE ORAL at 04:59

## 2021-09-02 RX ADMIN — CLOPIDOGREL BISULFATE 75 MG: 75 TABLET ORAL at 04:59

## 2021-09-02 ASSESSMENT — ENCOUNTER SYMPTOMS
MYALGIAS: 0
EYE PAIN: 0
DEPRESSION: 1
BLOOD IN STOOL: 0
NAUSEA: 0
CONSTIPATION: 0
VOMITING: 0
CHILLS: 0
HEMOPTYSIS: 0
DIAPHORESIS: 0
NERVOUS/ANXIOUS: 0
ABDOMINAL PAIN: 1
HEADACHES: 0
LOSS OF CONSCIOUSNESS: 0
DIZZINESS: 0
DIARRHEA: 1
FALLS: 0
NECK PAIN: 0
SORE THROAT: 0
SHORTNESS OF BREATH: 0
FEVER: 0
BACK PAIN: 0
SINUS PAIN: 0

## 2021-09-02 ASSESSMENT — PAIN DESCRIPTION - PAIN TYPE: TYPE: ACUTE PAIN

## 2021-09-02 NOTE — PROGRESS NOTES
Pt A&Ox4 on RA, denied pain/discomfort. Pt currently sleeping, running zosyn at 25 mL/hr per MAR. Pt BP 92/57 this AM, pt asymptomatic, denied dizziness, lightheadedness.       1 RN Skin Assessment completed.   Patient's risk of skin breakdown: Low    Devices in place and skin assessed under:   [] Pulse Ox  [] PIV [] Central Line [] SCDs []Purewick  [] Blancas  []Condom Cath   [] BMS        []  Cortrak   []  Oxymask   [] Bipap    [] Nasal Canula   [] N/A   [x] Other(specify): midline    Right Ear  [x] WDL                or           [] Skin issue present (please provide assessment/description below)    Left Ear  [x] WDL                or           [] Skin issue present (please provide assessment/description below)    Right Elbow:  [x] WDL               or           [] Skin issue present (please provide assessment/description below)    Left Elbow:   [x] WDL                or           [] Skin issue present (please provide assessment/description below)    Coccyx/Buttocks:  [] WDL                or           [] Skin issue present (please provide assessment/description below)    Right Heel/Foot/Toes:  [x] WDL                or           [] Skin issue present (please provide assessment/description below)    Left Heel/Foot/Toes:   [x] WDL              or           [] Skin issue present (please provide assessment/description below)      **Describe any other skin issues in areas not already listed from above list:   [] N/A    Interventions In Place: Pillows and Pressure Redistribution Mattress

## 2021-09-02 NOTE — DISCHARGE PLANNING
note:  Met with pt and her mom. Explained that we will need to wait for [facility] to confirm that they accept which will likely happen tomorrow.     Pt's son Franck will  tomorrow.

## 2021-09-02 NOTE — PROGRESS NOTES
Hospital Medicine Daily Progress Note    Date of Service  9/2/2021    Chief Complaint  Marisol Brown is a 61 y.o. female with diverticulitis, HTN, hypothyroidism, anxiety transferred from [facility] ED 8/29/2021 with diverticular abscess.  Hospital Course  Marisol Brown is a 61 y.o. female with diverticulitis, HTN, hypothyroidism, anxiety transferred from [facility] ED 8/29/2021 with diverticular abscess.  Pt was transferred from [place] overnight for surgical consult.  Cosnulted General Surgeon (Dr. Aleman) advised IR consultation based on 4.2 cm abscess.  In the ED at [place] she underwent CT which showed a 4.2 cm diverticular abscess.  IR unable to drain the abscess   ID also consulted.   Per ID - Continue IV Zosyn , initiated on empiric p.o. vancomycin 125 mg twice daily given history of C. Difficile. Will plan a 2-week course of IV antibiotics - Stop date 9/13/2021.   Because of insurance issue, home health and home antibiotic infusion plan is impossible.  Because of the size of diverticular abscess, unsafe to switch to oral antibiotics. Another option is IV ertapenem 1 g daily at the [facility] and  these antibiotic orders will need to come from her primary care physician from [place].   Case Mx will call [facility] & pt's PCP office.   General Sx following.         Interval Problem Update  9/2 - Patient was seen and examined at bedside.  Abdominal pain resolved.   Tolerating Full Liquid Diet.   Pt wants to go home but explained the pt about how important it is to complete her IV antibiotic course.  Per ID - Continue IV Zosyn , initiated on empiric p.o. vancomycin 125 mg twice daily given history of C. Difficile. Will plan a 2-week course of IV antibiotics - Stop date 9/13/2021.   Because of insurance issue, home health and home antibiotic infusion plan is impossible.  Because of the size of diverticula abscess, unsafe to switch to oral antibiotics.   Another  option is IV ertapenem 1 g daily at the [facility] and  these antibiotic orders will need to come from her primary care physician from [place].   Case Mx will coordinate with [facility] infusion [place] & pt's PCP office.   Daily H & H.       Midline ordered today.        I have personally seen and examined the patient at bedside. I discussed the plan of care with patient, bedside RN, charge RN,  and infectious disease.    Consultants/Specialty  general surgery, infectious disease and IR    Code Status  Full Code    Disposition  Patient is not medically cleared.   Anticipate discharge to to home with organized home healthcare and close outpatient follow-up.  I have placed the appropriate orders for post-discharge needs.    Review of Systems  Review of Systems   Constitutional: Negative for chills, diaphoresis and fever.   HENT: Negative for ear pain, nosebleeds, sinus pain and sore throat.    Eyes: Negative for pain.   Respiratory: Negative for hemoptysis and shortness of breath.    Cardiovascular: Negative for chest pain.   Gastrointestinal: Positive for abdominal pain and diarrhea. Negative for blood in stool, constipation, melena, nausea and vomiting.   Musculoskeletal: Negative for back pain, falls, joint pain, myalgias and neck pain.   Neurological: Negative for dizziness, loss of consciousness and headaches.   Psychiatric/Behavioral: Positive for depression. The patient is not nervous/anxious.         Physical Exam  Temp:  [36.2 °C (97.2 °F)-37.3 °C (99.1 °F)] 37.3 °C (99.1 °F)  Pulse:  [61-70] 62  Resp:  [16-17] 17  BP: ()/(57-72) 101/72  SpO2:  [96 %-100 %] 99 %    Physical Exam  Vitals and nursing note reviewed.   Constitutional:       General: She is in acute distress (Emotional distress).      Appearance: She is not ill-appearing, toxic-appearing or diaphoretic.   HENT:      Head: Normocephalic.      Nose: Nose normal.      Mouth/Throat:      Mouth: Mucous  membranes are dry.      Pharynx: Oropharynx is clear. No oropharyngeal exudate or posterior oropharyngeal erythema.   Eyes:      General: No scleral icterus.     Conjunctiva/sclera: Conjunctivae normal.   Cardiovascular:      Rate and Rhythm: Normal rate and regular rhythm.      Pulses: Normal pulses.      Heart sounds: Normal heart sounds. No murmur heard.   No friction rub. No gallop.    Pulmonary:      Effort: Pulmonary effort is normal. No respiratory distress.      Breath sounds: Normal breath sounds. No wheezing, rhonchi or rales.   Abdominal:      General: Abdomen is flat. Bowel sounds are decreased. There is no distension.      Palpations: Abdomen is soft.      Tenderness: There is abdominal tenderness (Mild LLQ). There is no guarding or rebound.   Genitourinary:     Comments: No yancey  Musculoskeletal:      Cervical back: Neck supple.      Right lower leg: No edema.      Left lower leg: No edema.   Skin:     General: Skin is warm and dry.   Neurological:      Mental Status: She is alert.      Comments: Appropriately conversant   Psychiatric:         Mood and Affect: Mood normal.         Behavior: Behavior normal.         Thought Content: Thought content normal.         Judgment: Judgment normal.         Fluids    Intake/Output Summary (Last 24 hours) at 9/2/2021 1017  Last data filed at 9/2/2021 1000  Gross per 24 hour   Intake 770 ml   Output --   Net 770 ml       Laboratory  Recent Labs     08/31/21  0426 09/02/21  0432   WBC 5.5  --    RBC 3.83*  --    HEMOGLOBIN 8.2* 7.7*   HEMATOCRIT 28.1* 25.7*   MCV 73.4*  --    MCH 21.4*  --    MCHC 29.2*  --    RDW 60.0*  --    PLATELETCT 424  --    MPV 9.4  --      Recent Labs     08/31/21  0426   SODIUM 143   POTASSIUM 3.8   CHLORIDE 112   CO2 23   GLUCOSE 92   BUN 6*   CREATININE 0.72   CALCIUM 8.7                   Imaging  IR-MIDLINE CATHETER INSERTION WO GUIDANCE > AGE 3   Final Result                  Ultrasound-guided midline placement performed by  qualified nursing staff    as above.          OUTSIDE IMAGES-CT ABDOMEN /PELVIS   Final Result      OUTSIDE IMAGES-CT ABDOMEN /PELVIS   Final Result           Assessment/Plan  Diverticulitis of large intestine with abscess- (present on admission)  Assessment & Plan  Leukocytosis without other SIRS criteria  Recent colonoscopy with biopsy for hematochezia  Presented to [place] ED with diarrhea and presyncope  CT demonstrated 4.2 cm abscess adjacent to rectal wall  Transferred to [facility]  BCx 8/30 drawn prior to initiation of antitiobics  General Surgery consulted, recommended nonoperative management with IV antibiotics and IR drain placement  IR consulted, not amenable to drainage due to discoid shape and adjacent anatomy  ID consulted  Per ID - Continue IV Zosyn , initiated on empiric p.o. vancomycin 125 mg twice daily given history of C. Difficile. Will plan a 2-week course of IV antibiotics - Stop date 9/13/2021.   Because of insurance issue, home health and home antibiotic infusion plan is impossible.  Because of the size of diverticula abscess, unsafe to switch to oral antibiotics.   Another option is IV ertapenem 1 g daily at the [facility] and  these antibiotic orders will need to come from her primary care physician from [place].   Case Mx will coordinate with [facility] & pt's PCP office.         Iron deficiency anemia due to chronic blood loss- (present on admission)  Assessment & Plan  Presents with microcytic anemia  Ferritin 25, Fe 6% consistent with iron deficiency  In setting of GI losses, possibly silent in addition to prior evaluation for hematochezia  IV Fe deferred in setting of active infection (diverticular abscess)  Transfuse for Hgb <7    Hx of coronary artery disease- (present on admission)  Assessment & Plan  STEMI 10/18/19 requiring stent placement  Continue aspirin, plavix, atorvastatin, and metoprolol  Uncertain why she is on DAPT beyond one year unless  other stents placed    Gastroesophageal reflux disease without esophagitis- (present on admission)  Assessment & Plan  Continue omeprazole    Generalized anxiety disorder- (present on admission)  Assessment & Plan  Continue venlafaxine    Leukocytosis- (present on admission)  Assessment & Plan  Due to intestinal abscess  Meets no other sepsis criteria    Tobacco use- (present on admission)  Assessment & Plan  NRT patch + lozenges    Hypothyroidism- (present on admission)  Assessment & Plan  TSH WNL  Continue levothyroxine    Essential hypertension- (present on admission)  Assessment & Plan  Continue amlodipine, spironolactone, and metoprolol    Dyslipidemia- (present on admission)  Assessment & Plan  Continue high-intensity statin and ASA for ASCVD risk reduction       VTE prophylaxis: SCDs/TEDs and pharmacologic prophylaxis contraindicated due to diverticular bleed     I have performed a physical exam and reviewed and updated ROS and Plan today (9/2/2021). In review of yesterday's note (9/1/2021), there are no changes except as documented above.

## 2021-09-02 NOTE — DISCHARGE PLANNING
note:  Fax to [facility] [phone] has failed.   CM called [facility]  and confirmed that clinic is open. Refaxed referral to [phone].    CM also was transferred to [facility] and [facility] for the 3rd time.

## 2021-09-02 NOTE — PROGRESS NOTES
"Surgical Progress Note:    S:  - Feeling well  -reports that pain has resolved  - Tolerating full liquid diet, no n/v  - Passing gas/liquid stools, no further blod  - Ambulating  - No chest pain, extremity pain, or difficulty breathing    Vitals:  /72   Pulse 62   Temp 37.3 °C (99.1 °F) (Temporal)   Resp 17   Ht 1.676 m (5' 6\")   Wt 72 kg (158 lb 11.7 oz)   SpO2 99%   BMI 25.62 kg/m²     I/O: No intake or output data in the 24 hours ending 08/31/21 0810    P/E:  Constitutional: Appears well, no distress  Head/Neck: Normocephalic, atraumatic, neck supple  Cardiovascular: Normal rate and rhythm  Pulmonary/Chest: Normal respiratory effort, no wheezes  Abdominal: Soft, nontender, otherwise nointender, no distension  Musculoskeletal: No deficits  Neurological:  Alert, oriented  Skin:  Warm and dry, no erythema  Extremities: No edema, no evidence of DVT    Labs:  Recent Labs     08/31/21  0426 09/02/21  0432   WBC 5.5  --    RBC 3.83*  --    HEMOGLOBIN 8.2* 7.7*   HEMATOCRIT 28.1* 25.7*   MCV 73.4*  --    MCH 21.4*  --    RDW 60.0*  --    PLATELETCT 424  --    MPV 9.4  --    NEUTSPOLYS 58.40  --    LYMPHOCYTES 24.90  --    MONOCYTES 13.50*  --    EOSINOPHILS 1.80  --    BASOPHILS 0.90  --    RBCMORPHOLO Present  --      Recent Labs     08/31/21  0426   SODIUM 143   POTASSIUM 3.8   CHLORIDE 112   CO2 23   GLUCOSE 92   BUN 6*         A/P:   - Continue full fluids  -Antibiotics per ID, to continue IV antibiotics x 2 weeks (stop date 9/13)---looking at options for home infusion vs infusion center at [facility].  --Plan for discharge to home on full fluid diet with outpatient follow-up with Dr. Zachery Contreras, A.P.N.  [facility]  [phone]  "

## 2021-09-02 NOTE — DISCHARGE PLANNING
CM notified pt about efforts to setting up outpt IV abx. Informed pt that we waiting for [facility] to confirm acceptance. Pt was ok with this. She confirmed that she is independent and able to drive herself to the clinic everyday. She also has many people that can help her get to the hospital.     Talked to daughter who will call the office of PCP to ask if there is someone else who can sign the order. Explained that CM already talked to [facility] Pierre who said that we have to wait for [facility] PCP to sign IV abx order.     LVM for nurse Pierre at [facility] office to call CM back.

## 2021-09-02 NOTE — DISCHARGE PLANNING
Dr. Yang notified CM that she sent an order for Oral Vancomycin.   CM called Cori at [facility] who said that [facility] Fay has the medication and because insurance is [employer] then approved services is needed for $88.70.    Sent approved services for $88.70 and faxed to [id number].    Pierre at [facility] who confirmed receiving the referral but she said that they would have to wait for the PCP to sign the order and it will be reviewed by theit pharmacist. Tomorrow they would be able to confirm whether pt is accepted or not. Contact [phone] and call Sushila tomorrow.

## 2021-09-03 ENCOUNTER — PHARMACY VISIT (OUTPATIENT)
Dept: PHARMACY | Facility: MEDICAL CENTER | Age: 61
End: 2021-09-03
Payer: COMMERCIAL

## 2021-09-03 LAB
HCT VFR BLD AUTO: 26.9 % (ref 37–47)
HGB BLD-MCNC: 7.8 G/DL (ref 12–16)
MORPHOLOGY BLD-IMP: NORMAL

## 2021-09-03 PROCEDURE — 36415 COLL VENOUS BLD VENIPUNCTURE: CPT

## 2021-09-03 PROCEDURE — 85014 HEMATOCRIT: CPT

## 2021-09-03 PROCEDURE — 700111 HCHG RX REV CODE 636 W/ 250 OVERRIDE (IP): Performed by: INTERNAL MEDICINE

## 2021-09-03 PROCEDURE — A9270 NON-COVERED ITEM OR SERVICE: HCPCS | Performed by: INTERNAL MEDICINE

## 2021-09-03 PROCEDURE — A9270 NON-COVERED ITEM OR SERVICE: HCPCS | Performed by: STUDENT IN AN ORGANIZED HEALTH CARE EDUCATION/TRAINING PROGRAM

## 2021-09-03 PROCEDURE — 770001 HCHG ROOM/CARE - MED/SURG/GYN PRIV*

## 2021-09-03 PROCEDURE — 700105 HCHG RX REV CODE 258: Performed by: INTERNAL MEDICINE

## 2021-09-03 PROCEDURE — 85018 HEMOGLOBIN: CPT

## 2021-09-03 PROCEDURE — 700102 HCHG RX REV CODE 250 W/ 637 OVERRIDE(OP): Performed by: INTERNAL MEDICINE

## 2021-09-03 PROCEDURE — 99232 SBSQ HOSP IP/OBS MODERATE 35: CPT | Performed by: STUDENT IN AN ORGANIZED HEALTH CARE EDUCATION/TRAINING PROGRAM

## 2021-09-03 PROCEDURE — 700102 HCHG RX REV CODE 250 W/ 637 OVERRIDE(OP): Performed by: STUDENT IN AN ORGANIZED HEALTH CARE EDUCATION/TRAINING PROGRAM

## 2021-09-03 RX ORDER — ALPRAZOLAM 0.5 MG/1
0.5 TABLET ORAL 3 TIMES DAILY PRN
Status: DISCONTINUED | OUTPATIENT
Start: 2021-09-03 | End: 2021-09-07 | Stop reason: HOSPADM

## 2021-09-03 RX ADMIN — ASPIRIN 81 MG: 81 TABLET, COATED ORAL at 04:38

## 2021-09-03 RX ADMIN — LISINOPRIL 10 MG: 10 TABLET ORAL at 04:38

## 2021-09-03 RX ADMIN — PIPERACILLIN AND TAZOBACTAM 3.38 G: 3; .375 INJECTION, POWDER, LYOPHILIZED, FOR SOLUTION INTRAVENOUS; PARENTERAL at 13:11

## 2021-09-03 RX ADMIN — CLOPIDOGREL BISULFATE 75 MG: 75 TABLET ORAL at 04:38

## 2021-09-03 RX ADMIN — SPIRONOLACTONE 12.5 MG: 25 TABLET ORAL at 04:38

## 2021-09-03 RX ADMIN — OMEPRAZOLE 20 MG: 20 CAPSULE, DELAYED RELEASE ORAL at 04:38

## 2021-09-03 RX ADMIN — VANCOMYCIN HYDROCHLORIDE 125 MG: KIT ORAL at 09:23

## 2021-09-03 RX ADMIN — ATORVASTATIN CALCIUM 80 MG: 40 TABLET, FILM COATED ORAL at 20:25

## 2021-09-03 RX ADMIN — DOCUSATE SODIUM 50 MG AND SENNOSIDES 8.6 MG 2 TABLET: 8.6; 5 TABLET, FILM COATED ORAL at 04:38

## 2021-09-03 RX ADMIN — NICOTINE TRANSDERMAL SYSTEM 21 MG: 21 PATCH, EXTENDED RELEASE TRANSDERMAL at 10:42

## 2021-09-03 RX ADMIN — LEVOTHYROXINE SODIUM 88 MCG: 0.09 TABLET ORAL at 04:38

## 2021-09-03 RX ADMIN — PIPERACILLIN AND TAZOBACTAM 3.38 G: 3; .375 INJECTION, POWDER, LYOPHILIZED, FOR SOLUTION INTRAVENOUS; PARENTERAL at 21:28

## 2021-09-03 RX ADMIN — PIPERACILLIN AND TAZOBACTAM 3.38 G: 3; .375 INJECTION, POWDER, LYOPHILIZED, FOR SOLUTION INTRAVENOUS; PARENTERAL at 04:39

## 2021-09-03 RX ADMIN — VENLAFAXINE HYDROCHLORIDE 75 MG: 75 CAPSULE, EXTENDED RELEASE ORAL at 04:38

## 2021-09-03 RX ADMIN — VANCOMYCIN HYDROCHLORIDE 125 MG: KIT ORAL at 17:43

## 2021-09-03 RX ADMIN — NICOTINE TRANSDERMAL SYSTEM 21 MG: 21 PATCH, EXTENDED RELEASE TRANSDERMAL at 04:39

## 2021-09-03 ASSESSMENT — PAIN DESCRIPTION - PAIN TYPE: TYPE: ACUTE PAIN

## 2021-09-03 NOTE — PROGRESS NOTES
Assumed care of pt after receiving report from night shift RN. Pt is A&Ox 4, VSS on RA. Pt denies pain at this time. Pt is up self, uses call light appropriately. Pt anticipating discharge tomorrow pending outpatient infusion center and PCP office. Bed is locked and in lowest position, call light within reach, fall precautions in place. All needs met at this time.       1 RN Skin Assessment completed.   Patient's risk of skin breakdown: Low    Devices in place and skin assessed under:   [] Pulse Ox  [] PIV [] Central Line [] SCDs []Purewick  [] Blancas  []Condom Cath   [] BMS        []  Cortrak   []  Oxymask   [] Bipap    [] Nasal Canula   [] N/A   [x] Other(specify): Midline    Right Ear  [x] WDL                or           [] Skin issue present (please provide assessment/description below)    Left Ear  [x] WDL                or           [] Skin issue present (please provide assessment/description below)    Right Elbow:  [x] WDL               or           [] Skin issue present (please provide assessment/description below)    Left Elbow:   [x] WDL                or           [] Skin issue present (please provide assessment/description below)    Coccyx/Buttocks:  [x] WDL                or           [] Skin issue present (please provide assessment/description below)    Right Heel/Foot/Toes:  [x] WDL                or           [] Skin issue present (please provide assessment/description below)    Left Heel/Foot/Toes:   [x] WDL              or           [] Skin issue present (please provide assessment/description below)      **Describe any other skin issues in areas not already listed from above list:   [] N/A    Interventions In Place: Pillows and Pressure Redistribution Mattress

## 2021-09-03 NOTE — DISCHARGE PLANNING
Anticipated Discharge Disposition: Home with outpatient infusion vs SNF    Action: Spoke with the patient at the bedside about [facility] needing insurance auth and not able to process it until Tuesday. The patient is agreeable to referral to be sent to [facility].    Choice form faxed to Shanthi AYALA at [phone].    Barriers to Discharge: Infusion needed, insurance auth needed    Plan: Will need to follow up with [facility] and SNF

## 2021-09-03 NOTE — PROGRESS NOTES
"Surgical Progress Note:    S:  - Feeling well  -reports that pain has resolved  - Tolerating full liquid diet, no n/v  - Passing gas/liquid stools, no further blood  -Wants to go home  - Ambulating  - No chest pain, extremity pain, or difficulty breathing    Vitals:  /67   Pulse 60   Temp 36.3 °C (97.3 °F) (Temporal)   Resp 17   Ht 1.676 m (5' 6\")   Wt 72 kg (158 lb 11.7 oz)   SpO2 98%   BMI 25.62 kg/m²     I/O: No intake or output data in the 24 hours ending 08/31/21 0810    P/E:  Constitutional: Appears well, no distress  Head/Neck: Normocephalic, atraumatic, neck supple  Cardiovascular: Normal rate and rhythm  Pulmonary/Chest: Normal respiratory effort, no wheezes  Abdominal: Soft, nontender, otherwise nointender, no distension  Musculoskeletal: No deficits  Neurological:  Alert, oriented  Skin:  Warm and dry, no erythema  Extremities: No edema, no evidence of DVT    Labs:  Recent Labs     09/02/21  0432 09/03/21  0440   HEMOGLOBIN 7.7* 7.8*   HEMATOCRIT 25.7* 26.9*     No results for input(s): SODIUM, POTASSIUM, CHLORIDE, CO2, GLUCOSE, BUN, CPKTOTAL in the last 72 hours.      A/P:   - Continue full fluids  -Antibiotics per ID, to continue IV antibiotics x 2 weeks (stop date 9/13)---looking at options for home infusion vs infusion center at [facility].  --Plan for discharge to home on full fluid diet with outpatient follow-up with Dr. Bingham has been arranged.  --Surgery will sign off at this time, please feel free to call PRN with any further questions or concerns      EDILIA DowneyPJACKSON.  [facility]  [phone]  "

## 2021-09-03 NOTE — DISCHARGE SUMMARY
Discharge Summary    CHIEF COMPLAINT ON ADMISSION  Chief Complaint   Patient presents with   • Sent by MD     transfer from [facility]   • Abdominal Pain       Reason for Admission  EMS     Admission Date  8/29/2021    CODE STATUS  Full Code    HPI & HOSPITAL COURSE  Marisol Brown is a 61 y.o. female with diverticulitis, HTN, hypothyroidism, anxiety transferred from [facility] ED 8/29/2021 with diverticular abscess.Pt was transferred from [place]  for surgical consult.  Cosnulted General Surgeonadvised IR consultation based on 4.2 cm abscess.  In the ED at [place] she underwent CT which showed a 4.2 cm diverticular abscess.  IR unable to drain the abscess .   ID also consulted. Per ID - Continue IV Zosyn , initiated on empiric p.o. vancomycin 125 mg twice daily given history of C. Difficile. Will plan a 2-week course of IV antibiotics - Stop date 9/13/2021.   Because of insurance issue, home health and home antibiotic infusion plan is impossible.  Because of the size of diverticular abscess, unsafe to switch to oral antibiotics. Another option is IV ertapenem 1 g daily at the [facility] infusion [place] and  these antibiotic orders will need to come from her primary care physician from [place].     MD & case management tried calling to pt's PCP office & [facility] center several times x 2 days. Escalated the care to our supervisors , final result is prior authorization for IV antibiotics will not be approved until next week Tuesday.  Multiple Conversations with patient's and patient's daughter over the phone, finally patient decides to leave AMA and family agrees with patient's leaving AMA.  Patient's daughter who is an RN stated that she will take the patient to [facility] [place] tomorrow to get IV antibiotics infusion.    Will provide oral vancomycin from [facility] pharmacy before d/c.  Patient needs to follow-up with general surgery outpatient to discuss about elective colon resection due to  high risk of recurrence.     The patient met 2-midnight criteria for an inpatient stay at the time of discharge.    Discharge Date  Left AMA on 9/3     FOLLOW UP ITEMS POST DISCHARGE  PCP and [facility] center to get IV antibiotics  General Surgery     DISCHARGE DIAGNOSES  Active Problems:    Diverticulitis of large intestine with abscess POA: Yes    Dyslipidemia POA: Yes    Essential hypertension POA: Yes    Hypothyroidism POA: Yes    Tobacco use POA: Yes    Generalized anxiety disorder POA: Yes    Gastroesophageal reflux disease without esophagitis POA: Yes    Hx of coronary artery disease POA: Yes    Iron deficiency anemia due to chronic blood loss POA: Yes  Resolved Problems:    Leukocytosis POA: Yes      FOLLOW UP  No future appointments.  Arianna Bingham M.D.  [address] [facility] [address]  [facility] [id number] [phone]    In 2 weeks  Call office for 2 week follow up appointment    NATHANAEL King  [address] [place] [address] [phone]    Schedule an appointment as soon as possible for a visit in 1 week        MEDICATIONS ON DISCHARGE     Medication List      START taking these medications      Instructions   vancomycin 50 MG/ML oral solution   Take 2.5 mL by mouth every 12 hours for 11 days.  Dose: 125 mg        CONTINUE taking these medications      Instructions   aspirin 81 MG EC tablet   Take 1 Tab by mouth every day.  Dose: 81 mg     atorvastatin 80 MG tablet  Commonly known as: LIPITOR   Take 1 Tab by mouth every day.  Dose: 80 mg     clopidogrel 75 MG Tabs  Commonly known as: PLAVIX   Doctor's comments: Meds to beds  Take 1 Tab by mouth every day.  Dose: 75 mg     levothyroxine 88 MCG Tabs  Commonly known as: SYNTHROID   Take 88 mcg by mouth Every morning on an empty stomach.  Dose: 88 mcg     lisinopril 10 MG Tabs  Commonly known as: PRINIVIL   Take 1 Tab by mouth every day.  Dose: 10 mg     metoprolol SR 50 MG Tb24  Commonly known as: TOPROL XL   Take 1 Tab by mouth  every evening.  Dose: 50 mg     omeprazole 20 MG tablet  Commonly known as: PriLOSEC OTC   Take 1 Tablet by mouth every day.  Dose: 20 mg     spironolactone 25 MG Tabs  Commonly known as: ALDACTONE   Take 0.5 Tabs by mouth every day.  Dose: 12.5 mg     venlafaxine XR 75 MG Cp24  Commonly known as: EFFEXOR XR   Take 75 mg by mouth every day.  Dose: 75 mg            Allergies  Allergies   Allergen Reactions   • Codeine        DIET  Orders Placed This Encounter   Procedures   • Diet Order Diet: Full Liquid     Standing Status:   Standing     Number of Occurrences:   1     Order Specific Question:   Diet:     Answer:   Full Liquid [11]       ACTIVITY  As tolerated.  Weight bearing as tolerated    CONSULTATIONS  ID   General Surgery     PROCEDURES  Imaging  IR-MIDLINE CATHETER INSERTION WO GUIDANCE > AGE 3   Final Result                   Ultrasound-guided midline placement performed by qualified nursing staff    as above.            OUTSIDE IMAGES-CT ABDOMEN /PELVIS   Final Result       OUTSIDE IMAGES-CT ABDOMEN /PELVIS   Final Result         LABORATORY  Lab Results   Component Value Date    SODIUM 143 08/31/2021    POTASSIUM 3.8 08/31/2021    CHLORIDE 112 08/31/2021    CO2 23 08/31/2021    GLUCOSE 92 08/31/2021    BUN 6 (L) 08/31/2021    CREATININE 0.72 08/31/2021        Lab Results   Component Value Date    WBC 5.5 08/31/2021    HEMOGLOBIN 7.8 (L) 09/03/2021    HEMATOCRIT 26.9 (L) 09/03/2021    PLATELETCT 424 08/31/2021        Total time of the discharge process exceeds 50 minutes.

## 2021-09-03 NOTE — PROGRESS NOTES
Bedside report received. Pt is A&O x4, on RA, VSS. Pt denies having pain at present time.  Pt is able to ambulate by self with steady gait.  Fall precautions, hourly rounds are in place. POC discussed with pt; all questions answered at this time.

## 2021-09-03 NOTE — DISCHARGE PLANNING
"YOSVANY supervisor contacted [facility]: no response.    Called main number; [phone] contacted to med/surge RN, \"Toshia\"   She reported OR is covering infusion today and is unsure if they know that they are supposed to answer phone.     She reported they told her they are not setting up any new orders, will be back on Tuesday. YOSVANY pointed out order was sent 9/2.She reports she is going to have acting director call as soon as she is out of meeting. YOSVANY provided direct number.     Faith from [facility] called back, reported to me patient's order is for Zosyn 13.5 q24. [facility] cannot do q24, the highest they can do is q12.     If patient has to do this frequency, they can use outpatient pharmacy, [facility], with PICC line, and HH. Per RN/Winsome BASHIR, patient was declined home infusion.     Additionally, [facility] requires prior authorization for infusions, which would not likely get approved today.     Patient need IV antibiotics until 9/13. All that can be done now is ask ID if patient can have different frequency (q12 or lower), re-send orders, and wait for prior authorization.     Updated STONE/Winsome BASHIR.         "

## 2021-09-03 NOTE — PROGRESS NOTES
1 RN Skin Assessment completed.   Patient's risk of skin breakdown: Low    Devices in place and skin assessed under:   [] Pulse Ox  [] PIV [] Central Line [] SCDs []Purewick  [] Blancas  []Condom Cath   [] BMS        []  Cortrak   []  Oxymask   [] Bipap    [] Nasal Canula   [] N/A   [x] Other(specify):   Midline    Right Ear  [x] WDL                or           [] Skin issue present (please provide assessment/description below)    Left Ear  [x] WDL                or           [] Skin issue present (please provide assessment/description below)    Right Elbow:  [x] WDL               or           [] Skin issue present (please provide assessment/description below)    Left Elbow:   [x] WDL                or           [] Skin issue present (please provide assessment/description below)    Coccyx/Buttocks:  [x] WDL                or           [] Skin issue present (please provide assessment/description below)    Right Heel/Foot/Toes:  [x] WDL                or           [] Skin issue present (please provide assessment/description below)    Left Heel/Foot/Toes:   [x] WDL              or           [] Skin issue present (please provide assessment/description below)      **Describe any other skin issues in areas not already listed from above list:   [] N/A    Interventions In Place: Pillows and Pressure Redistribution Mattress    **If any new or digressing skin issues are found, fill out the selection below.    Possible Skin Injury No  Pictures Uploaded Into Epic: N/A  Wound Consult Placed: N/A  RN Wound Prevention Protocol Ordered: No    What new preventative interventions did you add? N/A

## 2021-09-03 NOTE — CARE PLAN
The patient is Watcher - Medium risk of patient condition declining or worsening    Shift Goals  Clinical Goals: pt will remain fall free  Patient Goals: rest    Progress made toward(s) clinical / shift goals:    Problem: Knowledge Deficit - Standard  Goal: Patient and family/care givers will demonstrate understanding of plan of care, disease process/condition, diagnostic tests and medications  Outcome: Progressing     Problem: Pain - Standard  Goal: Alleviation of pain or a reduction in pain to the patient’s comfort goal  Outcome: Progressing       Patient is not progressing towards the following goals:

## 2021-09-03 NOTE — DISCHARGE PLANNING
Meds-to-Beds: Discharge prescription orders listed below delivered to patient's bedside STONE Castillo to store in refrigerator until discharge. Dosing device provided. Written information regarding the dispensed prescription was provided to the patient including the phone number of the pharmacy to call for any questions.    Current Outpatient Medications   Medication Sig Dispense Refill   • vancomycin 50 MG/ML oral solution Take 2.5 mL by mouth every 12 hours for 11 days. 150 mL 0      Marlene Choudhary, PharmD

## 2021-09-03 NOTE — PROGRESS NOTES
Assumed care of patient at bedside report from NOC RN. Updated on POC. Patient currently A & O x 4; on room air; up self; without complaints of acute pain. Assessment completed. Call light within reach. Whiteboard updated. Fall precautions in place. Bed locked and in lowest position. All questions answered. No other needs indicated at this time.

## 2021-09-03 NOTE — CARE PLAN
The patient is Watcher - Medium risk of patient condition declining or worsening    Shift Goals  Clinical Goals: Monitor s/s GI bleeding    Progress made toward(s) clinical / shift goals: Pt reports no bloody stool/emesis. Pt remains on full liquid diet    Patient is not progressing towards the following goals:

## 2021-09-03 NOTE — DISCHARGE PLANNING
Anticipated Discharge Disposition: Home with outpatient infusion    Action: Left a vm for [facility] [phone] requesting a return call to follow up on patient's referral.    Barriers to Discharge: Infusion referral pending    Plan: Will continue to follow for any discharge needs/barriers

## 2021-09-03 NOTE — DISCHARGE PLANNING
Received Choice form at 2993  Agency/Facility Name: [facility]  Referral sent per Choice form @ [id number]

## 2021-09-03 NOTE — PROGRESS NOTES
"Brief ID note:    Contacted by Anna Euceda RN regarding IV abx. Home health declined patient. Pt lives in [place] and can do outpatient infusions at the [facility] infusion [place] but IV abx orders for ertapenem 1 g daily will need to come from her PCP or a [place] licensed physician. These recommendations were mentioned in my initial consult note dated on 8/31/2021...\"Disposition: Home IV antibiotics.  If home IV antibiotics not feasible, she can do ertapenem 1 g daily at the [facility] infusion [place].  However these antibiotic orders will need to come from her primary care physician.\"    D/W Anna RITTER    "

## 2021-09-03 NOTE — CARE PLAN
The patient is Stable - Low risk of patient condition declining or worsening    Shift Goals  Clinical Goals: pt will remain fall free    Progress made toward(s) clinical / shift goals:  Pt did not experience any falls while ambulating during night.    Patient is not progressing towards the following goals:

## 2021-09-04 LAB
BACTERIA BLD CULT: NORMAL
BACTERIA BLD CULT: NORMAL
SIGNIFICANT IND 70042: NORMAL
SIGNIFICANT IND 70042: NORMAL
SITE SITE: NORMAL
SITE SITE: NORMAL
SOURCE SOURCE: NORMAL
SOURCE SOURCE: NORMAL

## 2021-09-04 PROCEDURE — A9270 NON-COVERED ITEM OR SERVICE: HCPCS | Performed by: STUDENT IN AN ORGANIZED HEALTH CARE EDUCATION/TRAINING PROGRAM

## 2021-09-04 PROCEDURE — 770001 HCHG ROOM/CARE - MED/SURG/GYN PRIV*

## 2021-09-04 PROCEDURE — 99232 SBSQ HOSP IP/OBS MODERATE 35: CPT | Performed by: STUDENT IN AN ORGANIZED HEALTH CARE EDUCATION/TRAINING PROGRAM

## 2021-09-04 PROCEDURE — 700102 HCHG RX REV CODE 250 W/ 637 OVERRIDE(OP): Performed by: STUDENT IN AN ORGANIZED HEALTH CARE EDUCATION/TRAINING PROGRAM

## 2021-09-04 PROCEDURE — 700105 HCHG RX REV CODE 258: Performed by: INTERNAL MEDICINE

## 2021-09-04 PROCEDURE — 700102 HCHG RX REV CODE 250 W/ 637 OVERRIDE(OP): Performed by: INTERNAL MEDICINE

## 2021-09-04 PROCEDURE — 700111 HCHG RX REV CODE 636 W/ 250 OVERRIDE (IP): Performed by: INTERNAL MEDICINE

## 2021-09-04 PROCEDURE — A9270 NON-COVERED ITEM OR SERVICE: HCPCS | Performed by: INTERNAL MEDICINE

## 2021-09-04 RX ADMIN — PIPERACILLIN AND TAZOBACTAM 3.38 G: 3; .375 INJECTION, POWDER, LYOPHILIZED, FOR SOLUTION INTRAVENOUS; PARENTERAL at 04:59

## 2021-09-04 RX ADMIN — PIPERACILLIN AND TAZOBACTAM 3.38 G: 3; .375 INJECTION, POWDER, LYOPHILIZED, FOR SOLUTION INTRAVENOUS; PARENTERAL at 21:05

## 2021-09-04 RX ADMIN — LEVOTHYROXINE SODIUM 88 MCG: 0.09 TABLET ORAL at 05:07

## 2021-09-04 RX ADMIN — VANCOMYCIN HYDROCHLORIDE 125 MG: KIT ORAL at 18:24

## 2021-09-04 RX ADMIN — SPIRONOLACTONE 12.5 MG: 25 TABLET ORAL at 05:08

## 2021-09-04 RX ADMIN — METOPROLOL SUCCINATE 50 MG: 25 TABLET, EXTENDED RELEASE ORAL at 18:24

## 2021-09-04 RX ADMIN — VANCOMYCIN HYDROCHLORIDE 125 MG: KIT ORAL at 05:08

## 2021-09-04 RX ADMIN — ALPRAZOLAM 0.5 MG: 0.5 TABLET ORAL at 10:58

## 2021-09-04 RX ADMIN — CLOPIDOGREL BISULFATE 75 MG: 75 TABLET ORAL at 05:07

## 2021-09-04 RX ADMIN — ATORVASTATIN CALCIUM 80 MG: 40 TABLET, FILM COATED ORAL at 21:05

## 2021-09-04 RX ADMIN — OMEPRAZOLE 20 MG: 20 CAPSULE, DELAYED RELEASE ORAL at 05:07

## 2021-09-04 RX ADMIN — NICOTINE TRANSDERMAL SYSTEM 21 MG: 21 PATCH, EXTENDED RELEASE TRANSDERMAL at 05:07

## 2021-09-04 RX ADMIN — VENLAFAXINE HYDROCHLORIDE 75 MG: 75 CAPSULE, EXTENDED RELEASE ORAL at 05:07

## 2021-09-04 RX ADMIN — ASPIRIN 81 MG: 81 TABLET, COATED ORAL at 05:07

## 2021-09-04 RX ADMIN — PIPERACILLIN AND TAZOBACTAM 3.38 G: 3; .375 INJECTION, POWDER, LYOPHILIZED, FOR SOLUTION INTRAVENOUS; PARENTERAL at 13:11

## 2021-09-04 ASSESSMENT — ENCOUNTER SYMPTOMS
BACK PAIN: 0
NECK PAIN: 0
NERVOUS/ANXIOUS: 0
FALLS: 0
SHORTNESS OF BREATH: 0
HEADACHES: 0
VOMITING: 0
DIZZINESS: 0
ABDOMINAL PAIN: 0
EYE PAIN: 0
MYALGIAS: 0
SINUS PAIN: 0
DIAPHORESIS: 0
LOSS OF CONSCIOUSNESS: 0
NAUSEA: 0
HEMOPTYSIS: 0
CONSTIPATION: 0
BLOOD IN STOOL: 0
CHILLS: 0
SORE THROAT: 0
DEPRESSION: 0
DIARRHEA: 0
FEVER: 0

## 2021-09-04 ASSESSMENT — PAIN DESCRIPTION - PAIN TYPE: TYPE: ACUTE PAIN

## 2021-09-04 NOTE — CARE PLAN
The patient is Stable - Low risk of patient condition declining or worsening    Shift Goals  Clinical Goals: Rest comfortably, IV abx administration  Patient Goals: sleeps comfortably, go home  Family Goals: n/a    Progress made toward(s) clinical / shift goals:  Completed AM dose of IV Zosyn. Currently finishing breakfast out at nurse's station. Denies need for pain intervention.     Patient is not progressing towards the following goals:

## 2021-09-04 NOTE — PROGRESS NOTES
Patient is alert and oriented x 4. Standby assist on ADLs. Currently on IV abx. Plan to be discharged once settled order for infusion center. Need to continue IV abx for about 10 more days per report received. She had her shower tonight prior to bedtime. No complaint of pain, Knows how to use the call light. Call light and personal items within reached. Kept safe and monitored.

## 2021-09-04 NOTE — PROGRESS NOTES
"AAOx4, pleasant. Currently finishing AM IV Zosyn dose, resting in bed. C/o 0/10 pain, declining intervention at this time. -N/V. -N/T. Denies new onset of chest pain/SOB. +BS in all 4 quadrants, last BM yesterday per pt - states \"I go to the bathroom every day!\". Room air, satting > 90%. POC discussed, denies further needs at this time. Call light within reach & hourly rounding in place.   "

## 2021-09-04 NOTE — PROGRESS NOTES
Hospital Medicine Daily Progress Note    Date of Service  9/4/2021    Chief Complaint  Marisol Brown is a 61 y.o. female with diverticulitis, HTN, hypothyroidism, anxiety transferred from [facility] ED 8/29/2021 with diverticular abscess.  Hospital Course  Marisol Brown is a 61 y.o. female with diverticulitis, HTN, hypothyroidism, anxiety transferred from [facility] ED 8/29/2021 with diverticular abscess.  Pt was transferred from [place] overnight for surgical consult.  Cosnulted General Surgeon (Dr. Aleman) advised IR consultation based on 4.2 cm abscess.  In the ED at [place] she underwent CT which showed a 4.2 cm diverticular abscess.  IR unable to drain the abscess   ID also consulted.   Per ID - Continue IV Zosyn , initiated on empiric p.o. vancomycin 125 mg twice daily given history of C. Difficile. Will plan a 2-week course of IV antibiotics - Stop date 9/13/2021.   Because of insurance issue, home health and home antibiotic infusion plan is impossible.  Because of the size of diverticular abscess, unsafe to switch to oral antibiotics. Another option is IV ertapenem 1 g daily at the [facility] and  these antibiotic orders will need to come from her primary care physician from [place].   Case Mx will call [facility] & pt's PCP office.   General Sx following.         Interval Problem Update  9/4 - Patient was seen and examined at bedside.  Abdominal pain resolved.   Tolerating Full Liquid Diet.   Midline in placed.     On 9/3 - MD & case management tried calling to pt's PCP office & [place] infusion center several times x 2 days. Escalated the care to our supervisors , final result is prior authorization for IV antibiotics will not be approved until next week Tuesday.  Multiple Conversations with patient's and patient's daughter over the phone, finally patient decides to leave AMA and family agrees with patient's leaving AMA.Patient's daughter who is an RN stated  that she will take the patient to [facility] [place] tomorrow to get IV antibiotics infusion. But at last minute, pt decided to stay.  Escalated the case to our medical director, because of the long holiday, [facility] center will be close till next week Tuesday.  Likely d/c on next week Tuesday.            I have personally seen and examined the patient at bedside. I discussed the plan of care with patient, bedside RN, charge RN,  and infectious disease.    Consultants/Specialty  general surgery, infectious disease and IR    Code Status  Full Code    Disposition  Patient is not medically cleared.   Anticipate discharge to to home with close outpatient follow-up.  I have placed the appropriate orders for post-discharge needs.    Review of Systems  Review of Systems   Constitutional: Negative for chills, diaphoresis and fever.   HENT: Negative for ear pain, nosebleeds, sinus pain and sore throat.    Eyes: Negative for pain.   Respiratory: Negative for hemoptysis and shortness of breath.    Cardiovascular: Negative for chest pain.   Gastrointestinal: Negative for abdominal pain, blood in stool, constipation, diarrhea, melena, nausea and vomiting.   Musculoskeletal: Negative for back pain, falls, joint pain, myalgias and neck pain.   Neurological: Negative for dizziness, loss of consciousness and headaches.   Psychiatric/Behavioral: Negative for depression. The patient is not nervous/anxious.         Physical Exam  Temp:  [35.9 °C (96.7 °F)-37 °C (98.6 °F)] 35.9 °C (96.7 °F)  Pulse:  [65-77] 77  Resp:  [17-20] 18  BP: ()/(48-69) 109/69  SpO2:  [93 %-99 %] 93 %    Physical Exam  Vitals and nursing note reviewed.   Constitutional:       General: She is not in acute distress (Emotional distress).     Appearance: She is not ill-appearing, toxic-appearing or diaphoretic.   HENT:      Head: Normocephalic.      Nose: Nose normal.      Mouth/Throat:      Pharynx: Oropharynx is clear. No oropharyngeal  exudate or posterior oropharyngeal erythema.   Eyes:      General: No scleral icterus.     Conjunctiva/sclera: Conjunctivae normal.   Cardiovascular:      Rate and Rhythm: Normal rate and regular rhythm.      Pulses: Normal pulses.      Heart sounds: Normal heart sounds. No murmur heard.   No friction rub. No gallop.    Pulmonary:      Effort: Pulmonary effort is normal. No respiratory distress.      Breath sounds: Normal breath sounds. No wheezing, rhonchi or rales.   Abdominal:      General: Abdomen is flat. Bowel sounds are decreased. There is no distension.      Palpations: Abdomen is soft.      Tenderness: There is no abdominal tenderness. There is no guarding or rebound.   Genitourinary:     Comments: No yancey  Musculoskeletal:      Cervical back: Neck supple.      Right lower leg: No edema.      Left lower leg: No edema.   Skin:     General: Skin is warm and dry.   Neurological:      Mental Status: She is alert.      Comments: Appropriately conversant   Psychiatric:         Mood and Affect: Mood normal.         Behavior: Behavior normal.         Thought Content: Thought content normal.         Judgment: Judgment normal.         Fluids    Intake/Output Summary (Last 24 hours) at 9/4/2021 1123  Last data filed at 9/3/2021 2128  Gross per 24 hour   Intake 500 ml   Output --   Net 500 ml       Laboratory  Recent Labs     09/02/21  0432 09/03/21  0440   HEMOGLOBIN 7.7* 7.8*   HEMATOCRIT 25.7* 26.9*                       Imaging  IR-MIDLINE CATHETER INSERTION WO GUIDANCE > AGE 3   Final Result                  Ultrasound-guided midline placement performed by qualified nursing staff    as above.          OUTSIDE IMAGES-CT ABDOMEN /PELVIS   Final Result      OUTSIDE IMAGES-CT ABDOMEN /PELVIS   Final Result           Assessment/Plan  Diverticulitis of large intestine with abscess- (present on admission)  Assessment & Plan  Leukocytosis without other SIRS criteria  Recent colonoscopy with biopsy for  hematochezia  Presented to [facility] ED with diarrhea and presyncope  CT demonstrated 4.2 cm abscess adjacent to rectal wall  Transferred to [facility]  BCx 8/30 drawn prior to initiation of antitiobics  General Surgery consulted, recommended nonoperative management with IV antibiotics and IR drain placement  IR consulted, not amenable to drainage due to discoid shape and adjacent anatomy  ID consulted  Per ID - Continue IV Zosyn , initiated on empiric p.o. vancomycin 125 mg twice daily given history of C. Difficile. Will plan a 2-week course of IV antibiotics - Stop date 9/13/2021.   Because of insurance issue, home health and home antibiotic infusion plan is impossible.  Because of the size of diverticula abscess, unsafe to switch to oral antibiotics.   Another option is IV ertapenem 1 g daily at the [facility] and  these antibiotic orders will need to come from her primary care physician from [place].   Case Mx will coordinate with [facility] & pt's PCP office.         Iron deficiency anemia due to chronic blood loss- (present on admission)  Assessment & Plan  Presents with microcytic anemia  Ferritin 25, Fe 6% consistent with iron deficiency  In setting of GI losses, possibly silent in addition to prior evaluation for hematochezia  IV Fe deferred in setting of active infection (diverticular abscess)  Transfuse for Hgb <7    Hx of coronary artery disease- (present on admission)  Assessment & Plan  STEMI 10/18/19 requiring stent placement  Continue aspirin, plavix, atorvastatin, and metoprolol  Uncertain why she is on DAPT beyond one year unless other stents placed    Gastroesophageal reflux disease without esophagitis- (present on admission)  Assessment & Plan  Continue omeprazole    Generalized anxiety disorder- (present on admission)  Assessment & Plan  Continue venlafaxine    Tobacco use- (present on admission)  Assessment & Plan  NRT patch + lozenges    Hypothyroidism- (present  on admission)  Assessment & Plan  TSH WNL  Continue levothyroxine    Essential hypertension- (present on admission)  Assessment & Plan  Continue amlodipine, spironolactone, and metoprolol    Dyslipidemia- (present on admission)  Assessment & Plan  Continue high-intensity statin and ASA for ASCVD risk reduction       VTE prophylaxis: SCDs/TEDs   Pt is ambulating very well.    I have performed a physical exam and reviewed and updated ROS and Plan today (9/4/2021). In review of yesterday's note (9/3/2021), there are no changes except as documented above.

## 2021-09-04 NOTE — CARE PLAN
The patient is Stable - Low risk of patient condition declining or worsening    Shift Goals  Clinical Goals: Self care, sleeps comfortably  Patient Goals: sleeps comfortably, go home  Family Goals: n/a    Progress made toward(s) clinical / shift goals:  Patient is alert and oriented x 4. Standby assist on ADLs. Currently on IV abx. Plan to be discharged once settled order for infusion center. Need to continue IV abx for about 10 more days per report received. She had her shower tonight prior to bedtime. No complaint of pain, Knows how to use the call light. Call light and personal items within reached. Kept safe and monitored.     Patient is not progressing towards the following goals:

## 2021-09-05 PROCEDURE — 99232 SBSQ HOSP IP/OBS MODERATE 35: CPT | Performed by: STUDENT IN AN ORGANIZED HEALTH CARE EDUCATION/TRAINING PROGRAM

## 2021-09-05 PROCEDURE — 700102 HCHG RX REV CODE 250 W/ 637 OVERRIDE(OP): Performed by: INTERNAL MEDICINE

## 2021-09-05 PROCEDURE — 700102 HCHG RX REV CODE 250 W/ 637 OVERRIDE(OP): Performed by: STUDENT IN AN ORGANIZED HEALTH CARE EDUCATION/TRAINING PROGRAM

## 2021-09-05 PROCEDURE — 700105 HCHG RX REV CODE 258: Performed by: INTERNAL MEDICINE

## 2021-09-05 PROCEDURE — 700111 HCHG RX REV CODE 636 W/ 250 OVERRIDE (IP): Performed by: INTERNAL MEDICINE

## 2021-09-05 PROCEDURE — 770001 HCHG ROOM/CARE - MED/SURG/GYN PRIV*

## 2021-09-05 PROCEDURE — A9270 NON-COVERED ITEM OR SERVICE: HCPCS | Performed by: INTERNAL MEDICINE

## 2021-09-05 PROCEDURE — A9270 NON-COVERED ITEM OR SERVICE: HCPCS | Performed by: STUDENT IN AN ORGANIZED HEALTH CARE EDUCATION/TRAINING PROGRAM

## 2021-09-05 RX ADMIN — ATORVASTATIN CALCIUM 80 MG: 40 TABLET, FILM COATED ORAL at 21:23

## 2021-09-05 RX ADMIN — LISINOPRIL 10 MG: 10 TABLET ORAL at 05:06

## 2021-09-05 RX ADMIN — PIPERACILLIN AND TAZOBACTAM 3.38 G: 3; .375 INJECTION, POWDER, LYOPHILIZED, FOR SOLUTION INTRAVENOUS; PARENTERAL at 12:54

## 2021-09-05 RX ADMIN — VANCOMYCIN HYDROCHLORIDE 125 MG: KIT ORAL at 05:07

## 2021-09-05 RX ADMIN — OMEPRAZOLE 20 MG: 20 CAPSULE, DELAYED RELEASE ORAL at 05:06

## 2021-09-05 RX ADMIN — ALPRAZOLAM 0.5 MG: 0.5 TABLET ORAL at 09:38

## 2021-09-05 RX ADMIN — LEVOTHYROXINE SODIUM 88 MCG: 0.09 TABLET ORAL at 05:06

## 2021-09-05 RX ADMIN — PIPERACILLIN AND TAZOBACTAM 3.38 G: 3; .375 INJECTION, POWDER, LYOPHILIZED, FOR SOLUTION INTRAVENOUS; PARENTERAL at 21:20

## 2021-09-05 RX ADMIN — CLOPIDOGREL BISULFATE 75 MG: 75 TABLET ORAL at 05:06

## 2021-09-05 RX ADMIN — ASPIRIN 81 MG: 81 TABLET, COATED ORAL at 05:05

## 2021-09-05 RX ADMIN — PIPERACILLIN AND TAZOBACTAM 3.38 G: 3; .375 INJECTION, POWDER, LYOPHILIZED, FOR SOLUTION INTRAVENOUS; PARENTERAL at 05:05

## 2021-09-05 RX ADMIN — NICOTINE TRANSDERMAL SYSTEM 21 MG: 21 PATCH, EXTENDED RELEASE TRANSDERMAL at 05:06

## 2021-09-05 RX ADMIN — VENLAFAXINE HYDROCHLORIDE 75 MG: 75 CAPSULE, EXTENDED RELEASE ORAL at 05:06

## 2021-09-05 RX ADMIN — VANCOMYCIN HYDROCHLORIDE 125 MG: KIT ORAL at 18:11

## 2021-09-05 RX ADMIN — SPIRONOLACTONE 12.5 MG: 25 TABLET ORAL at 05:06

## 2021-09-05 ASSESSMENT — ENCOUNTER SYMPTOMS
DIARRHEA: 0
ABDOMINAL PAIN: 0
HEADACHES: 0
CHILLS: 0
EYE PAIN: 0
FEVER: 0
DIZZINESS: 0
BACK PAIN: 0
SORE THROAT: 0
VOMITING: 0
CONSTIPATION: 0
NAUSEA: 0
SINUS PAIN: 0
LOSS OF CONSCIOUSNESS: 0
SHORTNESS OF BREATH: 0
DIAPHORESIS: 0
NECK PAIN: 0
BLOOD IN STOOL: 0
HEMOPTYSIS: 0
FALLS: 0
DEPRESSION: 0
NERVOUS/ANXIOUS: 0
MYALGIAS: 0

## 2021-09-05 NOTE — PROGRESS NOTES
Came to bedside to assess LUE midline for reported leaking under the dressing.  Old dressing was removed due to saturation of the BioPatch.  Insertion site has mild bruising but no s/s of infiltration, phlebitis, or occlusion.  Patient reports no pain from the insertion site or while flushing.  No leaking while flushing the line with saline.  Brisk blood return was visualized.  Catheter length upon insertion recorded at 14cm.  Catheter tip is likely positioned near the left axillary crease.  Arm position while resting the arm down may be a contributor to pinch-off of the axillary vein leading to back pressure and resultant leakage from the insertion site.  Patient educated to report any further incidences of leaking or dressing saturation in order to ensure midline continues to function at the time of discharge.  Updated primary RN.  VAT available to reassess line Tuesday AM prior to patient's planned discharge.  If this issue persists we will plan to place a new midline Tuesday so that discharge is not delayed.    Please call VAT at [phone] for questions or updates.

## 2021-09-05 NOTE — CARE PLAN
The patient is Stable - Low risk of patient condition declining or worsening    Shift Goals  Clinical Goals: Anxiety reduction  Patient Goals: sleeps comfortably, go home  Family Goals: n/a    Progress made toward(s) clinical / shift goals:  Administered PRN anxiety medication.    Patient is not progressing towards the following goals:

## 2021-09-05 NOTE — CARE PLAN
The patient is Stable - Low risk of patient condition declining or worsening    Shift Goals  Clinical Goals: Rest comfortably, IV abx administration  Patient Goals: sleeps comfortably, go home  Family Goals: n/a    Progress made toward(s) clinical / shift goals: IV zosyn administered q.8hr. Plan for IV infusion service to be set up outpatient.     Patient is not progressing towards the following goals:

## 2021-09-05 NOTE — PROGRESS NOTES
Hospital Medicine Daily Progress Note    Date of Service  9/5/2021    Chief Complaint  Marisol Brown is a 61 y.o. female with diverticulitis, HTN, hypothyroidism, anxiety transferred from [facility] ED 8/29/2021 with diverticular abscess.  Hospital Course  Marisol Brown is a 61 y.o. female with diverticulitis, HTN, hypothyroidism, anxiety transferred from [facility] ED 8/29/2021 with diverticular abscess.  Pt was transferred from [place] overnight for surgical consult.  Cosnulted General Surgeon (Dr. Aleman) advised IR consultation based on 4.2 cm abscess.  In the ED at [place] she underwent CT which showed a 4.2 cm diverticular abscess.  IR unable to drain the abscess   ID also consulted.   Per ID - Continue IV Zosyn , initiated on empiric p.o. vancomycin 125 mg twice daily given history of C. Difficile. Will plan a 2-week course of IV antibiotics - Stop date 9/13/2021.   Because of insurance issue, home health and home antibiotic infusion plan is impossible.  Because of the size of diverticular abscess, unsafe to switch to oral antibiotics. Another option is IV ertapenem 1 g daily at the [facility] infusion [place] and  these antibiotic orders will need to come from her primary care physician from [place].   Case Mx will call [facility] & pt's PCP office.   General Sx following.         Interval Problem Update  9/5 : No acute events overnight  Tolerating Full Liquid Diet.   Midline in placed.     On 9/3 - MD & case management tried calling to pt's PCP office & [facility] infusion center several times x 2 days. Escalated the care to our supervisors , final result is prior authorization for IV antibiotics will not be approved until next week Tuesday.  Multiple Conversations with patient's and patient's daughter over the phone, finally patient decides to leave AMA and family agrees with patient's leaving AMA.Patient's daughter who is an RN stated that she will take the patient to [facility]  infusion center tomorrow to get IV antibiotics infusion. But at last minute, pt decided to stay.  Escalated the case to our medical director, because of the long holiday, [place] infusion center will be close till next week Tuesday.    Likely d/c on next week Tuesday when the [place] infusion center opens and patient's PCP can sign for IV antibiotics.        I have personally seen and examined the patient at bedside. I discussed the plan of care with patient, bedside RN, charge RN,  and infectious disease.    Consultants/Specialty  general surgery, infectious disease and IR    Code Status  Full Code    Disposition  Patient is not medically cleared.   Anticipate discharge to to home with close outpatient follow-up.  I have placed the appropriate orders for post-discharge needs.    Review of Systems  Review of Systems   Constitutional: Negative for chills, diaphoresis and fever.   HENT: Negative for ear pain, nosebleeds, sinus pain and sore throat.    Eyes: Negative for pain.   Respiratory: Negative for hemoptysis and shortness of breath.    Cardiovascular: Negative for chest pain.   Gastrointestinal: Negative for abdominal pain, blood in stool, constipation, diarrhea, melena, nausea and vomiting.   Musculoskeletal: Negative for back pain, falls, joint pain, myalgias and neck pain.   Neurological: Negative for dizziness, loss of consciousness and headaches.   Psychiatric/Behavioral: Negative for depression. The patient is not nervous/anxious.         Physical Exam  Temp:  [36.4 °C (97.6 °F)-37.3 °C (99.1 °F)] 37.3 °C (99.1 °F)  Pulse:  [66-79] 79  Resp:  [16-18] 16  BP: ()/(53-73) 98/56  SpO2:  [95 %-99 %] 99 %    Physical Exam  Vitals and nursing note reviewed.   Constitutional:       General: She is not in acute distress (Emotional distress).     Appearance: She is not ill-appearing, toxic-appearing or diaphoretic.   HENT:      Head: Normocephalic.      Nose: Nose normal.      Mouth/Throat:       Pharynx: Oropharynx is clear. No oropharyngeal exudate or posterior oropharyngeal erythema.   Eyes:      General: No scleral icterus.     Conjunctiva/sclera: Conjunctivae normal.   Cardiovascular:      Rate and Rhythm: Normal rate and regular rhythm.      Pulses: Normal pulses.      Heart sounds: Normal heart sounds. No murmur heard.   No friction rub. No gallop.    Pulmonary:      Effort: Pulmonary effort is normal. No respiratory distress.      Breath sounds: Normal breath sounds. No wheezing, rhonchi or rales.   Abdominal:      General: Abdomen is flat. Bowel sounds are decreased. There is no distension.      Palpations: Abdomen is soft.      Tenderness: There is no abdominal tenderness. There is no guarding or rebound.   Genitourinary:     Comments: No yancey  Musculoskeletal:      Cervical back: Neck supple.      Right lower leg: No edema.      Left lower leg: No edema.   Skin:     General: Skin is warm and dry.   Neurological:      Mental Status: She is alert.      Comments: Appropriately conversant   Psychiatric:         Mood and Affect: Mood normal.         Behavior: Behavior normal.         Thought Content: Thought content normal.         Judgment: Judgment normal.         Fluids    Intake/Output Summary (Last 24 hours) at 9/5/2021 1040  Last data filed at 9/5/2021 0105  Gross per 24 hour   Intake 860 ml   Output --   Net 860 ml       Laboratory  Recent Labs     09/03/21  0440   HEMOGLOBIN 7.8*   HEMATOCRIT 26.9*                       Imaging  IR-MIDLINE CATHETER INSERTION WO GUIDANCE > AGE 3   Final Result                  Ultrasound-guided midline placement performed by qualified nursing staff    as above.          OUTSIDE IMAGES-CT ABDOMEN /PELVIS   Final Result      OUTSIDE IMAGES-CT ABDOMEN /PELVIS   Final Result           Assessment/Plan  Diverticulitis of large intestine with abscess- (present on admission)  Assessment & Plan  Leukocytosis without other SIRS criteria  Recent colonoscopy with biopsy  for hematochezia  Presented to [place] ED with diarrhea and presyncope  CT demonstrated 4.2 cm abscess adjacent to rectal wall  Transferred to [facility]  BCx 8/30 drawn prior to initiation of antitiobics  General Surgery consulted, recommended nonoperative management with IV antibiotics and IR drain placement  IR consulted, not amenable to drainage due to discoid shape and adjacent anatomy  ID consulted  Per ID - Continue IV Zosyn , initiated on empiric p.o. vancomycin 125 mg twice daily given history of C. Difficile. Will plan a 2-week course of IV antibiotics - Stop date 9/13/2021.   Because of insurance issue, home health and home antibiotic infusion plan is impossible.  Because of the size of diverticula abscess, unsafe to switch to oral antibiotics.   Another option is IV ertapenem 1 g daily at the [facility] and  these antibiotic orders will need to come from her primary care physician from [place].   Case Mx will coordinate with [facility] & pt's PCP office.         Iron deficiency anemia due to chronic blood loss- (present on admission)  Assessment & Plan  Presents with microcytic anemia  Ferritin 25, Fe 6% consistent with iron deficiency  In setting of GI losses, possibly silent in addition to prior evaluation for hematochezia  IV Fe deferred in setting of active infection (diverticular abscess)  Transfuse for Hgb <7    Hx of coronary artery disease- (present on admission)  Assessment & Plan  STEMI 10/18/19 requiring stent placement  Continue aspirin, plavix, atorvastatin, and metoprolol  Uncertain why she is on DAPT beyond one year unless other stents placed    Gastroesophageal reflux disease without esophagitis- (present on admission)  Assessment & Plan  Continue omeprazole    Generalized anxiety disorder- (present on admission)  Assessment & Plan  Continue venlafaxine    Tobacco use- (present on admission)  Assessment & Plan  NRT patch + lozenges    Hypothyroidism-  (present on admission)  Assessment & Plan  TSH WNL  Continue levothyroxine    Essential hypertension- (present on admission)  Assessment & Plan  Continue amlodipine, spironolactone, and metoprolol    Dyslipidemia- (present on admission)  Assessment & Plan  Continue high-intensity statin and ASA for ASCVD risk reduction       VTE prophylaxis: SCDs/TEDs   Pt is ambulating very well.    I have performed a physical exam and reviewed and updated ROS and Plan today (9/5/2021). In review of yesterday's note (9/4/2021), there are no changes except as documented above.

## 2021-09-05 NOTE — PROGRESS NOTES
Report received by stevie RN. Assumed care of pt. Assessment complete. Pt A&Ox4 on RA, denied pain/discomfort. Pt currently sleeping, running zosyn at 25 mL/hr per MAR. All needs met at this time. Call light within reach, bed locked and in lowest position, and pt has no further questions at this time.           1 RN Skin Assessment completed.   Patient's risk of skin breakdown: Low    Devices in place and skin assessed under:   [] Pulse Ox  [] PIV [] Central Line [] SCDs []Purewick  [] Blancas  []Condom Cath   [] BMS        []  Cortrak   []  Oxymask   [] Bipap    [] Nasal Canula   [] N/A   [x] Other(specify): Midline    Right Ear  [x] WDL                or           [] Skin issue present (please provide assessment/description below)    Left Ear  [x] WDL                or           [] Skin issue present (please provide assessment/description below)    Right Elbow:  [x] WDL               or           [] Skin issue present (please provide assessment/description below)    Left Elbow:   [x] WDL                or           [] Skin issue present (please provide assessment/description below)    Coccyx/Buttocks:  [x] WDL                or           [] Skin issue present (please provide assessment/description below)    Right Heel/Foot/Toes:  [x] WDL                or           [] Skin issue present (please provide assessment/description below)    Left Heel/Foot/Toes:   [x] WDL              or           [] Skin issue present (please provide assessment/description below)      **Describe any other skin issues in areas not already listed from above list:   [x] N/A    Interventions In Place: Pillows and Pressure Redistribution Mattress    **If any new or digressing skin issues are found, fill out the selection below.    Possible Skin Injury No  Pictures Uploaded Into Epic: N/A  Wound Consult Placed: N/A  RN Wound Prevention Protocol Ordered: No    What new preventative interventions did you add? N/A

## 2021-09-06 PROCEDURE — 700102 HCHG RX REV CODE 250 W/ 637 OVERRIDE(OP): Performed by: STUDENT IN AN ORGANIZED HEALTH CARE EDUCATION/TRAINING PROGRAM

## 2021-09-06 PROCEDURE — A9270 NON-COVERED ITEM OR SERVICE: HCPCS | Performed by: STUDENT IN AN ORGANIZED HEALTH CARE EDUCATION/TRAINING PROGRAM

## 2021-09-06 PROCEDURE — 700105 HCHG RX REV CODE 258: Performed by: INTERNAL MEDICINE

## 2021-09-06 PROCEDURE — 770001 HCHG ROOM/CARE - MED/SURG/GYN PRIV*

## 2021-09-06 PROCEDURE — 700111 HCHG RX REV CODE 636 W/ 250 OVERRIDE (IP): Performed by: INTERNAL MEDICINE

## 2021-09-06 PROCEDURE — 700102 HCHG RX REV CODE 250 W/ 637 OVERRIDE(OP): Performed by: INTERNAL MEDICINE

## 2021-09-06 PROCEDURE — A9270 NON-COVERED ITEM OR SERVICE: HCPCS | Performed by: INTERNAL MEDICINE

## 2021-09-06 PROCEDURE — 99232 SBSQ HOSP IP/OBS MODERATE 35: CPT | Performed by: STUDENT IN AN ORGANIZED HEALTH CARE EDUCATION/TRAINING PROGRAM

## 2021-09-06 RX ADMIN — VANCOMYCIN HYDROCHLORIDE 125 MG: KIT ORAL at 18:24

## 2021-09-06 RX ADMIN — LEVOTHYROXINE SODIUM 88 MCG: 0.09 TABLET ORAL at 04:53

## 2021-09-06 RX ADMIN — VANCOMYCIN HYDROCHLORIDE 125 MG: KIT ORAL at 04:52

## 2021-09-06 RX ADMIN — ATORVASTATIN CALCIUM 80 MG: 40 TABLET, FILM COATED ORAL at 22:08

## 2021-09-06 RX ADMIN — VENLAFAXINE HYDROCHLORIDE 75 MG: 75 CAPSULE, EXTENDED RELEASE ORAL at 04:53

## 2021-09-06 RX ADMIN — ASPIRIN 81 MG: 81 TABLET, COATED ORAL at 04:53

## 2021-09-06 RX ADMIN — METOPROLOL SUCCINATE 50 MG: 25 TABLET, EXTENDED RELEASE ORAL at 18:24

## 2021-09-06 RX ADMIN — OMEPRAZOLE 20 MG: 20 CAPSULE, DELAYED RELEASE ORAL at 04:54

## 2021-09-06 RX ADMIN — NICOTINE TRANSDERMAL SYSTEM 21 MG: 21 PATCH, EXTENDED RELEASE TRANSDERMAL at 08:56

## 2021-09-06 RX ADMIN — PIPERACILLIN AND TAZOBACTAM 3.38 G: 3; .375 INJECTION, POWDER, LYOPHILIZED, FOR SOLUTION INTRAVENOUS; PARENTERAL at 14:00

## 2021-09-06 RX ADMIN — CLOPIDOGREL BISULFATE 75 MG: 75 TABLET ORAL at 04:53

## 2021-09-06 RX ADMIN — PIPERACILLIN AND TAZOBACTAM 3.38 G: 3; .375 INJECTION, POWDER, LYOPHILIZED, FOR SOLUTION INTRAVENOUS; PARENTERAL at 04:51

## 2021-09-06 RX ADMIN — PIPERACILLIN AND TAZOBACTAM 3.38 G: 3; .375 INJECTION, POWDER, LYOPHILIZED, FOR SOLUTION INTRAVENOUS; PARENTERAL at 22:08

## 2021-09-06 RX ADMIN — ALPRAZOLAM 0.5 MG: 0.5 TABLET ORAL at 13:59

## 2021-09-06 ASSESSMENT — ENCOUNTER SYMPTOMS
DIAPHORESIS: 0
BLOOD IN STOOL: 0
DIARRHEA: 0
DEPRESSION: 0
NECK PAIN: 0
DIZZINESS: 0
FALLS: 0
SORE THROAT: 0
SHORTNESS OF BREATH: 0
MYALGIAS: 0
BACK PAIN: 0
CONSTIPATION: 0
FEVER: 0
NAUSEA: 0
VOMITING: 0
LOSS OF CONSCIOUSNESS: 0
ABDOMINAL PAIN: 0
NERVOUS/ANXIOUS: 0
HEMOPTYSIS: 0
EYE PAIN: 0
CHILLS: 0
HEADACHES: 0
SINUS PAIN: 0

## 2021-09-06 ASSESSMENT — PAIN DESCRIPTION - PAIN TYPE: TYPE: ACUTE PAIN

## 2021-09-06 ASSESSMENT — PATIENT HEALTH QUESTIONNAIRE - PHQ9
1. LITTLE INTEREST OR PLEASURE IN DOING THINGS: NOT AT ALL
SUM OF ALL RESPONSES TO PHQ9 QUESTIONS 1 AND 2: 0

## 2021-09-06 NOTE — PROGRESS NOTES
Pt is A&Ox4, denies any pain or discomfort at this time. Pt on RA. Pt took a walk with [place] out to [place]. POC discussed including continuing IV Zosyn Q8. Pt tolerating liquid diet.

## 2021-09-06 NOTE — CARE PLAN
The patient is Stable - Low risk of patient condition declining or worsening    Shift Goals  Clinical Goals: Pt's anxiety will decrease by end of shift  Patient Goals: sleeps comfortably, go home  Family Goals: n/a    Progress made toward(s) clinical / shift goals:  yes  Problem: Knowledge Deficit - Standard  Goal: Patient and family/care givers will demonstrate understanding of plan of care, disease process/condition, diagnostic tests and medications  Outcome: Progressing     Problem: Pain - Standard  Goal: Alleviation of pain or a reduction in pain to the patient’s comfort goal  Outcome: Progressing       Patient is not progressing towards the following goals:

## 2021-09-06 NOTE — PROGRESS NOTES
Hospital Medicine Daily Progress Note    Date of Service  9/6/2021    Chief Complaint  Marisol Brown is a 61 y.o. female with diverticulitis, HTN, hypothyroidism, anxiety transferred from [facility] ED 8/29/2021 with diverticular abscess.  Hospital Course  Marisol Brown is a 61 y.o. female with diverticulitis, HTN, hypothyroidism, anxiety transferred from [facility] ED 8/29/2021 with diverticular abscess.  Pt was transferred from [place] overnight for surgical consult.  Cosnulted General Surgeon (Dr. Aleman) advised IR consultation based on 4.2 cm abscess.  In the ED at [place] she underwent CT which showed a 4.2 cm diverticular abscess.  IR unable to drain the abscess   ID also consulted.   Per ID - Continue IV Zosyn , initiated on empiric p.o. vancomycin 125 mg twice daily given history of C. Difficile. Will plan a 2-week course of IV antibiotics - Stop date 9/13/2021.   Because of insurance issue, home health and home antibiotic infusion plan is impossible.  Because of the size of diverticular abscess, unsafe to switch to oral antibiotics. Another option is IV ertapenem 1 g daily at the [facility] infusion [place] and  these antibiotic orders will need to come from her primary care physician from [place].   Case Mx will call [facility] & pt's PCP office.   General Sx following.         Interval Problem Update  9/6 : No acute events overnight.   Tolerating Full Liquid Diet.   Midline in placed.     On 9/3 - MD & case management tried calling to pt's PCP office & [place] infusion center several times x 2 days. Escalated the care to our supervisors , final result is prior authorization for IV antibiotics will not be approved until next week Tuesday.  Multiple Conversations with patient's and patient's daughter over the phone, finally patient decides to leave AMA and family agrees with patient's leaving AMA.Patient's daughter who is an RN stated that she will take the patient to [place]  infusion center tomorrow to get IV antibiotics infusion. But at last minute, pt decided to stay.  Escalated the case to our medical director, because of the long holiday, [place] infusion center will be close till next week Tuesday.    Likely d/c on next week Tuesday when the [place] infusion center opens and patient's PCP can sign for IV antibiotics.        I have personally seen and examined the patient at bedside. I discussed the plan of care with patient and bedside RN.    Consultants/Specialty  general surgery, infectious disease and IR    Code Status  Full Code    Disposition  Patient is not medically cleared.   Anticipate discharge to to home with close outpatient follow-up.  I have placed the appropriate orders for post-discharge needs.    Review of Systems  Review of Systems   Constitutional: Negative for chills, diaphoresis and fever.   HENT: Negative for ear pain, nosebleeds, sinus pain and sore throat.    Eyes: Negative for pain.   Respiratory: Negative for hemoptysis and shortness of breath.    Cardiovascular: Negative for chest pain.   Gastrointestinal: Negative for abdominal pain, blood in stool, constipation, diarrhea, melena, nausea and vomiting.   Musculoskeletal: Negative for back pain, falls, joint pain, myalgias and neck pain.   Neurological: Negative for dizziness, loss of consciousness and headaches.   Psychiatric/Behavioral: Negative for depression. The patient is not nervous/anxious.         Physical Exam  Temp:  [36 °C (96.8 °F)-37.1 °C (98.7 °F)] 37.1 °C (98.7 °F)  Pulse:  [60-91] 88  Resp:  [16-20] 18  BP: ()/(51-62) 100/62  SpO2:  [95 %-100 %] 98 %    Physical Exam  Vitals and nursing note reviewed.   Constitutional:       General: She is not in acute distress (Emotional distress).     Appearance: She is not ill-appearing, toxic-appearing or diaphoretic.   HENT:      Head: Normocephalic.      Nose: Nose normal.      Mouth/Throat:      Pharynx: Oropharynx is clear. No oropharyngeal  exudate or posterior oropharyngeal erythema.   Eyes:      General: No scleral icterus.     Conjunctiva/sclera: Conjunctivae normal.   Cardiovascular:      Rate and Rhythm: Normal rate and regular rhythm.      Pulses: Normal pulses.      Heart sounds: Normal heart sounds. No murmur heard.   No friction rub. No gallop.    Pulmonary:      Effort: Pulmonary effort is normal. No respiratory distress.      Breath sounds: Normal breath sounds. No wheezing, rhonchi or rales.   Abdominal:      General: Abdomen is flat. Bowel sounds are decreased. There is no distension.      Palpations: Abdomen is soft.      Tenderness: There is no abdominal tenderness. There is no guarding or rebound.   Genitourinary:     Comments: No yancey  Musculoskeletal:      Cervical back: Neck supple.      Right lower leg: No edema.      Left lower leg: No edema.   Skin:     General: Skin is warm and dry.   Neurological:      Mental Status: She is alert.      Comments: Appropriately conversant   Psychiatric:         Mood and Affect: Mood normal.         Behavior: Behavior normal.         Thought Content: Thought content normal.         Judgment: Judgment normal.         Fluids    Intake/Output Summary (Last 24 hours) at 9/6/2021 0831  Last data filed at 9/6/2021 0120  Gross per 24 hour   Intake 1360 ml   Output --   Net 1360 ml       Laboratory                        Imaging  IR-MIDLINE CATHETER INSERTION WO GUIDANCE > AGE 3   Final Result                  Ultrasound-guided midline placement performed by qualified nursing staff    as above.          OUTSIDE IMAGES-CT ABDOMEN /PELVIS   Final Result      OUTSIDE IMAGES-CT ABDOMEN /PELVIS   Final Result           Assessment/Plan  Diverticulitis of large intestine with abscess- (present on admission)  Assessment & Plan  Leukocytosis without other SIRS criteria  Recent colonoscopy with biopsy for hematochezia  Presented to [place] ED with diarrhea and presyncope  CT demonstrated 4.2 cm abscess adjacent to  rectal wall  Transferred to [facility]  BCx 8/30 drawn prior to initiation of antitiobics  General Surgery consulted, recommended nonoperative management with IV antibiotics and IR drain placement  IR consulted, not amenable to drainage due to discoid shape and adjacent anatomy  ID consulted  Per ID - Continue IV Zosyn , initiated on empiric p.o. vancomycin 125 mg twice daily given history of C. Difficile. Will plan a 2-week course of IV antibiotics - Stop date 9/13/2021.   Because of insurance issue, home health and home antibiotic infusion plan is impossible.  Because of the size of diverticula abscess, unsafe to switch to oral antibiotics.   Another option is IV ertapenem 1 g daily at the [facility] and  these antibiotic orders will need to come from her primary care physician from [place].   Case Mx will coordinate with [facility] & pt's PCP office.         Iron deficiency anemia due to chronic blood loss- (present on admission)  Assessment & Plan  Presents with microcytic anemia  Ferritin 25, Fe 6% consistent with iron deficiency  In setting of GI losses, possibly silent in addition to prior evaluation for hematochezia  IV Fe deferred in setting of active infection (diverticular abscess)  Transfuse for Hgb <7    Hx of coronary artery disease- (present on admission)  Assessment & Plan  STEMI 10/18/19 requiring stent placement  Continue aspirin, plavix, atorvastatin, and metoprolol  Uncertain why she is on DAPT beyond one year unless other stents placed    Gastroesophageal reflux disease without esophagitis- (present on admission)  Assessment & Plan  Continue omeprazole    Generalized anxiety disorder- (present on admission)  Assessment & Plan  Continue venlafaxine    Tobacco use- (present on admission)  Assessment & Plan  NRT patch + lozenges    Hypothyroidism- (present on admission)  Assessment & Plan  TSH WNL  Continue levothyroxine    Essential hypertension- (present on  admission)  Assessment & Plan  Continue amlodipine, spironolactone, and metoprolol    Dyslipidemia- (present on admission)  Assessment & Plan  Continue high-intensity statin and ASA for ASCVD risk reduction       VTE prophylaxis: SCDs/TEDs   Pt is ambulating very well.    I have performed a physical exam and reviewed and updated ROS and Plan today (9/6/2021). In review of yesterday's note (9/5/2021), there are no changes except as documented above.

## 2021-09-07 ENCOUNTER — APPOINTMENT (OUTPATIENT)
Dept: RADIOLOGY | Facility: MEDICAL CENTER | Age: 61
DRG: 379 | End: 2021-09-07
Attending: STUDENT IN AN ORGANIZED HEALTH CARE EDUCATION/TRAINING PROGRAM
Payer: COMMERCIAL

## 2021-09-07 ENCOUNTER — PHARMACY VISIT (OUTPATIENT)
Dept: PHARMACY | Facility: MEDICAL CENTER | Age: 61
End: 2021-09-07
Payer: COMMERCIAL

## 2021-09-07 VITALS
BODY MASS INDEX: 25.51 KG/M2 | HEART RATE: 69 BPM | TEMPERATURE: 97.2 F | SYSTOLIC BLOOD PRESSURE: 115 MMHG | OXYGEN SATURATION: 95 % | HEIGHT: 66 IN | DIASTOLIC BLOOD PRESSURE: 44 MMHG | RESPIRATION RATE: 16 BRPM | WEIGHT: 158.73 LBS

## 2021-09-07 LAB
ALBUMIN SERPL BCP-MCNC: 3.4 G/DL (ref 3.2–4.9)
ALBUMIN/GLOB SERPL: 1.3 G/DL
ALP SERPL-CCNC: 48 U/L (ref 30–99)
ALT SERPL-CCNC: 9 U/L (ref 2–50)
ANION GAP SERPL CALC-SCNC: 8 MMOL/L (ref 7–16)
ANISOCYTOSIS BLD QL SMEAR: ABNORMAL
AST SERPL-CCNC: 22 U/L (ref 12–45)
BASOPHILS # BLD AUTO: 1.8 % (ref 0–1.8)
BASOPHILS # BLD: 0.1 K/UL (ref 0–0.12)
BILIRUB SERPL-MCNC: 0.2 MG/DL (ref 0.1–1.5)
BUN SERPL-MCNC: 8 MG/DL (ref 8–22)
CALCIUM SERPL-MCNC: 8.7 MG/DL (ref 8.5–10.5)
CHLORIDE SERPL-SCNC: 111 MMOL/L (ref 96–112)
CO2 SERPL-SCNC: 23 MMOL/L (ref 20–33)
CREAT SERPL-MCNC: 0.83 MG/DL (ref 0.5–1.4)
EOSINOPHIL # BLD AUTO: 0.1 K/UL (ref 0–0.51)
EOSINOPHIL NFR BLD: 1.8 % (ref 0–6.9)
ERYTHROCYTE [DISTWIDTH] IN BLOOD BY AUTOMATED COUNT: 59 FL (ref 35.9–50)
GLOBULIN SER CALC-MCNC: 2.6 G/DL (ref 1.9–3.5)
GLUCOSE SERPL-MCNC: 91 MG/DL (ref 65–99)
HCT VFR BLD AUTO: 25.7 % (ref 37–47)
HGB BLD-MCNC: 7.5 G/DL (ref 12–16)
LYMPHOCYTES # BLD AUTO: 1.22 K/UL (ref 1–4.8)
LYMPHOCYTES NFR BLD: 22.1 % (ref 22–41)
MANUAL DIFF BLD: NORMAL
MCH RBC QN AUTO: 21.5 PG (ref 27–33)
MCHC RBC AUTO-ENTMCNC: 29.2 G/DL (ref 33.6–35)
MCV RBC AUTO: 73.6 FL (ref 81.4–97.8)
MICROCYTES BLD QL SMEAR: ABNORMAL
MONOCYTES # BLD AUTO: 0.29 K/UL (ref 0–0.85)
MONOCYTES NFR BLD AUTO: 5.3 % (ref 0–13.4)
MORPHOLOGY BLD-IMP: NORMAL
NEUTROPHILS # BLD AUTO: 3.8 K/UL (ref 2–7.15)
NEUTROPHILS NFR BLD: 69 % (ref 44–72)
NRBC # BLD AUTO: 0 K/UL
NRBC BLD-RTO: 0 /100 WBC
PLATELET # BLD AUTO: 431 K/UL (ref 164–446)
PLATELET BLD QL SMEAR: NORMAL
PMV BLD AUTO: 9.8 FL (ref 9–12.9)
POIKILOCYTOSIS BLD QL SMEAR: NORMAL
POTASSIUM SERPL-SCNC: 4.4 MMOL/L (ref 3.6–5.5)
PROT SERPL-MCNC: 6 G/DL (ref 6–8.2)
RBC # BLD AUTO: 3.49 M/UL (ref 4.2–5.4)
RBC BLD AUTO: PRESENT
SODIUM SERPL-SCNC: 142 MMOL/L (ref 135–145)
TARGETS BLD QL SMEAR: NORMAL
WBC # BLD AUTO: 5.5 K/UL (ref 4.8–10.8)

## 2021-09-07 PROCEDURE — A9270 NON-COVERED ITEM OR SERVICE: HCPCS | Performed by: STUDENT IN AN ORGANIZED HEALTH CARE EDUCATION/TRAINING PROGRAM

## 2021-09-07 PROCEDURE — 80053 COMPREHEN METABOLIC PANEL: CPT

## 2021-09-07 PROCEDURE — 700102 HCHG RX REV CODE 250 W/ 637 OVERRIDE(OP): Performed by: STUDENT IN AN ORGANIZED HEALTH CARE EDUCATION/TRAINING PROGRAM

## 2021-09-07 PROCEDURE — A9270 NON-COVERED ITEM OR SERVICE: HCPCS | Performed by: INTERNAL MEDICINE

## 2021-09-07 PROCEDURE — 700117 HCHG RX CONTRAST REV CODE 255: Performed by: STUDENT IN AN ORGANIZED HEALTH CARE EDUCATION/TRAINING PROGRAM

## 2021-09-07 PROCEDURE — 85027 COMPLETE CBC AUTOMATED: CPT

## 2021-09-07 PROCEDURE — 74177 CT ABD & PELVIS W/CONTRAST: CPT

## 2021-09-07 PROCEDURE — 85007 BL SMEAR W/DIFF WBC COUNT: CPT

## 2021-09-07 PROCEDURE — 700111 HCHG RX REV CODE 636 W/ 250 OVERRIDE (IP): Performed by: INTERNAL MEDICINE

## 2021-09-07 PROCEDURE — 700102 HCHG RX REV CODE 250 W/ 637 OVERRIDE(OP): Performed by: INTERNAL MEDICINE

## 2021-09-07 PROCEDURE — 99239 HOSP IP/OBS DSCHRG MGMT >30: CPT | Performed by: STUDENT IN AN ORGANIZED HEALTH CARE EDUCATION/TRAINING PROGRAM

## 2021-09-07 PROCEDURE — RXMED WILLOW AMBULATORY MEDICATION CHARGE: Performed by: STUDENT IN AN ORGANIZED HEALTH CARE EDUCATION/TRAINING PROGRAM

## 2021-09-07 PROCEDURE — 700105 HCHG RX REV CODE 258: Performed by: INTERNAL MEDICINE

## 2021-09-07 RX ORDER — VANCOMYCIN HYDROCHLORIDE 50 MG/ML
125 KIT ORAL EVERY 12 HOURS
Qty: 150 ML | Refills: 0 | Status: SHIPPED | OUTPATIENT
Start: 2021-09-07 | End: 2021-09-13

## 2021-09-07 RX ORDER — AMOXICILLIN AND CLAVULANATE POTASSIUM 875; 125 MG/1; MG/1
1 TABLET, FILM COATED ORAL 2 TIMES DAILY
Qty: 12 TABLET | Refills: 0 | Status: SHIPPED | OUTPATIENT
Start: 2021-09-07 | End: 2021-09-13

## 2021-09-07 RX ADMIN — VANCOMYCIN HYDROCHLORIDE 125 MG: KIT ORAL at 04:40

## 2021-09-07 RX ADMIN — CLOPIDOGREL BISULFATE 75 MG: 75 TABLET ORAL at 04:41

## 2021-09-07 RX ADMIN — ALPRAZOLAM 0.5 MG: 0.5 TABLET ORAL at 10:04

## 2021-09-07 RX ADMIN — NICOTINE TRANSDERMAL SYSTEM 21 MG: 21 PATCH, EXTENDED RELEASE TRANSDERMAL at 10:04

## 2021-09-07 RX ADMIN — SPIRONOLACTONE 12.5 MG: 25 TABLET ORAL at 04:41

## 2021-09-07 RX ADMIN — LISINOPRIL 10 MG: 10 TABLET ORAL at 04:41

## 2021-09-07 RX ADMIN — PIPERACILLIN AND TAZOBACTAM 3.38 G: 3; .375 INJECTION, POWDER, LYOPHILIZED, FOR SOLUTION INTRAVENOUS; PARENTERAL at 04:40

## 2021-09-07 RX ADMIN — VENLAFAXINE HYDROCHLORIDE 75 MG: 75 CAPSULE, EXTENDED RELEASE ORAL at 04:41

## 2021-09-07 RX ADMIN — LEVOTHYROXINE SODIUM 88 MCG: 0.09 TABLET ORAL at 04:41

## 2021-09-07 RX ADMIN — OMEPRAZOLE 20 MG: 20 CAPSULE, DELAYED RELEASE ORAL at 04:41

## 2021-09-07 RX ADMIN — ASPIRIN 81 MG: 81 TABLET, COATED ORAL at 04:41

## 2021-09-07 RX ADMIN — IOHEXOL 100 ML: 350 INJECTION, SOLUTION INTRAVENOUS at 12:15

## 2021-09-07 ASSESSMENT — PAIN DESCRIPTION - PAIN TYPE: TYPE: ACUTE PAIN

## 2021-09-07 NOTE — DISCHARGE INSTRUCTIONS
Diverticulitis    Diverticulitis is when small pockets in your large intestine (colon) get infected or swollen. This causes stomach pain and watery poop (diarrhea).  These pouches are called diverticula. They form in people who have a condition called diverticulosis.  Follow these instructions at home:  Medicines  · Take over-the-counter and prescription medicines only as told by your doctor. These include:  ? Antibiotics.  ? Pain medicines.  ? Fiber pills.  ? Probiotics.  ? Stool softeners.  · Do not drive or use heavy machinery while taking prescription pain medicine.  · If you were prescribed an antibiotic, take it as told. Do not stop taking it even if you feel better.  General instructions    · Follow a diet as told by your doctor.  · When you feel better, your doctor may tell you to change your diet. You may need to eat a lot of fiber. Fiber makes it easier to poop (have bowel movements). Healthy foods with fiber include:  ? Berries.  ? Beans.  ? Lentils.  ? Green vegetables.  · Exercise 3 or more times a week. Aim for 30 minutes each time. Exercise enough to sweat and make your heart beat faster.  · Keep all follow-up visits as told. This is important. You may need to have an exam of the large intestine. This is called a colonoscopy.  Contact a doctor if:  · Your pain does not get better.  · You have a hard time eating or drinking.  · You are not pooping like normal.  Get help right away if:  · Your pain gets worse.  · Your problems do not get better.  · Your problems get worse very fast.  · You have a fever.  · You throw up (vomit) more than one time.  · You have poop that is:  ? Bloody.  ? Black.  ? Tarry.  Summary  · Diverticulitis is when small pockets in your large intestine (colon) get infected or swollen.  · Take medicines only as told by your doctor.  · Follow a diet as told by your doctor.  This information is not intended to replace advice given to you by your health care provider. Make sure you  discuss any questions you have with your health care provider.  Document Released: 06/05/2009 Document Revised: 11/30/2018 Document Reviewed: 01/04/2018  [employer] Patient Education © 2020 [employer]      Discharge Instructions    Discharged to home by car with relative. Discharged via wheelchair, hospital escort: Refused.  Special equipment needed: Not Applicable    Be sure to schedule a follow-up appointment with your primary care doctor or any specialists as instructed.     Discharge Plan:        I understand that a diet low in cholesterol, fat, and sodium is recommended for good health. Unless I have been given specific instructions below for another diet, I accept this instruction as my diet prescription.   Other diet:     Special Instructions: None    · Is patient discharged on Warfarin / Coumadin?   No     Vancomycin oral solution  What is this medicine?  VANCOMYCIN (van koe MYE sin) is a glycopeptide antibiotic. It is used to treat certain kinds of bacterial infections in the bowel. It will not work for colds, flu, or other viral infections.  This medicine may be used for other purposes; ask your health care provider or pharmacist if you have questions.  COMMON BRAND NAME(S): Vancocin  What should I tell my health care provider before I take this medicine?  They need to know if you have any of these conditions:  · dehydration  · inflammatory bowel disease  · kidney disease  · other chronic illness  · an unusual or allergic reaction to vancomycin, other medicines, foods, dyes, or preservatives  · pregnant or trying to get pregnant  · breast-feeding  How should I use this medicine?  Take this medicine by mouth. Follow the directions on the prescription label. Shake well before using. Use a specially marked spoon or dropper to measure each dose. Ask your pharmacist if you do not have one. Household spoons are not accurate. Take your medicine at regular intervals. Do not take your medicine more often than  directed. Take all of your medicine as directed even if you think you are better. Do not skip doses or stop your medicine early.  Talk to your pediatrician regarding the use of this medicine in children. Special care may be needed.  Overdosage: If you think you have taken too much of this medicine contact a poison control center or emergency room at once.  NOTE: This medicine is only for you. Do not share this medicine with others.  What if I miss a dose?  If you miss a dose, take it as soon as you can. If it is almost time for your next dose, take only that dose. Do not take double or extra doses.  What may interact with this medicine?  · birth control pills  · cholestyramine  · colestipol  · vancomycin injection  This list may not describe all possible interactions. Give your health care provider a list of all the medicines, herbs, non-prescription drugs, or dietary supplements you use. Also tell them if you smoke, drink alcohol, or use illegal drugs. Some items may interact with your medicine.  What should I watch for while using this medicine?  Tell your doctor or health care provider if your symptoms do not improve or if you get new symptoms.  This medicine may cause serious skin reactions. They can happen weeks to months after starting the medicine. Contact your health care provider right away if you notice fevers or flu-like symptoms with a rash. The rash may be red or purple and then turn into blisters or peeling of the skin. Or, you might notice a red rash with swelling of the face, lips or lymph nodes in your neck or under your arms.  Avoid taking this medicine within 3 or 4 hours of taking cholestyramine or colestipol.  What side effects may I notice from receiving this medicine?  Side effects that you should report to your doctor or health care professional as soon as possible:  · allergic reactions like skin rash, itching or hives, swelling of the face, lips, or tongue  · breathing difficulty  · change  in amount, color of urine  · change in hearing  · dizziness  · fever, infection  · rash, fever, and swollen lymph nodes  · redness, blistering, peeling or loosening of the skin, including inside the mouth  · unusual bleeding or bruising  · unusually weak or tired  Side effects that usually do not require medical attention (report to your doctor or health care professional if they continue or are bothersome):  · nausea, vomiting  · stomach cramps  This list may not describe all possible side effects. Call your doctor for medical advice about side effects. You may report side effects to FDA at 1-878-LEV-2301.  Where should I keep my medicine?  Keep out of the reach of children.  After this medicine is mixed by your pharmacist, store it in a refrigerator between 2 and 8 degrees C (36 and 46 degrees F). Do not freeze. Throw away any unused medicine after 14 days.  NOTE: This sheet is a summary. It may not cover all possible information. If you have questions about this medicine, talk to your doctor, pharmacist, or health care provider.  © 2020 [employer]/Gold Standard (2020-03-27 16:15:33)      Depression / Suicide Risk    As you are discharged from this [facility] facility, it is important to learn how to keep safe from harming yourself.    Recognize the warning signs:  · Abrupt changes in personality, positive or negative- including increase in energy   · Giving away possessions  · Change in eating patterns- significant weight changes-  positive or negative  · Change in sleeping patterns- unable to sleep or sleeping all the time   · Unwillingness or inability to communicate  · Depression  · Unusual sadness, discouragement and loneliness  · Talk of wanting to die  · Neglect of personal appearance   · Rebelliousness- reckless behavior  · Withdrawal from people/activities they love  · Confusion- inability to concentrate     If you or a loved one observes any of these behaviors or has concerns about self-harm, here's  what you can do:  · Talk about it- your feelings and reasons for harming yourself  · Remove any means that you might use to hurt yourself (examples: pills, rope, extension cords, firearm)  · Get professional help from the community (Mental Health, Substance Abuse, psychological counseling)  · Do not be alone:Call your Safe Contact- someone whom you trust who will be there for you.  · Call your local CRISIS HOTLINE 196-1186 or 252-155-0783  · Call your local Children's Mobile Crisis Response Team [place] (997) 343-1708 or wwwSkinfix  · Call the toll free National Suicide Prevention Hotlines   · National Suicide Prevention Lifeline 780-264-JXKO (2151)  · [employer] Hope Line Network 800-SUICIDE (592-4056)    Discharge Instructions    Discharged to home by car with relative. Discharged via wheelchair, hospital escort: Yes.  Special equipment needed: Not Applicable    Be sure to schedule a follow-up appointment with your primary care doctor or any specialists as instructed.     Discharge Plan:   Diet Plan: Discussed  Activity Level: Discussed  Confirmed Follow up Appointment: Appointment Scheduled  Confirmed Symptoms Management: Discussed  Medication Reconciliation Updated: Yes    I understand that a diet low in cholesterol, fat, and sodium is recommended for good health. Unless I have been given specific instructions below for another diet, I accept this instruction as my diet prescription.   Other diet: Regular    Special Instructions: None    · Is patient discharged on Warfarin / Coumadin?   No     Depression / Suicide Risk    As you are discharged from this [facility] facility, it is important to learn how to keep safe from harming yourself.    Recognize the warning signs:  · Abrupt changes in personality, positive or negative- including increase in energy   · Giving away possessions  · Change in eating patterns- significant weight changes-  positive or negative  · Change in sleeping patterns-  unable to sleep or sleeping all the time   · Unwillingness or inability to communicate  · Depression  · Unusual sadness, discouragement and loneliness  · Talk of wanting to die  · Neglect of personal appearance   · Rebelliousness- reckless behavior  · Withdrawal from people/activities they love  · Confusion- inability to concentrate     If you or a loved one observes any of these behaviors or has concerns about self-harm, here's what you can do:  · Talk about it- your feelings and reasons for harming yourself  · Remove any means that you might use to hurt yourself (examples: pills, rope, extension cords, firearm)  · Get professional help from the community (Mental Health, Substance Abuse, psychological counseling)  · Do not be alone:Call your Safe Contact- someone whom you trust who will be there for you.  · Call your local CRISIS HOTLINE 087-5682 or 856-320-7195  · Call your local Children's Mobile Crisis Response Team [place] (329) 582-3252 or www.VideoClix  · Call the toll free National Suicide Prevention Hotlines   · National Suicide Prevention Lifeline 047-448-PLKG (6904)  · National Hope Line Network 800-SUICIDE (197-7904)

## 2021-09-07 NOTE — CARE PLAN
The patient is Stable - Low risk of patient condition declining or worsening    Shift Goals  Clinical Goals: Pt will not exhibit new or worsening signs of infection during shift  Patient Goals: sleeps comfortably, go home  Family Goals: n/a    Progress made toward(s) clinical / shift goals:  yes  Problem: Knowledge Deficit - Standard  Goal: Patient and family/care givers will demonstrate understanding of plan of care, disease process/condition, diagnostic tests and medications  Outcome: Progressing     Problem: Pain - Standard  Goal: Alleviation of pain or a reduction in pain to the patient’s comfort goal  Outcome: Progressing       Patient is not progressing towards the following goals:

## 2021-09-07 NOTE — PROGRESS NOTES
Patient discharged home in stable condition with family, discharge instructions reviewed and signed, all questions answered

## 2021-09-07 NOTE — DISCHARGE SUMMARY
Discharge Summary    CHIEF COMPLAINT ON ADMISSION  Chief Complaint   Patient presents with   • Sent by MD     transfer from [facility]   • Abdominal Pain       Reason for Admission  Abdominal pain    Admission Date  8/29/2021    CODE STATUS  Full Code    HPI & HOSPITAL COURSE  Marisol Brown is a 61 y.o. female with history of hypertension, hypothyroidism, anxiety who was transferred from [facility], ED 8/29/2021 for diverticulitis with abscess.  As she had 4.2 cm abscess, general surgery and IR were consulted --neither were able to drain.  Patient was treated with Zosyn per ID recommendation with concomitant oral vancomycin given history of C. difficile.  She had resolution of leukocytosis, tolerating oral diet, resolution of abdominal pain with conservative management with IV antibiotic.  CT AP repeated on 9/7/2021 which noted resolution of sigmoid diverticulitis.  Case was discussed with ID again, cleared for discharge with oral Augmentin with end date of 9/13/2021.  Repeat CTAP outpatient was also ordered by ID, and follow-up in ID clinic was arranged.    Therefore, she is discharged in good and stable condition to home with close outpatient follow-up.    The patient met 2-midnight criteria for an inpatient stay at the time of discharge.    Discharge Date  9/7/2021    FOLLOW UP ITEMS POST DISCHARGE  Diverticulitis --continue taking antibiotic as instructed, follow-up with ID clinic and GI    DISCHARGE DIAGNOSES  Active Problems:    Dyslipidemia POA: Yes    Essential hypertension POA: Yes    Hypothyroidism POA: Yes    Tobacco use POA: Yes    Diverticulitis of large intestine with abscess POA: Yes    Generalized anxiety disorder POA: Yes    Gastroesophageal reflux disease without esophagitis POA: Yes    Hx of coronary artery disease POA: Yes    Iron deficiency anemia due to chronic blood loss POA: Yes  Resolved Problems:    Leukocytosis POA: Yes      FOLLOW UP  No future appointments.  Priya Whaley  F.N.P.  [address] [place] [id number] [phone]    Schedule an appointment as soon as possible for a visit in 1 week      [facility] - [address] [address] [phone]  In 1 week        MEDICATIONS ON DISCHARGE     Medication List      START taking these medications      Instructions   amoxicillin-clavulanate 875-125 MG Tabs  Commonly known as: AUGMENTIN   Take 1 Tablet by mouth 2 times a day for 6 days.  Dose: 1 Tablet     vancomycin 50 MG/ML oral solution   Take 2.5 mL by mouth every 12 hours for 6 days.  Dose: 125 mg        CONTINUE taking these medications      Instructions   aspirin 81 MG EC tablet   Take 1 Tab by mouth every day.  Dose: 81 mg     atorvastatin 80 MG tablet  Commonly known as: LIPITOR   Take 1 Tab by mouth every day.  Dose: 80 mg     clopidogrel 75 MG Tabs  Commonly known as: PLAVIX   Doctor's comments: Meds to beds  Take 1 Tab by mouth every day.  Dose: 75 mg     levothyroxine 88 MCG Tabs  Commonly known as: SYNTHROID   Take 88 mcg by mouth Every morning on an empty stomach.  Dose: 88 mcg     lisinopril 10 MG Tabs  Commonly known as: PRINIVIL   Take 1 Tab by mouth every day.  Dose: 10 mg     metoprolol SR 50 MG Tb24  Commonly known as: TOPROL XL   Take 1 Tab by mouth every evening.  Dose: 50 mg     omeprazole 20 MG tablet  Commonly known as: PriLOSEC OTC   Take 1 Tablet by mouth every day.  Dose: 20 mg     spironolactone 25 MG Tabs  Commonly known as: ALDACTONE   Take 0.5 Tabs by mouth every day.  Dose: 12.5 mg     venlafaxine XR 75 MG Cp24  Commonly known as: EFFEXOR XR   Take 75 mg by mouth every day.  Dose: 75 mg            Allergies  Allergies   Allergen Reactions   • Codeine        DIET  Orders Placed This Encounter   Procedures   • Diet Order Diet: Full Liquid     Standing Status:   Standing     Number of Occurrences:   1     Order Specific Question:   Diet:     Answer:   Full Liquid [11]       ACTIVITY  As  tolerated.  Weight bearing as tolerated    CONSULTATIONS  ID  Surgery  IR    PROCEDURES  None    LABORATORY  Lab Results   Component Value Date    SODIUM 142 09/07/2021    POTASSIUM 4.4 09/07/2021    CHLORIDE 111 09/07/2021    CO2 23 09/07/2021    GLUCOSE 91 09/07/2021    BUN 8 09/07/2021    CREATININE 0.83 09/07/2021        Lab Results   Component Value Date    WBC 5.5 09/07/2021    HEMOGLOBIN 7.5 (L) 09/07/2021    HEMATOCRIT 25.7 (L) 09/07/2021    PLATELETCT 431 09/07/2021        Total time of the discharge process exceeds 40 minutes.

## 2021-09-07 NOTE — PROGRESS NOTES
1 RN Skin Assessment completed.   Patient's risk of skin breakdown: Low     Devices in place and skin assessed under:   []? Pulse Ox  [x]? PIV [x]? Central Line []? SCDs []?Purewick  []? Blancas  []?Condom Cath   []? BMS        []?  Cortrak   []?  Oxymask   []? Bipap    []? Nasal Canula   []? N/A   []? Other(specify):      Right Ear  [x]? WDL                or           []? Skin issue present (please provide assessment/description below)     Left Ear  [x]? WDL                or           []? Skin issue present (please provide assessment/description below)     Right Elbow:  [x]? WDL               or           []? Skin issue present (please provide assessment/description below)     Left Elbow:   [x]? WDL                or           []? Skin issue present (please provide assessment/description below)     Coccyx/Buttocks:  [x]? WDL                or           []? Skin issue present (please provide assessment/description below)     Right Heel/Foot/Toes:  [x]? WDL                or           []? Skin issue present (please provide assessment/description below)     Left Heel/Foot/Toes:   [x]? WDL              or           []? Skin issue present (please provide assessment/description below)        **Describe any other skin issues in areas not already listed from above list:   [x]? N/A     Interventions In Place: Pressure redistribution mattress, pt encouraged to reposition frequently

## 2021-09-07 NOTE — PROGRESS NOTES
Brief ID note:    Notified by hospitalist to see if patient can be transition to p.o. Augmentin as she has been unable to get infusions in [place].    Repeat CT scan today shows findings consistent with resolving sigmoid diverticulitis.  Peripheral enhancing fluid collection along the bladder dome has slightly decreased, measuring 3.9 x 1.4 cm and small abscess in the left uterine fundus is similar in size.  Patient has been afebrile with no leukocytosis and is tolerating p.o. meds.    -Okay to transition to p.o. Augmentin 875 mg twice daily through 9/13/2021  -Recommend repeat CT scan prior to completion of antibiotic course.  Outpatient CT scan has been ordered    Follow-up in ID clinic    Discussed with hospitalist, Dr. Kramer.

## 2021-09-07 NOTE — CARE PLAN
The patient is Stable - Low risk of patient condition declining or worsening    Shift Goals  Clinical Goals: Pts hypotension will improve  Patient Goals:   Family Goals:    Progress made toward(s) clinical / shift goals:  Pt was encouraged to drink water throughout the day and BP improved to 103/65.     Patient is not progressing towards the following goals:

## 2021-09-07 NOTE — DISCHARGE PLANNING
Meds-to-Beds: Discharge prescription order listed below delivered to patient's bedside. STONE Nava notified. Patient counseled.     Vancomycin previously delivered on 9/3 and patient was instructed to only take for 6 days instead of the 11 days on the label. STONE Nava notified that vancomycin should have been placed in the refrigerator when delivered 9/3.     Current Outpatient Medications   Medication Sig Dispense Refill   • amoxicillin-clavulanate (AUGMENTIN) 875-125 MG Tab Take 1 Tablet by mouth 2 times a day for 6 days. 12 Tablet 0      Marlene Choudhary, PharmD

## 2021-09-07 NOTE — PROGRESS NOTES
Pt is A&Ox4, denies any pain or discomfort at this time. Pt on RA. POC discussed with MD at bedside. MD would like pt to have another scan, if abscess has improved, pt ok to go home on PO abx, if not pt will need to continue IV abx here.  Pt tolerating liquid diet.

## 2021-09-07 NOTE — PROGRESS NOTES
Patient A/Ox4. VSS. RA. Up self. No complaints of pain. Continue with IV abx Zosyn q8h and oral Vanco q12h. Bed low/locked. Pt safe with needs met. No s/sx of acute distress. Pt to d/c today.

## 2021-09-07 NOTE — PROGRESS NOTES
MD has placed dc order for pt. Pt's midline and PIV have been removed. Pt's belongings are bagged up. Pt going to dc lounge.

## 2021-09-07 NOTE — DISCHARGE PLANNING
Anticipated Discharge Disposition: Outpt infusion at [facility] after PCP rewrtes Zosyn RX and Infusion center gets auth    Action:   [facility] in [place] [place] [facility] and there is no infusion service that takes Med-iCal. Pt’s PCP needs to rewrite the Zosyn RX from continuous Q 24 hrs to Q12 hrs so that [facility] can accomodate that schedule.     Call placed to [facility] ([phone]-left message and called [facility] main number ([phone]) and was sent to the same voice mail. Left a message to return the call to see if they rec’d the new RX from the PCP and if they are in the process of obtaining auth.     Left vm for pt’s PCP- Priya Whaley-for her nurse Maureen Dsouza ([phone] ) to  see if they even sent the updated Zosyn RX to [facility].     Reviewed above with pt at bedside.     Voalte texted Dr Kramer to rweview this note.       Barriers to Discharge: Livesin [place], [place], [facility], Cannot do continuous inffusion    Plan: care coordination to follow for dc planning needs.       Addendum 10:15-Rec’d call back from Tiffanie with [facility]- they need a new antibiotic ordered. They can accept VAUGHN GANNON order but they can only give a once a day ABX since they are only there for 10 hour days. ([facility] fax is ([phone]).   Voalte texted Dr Kramer to have Dr Rowland write order for new ABX.    He voaltes back that pt is not going home today that she is pending CT and possible change to oral antibiotic if CT improved.       Addendum: per voalte texted from MD_ pt is to be discharged home on Augmentum po instead of needing IV ABX.

## 2021-09-15 ENCOUNTER — TELEPHONE (OUTPATIENT)
Dept: INFECTIOUS DISEASES | Facility: MEDICAL CENTER | Age: 61
End: 2021-09-15

## 2021-09-15 NOTE — TELEPHONE ENCOUNTER
I left voicemail for pt  She was to have a repeat CT prior to ending abx 9/13 asked to return our call for appt or to see if she did have CT done anywhere else.

## 2023-04-09 NOTE — PROGRESS NOTES
[facility] TRIAGE [facility]ICER DIRECT ADMISSION REPORT    Transferring facility: [facility]   Transferring physician: Dr Bojorquez    Chief complaint: GI bleed  Pertinent history & patient course: 62-year-old female with history of diverticulosis and diverticular bleed x2, and was recommended to have outpatient colectomy when she had her last GI bleed, however she was lost to follow-up, was admitted at the outlying facility with 1 month of melena, and then an episode of large bloody bowel movement/hematochezia.  She again had an episode of hematochezia in the hospital.  Hemoglobin was initially 6, and she is being given 4 units of PRBC (unclear why 4 units).  They have not repeated a hemoglobin since admission.  Otherwise, vital signs are stable, and she does not have any other symptoms aside from the melena/hematochezia.  Surgery at the OSH recommended that colectomy be done at the higher level of care, and thus request for transfer was made.  I had them talk to Dr. Brown of general surgery, who agreed to transfer and will consult.    Pertinent imaging & lab results: as above  Code Status: Full per transferring provider, I personally verified with the transferring provider patient's code status and the transferring provider has confirmed this with the patient.  Further work up or recommendations per triage officer prior to transfer: none  Consultants called prior to transfer and pertinent input from consultants: General Surgery (Dr. Brown)  Patient accepted for transfer: Yes  Consultants to be called upon arrival: General Surgery  Admission status: Inpatient.   Floor requested: Telemetry    Please inform the triage officer upon arrival of the patient to [facility] for assignment of a hospitalist to perform admission.     For any question or concerns regarding the care of this patient, please reach out to the assigned hospitalist.

## 2023-04-10 ENCOUNTER — HOSPITAL ENCOUNTER (INPATIENT)
Facility: MEDICAL CENTER | Age: 63
LOS: 3 days | DRG: 378 | End: 2023-04-13
Attending: HOSPITALIST | Admitting: HOSPITALIST
Payer: COMMERCIAL

## 2023-04-10 DIAGNOSIS — I25.10 CORONARY ARTERY DISEASE DUE TO LIPID RICH PLAQUE: ICD-10-CM

## 2023-04-10 DIAGNOSIS — K64.9 HEMORRHOIDS, UNSPECIFIED HEMORRHOID TYPE: ICD-10-CM

## 2023-04-10 DIAGNOSIS — Z86.79 HX OF CORONARY ARTERY DISEASE: ICD-10-CM

## 2023-04-10 DIAGNOSIS — I25.83 CORONARY ARTERY DISEASE DUE TO LIPID RICH PLAQUE: ICD-10-CM

## 2023-04-10 DIAGNOSIS — K92.2 GASTROINTESTINAL HEMORRHAGE, UNSPECIFIED GASTROINTESTINAL HEMORRHAGE TYPE: ICD-10-CM

## 2023-04-10 PROCEDURE — 770020 HCHG ROOM/CARE - TELE (206)

## 2023-04-10 RX ORDER — NICOTINE 21 MG/24HR
1 PATCH, TRANSDERMAL 24 HOURS TRANSDERMAL EVERY 24 HOURS
Status: ON HOLD | COMMUNITY
End: 2023-04-11

## 2023-04-10 RX ORDER — BUSPIRONE HYDROCHLORIDE 5 MG/1
7.5 TABLET ORAL 3 TIMES DAILY
Status: ON HOLD | COMMUNITY
End: 2023-04-11

## 2023-04-10 ASSESSMENT — LIFESTYLE VARIABLES
ON A TYPICAL DAY WHEN YOU DRINK ALCOHOL HOW MANY DRINKS DO YOU HAVE: 4
DOES PATIENT WANT TO TALK TO SOMEONE ABOUT QUITTING: YES
HAVE YOU EVER FELT YOU SHOULD CUT DOWN ON YOUR DRINKING: YES
TOTAL SCORE: 2
ALCOHOL_USE: YES
DOES PATIENT WANT TO STOP DRINKING: YES
AVERAGE NUMBER OF DAYS PER WEEK YOU HAVE A DRINK CONTAINING ALCOHOL: 7
TOTAL SCORE: 2
TOTAL SCORE: 2
EVER HAD A DRINK FIRST THING IN THE MORNING TO STEADY YOUR NERVES TO GET RID OF A HANGOVER: NO
EVER FELT BAD OR GUILTY ABOUT YOUR DRINKING: YES
HOW MANY TIMES IN THE PAST YEAR HAVE YOU HAD 5 OR MORE DRINKS IN A DAY: 300
CONSUMPTION TOTAL: POSITIVE
HAVE PEOPLE ANNOYED YOU BY CRITICIZING YOUR DRINKING: NO

## 2023-04-10 ASSESSMENT — PATIENT HEALTH QUESTIONNAIRE - PHQ9
1. LITTLE INTEREST OR PLEASURE IN DOING THINGS: NOT AT ALL
SUM OF ALL RESPONSES TO PHQ9 QUESTIONS 1 AND 2: 0
2. FEELING DOWN, DEPRESSED, IRRITABLE, OR HOPELESS: NOT AT ALL

## 2023-04-10 ASSESSMENT — FIBROSIS 4 INDEX: FIB4 SCORE: 1.07

## 2023-04-11 ENCOUNTER — APPOINTMENT (OUTPATIENT)
Dept: RADIOLOGY | Facility: MEDICAL CENTER | Age: 63
DRG: 378 | End: 2023-04-11
Attending: HOSPITALIST
Payer: COMMERCIAL

## 2023-04-11 PROBLEM — D62 ACUTE BLOOD LOSS ANEMIA: Status: ACTIVE | Noted: 2023-04-11

## 2023-04-11 PROBLEM — K92.2 ACUTE GI BLEEDING: Status: RESOLVED | Noted: 2023-04-11 | Resolved: 2023-04-11

## 2023-04-11 PROBLEM — K92.2 GI BLEED: Status: ACTIVE | Noted: 2023-04-11

## 2023-04-11 PROBLEM — K92.2 ACUTE GI BLEEDING: Status: ACTIVE | Noted: 2023-04-11

## 2023-04-11 PROBLEM — I50.22 CHRONIC SYSTOLIC HEART FAILURE (HCC): Status: ACTIVE | Noted: 2023-04-11

## 2023-04-11 PROBLEM — I73.9 CLAUDICATION (HCC): Status: ACTIVE | Noted: 2023-04-11

## 2023-04-11 LAB
ALBUMIN SERPL BCP-MCNC: 4 G/DL (ref 3.2–4.9)
ALBUMIN/GLOB SERPL: 1.4 G/DL
ALP SERPL-CCNC: 57 U/L (ref 30–99)
ALT SERPL-CCNC: 15 U/L (ref 2–50)
ANION GAP SERPL CALC-SCNC: 12 MMOL/L (ref 7–16)
ANISOCYTOSIS BLD QL SMEAR: ABNORMAL
APTT PPP: 26.5 SEC (ref 24.7–36)
AST SERPL-CCNC: 27 U/L (ref 12–45)
BASOPHILS # BLD AUTO: 1.4 % (ref 0–1.8)
BASOPHILS # BLD: 0.09 K/UL (ref 0–0.12)
BILIRUB SERPL-MCNC: 0.8 MG/DL (ref 0.1–1.5)
BUN SERPL-MCNC: 12 MG/DL (ref 8–22)
CALCIUM ALBUM COR SERPL-MCNC: 9 MG/DL (ref 8.5–10.5)
CALCIUM SERPL-MCNC: 9 MG/DL (ref 8.5–10.5)
CHLORIDE SERPL-SCNC: 108 MMOL/L (ref 96–112)
CO2 SERPL-SCNC: 20 MMOL/L (ref 20–33)
COMMENT 1642: NORMAL
CREAT SERPL-MCNC: 0.72 MG/DL (ref 0.5–1.4)
EOSINOPHIL # BLD AUTO: 0.23 K/UL (ref 0–0.51)
EOSINOPHIL NFR BLD: 3.7 % (ref 0–6.9)
ERYTHROCYTE [DISTWIDTH] IN BLOOD BY AUTOMATED COUNT: 66.8 FL (ref 35.9–50)
FERRITIN SERPL-MCNC: 30.6 NG/ML (ref 10–291)
GFR SERPLBLD CREATININE-BSD FMLA CKD-EPI: 93 ML/MIN/1.73 M 2
GLOBULIN SER CALC-MCNC: 2.9 G/DL (ref 1.9–3.5)
GLUCOSE SERPL-MCNC: 101 MG/DL (ref 65–99)
HCT VFR BLD AUTO: 33.8 % (ref 37–47)
HGB BLD-MCNC: 10.1 G/DL (ref 12–16)
HGB BLD-MCNC: 10.6 G/DL (ref 12–16)
HGB BLD-MCNC: 10.9 G/DL (ref 12–16)
HGB RETIC QN AUTO: 24.6 PG/CELL (ref 29–35)
HYPOCHROMIA BLD QL SMEAR: ABNORMAL
IMM GRANULOCYTES # BLD AUTO: 0.02 K/UL (ref 0–0.11)
IMM GRANULOCYTES NFR BLD AUTO: 0.3 % (ref 0–0.9)
IMM RETICS NFR: 30.6 % (ref 9.3–17.4)
INR PPP: 0.98 (ref 0.87–1.13)
IRON SATN MFR SERPL: 6 % (ref 15–55)
IRON SERPL-MCNC: 20 UG/DL (ref 40–170)
LACTATE SERPL-SCNC: 0.8 MMOL/L (ref 0.5–2)
LACTATE SERPL-SCNC: 0.9 MMOL/L (ref 0.5–2)
LACTATE SERPL-SCNC: 1.4 MMOL/L (ref 0.5–2)
LYMPHOCYTES # BLD AUTO: 2.02 K/UL (ref 1–4.8)
LYMPHOCYTES NFR BLD: 32.4 % (ref 22–41)
MACROCYTES BLD QL SMEAR: ABNORMAL
MAGNESIUM SERPL-MCNC: 2.1 MG/DL (ref 1.5–2.5)
MCH RBC QN AUTO: 23.2 PG (ref 27–33)
MCHC RBC AUTO-ENTMCNC: 31.4 G/DL (ref 33.6–35)
MCV RBC AUTO: 74.1 FL (ref 81.4–97.8)
MICROCYTES BLD QL SMEAR: ABNORMAL
MONOCYTES # BLD AUTO: 0.99 K/UL (ref 0–0.85)
MONOCYTES NFR BLD AUTO: 15.9 % (ref 0–13.4)
MORPHOLOGY BLD-IMP: NORMAL
NEUTROPHILS # BLD AUTO: 2.89 K/UL (ref 2–7.15)
NEUTROPHILS NFR BLD: 46.3 % (ref 44–72)
NRBC # BLD AUTO: 0 K/UL
NRBC BLD-RTO: 0 /100 WBC
OVALOCYTES BLD QL SMEAR: NORMAL
PHOSPHATE SERPL-MCNC: 3.3 MG/DL (ref 2.5–4.5)
PLATELET # BLD AUTO: 384 K/UL (ref 164–446)
PLATELET BLD QL SMEAR: NORMAL
PMV BLD AUTO: 9.4 FL (ref 9–12.9)
POIKILOCYTOSIS BLD QL SMEAR: NORMAL
POLYCHROMASIA BLD QL SMEAR: NORMAL
POTASSIUM SERPL-SCNC: 3.7 MMOL/L (ref 3.6–5.5)
PROT SERPL-MCNC: 6.9 G/DL (ref 6–8.2)
PROTHROMBIN TIME: 12.9 SEC (ref 12–14.6)
RBC # BLD AUTO: 4.56 M/UL (ref 4.2–5.4)
RBC BLD AUTO: PRESENT
RETICS # AUTO: 0.09 M/UL (ref 0.04–0.06)
RETICS/RBC NFR: 1.8 % (ref 0.8–2.1)
SODIUM SERPL-SCNC: 140 MMOL/L (ref 135–145)
TIBC SERPL-MCNC: 354 UG/DL (ref 250–450)
UIBC SERPL-MCNC: 334 UG/DL (ref 110–370)
WBC # BLD AUTO: 6.2 K/UL (ref 4.8–10.8)

## 2023-04-11 PROCEDURE — 99358 PROLONG SERVICE W/O CONTACT: CPT | Mod: 25 | Performed by: HOSPITALIST

## 2023-04-11 PROCEDURE — 84100 ASSAY OF PHOSPHORUS: CPT

## 2023-04-11 PROCEDURE — A9270 NON-COVERED ITEM OR SERVICE: HCPCS | Performed by: HOSPITALIST

## 2023-04-11 PROCEDURE — 700111 HCHG RX REV CODE 636 W/ 250 OVERRIDE (IP): Performed by: STUDENT IN AN ORGANIZED HEALTH CARE EDUCATION/TRAINING PROGRAM

## 2023-04-11 PROCEDURE — 83540 ASSAY OF IRON: CPT

## 2023-04-11 PROCEDURE — 770001 HCHG ROOM/CARE - MED/SURG/GYN PRIV*

## 2023-04-11 PROCEDURE — 80053 COMPREHEN METABOLIC PANEL: CPT

## 2023-04-11 PROCEDURE — 85730 THROMBOPLASTIN TIME PARTIAL: CPT

## 2023-04-11 PROCEDURE — 85025 COMPLETE CBC W/AUTO DIFF WBC: CPT

## 2023-04-11 PROCEDURE — 83605 ASSAY OF LACTIC ACID: CPT | Mod: 91

## 2023-04-11 PROCEDURE — 700117 HCHG RX CONTRAST REV CODE 255: Performed by: HOSPITALIST

## 2023-04-11 PROCEDURE — 85610 PROTHROMBIN TIME: CPT

## 2023-04-11 PROCEDURE — 700111 HCHG RX REV CODE 636 W/ 250 OVERRIDE (IP): Performed by: HOSPITALIST

## 2023-04-11 PROCEDURE — 85018 HEMOGLOBIN: CPT | Mod: 91

## 2023-04-11 PROCEDURE — 700102 HCHG RX REV CODE 250 W/ 637 OVERRIDE(OP): Performed by: HOSPITALIST

## 2023-04-11 PROCEDURE — 83735 ASSAY OF MAGNESIUM: CPT

## 2023-04-11 PROCEDURE — 99497 ADVNCD CARE PLAN 30 MIN: CPT | Mod: 25 | Performed by: HOSPITALIST

## 2023-04-11 PROCEDURE — 700105 HCHG RX REV CODE 258: Performed by: STUDENT IN AN ORGANIZED HEALTH CARE EDUCATION/TRAINING PROGRAM

## 2023-04-11 PROCEDURE — 85046 RETICYTE/HGB CONCENTRATE: CPT

## 2023-04-11 PROCEDURE — 82728 ASSAY OF FERRITIN: CPT

## 2023-04-11 PROCEDURE — 83550 IRON BINDING TEST: CPT

## 2023-04-11 PROCEDURE — 74177 CT ABD & PELVIS W/CONTRAST: CPT

## 2023-04-11 PROCEDURE — 36415 COLL VENOUS BLD VENIPUNCTURE: CPT

## 2023-04-11 PROCEDURE — 99223 1ST HOSP IP/OBS HIGH 75: CPT | Mod: 25,AI | Performed by: HOSPITALIST

## 2023-04-11 PROCEDURE — C9113 INJ PANTOPRAZOLE SODIUM, VIA: HCPCS | Performed by: HOSPITALIST

## 2023-04-11 PROCEDURE — 99406 BEHAV CHNG SMOKING 3-10 MIN: CPT | Performed by: HOSPITALIST

## 2023-04-11 RX ORDER — ONDANSETRON 4 MG/1
4 TABLET, ORALLY DISINTEGRATING ORAL EVERY 4 HOURS PRN
Status: DISCONTINUED | OUTPATIENT
Start: 2023-04-11 | End: 2023-04-13 | Stop reason: HOSPADM

## 2023-04-11 RX ORDER — LEVOTHYROXINE SODIUM 88 UG/1
88 TABLET ORAL
Status: DISCONTINUED | OUTPATIENT
Start: 2023-04-11 | End: 2023-04-13 | Stop reason: HOSPADM

## 2023-04-11 RX ORDER — ACETAMINOPHEN 325 MG/1
650 TABLET ORAL EVERY 6 HOURS PRN
Status: DISCONTINUED | OUTPATIENT
Start: 2023-04-11 | End: 2023-04-11

## 2023-04-11 RX ORDER — ONDANSETRON 2 MG/ML
4 INJECTION INTRAMUSCULAR; INTRAVENOUS EVERY 4 HOURS PRN
Status: DISCONTINUED | OUTPATIENT
Start: 2023-04-11 | End: 2023-04-11

## 2023-04-11 RX ORDER — ATORVASTATIN CALCIUM 10 MG/1
10 TABLET, FILM COATED ORAL NIGHTLY
COMMUNITY

## 2023-04-11 RX ORDER — SODIUM CHLORIDE 9 MG/ML
INJECTION, SOLUTION INTRAVENOUS CONTINUOUS
Status: DISCONTINUED | OUTPATIENT
Start: 2023-04-11 | End: 2023-04-11

## 2023-04-11 RX ORDER — PROMETHAZINE HYDROCHLORIDE 25 MG/1
12.5-25 TABLET ORAL EVERY 4 HOURS PRN
Status: DISCONTINUED | OUTPATIENT
Start: 2023-04-11 | End: 2023-04-13 | Stop reason: HOSPADM

## 2023-04-11 RX ORDER — ATORVASTATIN CALCIUM 80 MG/1
80 TABLET, FILM COATED ORAL DAILY
Status: DISCONTINUED | OUTPATIENT
Start: 2023-04-11 | End: 2023-04-13 | Stop reason: HOSPADM

## 2023-04-11 RX ORDER — ACETAMINOPHEN 325 MG/1
650 TABLET ORAL EVERY 6 HOURS PRN
Status: DISCONTINUED | OUTPATIENT
Start: 2023-04-11 | End: 2023-04-13 | Stop reason: HOSPADM

## 2023-04-11 RX ORDER — PROCHLORPERAZINE EDISYLATE 5 MG/ML
5-10 INJECTION INTRAMUSCULAR; INTRAVENOUS EVERY 4 HOURS PRN
Status: DISCONTINUED | OUTPATIENT
Start: 2023-04-11 | End: 2023-04-13 | Stop reason: HOSPADM

## 2023-04-11 RX ORDER — PANTOPRAZOLE SODIUM 40 MG/10ML
40 INJECTION, POWDER, LYOPHILIZED, FOR SOLUTION INTRAVENOUS 2 TIMES DAILY
Status: DISCONTINUED | OUTPATIENT
Start: 2023-04-11 | End: 2023-04-12

## 2023-04-11 RX ORDER — PROMETHAZINE HYDROCHLORIDE 25 MG/1
12.5-25 SUPPOSITORY RECTAL EVERY 4 HOURS PRN
Status: DISCONTINUED | OUTPATIENT
Start: 2023-04-11 | End: 2023-04-13 | Stop reason: HOSPADM

## 2023-04-11 RX ORDER — BUSPIRONE HYDROCHLORIDE 5 MG/1
7.5 TABLET ORAL 3 TIMES DAILY
Status: DISCONTINUED | OUTPATIENT
Start: 2023-04-11 | End: 2023-04-13 | Stop reason: HOSPADM

## 2023-04-11 RX ORDER — METOPROLOL SUCCINATE 50 MG/1
50 TABLET, EXTENDED RELEASE ORAL EVERY EVENING
Status: DISCONTINUED | OUTPATIENT
Start: 2023-04-11 | End: 2023-04-13 | Stop reason: HOSPADM

## 2023-04-11 RX ORDER — ONDANSETRON 2 MG/ML
4 INJECTION INTRAMUSCULAR; INTRAVENOUS EVERY 4 HOURS PRN
Status: DISCONTINUED | OUTPATIENT
Start: 2023-04-11 | End: 2023-04-13 | Stop reason: HOSPADM

## 2023-04-11 RX ORDER — VENLAFAXINE HYDROCHLORIDE 37.5 MG/1
75 CAPSULE, EXTENDED RELEASE ORAL DAILY
Status: DISCONTINUED | OUTPATIENT
Start: 2023-04-11 | End: 2023-04-13 | Stop reason: HOSPADM

## 2023-04-11 RX ADMIN — LEVOTHYROXINE SODIUM 88 MCG: 0.09 TABLET ORAL at 06:34

## 2023-04-11 RX ADMIN — VENLAFAXINE HYDROCHLORIDE 75 MG: 37.5 CAPSULE, EXTENDED RELEASE ORAL at 06:34

## 2023-04-11 RX ADMIN — BUSPIRONE HYDROCHLORIDE 7.5 MG: 5 TABLET ORAL at 17:54

## 2023-04-11 RX ADMIN — ATORVASTATIN CALCIUM 80 MG: 80 TABLET, FILM COATED ORAL at 06:34

## 2023-04-11 RX ADMIN — PANTOPRAZOLE SODIUM 40 MG: 40 INJECTION, POWDER, LYOPHILIZED, FOR SOLUTION INTRAVENOUS at 17:55

## 2023-04-11 RX ADMIN — SODIUM CHLORIDE 250 MG: 9 INJECTION, SOLUTION INTRAVENOUS at 18:00

## 2023-04-11 RX ADMIN — BUSPIRONE HYDROCHLORIDE 7.5 MG: 5 TABLET ORAL at 13:35

## 2023-04-11 RX ADMIN — METOPROLOL SUCCINATE 50 MG: 50 TABLET, EXTENDED RELEASE ORAL at 17:55

## 2023-04-11 RX ADMIN — IOHEXOL 100 ML: 350 INJECTION, SOLUTION INTRAVENOUS at 11:30

## 2023-04-11 RX ADMIN — SODIUM CHLORIDE 250 MG: 9 INJECTION, SOLUTION INTRAVENOUS at 13:40

## 2023-04-11 RX ADMIN — PANTOPRAZOLE SODIUM 40 MG: 40 INJECTION, POWDER, LYOPHILIZED, FOR SOLUTION INTRAVENOUS at 06:35

## 2023-04-11 ASSESSMENT — FIBROSIS 4 INDEX: FIB4 SCORE: 1.14

## 2023-04-11 ASSESSMENT — ENCOUNTER SYMPTOMS
DIARRHEA: 0
PND: 0
NAUSEA: 0
PALPITATIONS: 0
BLOOD IN STOOL: 1
SINUS PAIN: 0
CLAUDICATION: 0
MYALGIAS: 0
HEADACHES: 0
DEPRESSION: 0
POLYDIPSIA: 0
DIAPHORESIS: 0
BRUISES/BLEEDS EASILY: 0
WEAKNESS: 0
TINGLING: 0
DOUBLE VISION: 0
PHOTOPHOBIA: 0
SORE THROAT: 0
ABDOMINAL PAIN: 0
TREMORS: 0
FLANK PAIN: 0
HEARTBURN: 0
BLURRED VISION: 0
SPUTUM PRODUCTION: 0
CONSTIPATION: 0
VOMITING: 0
HALLUCINATIONS: 0
EYE PAIN: 0
WHEEZING: 0
CHILLS: 1
COUGH: 0
SHORTNESS OF BREATH: 1
STRIDOR: 0
FALLS: 0
FEVER: 0
NECK PAIN: 0
HEMOPTYSIS: 0
ORTHOPNEA: 0
DIZZINESS: 1
BACK PAIN: 0

## 2023-04-11 ASSESSMENT — COGNITIVE AND FUNCTIONAL STATUS - GENERAL
SUGGESTED CMS G CODE MODIFIER MOBILITY: CH
SUGGESTED CMS G CODE MODIFIER DAILY ACTIVITY: CH
MOBILITY SCORE: 24
DAILY ACTIVITIY SCORE: 24

## 2023-04-11 ASSESSMENT — COPD QUESTIONNAIRES
HAVE YOU SMOKED AT LEAST 100 CIGARETTES IN YOUR ENTIRE LIFE: NO/DON'T KNOW
DURING THE PAST 4 WEEKS HOW MUCH DID YOU FEEL SHORT OF BREATH: NONE/LITTLE OF THE TIME
DO YOU EVER COUGH UP ANY MUCUS OR PHLEGM?: NO/ONLY WITH OCCASIONAL COLDS OR INFECTIONS
COPD SCREENING SCORE: 2

## 2023-04-11 ASSESSMENT — LIFESTYLE VARIABLES: SUBSTANCE_ABUSE: 0

## 2023-04-11 ASSESSMENT — PAIN DESCRIPTION - PAIN TYPE: TYPE: ACUTE PAIN

## 2023-04-11 NOTE — ASSESSMENT & PLAN NOTE
Status post 4 units of blood transfusion  Monitor hemoglobin every 8 hours and transfuse less than 7

## 2023-04-11 NOTE — CARE PLAN
The patient is Stable - Low risk of patient condition declining or worsening    Shift Goals  Clinical Goals: Admit, Monitor labs, vitals  Patient Goals: Sleep, diagnostics      Problem: Risk for Bleeding  Goal: Patient will take measures to prevent bleeding and recognizes signs of bleeding that need to be reported immediately to a health care professional  Outcome: Progressing     Problem: Risk for Fluid Volume Deficit Related to Bleeding  Goal: Fluid volume balance will be maintained  Outcome: Progressing     Problem: Communication  Goal: The ability to communicate needs accurately and effectively will improve  4/11/2023 0409 by Abelardo Blas R.N.  Outcome: Progressing  4/11/2023 0409 by Abelardo Blas R.N.  Outcome: Progressing     Problem: Psychosocial  Goal: Patient's level of anxiety will decrease  4/11/2023 0409 by Abelardo Blas R.N.  Outcome: Progressing  4/11/2023 0409 by Abelardo Blas R.N.  Outcome: Progressing

## 2023-04-11 NOTE — PROGRESS NOTES
4 Eyes Skin Assessment Completed by STONE Zhou and Danny RN.    Head WDL  Ears WDL  Nose WDL  Mouth WDL  Neck WDL  Breast/Chest Weeping  Shoulder Blades WDL  Spine WDL  (R) Arm/Elbow/Hand WDL  (L) Arm/Elbow/Hand WDL  Abdomen WDL  Groin WDL  Scrotum/Coccyx/Buttocks WDL  (R) Leg WDL  (L) Leg WDL  (R) Heel/Foot/Toe WDL  (L) Heel/Foot/Toe WDL          Devices In Places Tele Box and Pulse Ox      Interventions In Place Pillows    Possible Skin Injury No    Pictures Uploaded Into Epic N/A  Wound Consult Placed N/A  RN Wound Prevention Protocol Ordered No

## 2023-04-11 NOTE — ASSESSMENT & PLAN NOTE
With hematochezia  Patient has been started on IV fluid hydration   NPO  Patient is started on IV Protonix  Monitor H&H every 8 hours, transfuse for hemoglobin less than 7  Coagulation studies within normal limits  We will consult GI in the morning for endoscopic evaluation  I am not sure at this time patient does need a colectomy.  I will order CT scan of the abdomen just in case if is indicated.

## 2023-04-11 NOTE — PROGRESS NOTES
Med Rec complete per Pt's home pharmacy and Pt at bedside.  Allergies reviewed.   Home Pharmacy:  [facility]

## 2023-04-11 NOTE — ASSESSMENT & PLAN NOTE
With a history of moderate AI  History of EF 30%  Compensated  Currently patient does not take lisinopril Aldactone anymore  Continue metoprolol

## 2023-04-11 NOTE — PROGRESS NOTES
Patient admitted via EMS stretcher to room T827. PT alert and oriented, bed locked in low position, call light within reach.

## 2023-04-11 NOTE — PROGRESS NOTES
Progress note    Pt eval at bedside  Stable VS   Hb trending up  No bleed today or BM  Cont IV PPI  Switch to clears  Pend ABD/Pelv CT  Labs on AM

## 2023-04-11 NOTE — H&P
Hospital Medicine History & Physical Note    Date of Service  4/11/2023    Primary Care Physician  NATHANAEL King    Consultants  general surgery    Specialist Names:      Code Status  Full Code    Chief Complaint  Hematochezia    History of Presenting Illness  Marisol Brown is a 62 y.o. female who presented 4/10/2023 with past medical history of diverticulitis with abscess, diverticular bleed, history of systolic heart failure, history of C. difficile, hypothyroidism, hypertension, coronary artery disease status post 2 stents in 2019 who presents from [facility] for bloody stools.  Patient states that she was having maroon-colored stools for the past 1 night.  It associated with lightheaded and dizziness.  When this would occur she would also started developing shortness of breath and bilateral lower extremity claudication and tingling.  She denies any hematemesis.  Patient states in the past she would develop left-sided abdominal pain but at this moment she currently does not have any pain.  Patient states that this is the third time having bloody stools.  She did have a colonoscopy and EGD last year.  Patient states that 1 month ago her cardiologist asked her to take aspirin in addition to her Plavix.  Patient states that she smokes cigarettes and occasionally drinks alcohol.      I have reviewed the records from [facility] that found  Blood pressure 154/91, temperature 36.4, heart rate 73, respiratory 17.    Patient presented with a hemoglobin of 6.2, WBC 5.8, platelets 544, glucose 115, creatinine 0.8, sodium 136, potassium 4.1, CO2 25, anion gap 5, calcium 8.6, AST 37, ALT 30, ALP 75.    Patient was given 4 units of blood transfusion.  Hemoglobin on repeat was 10.5.    I discussed the plan of care with patient.    Review of Systems  Review of Systems   Constitutional:  Positive for chills and malaise/fatigue. Negative for diaphoresis and fever.   HENT:  Negative for congestion,  ear discharge, ear pain, hearing loss, nosebleeds, sinus pain, sore throat and tinnitus.    Eyes:  Negative for blurred vision, double vision, photophobia and pain.   Respiratory:  Positive for shortness of breath. Negative for cough, hemoptysis, sputum production, wheezing and stridor.    Cardiovascular:  Negative for chest pain, palpitations, orthopnea, claudication, leg swelling and PND.   Gastrointestinal:  Positive for blood in stool. Negative for abdominal pain, constipation, diarrhea, heartburn, melena, nausea and vomiting.   Genitourinary:  Negative for dysuria, flank pain, frequency, hematuria and urgency.   Musculoskeletal:  Negative for back pain, falls, joint pain, myalgias and neck pain.   Skin:  Negative for itching and rash.   Neurological:  Positive for dizziness. Negative for tingling, tremors, weakness and headaches.   Endo/Heme/Allergies:  Negative for environmental allergies and polydipsia. Does not bruise/bleed easily.   Psychiatric/Behavioral:  Negative for depression, hallucinations, substance abuse and suicidal ideas.      Past Medical History   has a past medical history of Depression, Essential hypertension, Hypercholesteremia, Hypothyroidism, and Mixed hyperlipidemia.    Surgical History   has a past surgical history that includes tubal ligation; dental extraction(s); carpal tunnel release; pr upper gi endoscopy,diagnosis (N/A, 8/11/2021); and pr colonoscopy,diagnostic (N/A, 8/11/2021).     Family History  family history includes Dementia in her father; Heart Disease in her mother; Hyperlipidemia in her brother and sister.   Family history reviewed with patient. There is no family history that is pertinent to the chief complaint.     Social History   reports that she has been smoking cigarettes. She started smoking about 49 years ago. She has been smoking an average of 1 pack per day. She has never used smokeless tobacco. She reports current alcohol use of about 7.2 oz per week. She  reports current drug use. Drug: Inhaled.    Allergies  Allergies   Allergen Reactions    Codeine        Medications  Prior to Admission Medications   Prescriptions Last Dose Informant Patient Reported? Taking?   atorvastatin (LIPITOR) 80 MG tablet  Patient No No   Sig: Take 1 Tab by mouth every day.   busPIRone (BUSPAR) 5 MG tablet   Yes Yes   Sig: Take 5 mg by mouth 3 times a day.   clopidogrel (PLAVIX) 75 MG Tab 4/8/2023 Patient No No   Sig: Take 1 Tab by mouth every day.   levothyroxine (SYNTHROID) 88 MCG Tab 4/9/2023 Patient Yes No   Sig: Take 88 mcg by mouth Every morning on an empty stomach.   lisinopril (PRINIVIL) 10 MG Tab Not Taking Patient No No   Sig: Take 1 Tab by mouth every day.   Patient not taking: Reported on 4/10/2023   metoprolol SR (TOPROL XL) 50 MG TABLET SR 24 HR  Patient No No   Sig: Take 1 Tab by mouth every evening.   nicotine (NICODERM) 21 MG/24HR PATCH 24 HR   Yes Yes   Sig: Place 1 Patch on the skin every 24 hours.   omeprazole (PRILOSEC OTC) 20 MG tablet 4/9/2023 Patient No No   Sig: Take 1 Tablet by mouth every day.   spironolactone (ALDACTONE) 25 MG Tab Not Taking Patient No No   Sig: Take 0.5 Tabs by mouth every day.   Patient not taking: Reported on 4/10/2023   venlafaxine XR (EFFEXOR XR) 75 MG CAPSULE SR 24 HR  Patient Yes No   Sig: Take 75 mg by mouth every day.      Facility-Administered Medications: None       Physical Exam  Temp:  [36.4 °C (97.5 °F)] 36.4 °C (97.5 °F)  Pulse:  [83] 83  Resp:  [18] 18  BP: (147)/(93) 147/93  SpO2:  [97 %] 97 %  Blood Pressure: (!) 147/93   Temperature: 36.4 °C (97.5 °F)   Pulse: 83   Respiration: 18   Pulse Oximetry: 97 %       Physical Exam  Vitals and nursing note reviewed.   Constitutional:       General: She is not in acute distress.     Appearance: Normal appearance. She is not ill-appearing, toxic-appearing or diaphoretic.   HENT:      Head: Normocephalic and atraumatic.      Nose: No congestion or rhinorrhea.      Mouth/Throat:       Pharynx: No oropharyngeal exudate or posterior oropharyngeal erythema.   Eyes:      General: No scleral icterus.  Neck:      Vascular: No carotid bruit or JVD.   Cardiovascular:      Rate and Rhythm: Normal rate and regular rhythm.      Pulses: Normal pulses.      Heart sounds: Normal heart sounds. No murmur heard.    No friction rub. No gallop.   Pulmonary:      Effort: Pulmonary effort is normal. No respiratory distress.      Breath sounds: No stridor. No wheezing, rhonchi or rales.   Abdominal:      General: Abdomen is flat. There is no distension.      Palpations: There is no mass.      Tenderness: There is no abdominal tenderness. There is no left CVA tenderness, guarding or rebound.      Hernia: No hernia is present.   Musculoskeletal:         General: No swelling. Normal range of motion.      Cervical back: No rigidity. No muscular tenderness.      Right lower leg: No edema.      Left lower leg: No edema.   Lymphadenopathy:      Cervical: No cervical adenopathy.   Skin:     General: Skin is warm and dry.      Capillary Refill: Capillary refill takes more than 3 seconds.      Coloration: Skin is not jaundiced or pale.      Findings: No bruising or erythema.   Neurological:      Mental Status: She is alert.       Laboratory:      Recent Labs     04/11/23 0012   SODIUM 140   POTASSIUM 3.7   CHLORIDE 108   CO2 20   GLUCOSE 101*   BUN 12   CREATININE 0.72   CALCIUM 9.0     Recent Labs     04/11/23 0012   ALTSGPT 15   ASTSGOT 27   ALKPHOSPHAT 57   GLUCOSE 101*     Recent Labs     04/11/23 0012   APTT 26.5   INR 0.98     No results for input(s): NTPROBNP in the last 72 hours.      No results for input(s): TROPONINT in the last 72 hours.    Imaging:  US-EXTREMITY ARTERY LOWER BILAT W/RNADAL (COMBO)    (Results Pending)   CT-ABDOMEN-PELVIS WITH    (Results Pending)       X-Ray:  I have personally reviewed the images and compared with prior images.  EKG:  I have personally reviewed the images and compared with prior  images.    Assessment/Plan:  Justification for Admission Status  I anticipate this patient will require at least two midnights for appropriate medical management, necessitating inpatient admission because GI bleed    Patient will need a Med/Surg bed on SURGICAL service .  The need is secondary to GI bleed.    * GI bleed- (present on admission)  Assessment & Plan  With hematochezia  Patient has been started on IV fluid hydration   NPO  Patient is started on IV Protonix  Monitor H&H every 8 hours, transfuse for hemoglobin less than 7  Coagulation studies within normal limits  We will consult GI in the morning for endoscopic evaluation  I am not sure at this time patient does need a colectomy.  I will order CT scan of the abdomen just in case if is indicated.    Chronic systolic heart failure (HCC)  Assessment & Plan  With a history of moderate AI  History of EF 30%  Compensated  Currently patient does not take lisinopril Aldactone anymore  Continue metoprolol    Acute blood loss anemia  Assessment & Plan  Status post 4 units of blood transfusion  Monitor hemoglobin every 8 hours and transfuse less than 7    Claudication (HCC)  Assessment & Plan  I have ordered arterial Doppler studies of lower extremity    Hx of coronary artery disease- (present on admission)  Assessment & Plan  Hold aspirin and Plavix at this time    Tobacco use- (present on admission)  Assessment & Plan  Tobacco cessation education provided for more than 5 minutes.  We discussed the risks of smoking including increased risk of heart disease, stroke, cancer and COPD. We discussed the benefits of quitting smoking. We discussed options of nicotine patch, wellbutrin and chantix.  The patient has the intention to quit smoking. Nicotine replacement protocol will be provided to the patient.      Hypothyroidism- (present on admission)  Assessment & Plan  Continue home thyroid medication    Essential hypertension- (present on admission)  Assessment &  Plan  controlled  Continue current home medications  IV as needed medications have been ordered          VTE prophylaxis: SCDs/TEDs      I discussed advance care planning for at least 20 minutes with the patientincluding diagnosis, prognosis, plan of care, risks and benefits of any therapies that could be offered, as well as alternatives including palliation and hospice, as appropriate. The patient has opted Full code Time spent is exclusive of evaluation and management or other separately billable procedures. Start time: 11:30 PM end time: 11:50 PM      I spent a total of 35 minutes of non face to face time performing additional research, reviewing medical records from transferring facility, discussing plan of care with other healthcare providers. Start time: 12:00 AM. End time: 12:35 AM

## 2023-04-12 ENCOUNTER — APPOINTMENT (OUTPATIENT)
Dept: RADIOLOGY | Facility: MEDICAL CENTER | Age: 63
DRG: 378 | End: 2023-04-12
Attending: HOSPITALIST
Payer: COMMERCIAL

## 2023-04-12 PROBLEM — K57.90 DIVERTICULOSIS: Status: ACTIVE | Noted: 2021-08-30

## 2023-04-12 LAB
ANION GAP SERPL CALC-SCNC: 11 MMOL/L (ref 7–16)
BASOPHILS # BLD AUTO: 1.2 % (ref 0–1.8)
BASOPHILS # BLD: 0.06 K/UL (ref 0–0.12)
BUN SERPL-MCNC: 10 MG/DL (ref 8–22)
CALCIUM SERPL-MCNC: 8.6 MG/DL (ref 8.5–10.5)
CHLORIDE SERPL-SCNC: 109 MMOL/L (ref 96–112)
CO2 SERPL-SCNC: 20 MMOL/L (ref 20–33)
CREAT SERPL-MCNC: 0.7 MG/DL (ref 0.5–1.4)
EOSINOPHIL # BLD AUTO: 0.2 K/UL (ref 0–0.51)
EOSINOPHIL NFR BLD: 4 % (ref 0–6.9)
ERYTHROCYTE [DISTWIDTH] IN BLOOD BY AUTOMATED COUNT: 69.2 FL (ref 35.9–50)
GFR SERPLBLD CREATININE-BSD FMLA CKD-EPI: 97 ML/MIN/1.73 M 2
GLUCOSE SERPL-MCNC: 91 MG/DL (ref 65–99)
HCT VFR BLD AUTO: 33.3 % (ref 37–47)
HGB BLD-MCNC: 10.1 G/DL (ref 12–16)
HGB BLD-MCNC: 11.1 G/DL (ref 12–16)
IMM GRANULOCYTES # BLD AUTO: 0.02 K/UL (ref 0–0.11)
IMM GRANULOCYTES NFR BLD AUTO: 0.4 % (ref 0–0.9)
LYMPHOCYTES # BLD AUTO: 1.37 K/UL (ref 1–4.8)
LYMPHOCYTES NFR BLD: 27.4 % (ref 22–41)
MCH RBC QN AUTO: 23 PG (ref 27–33)
MCHC RBC AUTO-ENTMCNC: 30.3 G/DL (ref 33.6–35)
MCV RBC AUTO: 75.9 FL (ref 81.4–97.8)
MONOCYTES # BLD AUTO: 0.73 K/UL (ref 0–0.85)
MONOCYTES NFR BLD AUTO: 14.6 % (ref 0–13.4)
NEUTROPHILS # BLD AUTO: 2.62 K/UL (ref 2–7.15)
NEUTROPHILS NFR BLD: 52.4 % (ref 44–72)
NRBC # BLD AUTO: 0 K/UL
NRBC BLD-RTO: 0 /100 WBC
PLATELET # BLD AUTO: 346 K/UL (ref 164–446)
PMV BLD AUTO: 8.9 FL (ref 9–12.9)
POTASSIUM SERPL-SCNC: 3.5 MMOL/L (ref 3.6–5.5)
RBC # BLD AUTO: 4.39 M/UL (ref 4.2–5.4)
SODIUM SERPL-SCNC: 140 MMOL/L (ref 135–145)
WBC # BLD AUTO: 5 K/UL (ref 4.8–10.8)

## 2023-04-12 PROCEDURE — 700111 HCHG RX REV CODE 636 W/ 250 OVERRIDE (IP): Performed by: STUDENT IN AN ORGANIZED HEALTH CARE EDUCATION/TRAINING PROGRAM

## 2023-04-12 PROCEDURE — 700105 HCHG RX REV CODE 258: Performed by: STUDENT IN AN ORGANIZED HEALTH CARE EDUCATION/TRAINING PROGRAM

## 2023-04-12 PROCEDURE — A9270 NON-COVERED ITEM OR SERVICE: HCPCS | Performed by: HOSPITALIST

## 2023-04-12 PROCEDURE — C9113 INJ PANTOPRAZOLE SODIUM, VIA: HCPCS | Performed by: HOSPITALIST

## 2023-04-12 PROCEDURE — 36415 COLL VENOUS BLD VENIPUNCTURE: CPT

## 2023-04-12 PROCEDURE — A9270 NON-COVERED ITEM OR SERVICE: HCPCS | Performed by: STUDENT IN AN ORGANIZED HEALTH CARE EDUCATION/TRAINING PROGRAM

## 2023-04-12 PROCEDURE — 770001 HCHG ROOM/CARE - MED/SURG/GYN PRIV*

## 2023-04-12 PROCEDURE — 85025 COMPLETE CBC W/AUTO DIFF WBC: CPT

## 2023-04-12 PROCEDURE — 85018 HEMOGLOBIN: CPT

## 2023-04-12 PROCEDURE — 700111 HCHG RX REV CODE 636 W/ 250 OVERRIDE (IP): Performed by: HOSPITALIST

## 2023-04-12 PROCEDURE — 700102 HCHG RX REV CODE 250 W/ 637 OVERRIDE(OP): Performed by: STUDENT IN AN ORGANIZED HEALTH CARE EDUCATION/TRAINING PROGRAM

## 2023-04-12 PROCEDURE — 99233 SBSQ HOSP IP/OBS HIGH 50: CPT | Performed by: STUDENT IN AN ORGANIZED HEALTH CARE EDUCATION/TRAINING PROGRAM

## 2023-04-12 PROCEDURE — 93922 UPR/L XTREMITY ART 2 LEVELS: CPT

## 2023-04-12 PROCEDURE — 700102 HCHG RX REV CODE 250 W/ 637 OVERRIDE(OP): Performed by: HOSPITALIST

## 2023-04-12 PROCEDURE — 80048 BASIC METABOLIC PNL TOTAL CA: CPT

## 2023-04-12 RX ORDER — NICOTINE 21 MG/24HR
21 PATCH, TRANSDERMAL 24 HOURS TRANSDERMAL
Status: DISCONTINUED | OUTPATIENT
Start: 2023-04-12 | End: 2023-04-13 | Stop reason: HOSPADM

## 2023-04-12 RX ORDER — OMEPRAZOLE 20 MG/1
40 CAPSULE, DELAYED RELEASE ORAL 2 TIMES DAILY
Status: DISCONTINUED | OUTPATIENT
Start: 2023-04-12 | End: 2023-04-13 | Stop reason: HOSPADM

## 2023-04-12 RX ORDER — POTASSIUM CHLORIDE 20 MEQ/1
40 TABLET, EXTENDED RELEASE ORAL ONCE
Status: COMPLETED | OUTPATIENT
Start: 2023-04-12 | End: 2023-04-12

## 2023-04-12 RX ADMIN — ATORVASTATIN CALCIUM 80 MG: 80 TABLET, FILM COATED ORAL at 04:52

## 2023-04-12 RX ADMIN — OMEPRAZOLE 40 MG: 20 CAPSULE, DELAYED RELEASE ORAL at 09:00

## 2023-04-12 RX ADMIN — METOPROLOL SUCCINATE 50 MG: 50 TABLET, EXTENDED RELEASE ORAL at 17:12

## 2023-04-12 RX ADMIN — POTASSIUM CHLORIDE 40 MEQ: 1500 TABLET, EXTENDED RELEASE ORAL at 09:03

## 2023-04-12 RX ADMIN — LEVOTHYROXINE SODIUM 88 MCG: 0.09 TABLET ORAL at 04:52

## 2023-04-12 RX ADMIN — NICOTINE TRANSDERMAL SYSTEM 21 MG: 21 PATCH, EXTENDED RELEASE TRANSDERMAL at 09:50

## 2023-04-12 RX ADMIN — SODIUM CHLORIDE 250 MG: 9 INJECTION, SOLUTION INTRAVENOUS at 04:54

## 2023-04-12 RX ADMIN — PANTOPRAZOLE SODIUM 40 MG: 40 INJECTION, POWDER, LYOPHILIZED, FOR SOLUTION INTRAVENOUS at 04:53

## 2023-04-12 RX ADMIN — VENLAFAXINE HYDROCHLORIDE 75 MG: 37.5 CAPSULE, EXTENDED RELEASE ORAL at 04:52

## 2023-04-12 RX ADMIN — SODIUM CHLORIDE 250 MG: 9 INJECTION, SOLUTION INTRAVENOUS at 17:13

## 2023-04-12 RX ADMIN — BUSPIRONE HYDROCHLORIDE 7.5 MG: 5 TABLET ORAL at 12:32

## 2023-04-12 RX ADMIN — BUSPIRONE HYDROCHLORIDE 7.5 MG: 5 TABLET ORAL at 04:52

## 2023-04-12 RX ADMIN — OMEPRAZOLE 40 MG: 20 CAPSULE, DELAYED RELEASE ORAL at 18:00

## 2023-04-12 RX ADMIN — BUSPIRONE HYDROCHLORIDE 7.5 MG: 5 TABLET ORAL at 17:12

## 2023-04-12 ASSESSMENT — ENCOUNTER SYMPTOMS
PSYCHIATRIC NEGATIVE: 1
CARDIOVASCULAR NEGATIVE: 1
GASTROINTESTINAL NEGATIVE: 1
RESPIRATORY NEGATIVE: 1
NEUROLOGICAL NEGATIVE: 1
CONSTITUTIONAL NEGATIVE: 1
EYES NEGATIVE: 1
MUSCULOSKELETAL NEGATIVE: 1

## 2023-04-12 ASSESSMENT — PAIN DESCRIPTION - PAIN TYPE: TYPE: ACUTE PAIN

## 2023-04-12 NOTE — CARE PLAN
The patient is Stable - Low risk of patient condition declining or worsening    Shift Goals  Clinical Goals: Monitor labs, s/s of bleeding  Patient Goals: sleep    HGB stable, no BM overnight      Problem: Knowledge Deficit - Standard  Goal: Patient and family/care givers will demonstrate understanding of plan of care, disease process/condition, diagnostic tests and medications  Outcome: Progressing     Problem: Risk for Bleeding  Goal: Patient will take measures to prevent bleeding and recognizes signs of bleeding that need to be reported immediately to a health care professional  Outcome: Progressing

## 2023-04-12 NOTE — DISCHARGE PLANNING
Case Management Discharge Planning    Admission Date: 4/10/2023  GMLOS: 3  ALOS: 1    6-Clicks ADL Score: 24  6-Clicks Mobility Score: 24      Anticipated Discharge Dispo: Discharge Disposition:   Discharged to home/self care (01)  Discharge Address: [place]  [address]  Discharge Contact Phone Number: [phone]    DME Needed: No    Action(s) Taken:   RN MCKAY met with patient at bedside.  She lives alone in a ground floor Senior apartment in [place], [place].  She is independent with ADLs and IADLs.  Pt admitted from [facility] in [place] with   transfer back agreement.    Medically Clear: No    Next Steps: Collaborate with patient, family and medical team.    Barriers to Discharge: Medical clearance    Care Transition Team Assessment    Information Source  Orientation Level: Oriented X4  Information Given By: Patient  Who is responsible for making decisions for patient? : Patient    Readmission Evaluation  Is this a readmission?: No    Elopement Risk  Legal Hold: No  Ambulatory or Self Mobile in Wheelchair: Yes  Disoriented: No  Psychiatric Symptoms: None  History of Wandering: No  Elopement this Admit: No  Vocalizing Wanting to Leave: No  Displays Behaviors, Body Language Wanting to Leave: No-Not at Risk for Elopement  Elopement Risk: Not at Risk for Elopement    Interdisciplinary Discharge Planning  Lives with - Patient's Self Care Capacity: Alone and Able to Care For Self  Patient or legal guardian wants to designate a caregiver: No  Support Systems: Friends / Neighbors  Housing / Facility: Assisted Living Residence  [facility] of Care Facility: [facility]  Durable Medical Equipment: Not Applicable    Discharge Preparedness  What is your plan after discharge?: Home with help  What are your discharge supports?: Parent  Prior Functional Level: Ambulatory, Drives Self, Independent with Activities of Daily Living, Independent with Medication Management  Difficulity with ADLs:  None    Functional Assesment  Prior Functional Level: Ambulatory, Drives Self, Independent with Activities of Daily Living, Independent with Medication Management    Finances  Financial Barriers to Discharge: No  Prescription Coverage: Yes    Vision / Hearing Impairment  Vision Impairment : No  Right Eye Vision: Impaired, Wears Glasses  Left Eye Vision: Impaired, Wears Glasses  Hearing Impairment : No         Advance Directive  Advance Directive?: None    Domestic Abuse  Have you ever been the victim of abuse or violence?: No  Physical Abuse or Sexual Abuse: No  Verbal Abuse or Emotional Abuse: No  Possible Abuse/Neglect Reported to:: Not Applicable    Psychological Assessment  History of Substance Abuse: None    Discharge Risks or Barriers  Discharge risks or barriers?: No    Anticipated Discharge Information  Discharge Disposition: Discharged to home/self care (01)  Discharge Address: [address] [place] [address]  Discharge Contact Phone Number: [phone]

## 2023-04-12 NOTE — CARE PLAN
Problem: Knowledge Deficit - Standard  Goal: Patient and family/care givers will demonstrate understanding of plan of care, disease process/condition, diagnostic tests and medications  Outcome: Progressing  Note: Patient understands the importance of need for a stool sample     Problem: Psychosocial  Goal: Patient's level of anxiety will decrease  Outcome: Progressing  Goal: Patient's ability to verbalize feelings about condition will improve  Outcome: Progressing     Problem: Discharge Barriers/Planning  Goal: Patient's continuum of care needs are met  Outcome: Progressing  Flowsheets (Taken 4/12/2023 1516)  Continuum of Care Needs:   Assessed for discharge barriers   Communicated discharge barriers to interdisciplinary tream   Collaborated with Case Management/  Note: CM working with patient for transport back to [place]     Problem: Risk for Fluid Volume Deficit Related to Bleeding  Goal: Fluid volume balance will be maintained  Outcome: Progressing   The patient is Stable - Low risk of patient condition declining or worsening    Shift Goals  Clinical Goals: monitor labs, s/s of bleeding  Patient Goals: comfort  Family Goals: comfort    Progress made toward(s) clinical / shift goals:  Patient hemoglobin stable/improving, no active signs or symptoms of bleeding    Patient is not progressing towards the following goals:Patient still has not had a bowel movement, unable to assess for source of bleed

## 2023-04-12 NOTE — PROGRESS NOTES
Hospital Medicine Daily Progress Note    Date of Service  4/12/2023    Chief Complaint  Marisol Brown is a 62 y.o. female admitted 4/10/2023 with hematochezia    Hospital Course  62 y.o. female who presented 4/10/2023 with past medical history of diverticulitis with abscess, diverticular bleed, history of systolic heart failure, history of C. difficile, hypothyroidism, hypertension, coronary artery disease status post 2 stents in 2019 who presents from [facility] for bloody stools.  Patient states that she was having maroon-colored stools for the past 1 night.  It associated with lightheaded and dizziness.  When this would occur she would also started developing shortness of breath and bilateral lower extremity claudication and tingling.  She denies any hematemesis.  Patient states in the past she would develop left-sided abdominal pain but at this moment she currently does not have any pain.  Patient states that this is the third time having bloody stools.  She did have a colonoscopy and EGD last year.  Patient states that 1 month ago her cardiologist asked her to take aspirin in addition to her Plavix.  Patient states that she smokes cigarettes and occasionally drinks alcohol.     I have reviewed the records from [facility] that found  Blood pressure 154/91, temperature 36.4, heart rate 73, respiratory 17.     Patient presented with a hemoglobin of 6.2, WBC 5.8, platelets 544, glucose 115, creatinine 0.8, sodium 136, potassium 4.1, CO2 25, anion gap 5, calcium 8.6, AST 37, ALT 30, ALP 75.     Patient was given 4 units of blood transfusion.  Hemoglobin on repeat was 10.5.      8/2021 EGD/colonoscopy showed    Esophagus:   - LA-A esophagitis s/p cold forceps biopsy   - Multiple whitish plaques were noted in the middle and upper esophagus, s/p cold forceps biopsy, s/p Savary 54 FR dilation with good result  Stomach:  - Hiatal hernia, small, 35-38 cm  - Patchy gastritis in antrum, s/p cold forceps  biopsy   Duodenum:  - Patchy duodenitis in bulb and duodenal 2nd portion, s/p cold forceps biopsy      COL:   Terminal ileum: normal  Colon:   - A 8 mm sessile polyp was seen in the sigmoid colon, s/p cold snare polypectomy, completely removed and retrieved  - Sigmoid diverticulosis, moderately severe, mixed size  - Mucosal erythema and erosion was noted in the sigmoid colon about 30 cm above anal verge, s/p cold forceps biopsy r/o ischemic colitis  - Internal hemorrhoids, medium, non-bleeding      Interval Problem Update  Patient was evaluated bedside  In NAD  Stable vitals  Saturating well room air  Hemoglobin stable at 10.1  Iron studies suggestive of iron deficiency anemia  Completes IV iron infusion today  4/11 abdominal/pelvic CT with resolution of tubular abscess and noted for few small sigmoid diverticuli without acute inflammation.  Bleed likely is secondary to episodic diverticulosis bleed  Regular diet  Continue monitoring hemoglobin  Continue holding off on anticoagulation for now  Labs on a.m.    I have discussed this patient's plan of care and discharge plan at IDT rounds today with Case Management, Nursing, Nursing leadership, and other members of the IDT team.    Consultants/Specialty  None    Code Status  Full Code    Disposition  Patient is not medically cleared for discharge.   Anticipate discharge to to home with close outpatient follow-up.  I have placed the appropriate orders for post-discharge needs.    Review of Systems  Review of Systems   Constitutional: Negative.    HENT: Negative.     Eyes: Negative.    Respiratory: Negative.     Cardiovascular: Negative.    Gastrointestinal: Negative.    Genitourinary: Negative.    Musculoskeletal: Negative.    Skin: Negative.    Neurological: Negative.    Endo/Heme/Allergies: Negative.    Psychiatric/Behavioral: Negative.        Physical Exam  Temp:  [36.3 °C (97.3 °F)-36.7 °C (98.1 °F)] 36.7 °C (98.1 °F)  Pulse:  [66-78] 74  Resp:  [16-18] 18  BP:  (111-131)/(66-80) 111/73  SpO2:  [95 %-98 %] 98 %    Physical Exam  Constitutional:       Appearance: Normal appearance.   HENT:      Head: Normocephalic and atraumatic.      Mouth/Throat:      Mouth: Mucous membranes are moist.   Eyes:      Extraocular Movements: Extraocular movements intact.      Pupils: Pupils are equal, round, and reactive to light.   Cardiovascular:      Rate and Rhythm: Normal rate and regular rhythm.      Pulses: Normal pulses.      Heart sounds: Normal heart sounds.   Pulmonary:      Effort: Pulmonary effort is normal.      Breath sounds: Normal breath sounds.   Abdominal:      General: Bowel sounds are normal.      Palpations: Abdomen is soft.   Musculoskeletal:         General: No swelling. Normal range of motion.      Cervical back: Normal range of motion and neck supple.   Skin:     General: Skin is warm.      Coloration: Skin is not jaundiced.   Neurological:      General: No focal deficit present.      Mental Status: She is alert and oriented to person, place, and time. Mental status is at baseline.      Cranial Nerves: No cranial nerve deficit.   Psychiatric:         Mood and Affect: Mood normal.         Behavior: Behavior normal.         Thought Content: Thought content normal.         Judgment: Judgment normal.       Fluids    Intake/Output Summary (Last 24 hours) at 4/12/2023 1333  Last data filed at 4/12/2023 0554  Gross per 24 hour   Intake 340 ml   Output --   Net 340 ml       Laboratory  Recent Labs     04/11/23  0012 04/11/23  0844 04/11/23  1621 04/12/23  0105 04/12/23  0851   WBC 6.2  --   --  5.0  --    RBC 4.56  --   --  4.39  --    HEMOGLOBIN 10.6*   < > 10.1* 10.1* 11.1*   HEMATOCRIT 33.8*  --   --  33.3*  --    MCV 74.1*  --   --  75.9*  --    MCH 23.2*  --   --  23.0*  --    MCHC 31.4*  --   --  30.3*  --    RDW 66.8*  --   --  69.2*  --    PLATELETCT 384  --   --  346  --    MPV 9.4  --   --  8.9*  --     < > = values in this interval not displayed.     Recent Labs      04/11/23  0012 04/12/23  0105   SODIUM 140 140   POTASSIUM 3.7 3.5*   CHLORIDE 108 109   CO2 20 20   GLUCOSE 101* 91   BUN 12 10   CREATININE 0.72 0.70   CALCIUM 9.0 8.6     Recent Labs     04/11/23  0012   APTT 26.5   INR 0.98               Imaging  US-RANDAL SINGLE LEVEL BILAT   Final Result      CT-ABDOMEN-PELVIS WITH   Final Result      1.  Tubular curvilinear abscess between the uterus and the sigmoid colon described on the prior study has nearly resolved. It contains no drainable fluid but does contain a tiny pocket of air.   2.  No new drainable pelvic fluid collections.   3.  A few small sigmoid diverticula. Acute inflammation has resolved.   4.  Benign hepatic cyst, which does not require follow-up.      US-EXTREMITY ARTERY LOWER BILAT    (Results Pending)        Assessment/Plan  * GI bleed- (present on admission)  Assessment & Plan  With hematochezia  Patient has been started on IV fluid hydration   NPO  Patient is started on IV Protonix  Monitor H&H every 8 hours, transfuse for hemoglobin less than 7  Coagulation studies within normal limits  We will consult GI in the morning for endoscopic evaluation  I am not sure at this time patient does need a colectomy.  I will order CT scan of the abdomen just in case if is indicated.    Chronic systolic heart failure (HCC)  Assessment & Plan  With a history of moderate AI  History of EF 30%  Compensated  Currently patient does not take lisinopril Aldactone anymore  Continue metoprolol    Acute blood loss anemia  Assessment & Plan  Status post 4 units of blood transfusion  Monitor hemoglobin every 8 hours and transfuse less than 7    Claudication (HCC)  Assessment & Plan  I have ordered arterial Doppler studies of lower extremity    Hx of coronary artery disease- (present on admission)  Assessment & Plan  Hold aspirin and Plavix at this time    Diverticulosis  Assessment & Plan  H/o of  Likely history of hematochezia  Monitor    Tobacco use- (present on  admission)  Assessment & Plan  Tobacco cessation education provided for more than 5 minutes.  We discussed the risks of smoking including increased risk of heart disease, stroke, cancer and COPD. We discussed the benefits of quitting smoking. We discussed options of nicotine patch, wellbutrin and chantix.  The patient has the intention to quit smoking. Nicotine replacement protocol will be provided to the patient.      Hypothyroidism- (present on admission)  Assessment & Plan  Continue home thyroid medication    Essential hypertension- (present on admission)  Assessment & Plan  controlled  Continue current home medications  IV as needed medications have been ordered         VTE prophylaxis: SCDs/TEDs    I have performed a physical exam and reviewed and updated ROS and Plan today (4/12/2023). In review of yesterday's note (4/11/2023), there are no changes except as documented above.

## 2023-04-13 VITALS
SYSTOLIC BLOOD PRESSURE: 135 MMHG | OXYGEN SATURATION: 97 % | BODY MASS INDEX: 25.36 KG/M2 | RESPIRATION RATE: 18 BRPM | DIASTOLIC BLOOD PRESSURE: 82 MMHG | TEMPERATURE: 97.8 F | HEART RATE: 74 BPM | HEIGHT: 67 IN | WEIGHT: 161.6 LBS

## 2023-04-13 LAB
BASOPHILS # BLD AUTO: 1.1 % (ref 0–1.8)
BASOPHILS # BLD: 0.08 K/UL (ref 0–0.12)
EOSINOPHIL # BLD AUTO: 0.17 K/UL (ref 0–0.51)
EOSINOPHIL NFR BLD: 2.3 % (ref 0–6.9)
ERYTHROCYTE [DISTWIDTH] IN BLOOD BY AUTOMATED COUNT: 72.4 FL (ref 35.9–50)
HCT VFR BLD AUTO: 33.7 % (ref 37–47)
HGB BLD-MCNC: 10 G/DL (ref 12–16)
IMM GRANULOCYTES # BLD AUTO: 0.03 K/UL (ref 0–0.11)
IMM GRANULOCYTES NFR BLD AUTO: 0.4 % (ref 0–0.9)
LYMPHOCYTES # BLD AUTO: 1.49 K/UL (ref 1–4.8)
LYMPHOCYTES NFR BLD: 20.3 % (ref 22–41)
MCH RBC QN AUTO: 23 PG (ref 27–33)
MCHC RBC AUTO-ENTMCNC: 29.7 G/DL (ref 33.6–35)
MCV RBC AUTO: 77.6 FL (ref 81.4–97.8)
MONOCYTES # BLD AUTO: 1.03 K/UL (ref 0–0.85)
MONOCYTES NFR BLD AUTO: 14.1 % (ref 0–13.4)
NEUTROPHILS # BLD AUTO: 4.53 K/UL (ref 2–7.15)
NEUTROPHILS NFR BLD: 61.8 % (ref 44–72)
NRBC # BLD AUTO: 0 K/UL
NRBC BLD-RTO: 0 /100 WBC
PLATELET # BLD AUTO: 333 K/UL (ref 164–446)
PMV BLD AUTO: 9.4 FL (ref 9–12.9)
RBC # BLD AUTO: 4.34 M/UL (ref 4.2–5.4)
WBC # BLD AUTO: 7.3 K/UL (ref 4.8–10.8)

## 2023-04-13 PROCEDURE — A9270 NON-COVERED ITEM OR SERVICE: HCPCS | Performed by: HOSPITALIST

## 2023-04-13 PROCEDURE — 99239 HOSP IP/OBS DSCHRG MGMT >30: CPT | Performed by: STUDENT IN AN ORGANIZED HEALTH CARE EDUCATION/TRAINING PROGRAM

## 2023-04-13 PROCEDURE — A9270 NON-COVERED ITEM OR SERVICE: HCPCS | Performed by: STUDENT IN AN ORGANIZED HEALTH CARE EDUCATION/TRAINING PROGRAM

## 2023-04-13 PROCEDURE — 85025 COMPLETE CBC W/AUTO DIFF WBC: CPT

## 2023-04-13 PROCEDURE — 700102 HCHG RX REV CODE 250 W/ 637 OVERRIDE(OP): Performed by: STUDENT IN AN ORGANIZED HEALTH CARE EDUCATION/TRAINING PROGRAM

## 2023-04-13 PROCEDURE — 36415 COLL VENOUS BLD VENIPUNCTURE: CPT

## 2023-04-13 PROCEDURE — 700102 HCHG RX REV CODE 250 W/ 637 OVERRIDE(OP): Performed by: HOSPITALIST

## 2023-04-13 RX ORDER — POTASSIUM CHLORIDE 20 MEQ/1
40 TABLET, EXTENDED RELEASE ORAL ONCE
Status: COMPLETED | OUTPATIENT
Start: 2023-04-13 | End: 2023-04-13

## 2023-04-13 RX ORDER — SENNA AND DOCUSATE SODIUM 50; 8.6 MG/1; MG/1
1 TABLET, FILM COATED ORAL DAILY
Qty: 30 TABLET | Refills: 0 | Status: SHIPPED | OUTPATIENT
Start: 2023-04-13 | End: 2023-04-13 | Stop reason: SDUPTHER

## 2023-04-13 RX ORDER — SENNA AND DOCUSATE SODIUM 50; 8.6 MG/1; MG/1
1 TABLET, FILM COATED ORAL DAILY
Qty: 30 TABLET | Refills: 0 | Status: SHIPPED | OUTPATIENT
Start: 2023-04-13

## 2023-04-13 RX ORDER — BUSPIRONE HYDROCHLORIDE 7.5 MG/1
7.5 TABLET ORAL 3 TIMES DAILY
Qty: 90 TABLET | Refills: 0 | Status: SHIPPED | OUTPATIENT
Start: 2023-04-13

## 2023-04-13 RX ORDER — BENZOCAINE/MENTHOL 6 MG-10 MG
1 LOZENGE MUCOUS MEMBRANE 2 TIMES DAILY
Qty: 1 EACH | Refills: 0 | Status: SHIPPED | OUTPATIENT
Start: 2023-04-13

## 2023-04-13 RX ORDER — OMEPRAZOLE 20 MG/1
40 TABLET, DELAYED RELEASE ORAL 2 TIMES DAILY
Qty: 120 TABLET | Refills: 0 | Status: SHIPPED | OUTPATIENT
Start: 2023-04-13 | End: 2023-04-13 | Stop reason: SDUPTHER

## 2023-04-13 RX ORDER — OMEPRAZOLE 20 MG/1
40 TABLET, DELAYED RELEASE ORAL 2 TIMES DAILY
Qty: 120 TABLET | Refills: 0 | Status: SHIPPED | OUTPATIENT
Start: 2023-04-13

## 2023-04-13 RX ORDER — METOPROLOL SUCCINATE 50 MG/1
50 TABLET, EXTENDED RELEASE ORAL EVERY EVENING
Qty: 30 TABLET | Refills: 11 | Status: SHIPPED | OUTPATIENT
Start: 2023-04-13

## 2023-04-13 RX ADMIN — POTASSIUM CHLORIDE 40 MEQ: 1500 TABLET, EXTENDED RELEASE ORAL at 08:29

## 2023-04-13 RX ADMIN — NICOTINE TRANSDERMAL SYSTEM 21 MG: 21 PATCH, EXTENDED RELEASE TRANSDERMAL at 05:13

## 2023-04-13 RX ADMIN — BUSPIRONE HYDROCHLORIDE 7.5 MG: 5 TABLET ORAL at 05:13

## 2023-04-13 RX ADMIN — LEVOTHYROXINE SODIUM 88 MCG: 0.09 TABLET ORAL at 05:13

## 2023-04-13 RX ADMIN — ATORVASTATIN CALCIUM 80 MG: 80 TABLET, FILM COATED ORAL at 05:13

## 2023-04-13 RX ADMIN — VENLAFAXINE HYDROCHLORIDE 75 MG: 37.5 CAPSULE, EXTENDED RELEASE ORAL at 05:13

## 2023-04-13 RX ADMIN — OMEPRAZOLE 40 MG: 20 CAPSULE, DELAYED RELEASE ORAL at 05:13

## 2023-04-13 NOTE — DISCHARGE PLANNING
Case Management Discharge Planning    Admission Date: 4/10/2023  GMLOS: 3  ALOS: 3    6-Clicks ADL Score: 24  6-Clicks Mobility Score: 24      Anticipated Discharge Dispo: Discharge Disposition:   Discharged to home/self care (01)  Discharge Address: [address] [facility]  [address]  Discharge Contact Phone Number: [phone]    DME Needed: No    Action(s) Taken:   [facility]  Request to Dr. Natalie Lott to please escribe dc medications to [facility] in [place], [place].    Escalations Completed: Ride Line    Medically Clear: Yes    Next Steps: Wait for transportation confirmation.    Barriers to Discharge: None

## 2023-04-13 NOTE — DISCHARGE PLANNING
PT unable to DC w/ current transport, will set up [facility] transport at a later time.   SLC

## 2023-04-13 NOTE — CARE PLAN
The patient is Watcher - Medium risk of patient condition declining or worsening    Shift Goals  Clinical Goals: monitor hemoglobin; sleep      Progress made toward(s) clinical / shift goals:  Patient resting through out night. Last hemoglobin value of 10         Problem: Knowledge Deficit - Standard  Goal: Patient and family/care givers will demonstrate understanding of plan of care, disease process/condition, diagnostic tests and medications  Outcome: Progressing     Problem: Risk for Fluid Volume Deficit Related to Bleeding  Goal: Fluid volume balance will be maintained  Outcome: Progressing

## 2023-04-13 NOTE — PROGRESS NOTES
Assumed care of patient at 1900. Patient is Aox 4. Patient is eating in bed at this time. Bed alarm is on, call light within reach, hourly rounding in place, personal belongings in reach, upper bed rails up, bed in lowest and locked position. No further needs at this time.

## 2023-04-13 NOTE — DISCHARGE PLANNING
DC Transport Scheduled    Received request at: 4/13/2023 at 0830    Transport Company Scheduled:  [employer]   [facility] with [facility] to schedule transport.    Scheduled Date: 4/13/2023   Scheduled Time: 0920    Destination: Home at [address] [facility] [address] [facility] [address]    Notified care team of scheduled transport via [employer].     If there are any changes needed to the DC transportation scheduled, please contact [facility] Line at ext. [phone] between the hours of [phone] Mon-Fri. If outside those hours, contact the ED Case Manager at ext. [phone].

## 2023-04-13 NOTE — DISCHARGE SUMMARY
Discharge Summary    CHIEF COMPLAINT ON ADMISSION  No chief complaint on file.    Reason for Admission  GI Bleed     Admission Date  4/10/2023    CODE STATUS  Full Code    HPI & HOSPITAL COURSE  62 y.o. female who presented 4/10/2023 with past medical history of diverticulitis with abscess, diverticular bleed, history of systolic heart failure, history of C. difficile, hypothyroidism, hypertension, coronary artery disease status post 2 stents in 2019 was admitted on 4/10/2023 for iron deficiency anemia secondary to hematochezia.  Patient last EGD/colonoscopy was on 8/2021 for which she was noted for esophagitis, patchy gastritis, duodenitis, moderately severe sigmoid diverticulosis and internal hemorrhoids. Patient received IV iron load and her aspirin and Plavix were held.  Hemoglobin remained stable and patient did not have any episodes of hematochezia or melena while in house.  Patient tolerating meals.  Her omeprazole was increased, Preparation H and patient was discharged on stool softeners.  Patient was instructed to continue aspirin but hold off on Plavix for now. Stable patient with in chronic condition was discharged home and instructed to follow-up with her primary care provider, gastroenterology and cardiology in the outpatient setting.  All results and plan of action discussed with the patient which she was understanding agreement the primary care team.  Patient was instructed to return to emergency department symptoms were to worsen.    Therefore, she is discharged in good and stable condition to home with close outpatient follow-up.    The patient met 2-midnight criteria for an inpatient stay at the time of discharge.    Discharge Date  4/13/2023    FOLLOW UP ITEMS POST DISCHARGE  Primary care provider follow posthospital discharge care  Gastroenterology to follow internal hemorrhoid care  Cardiology to follow CAD care    DISCHARGE DIAGNOSES  Principal Problem:    GI bleed POA: Yes  Active Problems:     Essential hypertension POA: Yes    Hypothyroidism POA: Yes    Tobacco use POA: Yes    Diverticulosis POA: Unknown    Hx of coronary artery disease POA: Yes    Claudication (HCC) POA: Unknown    Acute blood loss anemia POA: Unknown    Chronic systolic heart failure (HCC) POA: Unknown  Resolved Problems:    Acute GI bleeding POA: Yes      FOLLOW UP  No future appointments.  [facility]  [phone]  Call  You can use [facility] for transportation to medical appointments, to  medications at pharmacy and to get home from the hospital.      MEDICATIONS ON DISCHARGE     Medication List        CONTINUE taking these medications        Instructions   ASPIRIN 81 PO   Take 81 mg by mouth every 48 hours.  Dose: 81 mg     atorvastatin 10 MG Tabs  Commonly known as: LIPITOR   Take 10 mg by mouth every evening.  Dose: 10 mg     busPIRone 5 MG tablet  Commonly known as: BUSPAR   Take 1.5 Tablets by mouth 3 times a day.  Dose: 7.5 mg     levothyroxine 88 MCG Tabs  Commonly known as: SYNTHROID   Take 88 mcg by mouth Every morning on an empty stomach.  Dose: 88 mcg     lisinopril 10 MG Tabs  Commonly known as: PRINIVIL   Take 1 Tab by mouth every day.  Dose: 10 mg     metoprolol SR 50 MG Tb24  Commonly known as: TOPROL XL   Take 1 Tablet by mouth every evening.  Dose: 50 mg     omeprazole 20 MG tablet  Commonly known as: PriLOSEC OTC   Take 1 Tablet by mouth every day.  Dose: 20 mg     spironolactone 25 MG Tabs  Commonly known as: ALDACTONE   Take 0.5 Tabs by mouth every day.  Dose: 12.5 mg     venlafaxine XR 75 MG Cp24  Commonly known as: EFFEXOR XR   Take 75 mg by mouth every day.  Dose: 75 mg            STOP taking these medications      clopidogrel 75 MG Tabs  Commonly known as: PLAVIX              Allergies  Allergies   Allergen Reactions    Codeine        DIET  Orders Placed This Encounter   Procedures    Diet Order Diet: Cardiac; Fluid modifications: (optional): 1500 ml Fluid Restriction     Standing Status:    Standing     Number of Occurrences:   1     Order Specific Question:   Diet:     Answer:   Cardiac [6]     Order Specific Question:   Fluid modifications: (optional)     Answer:   1500 ml Fluid Restriction [9]       ACTIVITY  As tolerated.  Weight bearing as tolerated    CONSULTATIONS  None    PROCEDURES  None    LABORATORY  Lab Results   Component Value Date    SODIUM 140 04/12/2023    POTASSIUM 3.5 (L) 04/12/2023    CHLORIDE 109 04/12/2023    CO2 20 04/12/2023    GLUCOSE 91 04/12/2023    BUN 10 04/12/2023    CREATININE 0.70 04/12/2023        Lab Results   Component Value Date    WBC 7.3 04/13/2023    HEMOGLOBIN 10.0 (L) 04/13/2023    HEMATOCRIT 33.7 (L) 04/13/2023    PLATELETCT 333 04/13/2023        Total time of the discharge process exceeds 31 minutes.

## 2024-10-17 ENCOUNTER — HOSPITAL ENCOUNTER (INPATIENT)
Facility: MEDICAL CENTER | Age: 64
LOS: 2 days | DRG: 660 | End: 2024-10-19
Attending: STUDENT IN AN ORGANIZED HEALTH CARE EDUCATION/TRAINING PROGRAM
Payer: MEDICARE

## 2024-10-17 ENCOUNTER — HOSPITAL ENCOUNTER (OUTPATIENT)
Dept: RADIOLOGY | Facility: MEDICAL CENTER | Age: 64
End: 2024-10-17

## 2024-10-17 ENCOUNTER — APPOINTMENT (OUTPATIENT)
Dept: RADIOLOGY | Facility: MEDICAL CENTER | Age: 64
DRG: 660 | End: 2024-10-17
Attending: UROLOGY
Payer: MEDICARE

## 2024-10-17 ENCOUNTER — ANESTHESIA EVENT (OUTPATIENT)
Dept: SURGERY | Facility: MEDICAL CENTER | Age: 64
DRG: 660 | End: 2024-10-17
Payer: MEDICARE

## 2024-10-17 ENCOUNTER — ANESTHESIA (OUTPATIENT)
Dept: SURGERY | Facility: MEDICAL CENTER | Age: 64
DRG: 660 | End: 2024-10-17
Payer: MEDICARE

## 2024-10-17 DIAGNOSIS — N12 PYELONEPHRITIS: ICD-10-CM

## 2024-10-17 PROBLEM — N13.30 HYDRONEPHROSIS: Status: ACTIVE | Noted: 2024-10-17

## 2024-10-17 PROBLEM — N28.89 RENAL MASS: Status: ACTIVE | Noted: 2024-10-17

## 2024-10-17 LAB
ALBUMIN SERPL BCP-MCNC: 3.2 G/DL (ref 3.2–4.9)
ALBUMIN/GLOB SERPL: 1 G/DL
ALP SERPL-CCNC: 92 U/L (ref 30–99)
ALT SERPL-CCNC: 40 U/L (ref 2–50)
ANION GAP SERPL CALC-SCNC: 13 MMOL/L (ref 7–16)
APPEARANCE UR: ABNORMAL
AST SERPL-CCNC: 43 U/L (ref 12–45)
BACTERIA #/AREA URNS HPF: NEGATIVE /HPF
BASOPHILS # BLD AUTO: 0.4 % (ref 0–1.8)
BASOPHILS # BLD: 0.06 K/UL (ref 0–0.12)
BILIRUB SERPL-MCNC: 0.6 MG/DL (ref 0.1–1.5)
BILIRUB UR QL STRIP.AUTO: ABNORMAL
BUN SERPL-MCNC: 9 MG/DL (ref 8–22)
CALCIUM ALBUM COR SERPL-MCNC: 9 MG/DL (ref 8.5–10.5)
CALCIUM SERPL-MCNC: 8.4 MG/DL (ref 8.5–10.5)
CHLORIDE SERPL-SCNC: 104 MMOL/L (ref 96–112)
CO2 SERPL-SCNC: 18 MMOL/L (ref 20–33)
COLOR UR: YELLOW
CREAT SERPL-MCNC: 0.87 MG/DL (ref 0.5–1.4)
EKG IMPRESSION: NORMAL
EOSINOPHIL # BLD AUTO: 0.03 K/UL (ref 0–0.51)
EOSINOPHIL NFR BLD: 0.2 % (ref 0–6.9)
EPI CELLS #/AREA URNS HPF: NEGATIVE /HPF
ERYTHROCYTE [DISTWIDTH] IN BLOOD BY AUTOMATED COUNT: 43.4 FL (ref 35.9–50)
GFR SERPLBLD CREATININE-BSD FMLA CKD-EPI: 74 ML/MIN/1.73 M 2
GLOBULIN SER CALC-MCNC: 3.3 G/DL (ref 1.9–3.5)
GLUCOSE SERPL-MCNC: 130 MG/DL (ref 65–99)
GLUCOSE UR STRIP.AUTO-MCNC: NEGATIVE MG/DL
HCT VFR BLD AUTO: 38.2 % (ref 37–47)
HGB BLD-MCNC: 13.4 G/DL (ref 12–16)
HYALINE CASTS #/AREA URNS LPF: ABNORMAL /LPF
IMM GRANULOCYTES # BLD AUTO: 0.15 K/UL (ref 0–0.11)
IMM GRANULOCYTES NFR BLD AUTO: 1 % (ref 0–0.9)
KETONES UR STRIP.AUTO-MCNC: NEGATIVE MG/DL
LEUKOCYTE ESTERASE UR QL STRIP.AUTO: ABNORMAL
LYMPHOCYTES # BLD AUTO: 0.33 K/UL (ref 1–4.8)
LYMPHOCYTES NFR BLD: 2.1 % (ref 22–41)
MAGNESIUM SERPL-MCNC: 2.1 MG/DL (ref 1.5–2.5)
MCH RBC QN AUTO: 32.9 PG (ref 27–33)
MCHC RBC AUTO-ENTMCNC: 35.1 G/DL (ref 32.2–35.5)
MCV RBC AUTO: 93.9 FL (ref 81.4–97.8)
MICRO URNS: ABNORMAL
MONOCYTES # BLD AUTO: 0.88 K/UL (ref 0–0.85)
MONOCYTES NFR BLD AUTO: 5.7 % (ref 0–13.4)
NEUTROPHILS # BLD AUTO: 14.11 K/UL (ref 1.82–7.42)
NEUTROPHILS NFR BLD: 90.6 % (ref 44–72)
NITRITE UR QL STRIP.AUTO: NEGATIVE
NRBC # BLD AUTO: 0 K/UL
NRBC BLD-RTO: 0 /100 WBC (ref 0–0.2)
PH UR STRIP.AUTO: 6 [PH] (ref 5–8)
PLATELET # BLD AUTO: 241 K/UL (ref 164–446)
PMV BLD AUTO: 9.7 FL (ref 9–12.9)
POTASSIUM SERPL-SCNC: 3.9 MMOL/L (ref 3.6–5.5)
PROT SERPL-MCNC: 6.5 G/DL (ref 6–8.2)
PROT UR QL STRIP: 100 MG/DL
RBC # BLD AUTO: 4.07 M/UL (ref 4.2–5.4)
RBC # URNS HPF: >150 /HPF
RBC UR QL AUTO: ABNORMAL
SODIUM SERPL-SCNC: 135 MMOL/L (ref 135–145)
SP GR UR STRIP.AUTO: 1.02
UROBILINOGEN UR STRIP.AUTO-MCNC: 2 MG/DL
WBC # BLD AUTO: 15.6 K/UL (ref 4.8–10.8)
WBC #/AREA URNS HPF: ABNORMAL /HPF

## 2024-10-17 PROCEDURE — C1769 GUIDE WIRE: HCPCS | Performed by: UROLOGY

## 2024-10-17 PROCEDURE — 93005 ELECTROCARDIOGRAM TRACING: CPT | Performed by: ANESTHESIOLOGY

## 2024-10-17 PROCEDURE — 700101 HCHG RX REV CODE 250

## 2024-10-17 PROCEDURE — 700105 HCHG RX REV CODE 258

## 2024-10-17 PROCEDURE — A9270 NON-COVERED ITEM OR SERVICE: HCPCS

## 2024-10-17 PROCEDURE — 700111 HCHG RX REV CODE 636 W/ 250 OVERRIDE (IP): Performed by: STUDENT IN AN ORGANIZED HEALTH CARE EDUCATION/TRAINING PROGRAM

## 2024-10-17 PROCEDURE — 700117 HCHG RX CONTRAST REV CODE 255: Performed by: UROLOGY

## 2024-10-17 PROCEDURE — 99223 1ST HOSP IP/OBS HIGH 75: CPT | Mod: 25,AI

## 2024-10-17 PROCEDURE — 81001 URINALYSIS AUTO W/SCOPE: CPT

## 2024-10-17 PROCEDURE — 85025 COMPLETE CBC W/AUTO DIFF WBC: CPT

## 2024-10-17 PROCEDURE — C2617 STENT, NON-COR, TEM W/O DEL: HCPCS | Performed by: UROLOGY

## 2024-10-17 PROCEDURE — 160009 HCHG ANES TIME/MIN: Performed by: UROLOGY

## 2024-10-17 PROCEDURE — 0TBB8ZZ EXCISION OF BLADDER, VIA NATURAL OR ARTIFICIAL OPENING ENDOSCOPIC: ICD-10-PCS | Performed by: UROLOGY

## 2024-10-17 PROCEDURE — 36415 COLL VENOUS BLD VENIPUNCTURE: CPT

## 2024-10-17 PROCEDURE — 160048 HCHG OR STATISTICAL LEVEL 1-5: Performed by: UROLOGY

## 2024-10-17 PROCEDURE — 160035 HCHG PACU - 1ST 60 MINS PHASE I: Performed by: UROLOGY

## 2024-10-17 PROCEDURE — 700102 HCHG RX REV CODE 250 W/ 637 OVERRIDE(OP)

## 2024-10-17 PROCEDURE — 0T778DZ DILATION OF LEFT URETER WITH INTRALUMINAL DEVICE, VIA NATURAL OR ARTIFICIAL OPENING ENDOSCOPIC: ICD-10-PCS | Performed by: UROLOGY

## 2024-10-17 PROCEDURE — 770004 HCHG ROOM/CARE - ONCOLOGY PRIVATE *

## 2024-10-17 PROCEDURE — 83735 ASSAY OF MAGNESIUM: CPT

## 2024-10-17 PROCEDURE — 80053 COMPREHEN METABOLIC PANEL: CPT

## 2024-10-17 PROCEDURE — 160002 HCHG RECOVERY MINUTES (STAT): Performed by: UROLOGY

## 2024-10-17 PROCEDURE — C1747 HCHG SHELL REV 278 C1747: HCPCS | Performed by: UROLOGY

## 2024-10-17 PROCEDURE — 700101 HCHG RX REV CODE 250: Performed by: STUDENT IN AN ORGANIZED HEALTH CARE EDUCATION/TRAINING PROGRAM

## 2024-10-17 PROCEDURE — 93010 ELECTROCARDIOGRAM REPORT: CPT | Performed by: INTERNAL MEDICINE

## 2024-10-17 PROCEDURE — 74018 RADEX ABDOMEN 1 VIEW: CPT

## 2024-10-17 PROCEDURE — 160028 HCHG SURGERY MINUTES - 1ST 30 MINS LEVEL 3: Performed by: UROLOGY

## 2024-10-17 PROCEDURE — 160039 HCHG SURGERY MINUTES - EA ADDL 1 MIN LEVEL 3: Performed by: UROLOGY

## 2024-10-17 PROCEDURE — 700111 HCHG RX REV CODE 636 W/ 250 OVERRIDE (IP)

## 2024-10-17 PROCEDURE — 87086 URINE CULTURE/COLONY COUNT: CPT

## 2024-10-17 PROCEDURE — 99406 BEHAV CHNG SMOKING 3-10 MIN: CPT

## 2024-10-17 DEVICE — STENT UROLOGICAL POLARIS 6X26 ULTRA: Type: IMPLANTABLE DEVICE | Site: BLADDER | Status: FUNCTIONAL

## 2024-10-17 RX ORDER — ASPIRIN 81 MG/1
81 TABLET ORAL
Status: DISCONTINUED | OUTPATIENT
Start: 2024-10-17 | End: 2024-10-19 | Stop reason: HOSPADM

## 2024-10-17 RX ORDER — ACETAMINOPHEN 325 MG/1
650 TABLET ORAL EVERY 6 HOURS PRN
Status: DISCONTINUED | OUTPATIENT
Start: 2024-10-17 | End: 2024-10-19 | Stop reason: HOSPADM

## 2024-10-17 RX ORDER — ONDANSETRON 2 MG/ML
INJECTION INTRAMUSCULAR; INTRAVENOUS PRN
Status: DISCONTINUED | OUTPATIENT
Start: 2024-10-17 | End: 2024-10-17 | Stop reason: SURG

## 2024-10-17 RX ORDER — HYDROMORPHONE HYDROCHLORIDE 1 MG/ML
0.1 INJECTION, SOLUTION INTRAMUSCULAR; INTRAVENOUS; SUBCUTANEOUS
Status: DISCONTINUED | OUTPATIENT
Start: 2024-10-17 | End: 2024-10-17 | Stop reason: HOSPADM

## 2024-10-17 RX ORDER — VENLAFAXINE HYDROCHLORIDE 75 MG/1
75 CAPSULE, EXTENDED RELEASE ORAL DAILY
Status: DISCONTINUED | OUTPATIENT
Start: 2024-10-17 | End: 2024-10-19 | Stop reason: HOSPADM

## 2024-10-17 RX ORDER — ONDANSETRON 2 MG/ML
4 INJECTION INTRAMUSCULAR; INTRAVENOUS
Status: DISCONTINUED | OUTPATIENT
Start: 2024-10-17 | End: 2024-10-17 | Stop reason: HOSPADM

## 2024-10-17 RX ORDER — DIPHENHYDRAMINE HYDROCHLORIDE 50 MG/ML
12.5 INJECTION INTRAMUSCULAR; INTRAVENOUS
Status: DISCONTINUED | OUTPATIENT
Start: 2024-10-17 | End: 2024-10-17 | Stop reason: HOSPADM

## 2024-10-17 RX ORDER — METOPROLOL SUCCINATE 50 MG/1
50 TABLET, EXTENDED RELEASE ORAL EVERY EVENING
Status: DISCONTINUED | OUTPATIENT
Start: 2024-10-17 | End: 2024-10-19 | Stop reason: HOSPADM

## 2024-10-17 RX ORDER — HYDROMORPHONE HYDROCHLORIDE 1 MG/ML
0.2 INJECTION, SOLUTION INTRAMUSCULAR; INTRAVENOUS; SUBCUTANEOUS
Status: DISCONTINUED | OUTPATIENT
Start: 2024-10-17 | End: 2024-10-17 | Stop reason: HOSPADM

## 2024-10-17 RX ORDER — HYDROMORPHONE HYDROCHLORIDE 1 MG/ML
0.4 INJECTION, SOLUTION INTRAMUSCULAR; INTRAVENOUS; SUBCUTANEOUS
Status: DISCONTINUED | OUTPATIENT
Start: 2024-10-17 | End: 2024-10-17 | Stop reason: HOSPADM

## 2024-10-17 RX ORDER — BUSPIRONE HYDROCHLORIDE 10 MG/1
7.5 TABLET ORAL 3 TIMES DAILY
Status: DISCONTINUED | OUTPATIENT
Start: 2024-10-17 | End: 2024-10-19 | Stop reason: HOSPADM

## 2024-10-17 RX ORDER — HYDRALAZINE HYDROCHLORIDE 20 MG/ML
5 INJECTION INTRAMUSCULAR; INTRAVENOUS
Status: DISCONTINUED | OUTPATIENT
Start: 2024-10-17 | End: 2024-10-17 | Stop reason: HOSPADM

## 2024-10-17 RX ORDER — LISINOPRIL 10 MG/1
10 TABLET ORAL DAILY
Status: DISCONTINUED | OUTPATIENT
Start: 2024-10-17 | End: 2024-10-19 | Stop reason: HOSPADM

## 2024-10-17 RX ORDER — DEXAMETHASONE SODIUM PHOSPHATE 4 MG/ML
INJECTION, SOLUTION INTRA-ARTICULAR; INTRALESIONAL; INTRAMUSCULAR; INTRAVENOUS; SOFT TISSUE PRN
Status: DISCONTINUED | OUTPATIENT
Start: 2024-10-17 | End: 2024-10-17 | Stop reason: SURG

## 2024-10-17 RX ORDER — HALOPERIDOL 5 MG/ML
1 INJECTION INTRAMUSCULAR
Status: DISCONTINUED | OUTPATIENT
Start: 2024-10-17 | End: 2024-10-17 | Stop reason: HOSPADM

## 2024-10-17 RX ORDER — OXYCODONE HYDROCHLORIDE 10 MG/1
10 TABLET ORAL
Status: DISCONTINUED | OUTPATIENT
Start: 2024-10-17 | End: 2024-10-19 | Stop reason: HOSPADM

## 2024-10-17 RX ORDER — LABETALOL HYDROCHLORIDE 5 MG/ML
5 INJECTION, SOLUTION INTRAVENOUS
Status: DISCONTINUED | OUTPATIENT
Start: 2024-10-17 | End: 2024-10-17 | Stop reason: HOSPADM

## 2024-10-17 RX ORDER — LEVOTHYROXINE SODIUM 88 UG/1
88 TABLET ORAL
Status: DISCONTINUED | OUTPATIENT
Start: 2024-10-17 | End: 2024-10-19 | Stop reason: HOSPADM

## 2024-10-17 RX ORDER — OXYCODONE HCL 5 MG/5 ML
10 SOLUTION, ORAL ORAL
Status: DISCONTINUED | OUTPATIENT
Start: 2024-10-17 | End: 2024-10-17 | Stop reason: HOSPADM

## 2024-10-17 RX ORDER — LIDOCAINE HYDROCHLORIDE 20 MG/ML
INJECTION, SOLUTION EPIDURAL; INFILTRATION; INTRACAUDAL; PERINEURAL PRN
Status: DISCONTINUED | OUTPATIENT
Start: 2024-10-17 | End: 2024-10-17 | Stop reason: SURG

## 2024-10-17 RX ORDER — ATORVASTATIN CALCIUM 10 MG/1
10 TABLET, FILM COATED ORAL NIGHTLY
Status: DISCONTINUED | OUTPATIENT
Start: 2024-10-17 | End: 2024-10-19 | Stop reason: HOSPADM

## 2024-10-17 RX ORDER — OXYCODONE HCL 5 MG/5 ML
5 SOLUTION, ORAL ORAL
Status: DISCONTINUED | OUTPATIENT
Start: 2024-10-17 | End: 2024-10-17 | Stop reason: HOSPADM

## 2024-10-17 RX ORDER — OXYCODONE HYDROCHLORIDE 5 MG/1
5 TABLET ORAL
Status: DISCONTINUED | OUTPATIENT
Start: 2024-10-17 | End: 2024-10-19 | Stop reason: HOSPADM

## 2024-10-17 RX ORDER — POLYETHYLENE GLYCOL 3350 17 G/17G
1 POWDER, FOR SOLUTION ORAL
Status: DISCONTINUED | OUTPATIENT
Start: 2024-10-17 | End: 2024-10-19 | Stop reason: HOSPADM

## 2024-10-17 RX ORDER — AMOXICILLIN 250 MG
2 CAPSULE ORAL EVERY EVENING
Status: DISCONTINUED | OUTPATIENT
Start: 2024-10-17 | End: 2024-10-19 | Stop reason: HOSPADM

## 2024-10-17 RX ORDER — SPIRONOLACTONE 25 MG/1
12.5 TABLET ORAL DAILY
Status: DISCONTINUED | OUTPATIENT
Start: 2024-10-17 | End: 2024-10-19 | Stop reason: HOSPADM

## 2024-10-17 RX ORDER — ONDANSETRON 2 MG/ML
4 INJECTION INTRAMUSCULAR; INTRAVENOUS EVERY 4 HOURS PRN
Status: DISCONTINUED | OUTPATIENT
Start: 2024-10-17 | End: 2024-10-19 | Stop reason: HOSPADM

## 2024-10-17 RX ORDER — NICOTINE 21 MG/24HR
21 PATCH, TRANSDERMAL 24 HOURS TRANSDERMAL
Status: DISCONTINUED | OUTPATIENT
Start: 2024-10-17 | End: 2024-10-19 | Stop reason: HOSPADM

## 2024-10-17 RX ORDER — CEFAZOLIN SODIUM 1 G/3ML
INJECTION, POWDER, FOR SOLUTION INTRAMUSCULAR; INTRAVENOUS PRN
Status: DISCONTINUED | OUTPATIENT
Start: 2024-10-17 | End: 2024-10-17 | Stop reason: SURG

## 2024-10-17 RX ORDER — SODIUM CHLORIDE, SODIUM LACTATE, POTASSIUM CHLORIDE, CALCIUM CHLORIDE 600; 310; 30; 20 MG/100ML; MG/100ML; MG/100ML; MG/100ML
INJECTION, SOLUTION INTRAVENOUS CONTINUOUS
Status: DISCONTINUED | OUTPATIENT
Start: 2024-10-17 | End: 2024-10-17 | Stop reason: HOSPADM

## 2024-10-17 RX ORDER — ALBUTEROL SULFATE 5 MG/ML
2.5 SOLUTION RESPIRATORY (INHALATION)
Status: DISCONTINUED | OUTPATIENT
Start: 2024-10-17 | End: 2024-10-17 | Stop reason: HOSPADM

## 2024-10-17 RX ORDER — SODIUM CHLORIDE, SODIUM LACTATE, POTASSIUM CHLORIDE, CALCIUM CHLORIDE 600; 310; 30; 20 MG/100ML; MG/100ML; MG/100ML; MG/100ML
INJECTION, SOLUTION INTRAVENOUS CONTINUOUS
Status: DISCONTINUED | OUTPATIENT
Start: 2024-10-17 | End: 2024-10-18

## 2024-10-17 RX ORDER — HYDROMORPHONE HYDROCHLORIDE 1 MG/ML
0.5 INJECTION, SOLUTION INTRAMUSCULAR; INTRAVENOUS; SUBCUTANEOUS
Status: DISCONTINUED | OUTPATIENT
Start: 2024-10-17 | End: 2024-10-19 | Stop reason: HOSPADM

## 2024-10-17 RX ORDER — PHENYLEPHRINE HCL IN 0.9% NACL 1 MG/10 ML
SYRINGE (ML) INTRAVENOUS PRN
Status: DISCONTINUED | OUTPATIENT
Start: 2024-10-17 | End: 2024-10-17 | Stop reason: SURG

## 2024-10-17 RX ORDER — ONDANSETRON 4 MG/1
4 TABLET, ORALLY DISINTEGRATING ORAL EVERY 4 HOURS PRN
Status: DISCONTINUED | OUTPATIENT
Start: 2024-10-17 | End: 2024-10-19 | Stop reason: HOSPADM

## 2024-10-17 RX ADMIN — ACETAMINOPHEN 650 MG: 325 TABLET ORAL at 19:08

## 2024-10-17 RX ADMIN — Medication 100 MCG: at 17:11

## 2024-10-17 RX ADMIN — LIDOCAINE HYDROCHLORIDE 100 MG: 20 INJECTION, SOLUTION EPIDURAL; INFILTRATION; INTRACAUDAL at 16:51

## 2024-10-17 RX ADMIN — PROPOFOL 150 MG: 10 INJECTION, EMULSION INTRAVENOUS at 16:51

## 2024-10-17 RX ADMIN — ATORVASTATIN CALCIUM 10 MG: 10 TABLET, FILM COATED ORAL at 20:41

## 2024-10-17 RX ADMIN — NICOTINE TRANSDERMAL SYSTEM 21 MG: 21 PATCH, EXTENDED RELEASE TRANSDERMAL at 06:13

## 2024-10-17 RX ADMIN — Medication 100 MCG: at 17:16

## 2024-10-17 RX ADMIN — CEFAZOLIN 2 G: 1 INJECTION, POWDER, FOR SOLUTION INTRAMUSCULAR; INTRAVENOUS at 16:53

## 2024-10-17 RX ADMIN — SODIUM CHLORIDE, POTASSIUM CHLORIDE, SODIUM LACTATE AND CALCIUM CHLORIDE: 600; 310; 30; 20 INJECTION, SOLUTION INTRAVENOUS at 16:41

## 2024-10-17 RX ADMIN — FENTANYL CITRATE 50 MCG: 50 INJECTION, SOLUTION INTRAMUSCULAR; INTRAVENOUS at 17:48

## 2024-10-17 RX ADMIN — BUSPIRONE HYDROCHLORIDE 7.5 MG: 10 TABLET ORAL at 20:41

## 2024-10-17 RX ADMIN — ACETAMINOPHEN 650 MG: 325 TABLET ORAL at 12:45

## 2024-10-17 RX ADMIN — Medication 100 MCG: at 17:23

## 2024-10-17 RX ADMIN — ONDANSETRON 4 MG: 2 INJECTION INTRAMUSCULAR; INTRAVENOUS at 16:54

## 2024-10-17 RX ADMIN — DEXAMETHASONE SODIUM PHOSPHATE 4 MG: 4 INJECTION INTRA-ARTICULAR; INTRALESIONAL; INTRAMUSCULAR; INTRAVENOUS; SOFT TISSUE at 16:54

## 2024-10-17 RX ADMIN — CEFTRIAXONE SODIUM 2000 MG: 10 INJECTION, POWDER, FOR SOLUTION INTRAVENOUS at 06:28

## 2024-10-17 RX ADMIN — BUSPIRONE HYDROCHLORIDE 7.5 MG: 10 TABLET ORAL at 08:59

## 2024-10-17 RX ADMIN — FENTANYL CITRATE 75 MCG: 50 INJECTION, SOLUTION INTRAMUSCULAR; INTRAVENOUS at 17:43

## 2024-10-17 RX ADMIN — FENTANYL CITRATE 25 MCG: 50 INJECTION, SOLUTION INTRAMUSCULAR; INTRAVENOUS at 17:37

## 2024-10-17 RX ADMIN — SODIUM CHLORIDE, POTASSIUM CHLORIDE, SODIUM LACTATE AND CALCIUM CHLORIDE: 600; 310; 30; 20 INJECTION, SOLUTION INTRAVENOUS at 06:17

## 2024-10-17 RX ADMIN — OXYCODONE HYDROCHLORIDE 10 MG: 10 TABLET ORAL at 20:41

## 2024-10-17 RX ADMIN — FENTANYL CITRATE 100 MCG: 50 INJECTION, SOLUTION INTRAMUSCULAR; INTRAVENOUS at 16:57

## 2024-10-17 RX ADMIN — FENTANYL CITRATE 50 MCG: 50 INJECTION, SOLUTION INTRAMUSCULAR; INTRAVENOUS at 17:08

## 2024-10-17 RX ADMIN — ACETAMINOPHEN 650 MG: 325 TABLET ORAL at 06:22

## 2024-10-17 SDOH — ECONOMIC STABILITY: TRANSPORTATION INSECURITY
IN THE PAST 12 MONTHS, HAS LACK OF RELIABLE TRANSPORTATION KEPT YOU FROM MEDICAL APPOINTMENTS, MEETINGS, WORK OR FROM GETTING THINGS NEEDED FOR DAILY LIVING?: NO

## 2024-10-17 SDOH — ECONOMIC STABILITY: TRANSPORTATION INSECURITY
IN THE PAST 12 MONTHS, HAS THE LACK OF TRANSPORTATION KEPT YOU FROM MEDICAL APPOINTMENTS OR FROM GETTING MEDICATIONS?: NO

## 2024-10-17 ASSESSMENT — ENCOUNTER SYMPTOMS
BACK PAIN: 1
PALPITATIONS: 0
NAUSEA: 0
CONSTIPATION: 0
ABDOMINAL PAIN: 0
WHEEZING: 0
SORE THROAT: 0
DIARRHEA: 0
ABDOMINAL PAIN: 1
CHILLS: 0
MYALGIAS: 0
VOMITING: 0
FEVER: 0
NAUSEA: 1
FLANK PAIN: 1
CHILLS: 1
SHORTNESS OF BREATH: 0
COUGH: 0

## 2024-10-17 ASSESSMENT — LIFESTYLE VARIABLES
DOES PATIENT WANT TO STOP DRINKING: YES
DOES PATIENT WANT TO TALK TO SOMEONE ABOUT QUITTING: YES
TOTAL SCORE: 3
ON A TYPICAL DAY WHEN YOU DRINK ALCOHOL HOW MANY DRINKS DO YOU HAVE: 6
AVERAGE NUMBER OF DAYS PER WEEK YOU HAVE A DRINK CONTAINING ALCOHOL: 5
HOW MANY TIMES IN THE PAST YEAR HAVE YOU HAD 5 OR MORE DRINKS IN A DAY: 365
CONSUMPTION TOTAL: POSITIVE
EVER FELT BAD OR GUILTY ABOUT YOUR DRINKING: YES
TOTAL SCORE: 3
TOTAL SCORE: 3
EVER HAD A DRINK FIRST THING IN THE MORNING TO STEADY YOUR NERVES TO GET RID OF A HANGOVER: NO
HAVE PEOPLE ANNOYED YOU BY CRITICIZING YOUR DRINKING: YES
ALCOHOL_USE: YES
HAVE YOU EVER FELT YOU SHOULD CUT DOWN ON YOUR DRINKING: YES

## 2024-10-17 ASSESSMENT — COGNITIVE AND FUNCTIONAL STATUS - GENERAL
SUGGESTED CMS G CODE MODIFIER MOBILITY: CH
MOBILITY SCORE: 24
DAILY ACTIVITIY SCORE: 24
SUGGESTED CMS G CODE MODIFIER DAILY ACTIVITY: CH

## 2024-10-17 ASSESSMENT — PAIN DESCRIPTION - PAIN TYPE
TYPE: SURGICAL PAIN
TYPE: SURGICAL PAIN
TYPE: ACUTE PAIN

## 2024-10-17 ASSESSMENT — SOCIAL DETERMINANTS OF HEALTH (SDOH)
WITHIN THE LAST YEAR, HAVE YOU BEEN AFRAID OF YOUR PARTNER OR EX-PARTNER?: NO
IN THE PAST 12 MONTHS, HAS THE ELECTRIC, GAS, OIL, OR WATER COMPANY THREATENED TO SHUT OFF SERVICE IN YOUR HOME?: NO
WITHIN THE LAST YEAR, HAVE YOU BEEN KICKED, HIT, SLAPPED, OR OTHERWISE PHYSICALLY HURT BY YOUR PARTNER OR EX-PARTNER?: NO
WITHIN THE PAST 12 MONTHS, YOU WORRIED THAT YOUR FOOD WOULD RUN OUT BEFORE YOU GOT THE MONEY TO BUY MORE: NEVER TRUE
WITHIN THE PAST 12 MONTHS, THE FOOD YOU BOUGHT JUST DIDN'T LAST AND YOU DIDN'T HAVE MONEY TO GET MORE: NEVER TRUE
WITHIN THE LAST YEAR, HAVE TO BEEN RAPED OR FORCED TO HAVE ANY KIND OF SEXUAL ACTIVITY BY YOUR PARTNER OR EX-PARTNER?: NO
WITHIN THE LAST YEAR, HAVE YOU BEEN HUMILIATED OR EMOTIONALLY ABUSED IN OTHER WAYS BY YOUR PARTNER OR EX-PARTNER?: NO

## 2024-10-17 ASSESSMENT — FIBROSIS 4 INDEX: FIB4 SCORE: 1.360777932451254581

## 2024-10-18 LAB
ALBUMIN SERPL BCP-MCNC: 3 G/DL (ref 3.2–4.9)
ALBUMIN/GLOB SERPL: 0.8 G/DL
ALP SERPL-CCNC: 90 U/L (ref 30–99)
ALT SERPL-CCNC: 27 U/L (ref 2–50)
ANION GAP SERPL CALC-SCNC: 10 MMOL/L (ref 7–16)
AST SERPL-CCNC: 31 U/L (ref 12–45)
BASOPHILS # BLD AUTO: 0.1 % (ref 0–1.8)
BASOPHILS # BLD: 0.01 K/UL (ref 0–0.12)
BILIRUB SERPL-MCNC: 0.3 MG/DL (ref 0.1–1.5)
BUN SERPL-MCNC: 12 MG/DL (ref 8–22)
CALCIUM ALBUM COR SERPL-MCNC: 9.5 MG/DL (ref 8.5–10.5)
CALCIUM SERPL-MCNC: 8.7 MG/DL (ref 8.5–10.5)
CHLORIDE SERPL-SCNC: 104 MMOL/L (ref 96–112)
CO2 SERPL-SCNC: 20 MMOL/L (ref 20–33)
CREAT SERPL-MCNC: 0.88 MG/DL (ref 0.5–1.4)
EOSINOPHIL # BLD AUTO: 0 K/UL (ref 0–0.51)
EOSINOPHIL NFR BLD: 0 % (ref 0–6.9)
ERYTHROCYTE [DISTWIDTH] IN BLOOD BY AUTOMATED COUNT: 45.2 FL (ref 35.9–50)
GFR SERPLBLD CREATININE-BSD FMLA CKD-EPI: 73 ML/MIN/1.73 M 2
GLOBULIN SER CALC-MCNC: 3.7 G/DL (ref 1.9–3.5)
GLUCOSE SERPL-MCNC: 131 MG/DL (ref 65–99)
HCT VFR BLD AUTO: 39.5 % (ref 37–47)
HGB BLD-MCNC: 13.6 G/DL (ref 12–16)
IMM GRANULOCYTES # BLD AUTO: 0.11 K/UL (ref 0–0.11)
IMM GRANULOCYTES NFR BLD AUTO: 0.9 % (ref 0–0.9)
LYMPHOCYTES # BLD AUTO: 0.66 K/UL (ref 1–4.8)
LYMPHOCYTES NFR BLD: 5.7 % (ref 22–41)
MAGNESIUM SERPL-MCNC: 2.4 MG/DL (ref 1.5–2.5)
MCH RBC QN AUTO: 32.9 PG (ref 27–33)
MCHC RBC AUTO-ENTMCNC: 34.4 G/DL (ref 32.2–35.5)
MCV RBC AUTO: 95.4 FL (ref 81.4–97.8)
MONOCYTES # BLD AUTO: 0.75 K/UL (ref 0–0.85)
MONOCYTES NFR BLD AUTO: 6.4 % (ref 0–13.4)
NEUTROPHILS # BLD AUTO: 10.15 K/UL (ref 1.82–7.42)
NEUTROPHILS NFR BLD: 86.9 % (ref 44–72)
NRBC # BLD AUTO: 0 K/UL
NRBC BLD-RTO: 0 /100 WBC (ref 0–0.2)
PATHOLOGY CONSULT NOTE: NORMAL
PLATELET # BLD AUTO: 250 K/UL (ref 164–446)
PMV BLD AUTO: 10.1 FL (ref 9–12.9)
POTASSIUM SERPL-SCNC: 3.8 MMOL/L (ref 3.6–5.5)
PROT SERPL-MCNC: 6.7 G/DL (ref 6–8.2)
RBC # BLD AUTO: 4.14 M/UL (ref 4.2–5.4)
SODIUM SERPL-SCNC: 134 MMOL/L (ref 135–145)
WBC # BLD AUTO: 11.7 K/UL (ref 4.8–10.8)

## 2024-10-18 PROCEDURE — 36415 COLL VENOUS BLD VENIPUNCTURE: CPT

## 2024-10-18 PROCEDURE — A9270 NON-COVERED ITEM OR SERVICE: HCPCS | Performed by: HOSPITALIST

## 2024-10-18 PROCEDURE — 700102 HCHG RX REV CODE 250 W/ 637 OVERRIDE(OP): Performed by: HOSPITALIST

## 2024-10-18 PROCEDURE — 90662 IIV NO PRSV INCREASED AG IM: CPT | Performed by: HOSPITALIST

## 2024-10-18 PROCEDURE — 700111 HCHG RX REV CODE 636 W/ 250 OVERRIDE (IP)

## 2024-10-18 PROCEDURE — 700102 HCHG RX REV CODE 250 W/ 637 OVERRIDE(OP)

## 2024-10-18 PROCEDURE — 90471 IMMUNIZATION ADMIN: CPT

## 2024-10-18 PROCEDURE — 700101 HCHG RX REV CODE 250

## 2024-10-18 PROCEDURE — 700111 HCHG RX REV CODE 636 W/ 250 OVERRIDE (IP): Performed by: HOSPITALIST

## 2024-10-18 PROCEDURE — 80053 COMPREHEN METABOLIC PANEL: CPT

## 2024-10-18 PROCEDURE — 770004 HCHG ROOM/CARE - ONCOLOGY PRIVATE *

## 2024-10-18 PROCEDURE — 85025 COMPLETE CBC W/AUTO DIFF WBC: CPT

## 2024-10-18 PROCEDURE — A9270 NON-COVERED ITEM OR SERVICE: HCPCS

## 2024-10-18 PROCEDURE — 83735 ASSAY OF MAGNESIUM: CPT

## 2024-10-18 PROCEDURE — 99233 SBSQ HOSP IP/OBS HIGH 50: CPT | Performed by: HOSPITALIST

## 2024-10-18 RX ORDER — TAMSULOSIN HYDROCHLORIDE 0.4 MG/1
0.4 CAPSULE ORAL
Status: DISCONTINUED | OUTPATIENT
Start: 2024-10-18 | End: 2024-10-19 | Stop reason: HOSPADM

## 2024-10-18 RX ADMIN — LEVOTHYROXINE SODIUM 88 MCG: 0.09 TABLET ORAL at 06:41

## 2024-10-18 RX ADMIN — ACETAMINOPHEN 650 MG: 325 TABLET ORAL at 14:33

## 2024-10-18 RX ADMIN — ACETAMINOPHEN 650 MG: 325 TABLET ORAL at 03:54

## 2024-10-18 RX ADMIN — SPIRONOLACTONE 12.5 MG: 25 TABLET ORAL at 06:41

## 2024-10-18 RX ADMIN — BUSPIRONE HYDROCHLORIDE 7.5 MG: 10 TABLET ORAL at 08:09

## 2024-10-18 RX ADMIN — INFLUENZA A VIRUS A/VICTORIA/4897/2022 IVR-238 (H1N1) ANTIGEN (FORMALDEHYDE INACTIVATED), INFLUENZA A VIRUS A/CALIFORNIA/122/2022 SAN-022 (H3N2) ANTIGEN (FORMALDEHYDE INACTIVATED), AND INFLUENZA B VIRUS B/MICHIGAN/01/2021 ANTIGEN (FORMALDEHYDE INACTIVATED) 0.5 ML: 60; 60; 60 INJECTION, SUSPENSION INTRAMUSCULAR at 14:45

## 2024-10-18 RX ADMIN — TAMSULOSIN HYDROCHLORIDE 0.4 MG: 0.4 CAPSULE ORAL at 14:35

## 2024-10-18 RX ADMIN — SENNOSIDES AND DOCUSATE SODIUM 2 TABLET: 50; 8.6 TABLET ORAL at 17:36

## 2024-10-18 RX ADMIN — CEFTRIAXONE SODIUM 2000 MG: 10 INJECTION, POWDER, FOR SOLUTION INTRAVENOUS at 11:00

## 2024-10-18 RX ADMIN — VENLAFAXINE HYDROCHLORIDE 75 MG: 75 CAPSULE, EXTENDED RELEASE ORAL at 06:42

## 2024-10-18 RX ADMIN — BUSPIRONE HYDROCHLORIDE 7.5 MG: 10 TABLET ORAL at 14:33

## 2024-10-18 RX ADMIN — BUSPIRONE HYDROCHLORIDE 7.5 MG: 10 TABLET ORAL at 21:28

## 2024-10-18 RX ADMIN — LISINOPRIL 10 MG: 10 TABLET ORAL at 06:42

## 2024-10-18 RX ADMIN — METOPROLOL SUCCINATE 50 MG: 50 TABLET, EXTENDED RELEASE ORAL at 17:40

## 2024-10-18 RX ADMIN — ATORVASTATIN CALCIUM 10 MG: 10 TABLET, FILM COATED ORAL at 21:28

## 2024-10-18 RX ADMIN — NICOTINE TRANSDERMAL SYSTEM 21 MG: 21 PATCH, EXTENDED RELEASE TRANSDERMAL at 06:41

## 2024-10-18 RX ADMIN — OMEPRAZOLE 40 MG: 20 CAPSULE, DELAYED RELEASE ORAL at 17:35

## 2024-10-18 RX ADMIN — OMEPRAZOLE 40 MG: 20 CAPSULE, DELAYED RELEASE ORAL at 06:47

## 2024-10-18 ASSESSMENT — ENCOUNTER SYMPTOMS
DOUBLE VISION: 0
HEMOPTYSIS: 0
COUGH: 0
MYALGIAS: 0
HEADACHES: 0
VOMITING: 0
CHILLS: 0
BLURRED VISION: 0
BRUISES/BLEEDS EASILY: 0
DEPRESSION: 0
PND: 0
DIZZINESS: 0
WHEEZING: 0
PALPITATIONS: 0
CLAUDICATION: 0
FEVER: 0
HEARTBURN: 0
BACK PAIN: 0
NAUSEA: 0

## 2024-10-18 ASSESSMENT — COGNITIVE AND FUNCTIONAL STATUS - GENERAL
SUGGESTED CMS G CODE MODIFIER DAILY ACTIVITY: CH
SUGGESTED CMS G CODE MODIFIER MOBILITY: CH
MOBILITY SCORE: 24
DAILY ACTIVITIY SCORE: 24

## 2024-10-18 ASSESSMENT — PAIN DESCRIPTION - PAIN TYPE
TYPE: ACUTE PAIN

## 2024-10-18 ASSESSMENT — PAIN SCALES - GENERAL: PAIN_LEVEL: 0

## 2024-10-19 ENCOUNTER — PHARMACY VISIT (OUTPATIENT)
Dept: PHARMACY | Facility: MEDICAL CENTER | Age: 64
End: 2024-10-19
Payer: COMMERCIAL

## 2024-10-19 VITALS
OXYGEN SATURATION: 92 % | DIASTOLIC BLOOD PRESSURE: 72 MMHG | WEIGHT: 185.63 LBS | BODY MASS INDEX: 29.83 KG/M2 | HEIGHT: 66 IN | SYSTOLIC BLOOD PRESSURE: 125 MMHG | HEART RATE: 60 BPM | TEMPERATURE: 98.1 F | RESPIRATION RATE: 18 BRPM

## 2024-10-19 PROBLEM — N12 PYELONEPHRITIS: Status: RESOLVED | Noted: 2024-10-17 | Resolved: 2024-10-19

## 2024-10-19 LAB
ANION GAP SERPL CALC-SCNC: 12 MMOL/L (ref 7–16)
BACTERIA UR CULT: NORMAL
BUN SERPL-MCNC: 11 MG/DL (ref 8–22)
CALCIUM SERPL-MCNC: 8.6 MG/DL (ref 8.5–10.5)
CHLORIDE SERPL-SCNC: 105 MMOL/L (ref 96–112)
CO2 SERPL-SCNC: 19 MMOL/L (ref 20–33)
CREAT SERPL-MCNC: 0.65 MG/DL (ref 0.5–1.4)
ERYTHROCYTE [DISTWIDTH] IN BLOOD BY AUTOMATED COUNT: 45.7 FL (ref 35.9–50)
GFR SERPLBLD CREATININE-BSD FMLA CKD-EPI: 97 ML/MIN/1.73 M 2
GLUCOSE SERPL-MCNC: 92 MG/DL (ref 65–99)
HCT VFR BLD AUTO: 37 % (ref 37–47)
HGB BLD-MCNC: 12.7 G/DL (ref 12–16)
MCH RBC QN AUTO: 32.8 PG (ref 27–33)
MCHC RBC AUTO-ENTMCNC: 34.3 G/DL (ref 32.2–35.5)
MCV RBC AUTO: 95.6 FL (ref 81.4–97.8)
PLATELET # BLD AUTO: 263 K/UL (ref 164–446)
PMV BLD AUTO: 10.4 FL (ref 9–12.9)
POTASSIUM SERPL-SCNC: 3.9 MMOL/L (ref 3.6–5.5)
RBC # BLD AUTO: 3.87 M/UL (ref 4.2–5.4)
SIGNIFICANT IND 70042: NORMAL
SITE SITE: NORMAL
SODIUM SERPL-SCNC: 136 MMOL/L (ref 135–145)
SOURCE SOURCE: NORMAL
WBC # BLD AUTO: 8.2 K/UL (ref 4.8–10.8)

## 2024-10-19 PROCEDURE — 85027 COMPLETE CBC AUTOMATED: CPT

## 2024-10-19 PROCEDURE — 80048 BASIC METABOLIC PNL TOTAL CA: CPT

## 2024-10-19 PROCEDURE — 3E02340 INTRODUCTION OF INFLUENZA VACCINE INTO MUSCLE, PERCUTANEOUS APPROACH: ICD-10-PCS | Performed by: HOSPITALIST

## 2024-10-19 PROCEDURE — A9270 NON-COVERED ITEM OR SERVICE: HCPCS | Performed by: HOSPITALIST

## 2024-10-19 PROCEDURE — A9270 NON-COVERED ITEM OR SERVICE: HCPCS

## 2024-10-19 PROCEDURE — 36415 COLL VENOUS BLD VENIPUNCTURE: CPT

## 2024-10-19 PROCEDURE — 700101 HCHG RX REV CODE 250

## 2024-10-19 PROCEDURE — 99239 HOSP IP/OBS DSCHRG MGMT >30: CPT | Performed by: HOSPITALIST

## 2024-10-19 PROCEDURE — 700102 HCHG RX REV CODE 250 W/ 637 OVERRIDE(OP)

## 2024-10-19 PROCEDURE — 700102 HCHG RX REV CODE 250 W/ 637 OVERRIDE(OP): Performed by: HOSPITALIST

## 2024-10-19 PROCEDURE — 700111 HCHG RX REV CODE 636 W/ 250 OVERRIDE (IP)

## 2024-10-19 PROCEDURE — RXMED WILLOW AMBULATORY MEDICATION CHARGE: Performed by: HOSPITALIST

## 2024-10-19 RX ORDER — ACETAMINOPHEN 500 MG
500 TABLET ORAL EVERY 6 HOURS PRN
COMMUNITY
Start: 2024-10-19

## 2024-10-19 RX ORDER — SULFAMETHOXAZOLE AND TRIMETHOPRIM 800; 160 MG/1; MG/1
1 TABLET ORAL 2 TIMES DAILY
Qty: 14 TABLET | Refills: 0 | Status: ACTIVE | OUTPATIENT
Start: 2024-10-19 | End: 2024-10-26

## 2024-10-19 RX ADMIN — SPIRONOLACTONE 12.5 MG: 25 TABLET ORAL at 05:57

## 2024-10-19 RX ADMIN — CEFTRIAXONE SODIUM 2000 MG: 10 INJECTION, POWDER, FOR SOLUTION INTRAVENOUS at 11:02

## 2024-10-19 RX ADMIN — BUSPIRONE HYDROCHLORIDE 7.5 MG: 10 TABLET ORAL at 08:18

## 2024-10-19 RX ADMIN — TAMSULOSIN HYDROCHLORIDE 0.4 MG: 0.4 CAPSULE ORAL at 08:18

## 2024-10-19 RX ADMIN — VENLAFAXINE HYDROCHLORIDE 75 MG: 75 CAPSULE, EXTENDED RELEASE ORAL at 05:57

## 2024-10-19 RX ADMIN — LISINOPRIL 10 MG: 10 TABLET ORAL at 05:56

## 2024-10-19 RX ADMIN — OMEPRAZOLE 40 MG: 20 CAPSULE, DELAYED RELEASE ORAL at 05:57

## 2024-10-19 RX ADMIN — ASPIRIN 81 MG: 81 TABLET, COATED ORAL at 05:57

## 2024-10-19 RX ADMIN — LEVOTHYROXINE SODIUM 88 MCG: 0.09 TABLET ORAL at 05:56

## 2024-10-19 ASSESSMENT — FIBROSIS 4 INDEX: FIB4 SCORE: 1.47

## 2024-12-25 ENCOUNTER — APPOINTMENT (OUTPATIENT)
Dept: RADIOLOGY | Facility: MEDICAL CENTER | Age: 64
End: 2024-12-25
Attending: PSYCHIATRY & NEUROLOGY
Payer: MEDICARE

## 2024-12-25 ENCOUNTER — HOSPITAL ENCOUNTER (INPATIENT)
Facility: MEDICAL CENTER | Age: 64
LOS: 5 days | End: 2024-12-30
Attending: STUDENT IN AN ORGANIZED HEALTH CARE EDUCATION/TRAINING PROGRAM | Admitting: INTERNAL MEDICINE
Payer: MEDICARE

## 2024-12-25 ENCOUNTER — APPOINTMENT (OUTPATIENT)
Dept: RADIOLOGY | Facility: MEDICAL CENTER | Age: 64
End: 2024-12-25
Attending: STUDENT IN AN ORGANIZED HEALTH CARE EDUCATION/TRAINING PROGRAM
Payer: MEDICARE

## 2024-12-25 ENCOUNTER — APPOINTMENT (OUTPATIENT)
Dept: RADIOLOGY | Facility: MEDICAL CENTER | Age: 64
End: 2024-12-25
Attending: NURSE PRACTITIONER
Payer: MEDICARE

## 2024-12-25 DIAGNOSIS — I63.9 ACUTE CVA (CEREBROVASCULAR ACCIDENT) (HCC): ICD-10-CM

## 2024-12-25 DIAGNOSIS — R29.898 RIGHT ARM WEAKNESS: ICD-10-CM

## 2024-12-25 DIAGNOSIS — I25.10 CORONARY ARTERY DISEASE DUE TO LIPID RICH PLAQUE: ICD-10-CM

## 2024-12-25 DIAGNOSIS — I25.83 CORONARY ARTERY DISEASE DUE TO LIPID RICH PLAQUE: ICD-10-CM

## 2024-12-25 DIAGNOSIS — R29.898 LEFT LEG WEAKNESS: ICD-10-CM

## 2024-12-25 DIAGNOSIS — I10 ESSENTIAL HYPERTENSION: ICD-10-CM

## 2024-12-25 DIAGNOSIS — R91.8 LUNG MASS: ICD-10-CM

## 2024-12-25 PROBLEM — D72.823 LEUKEMOID REACTION: Status: ACTIVE | Noted: 2021-08-29

## 2024-12-25 PROBLEM — E87.1 HYPONATREMIA: Status: ACTIVE | Noted: 2024-12-25

## 2024-12-25 LAB
ABO + RH BLD: NORMAL
ABO GROUP BLD: NORMAL
ALBUMIN SERPL BCP-MCNC: 3.8 G/DL (ref 3.2–4.9)
ALBUMIN/GLOB SERPL: 1.1 G/DL
ALP SERPL-CCNC: 68 U/L (ref 30–99)
ALT SERPL-CCNC: 14 U/L (ref 2–50)
AMPHET UR QL SCN: NEGATIVE
ANION GAP SERPL CALC-SCNC: 17 MMOL/L (ref 7–16)
APPEARANCE UR: CLEAR
APTT PPP: 29.6 SEC (ref 24.7–36)
AST SERPL-CCNC: 22 U/L (ref 12–45)
BACTERIA #/AREA URNS HPF: ABNORMAL /HPF
BARBITURATES UR QL SCN: NEGATIVE
BASOPHILS # BLD AUTO: 0.3 % (ref 0–1.8)
BASOPHILS # BLD: 0.06 K/UL (ref 0–0.12)
BENZODIAZ UR QL SCN: NEGATIVE
BILIRUB SERPL-MCNC: 0.5 MG/DL (ref 0.1–1.5)
BILIRUB UR QL STRIP.AUTO: NEGATIVE
BLD GP AB SCN SERPL QL: NORMAL
BUN SERPL-MCNC: 12 MG/DL (ref 8–22)
BZE UR QL SCN: NEGATIVE
CALCIUM ALBUM COR SERPL-MCNC: 8.7 MG/DL (ref 8.5–10.5)
CALCIUM SERPL-MCNC: 8.5 MG/DL (ref 8.5–10.5)
CANNABINOIDS UR QL SCN: POSITIVE
CASTS URNS QL MICRO: ABNORMAL /LPF (ref 0–2)
CHLORIDE SERPL-SCNC: 100 MMOL/L (ref 96–112)
CO2 SERPL-SCNC: 14 MMOL/L (ref 20–33)
COLOR UR: YELLOW
CREAT SERPL-MCNC: 1.14 MG/DL (ref 0.5–1.4)
EOSINOPHIL # BLD AUTO: 0.04 K/UL (ref 0–0.51)
EOSINOPHIL NFR BLD: 0.2 % (ref 0–6.9)
EPITHELIAL CELLS 1715: ABNORMAL /HPF (ref 0–5)
ERYTHROCYTE [DISTWIDTH] IN BLOOD BY AUTOMATED COUNT: 45.1 FL (ref 35.9–50)
FENTANYL UR QL: NEGATIVE
GFR SERPLBLD CREATININE-BSD FMLA CKD-EPI: 53 ML/MIN/1.73 M 2
GLOBULIN SER CALC-MCNC: 3.6 G/DL (ref 1.9–3.5)
GLUCOSE SERPL-MCNC: 182 MG/DL (ref 65–99)
GLUCOSE UR STRIP.AUTO-MCNC: NEGATIVE MG/DL
HCT VFR BLD AUTO: 42.2 % (ref 37–47)
HGB BLD-MCNC: 13.9 G/DL (ref 12–16)
HYALINE CAST   1831: PRESENT /LPF
IMM GRANULOCYTES # BLD AUTO: 0.17 K/UL (ref 0–0.11)
IMM GRANULOCYTES NFR BLD AUTO: 1 % (ref 0–0.9)
INR PPP: 0.96 (ref 0.87–1.13)
KETONES UR STRIP.AUTO-MCNC: NEGATIVE MG/DL
LEUKOCYTE ESTERASE UR QL STRIP.AUTO: ABNORMAL
LYMPHOCYTES # BLD AUTO: 1.55 K/UL (ref 1–4.8)
LYMPHOCYTES NFR BLD: 9 % (ref 22–41)
MCH RBC QN AUTO: 30.9 PG (ref 27–33)
MCHC RBC AUTO-ENTMCNC: 32.9 G/DL (ref 32.2–35.5)
MCV RBC AUTO: 93.8 FL (ref 81.4–97.8)
METHADONE UR QL SCN: NEGATIVE
MICRO URNS: ABNORMAL
MONOCYTES # BLD AUTO: 1.86 K/UL (ref 0–0.85)
MONOCYTES NFR BLD AUTO: 10.8 % (ref 0–13.4)
NEUTROPHILS # BLD AUTO: 13.49 K/UL (ref 1.82–7.42)
NEUTROPHILS NFR BLD: 78.7 % (ref 44–72)
NITRITE UR QL STRIP.AUTO: NEGATIVE
NRBC # BLD AUTO: 0 K/UL
NRBC BLD-RTO: 0 /100 WBC (ref 0–0.2)
OPIATES UR QL SCN: NEGATIVE
OXYCODONE UR QL SCN: NEGATIVE
PCP UR QL SCN: NEGATIVE
PH UR STRIP.AUTO: 7 [PH] (ref 5–8)
PLATELET # BLD AUTO: 244 K/UL (ref 164–446)
PMV BLD AUTO: 10 FL (ref 9–12.9)
POTASSIUM SERPL-SCNC: 3.6 MMOL/L (ref 3.6–5.5)
PROPOXYPH UR QL SCN: NEGATIVE
PROT SERPL-MCNC: 7.4 G/DL (ref 6–8.2)
PROT UR QL STRIP: NEGATIVE MG/DL
PROTHROMBIN TIME: 12.8 SEC (ref 12–14.6)
RBC # BLD AUTO: 4.5 M/UL (ref 4.2–5.4)
RBC # URNS HPF: ABNORMAL /HPF (ref 0–2)
RBC UR QL AUTO: ABNORMAL
RH BLD: NORMAL
SODIUM SERPL-SCNC: 131 MMOL/L (ref 135–145)
SP GR UR STRIP.AUTO: 1.02
TROPONIN T SERPL-MCNC: 10 NG/L (ref 6–19)
UROBILINOGEN UR STRIP.AUTO-MCNC: 0.2 EU/DL
WBC # BLD AUTO: 17.2 K/UL (ref 4.8–10.8)
WBC #/AREA URNS HPF: ABNORMAL /HPF

## 2024-12-25 PROCEDURE — 71045 X-RAY EXAM CHEST 1 VIEW: CPT

## 2024-12-25 PROCEDURE — 700111 HCHG RX REV CODE 636 W/ 250 OVERRIDE (IP): Mod: JZ | Performed by: NURSE PRACTITIONER

## 2024-12-25 PROCEDURE — 81001 URINALYSIS AUTO W/SCOPE: CPT

## 2024-12-25 PROCEDURE — 70498 CT ANGIOGRAPHY NECK: CPT

## 2024-12-25 PROCEDURE — 700101 HCHG RX REV CODE 250: Performed by: INTERNAL MEDICINE

## 2024-12-25 PROCEDURE — 700117 HCHG RX CONTRAST REV CODE 255: Performed by: PSYCHIATRY & NEUROLOGY

## 2024-12-25 PROCEDURE — 700111 HCHG RX REV CODE 636 W/ 250 OVERRIDE (IP): Mod: JG | Performed by: PSYCHIATRY & NEUROLOGY

## 2024-12-25 PROCEDURE — 80307 DRUG TEST PRSMV CHEM ANLYZR: CPT

## 2024-12-25 PROCEDURE — 85730 THROMBOPLASTIN TIME PARTIAL: CPT

## 2024-12-25 PROCEDURE — 36556 INSERT NON-TUNNEL CV CATH: CPT | Performed by: NURSE PRACTITIONER

## 2024-12-25 PROCEDURE — 99291 CRITICAL CARE FIRST HOUR: CPT | Performed by: PSYCHIATRY & NEUROLOGY

## 2024-12-25 PROCEDURE — 770022 HCHG ROOM/CARE - ICU (200)

## 2024-12-25 PROCEDURE — 700117 HCHG RX CONTRAST REV CODE 255: Performed by: STUDENT IN AN ORGANIZED HEALTH CARE EDUCATION/TRAINING PROGRAM

## 2024-12-25 PROCEDURE — 99291 CRITICAL CARE FIRST HOUR: CPT

## 2024-12-25 PROCEDURE — 84484 ASSAY OF TROPONIN QUANT: CPT

## 2024-12-25 PROCEDURE — 86900 BLOOD TYPING SEROLOGIC ABO: CPT

## 2024-12-25 PROCEDURE — 80053 COMPREHEN METABOLIC PANEL: CPT

## 2024-12-25 PROCEDURE — 86901 BLOOD TYPING SEROLOGIC RH(D): CPT

## 2024-12-25 PROCEDURE — 85610 PROTHROMBIN TIME: CPT

## 2024-12-25 PROCEDURE — 70450 CT HEAD/BRAIN W/O DYE: CPT

## 2024-12-25 PROCEDURE — 700105 HCHG RX REV CODE 258: Performed by: NURSE PRACTITIONER

## 2024-12-25 PROCEDURE — 0042T CT-CEREBRAL PERFUSION ANALYSIS: CPT

## 2024-12-25 PROCEDURE — 93005 ELECTROCARDIOGRAM TRACING: CPT | Mod: TC | Performed by: STUDENT IN AN ORGANIZED HEALTH CARE EDUCATION/TRAINING PROGRAM

## 2024-12-25 PROCEDURE — 02HV33Z INSERTION OF INFUSION DEVICE INTO SUPERIOR VENA CAVA, PERCUTANEOUS APPROACH: ICD-10-PCS | Performed by: NURSE PRACTITIONER

## 2024-12-25 PROCEDURE — C1751 CATH, INF, PER/CENT/MIDLINE: HCPCS

## 2024-12-25 PROCEDURE — 86850 RBC ANTIBODY SCREEN: CPT

## 2024-12-25 PROCEDURE — 99291 CRITICAL CARE FIRST HOUR: CPT | Performed by: INTERNAL MEDICINE

## 2024-12-25 PROCEDURE — 85025 COMPLETE CBC W/AUTO DIFF WBC: CPT

## 2024-12-25 PROCEDURE — 36415 COLL VENOUS BLD VENIPUNCTURE: CPT

## 2024-12-25 PROCEDURE — 37215 TRANSCATH STENT CCA W/EPS: CPT

## 2024-12-25 PROCEDURE — 37195 THROMBOLYTIC THERAPY STROKE: CPT

## 2024-12-25 PROCEDURE — 70496 CT ANGIOGRAPHY HEAD: CPT

## 2024-12-25 PROCEDURE — 700111 HCHG RX REV CODE 636 W/ 250 OVERRIDE (IP): Performed by: RADIOLOGY

## 2024-12-25 PROCEDURE — 700111 HCHG RX REV CODE 636 W/ 250 OVERRIDE (IP): Mod: JG

## 2024-12-25 PROCEDURE — 037L3DZ DILATION OF LEFT INTERNAL CAROTID ARTERY WITH INTRALUMINAL DEVICE, PERCUTANEOUS APPROACH: ICD-10-PCS | Performed by: RADIOLOGY

## 2024-12-25 PROCEDURE — 3E03317 INTRODUCTION OF OTHER THROMBOLYTIC INTO PERIPHERAL VEIN, PERCUTANEOUS APPROACH: ICD-10-PCS | Performed by: PSYCHIATRY & NEUROLOGY

## 2024-12-25 PROCEDURE — 36556 INSERT NON-TUNNEL CV CATH: CPT

## 2024-12-25 PROCEDURE — 82962 GLUCOSE BLOOD TEST: CPT

## 2024-12-25 PROCEDURE — 700105 HCHG RX REV CODE 258: Performed by: INTERNAL MEDICINE

## 2024-12-25 RX ORDER — EPTIFIBATIDE 2 MG/ML
180 INJECTION, SOLUTION INTRAVENOUS ONCE
Status: COMPLETED | OUTPATIENT
Start: 2024-12-25 | End: 2024-12-25

## 2024-12-25 RX ORDER — VENLAFAXINE HYDROCHLORIDE 75 MG/1
75 CAPSULE, EXTENDED RELEASE ORAL DAILY
Status: DISCONTINUED | OUTPATIENT
Start: 2024-12-26 | End: 2024-12-26

## 2024-12-25 RX ORDER — LEVOTHYROXINE SODIUM 88 UG/1
88 TABLET ORAL
Status: DISCONTINUED | OUTPATIENT
Start: 2024-12-26 | End: 2024-12-26

## 2024-12-25 RX ORDER — SODIUM CHLORIDE, SODIUM LACTATE, POTASSIUM CHLORIDE, CALCIUM CHLORIDE 600; 310; 30; 20 MG/100ML; MG/100ML; MG/100ML; MG/100ML
INJECTION, SOLUTION INTRAVENOUS CONTINUOUS
Status: DISCONTINUED | OUTPATIENT
Start: 2024-12-25 | End: 2024-12-26

## 2024-12-25 RX ORDER — DEXTROSE MONOHYDRATE 25 G/50ML
25 INJECTION, SOLUTION INTRAVENOUS
Status: DISCONTINUED | OUTPATIENT
Start: 2024-12-25 | End: 2024-12-26

## 2024-12-25 RX ORDER — MIDAZOLAM HYDROCHLORIDE 1 MG/ML
INJECTION INTRAMUSCULAR; INTRAVENOUS
Status: COMPLETED
Start: 2024-12-25 | End: 2024-12-25

## 2024-12-25 RX ORDER — HEPARIN SODIUM 1000 [USP'U]/ML
INJECTION, SOLUTION INTRAVENOUS; SUBCUTANEOUS
Status: COMPLETED
Start: 2024-12-25 | End: 2024-12-25

## 2024-12-25 RX ORDER — ATORVASTATIN CALCIUM 40 MG/1
80 TABLET, FILM COATED ORAL EVERY EVENING
Status: DISCONTINUED | OUTPATIENT
Start: 2024-12-25 | End: 2024-12-26

## 2024-12-25 RX ORDER — HYDRALAZINE HYDROCHLORIDE 20 MG/ML
10 INJECTION INTRAMUSCULAR; INTRAVENOUS
Status: DISCONTINUED | OUTPATIENT
Start: 2024-12-25 | End: 2024-12-25

## 2024-12-25 RX ORDER — POLYETHYLENE GLYCOL 3350 17 G/17G
1 POWDER, FOR SOLUTION ORAL
Status: DISCONTINUED | OUTPATIENT
Start: 2024-12-25 | End: 2024-12-26

## 2024-12-25 RX ORDER — HYDROMORPHONE HYDROCHLORIDE 1 MG/ML
0.5 INJECTION, SOLUTION INTRAMUSCULAR; INTRAVENOUS; SUBCUTANEOUS EVERY 4 HOURS PRN
Status: DISCONTINUED | OUTPATIENT
Start: 2024-12-25 | End: 2024-12-30 | Stop reason: HOSPADM

## 2024-12-25 RX ORDER — EPTIFIBATIDE 0.75 MG/ML
INJECTION, SOLUTION INTRAVENOUS
Status: COMPLETED
Start: 2024-12-25 | End: 2024-12-25

## 2024-12-25 RX ORDER — IPRATROPIUM BROMIDE AND ALBUTEROL SULFATE 2.5; .5 MG/3ML; MG/3ML
3 SOLUTION RESPIRATORY (INHALATION)
Status: DISCONTINUED | OUTPATIENT
Start: 2024-12-25 | End: 2024-12-30 | Stop reason: HOSPADM

## 2024-12-25 RX ORDER — ACETAMINOPHEN 325 MG/1
650 TABLET ORAL EVERY 6 HOURS PRN
Status: DISCONTINUED | OUTPATIENT
Start: 2024-12-25 | End: 2024-12-26

## 2024-12-25 RX ORDER — BUSPIRONE HYDROCHLORIDE 5 MG/1
7.5 TABLET ORAL 3 TIMES DAILY
Status: DISCONTINUED | OUTPATIENT
Start: 2024-12-25 | End: 2024-12-26

## 2024-12-25 RX ORDER — ONDANSETRON 4 MG/1
4 TABLET, ORALLY DISINTEGRATING ORAL EVERY 4 HOURS PRN
Status: DISCONTINUED | OUTPATIENT
Start: 2024-12-25 | End: 2024-12-26

## 2024-12-25 RX ORDER — NOREPINEPHRINE BITARTRATE 0.03 MG/ML
0-1 INJECTION, SOLUTION INTRAVENOUS CONTINUOUS
Status: DISCONTINUED | OUTPATIENT
Start: 2024-12-25 | End: 2024-12-28

## 2024-12-25 RX ORDER — ONDANSETRON 2 MG/ML
4 INJECTION INTRAMUSCULAR; INTRAVENOUS EVERY 4 HOURS PRN
Status: DISCONTINUED | OUTPATIENT
Start: 2024-12-25 | End: 2024-12-30 | Stop reason: HOSPADM

## 2024-12-25 RX ORDER — EPTIFIBATIDE 2 MG/ML
INJECTION, SOLUTION INTRAVENOUS
Status: COMPLETED
Start: 2024-12-25 | End: 2024-12-25

## 2024-12-25 RX ORDER — EPTIFIBATIDE 0.75 MG/ML
2 INJECTION, SOLUTION INTRAVENOUS CONTINUOUS
Status: DISCONTINUED | OUTPATIENT
Start: 2024-12-25 | End: 2024-12-26

## 2024-12-25 RX ORDER — LISINOPRIL 10 MG/1
10 TABLET ORAL DAILY
Status: DISCONTINUED | OUTPATIENT
Start: 2024-12-26 | End: 2024-12-28

## 2024-12-25 RX ORDER — PHENYLEPHRINE HCL IN 0.9% NACL 1 MG/10 ML
SYRINGE (ML) INTRAVENOUS
Status: DISPENSED
Start: 2024-12-25 | End: 2024-12-26

## 2024-12-25 RX ORDER — INSULIN LISPRO 100 [IU]/ML
1-6 INJECTION, SOLUTION INTRAVENOUS; SUBCUTANEOUS EVERY 6 HOURS
Status: DISCONTINUED | OUTPATIENT
Start: 2024-12-26 | End: 2024-12-26

## 2024-12-25 RX ORDER — HYDRALAZINE HYDROCHLORIDE 20 MG/ML
10 INJECTION INTRAMUSCULAR; INTRAVENOUS
Status: DISCONTINUED | OUTPATIENT
Start: 2024-12-25 | End: 2024-12-29

## 2024-12-25 RX ORDER — METOPROLOL SUCCINATE 50 MG/1
50 TABLET, EXTENDED RELEASE ORAL EVERY EVENING
Status: DISCONTINUED | OUTPATIENT
Start: 2024-12-25 | End: 2024-12-28

## 2024-12-25 RX ORDER — SODIUM CHLORIDE 9 MG/ML
INJECTION, SOLUTION INTRAVENOUS CONTINUOUS
Status: DISCONTINUED | OUTPATIENT
Start: 2024-12-25 | End: 2024-12-26

## 2024-12-25 RX ORDER — LABETALOL HYDROCHLORIDE 5 MG/ML
10 INJECTION, SOLUTION INTRAVENOUS
Status: DISCONTINUED | OUTPATIENT
Start: 2024-12-25 | End: 2024-12-25

## 2024-12-25 RX ORDER — HEPARIN SODIUM 1000 [USP'U]/ML
INJECTION, SOLUTION INTRAVENOUS; SUBCUTANEOUS
Status: COMPLETED | OUTPATIENT
Start: 2024-12-25 | End: 2024-12-25

## 2024-12-25 RX ORDER — AMOXICILLIN 250 MG
2 CAPSULE ORAL EVERY EVENING
Status: DISCONTINUED | OUTPATIENT
Start: 2024-12-25 | End: 2024-12-26

## 2024-12-25 RX ORDER — LABETALOL HYDROCHLORIDE 5 MG/ML
10 INJECTION, SOLUTION INTRAVENOUS
Status: DISCONTINUED | OUTPATIENT
Start: 2024-12-25 | End: 2024-12-29

## 2024-12-25 RX ADMIN — TENECTEPLASE 21 MG: KIT at 17:36

## 2024-12-25 RX ADMIN — IOHEXOL 80 ML: 350 INJECTION, SOLUTION INTRAVENOUS at 18:00

## 2024-12-25 RX ADMIN — IOHEXOL 70 ML: 300 INJECTION, SOLUTION INTRAVENOUS at 22:00

## 2024-12-25 RX ADMIN — EPTIFIBATIDE 12690 MCG: 2 INJECTION, SOLUTION INTRAVENOUS at 20:56

## 2024-12-25 RX ADMIN — IOHEXOL 40 ML: 350 INJECTION, SOLUTION INTRAVENOUS at 19:57

## 2024-12-25 RX ADMIN — IOHEXOL 40 ML: 350 INJECTION, SOLUTION INTRAVENOUS at 18:00

## 2024-12-25 RX ADMIN — IOHEXOL 60 ML: 350 INJECTION, SOLUTION INTRAVENOUS at 20:06

## 2024-12-25 RX ADMIN — EPTIFIBATIDE 12690 MCG: 2 INJECTION, SOLUTION INTRAVENOUS at 22:44

## 2024-12-25 RX ADMIN — CEFTRIAXONE SODIUM 1000 MG: 10 INJECTION, POWDER, FOR SOLUTION INTRAVENOUS at 23:29

## 2024-12-25 RX ADMIN — NOREPINEPHRINE BITARTRATE 0.05 MCG/KG/MIN: 1 INJECTION, SOLUTION, CONCENTRATE INTRAVENOUS at 20:05

## 2024-12-25 RX ADMIN — EPTIFIBATIDE 2 MCG/KG/MIN: 0.75 INJECTION, SOLUTION INTRAVENOUS at 20:45

## 2024-12-25 RX ADMIN — ATROPINE SULFATE 0.5 MG: 0.1 INJECTION, SOLUTION ENDOTRACHEAL; INTRAMUSCULAR; INTRAVENOUS; SUBCUTANEOUS at 20:56

## 2024-12-25 RX ADMIN — HYDROMORPHONE HYDROCHLORIDE 0.5 MG: 1 INJECTION, SOLUTION INTRAMUSCULAR; INTRAVENOUS; SUBCUTANEOUS at 22:19

## 2024-12-25 RX ADMIN — HEPARIN SODIUM 5000 UNITS: 1000 INJECTION, SOLUTION INTRAVENOUS; SUBCUTANEOUS at 20:46

## 2024-12-25 RX ADMIN — SODIUM CHLORIDE, SODIUM LACTATE, POTASSIUM CHLORIDE, AND CALCIUM CHLORIDE: .6; .31; .03; .02 INJECTION, SOLUTION INTRAVENOUS at 22:50

## 2024-12-25 RX ADMIN — SODIUM CHLORIDE: 9 INJECTION, SOLUTION INTRAVENOUS at 18:02

## 2024-12-25 ASSESSMENT — ENCOUNTER SYMPTOMS
NERVOUS/ANXIOUS: 0
BACK PAIN: 0
SHORTNESS OF BREATH: 0
SENSORY CHANGE: 1
ABDOMINAL PAIN: 0
DIZZINESS: 0
SPUTUM PRODUCTION: 0
BLURRED VISION: 0
COUGH: 0
HEADACHES: 0
NAUSEA: 0
FOCAL WEAKNESS: 1
FEVER: 0
VOMITING: 0
SPEECH CHANGE: 0
CHILLS: 0
BRUISES/BLEEDS EASILY: 0
SORE THROAT: 0
DEPRESSION: 0
PALPITATIONS: 0
MYALGIAS: 0

## 2024-12-25 ASSESSMENT — PAIN DESCRIPTION - PAIN TYPE
TYPE: ACUTE PAIN

## 2024-12-25 ASSESSMENT — FIBROSIS 4 INDEX
FIB4 SCORE: 1.47
FIB4 SCORE: 1.57

## 2024-12-25 NOTE — Clinical Note
Closure device placed in the left femoral artery. Left femoral angio-seal (8Fr)  Terumo Angio-Seal VIP vascular closure device  REF: [id number]  LOT: [id number]  EXP: 2025-08-07

## 2024-12-26 ENCOUNTER — APPOINTMENT (OUTPATIENT)
Dept: RADIOLOGY | Facility: MEDICAL CENTER | Age: 64
End: 2024-12-26
Payer: MEDICARE

## 2024-12-26 ENCOUNTER — APPOINTMENT (OUTPATIENT)
Dept: RADIOLOGY | Facility: MEDICAL CENTER | Age: 64
End: 2024-12-26
Attending: RADIOLOGY
Payer: MEDICARE

## 2024-12-26 ENCOUNTER — HOSPITAL ENCOUNTER (INPATIENT)
Dept: RADIOLOGY | Facility: MEDICAL CENTER | Age: 64
End: 2024-12-26
Attending: INTERNAL MEDICINE | Admitting: INTERNAL MEDICINE
Payer: MEDICARE

## 2024-12-26 ENCOUNTER — APPOINTMENT (OUTPATIENT)
Dept: CARDIOLOGY | Facility: MEDICAL CENTER | Age: 64
End: 2024-12-26
Attending: STUDENT IN AN ORGANIZED HEALTH CARE EDUCATION/TRAINING PROGRAM
Payer: MEDICARE

## 2024-12-26 ENCOUNTER — APPOINTMENT (OUTPATIENT)
Dept: RADIOLOGY | Facility: MEDICAL CENTER | Age: 64
End: 2024-12-26
Attending: STUDENT IN AN ORGANIZED HEALTH CARE EDUCATION/TRAINING PROGRAM
Payer: MEDICARE

## 2024-12-26 LAB
ANION GAP SERPL CALC-SCNC: 12 MMOL/L (ref 7–16)
BASOPHILS # BLD AUTO: 0.6 % (ref 0–1.8)
BASOPHILS # BLD: 0.06 K/UL (ref 0–0.12)
BUN SERPL-MCNC: 11 MG/DL (ref 8–22)
CALCIUM SERPL-MCNC: 8.9 MG/DL (ref 8.5–10.5)
CHLORIDE SERPL-SCNC: 108 MMOL/L (ref 96–112)
CHOLEST SERPL-MCNC: 174 MG/DL (ref 100–199)
CO2 SERPL-SCNC: 18 MMOL/L (ref 20–33)
CREAT SERPL-MCNC: 0.78 MG/DL (ref 0.5–1.4)
EOSINOPHIL # BLD AUTO: 0.03 K/UL (ref 0–0.51)
EOSINOPHIL NFR BLD: 0.3 % (ref 0–6.9)
ERYTHROCYTE [DISTWIDTH] IN BLOOD BY AUTOMATED COUNT: 44.8 FL (ref 35.9–50)
EST. AVERAGE GLUCOSE BLD GHB EST-MCNC: 108 MG/DL
GFR SERPLBLD CREATININE-BSD FMLA CKD-EPI: 84 ML/MIN/1.73 M 2
GLUCOSE BLD STRIP.AUTO-MCNC: 115 MG/DL (ref 65–99)
GLUCOSE BLD STRIP.AUTO-MCNC: 140 MG/DL (ref 65–99)
GLUCOSE SERPL-MCNC: 123 MG/DL (ref 65–99)
HBA1C MFR BLD: 5.4 % (ref 4–5.6)
HCT VFR BLD AUTO: 38.1 % (ref 37–47)
HDLC SERPL-MCNC: 38 MG/DL
HGB BLD-MCNC: 12.9 G/DL (ref 12–16)
IMM GRANULOCYTES # BLD AUTO: 0.06 K/UL (ref 0–0.11)
IMM GRANULOCYTES NFR BLD AUTO: 0.6 % (ref 0–0.9)
LDLC SERPL CALC-MCNC: 104 MG/DL
LV EJECT FRACT MOD 2C 99903: 74.61
LV EJECT FRACT MOD 4C 99902: 68.34
LV EJECT FRACT MOD BP 99901: 71.68
LYMPHOCYTES # BLD AUTO: 1.66 K/UL (ref 1–4.8)
LYMPHOCYTES NFR BLD: 16.5 % (ref 22–41)
MAGNESIUM SERPL-MCNC: 2.5 MG/DL (ref 1.5–2.5)
MCH RBC QN AUTO: 31.6 PG (ref 27–33)
MCHC RBC AUTO-ENTMCNC: 33.9 G/DL (ref 32.2–35.5)
MCV RBC AUTO: 93.4 FL (ref 81.4–97.8)
MONOCYTES # BLD AUTO: 1.31 K/UL (ref 0–0.85)
MONOCYTES NFR BLD AUTO: 13 % (ref 0–13.4)
NEUTROPHILS # BLD AUTO: 6.92 K/UL (ref 1.82–7.42)
NEUTROPHILS NFR BLD: 69 % (ref 44–72)
NRBC # BLD AUTO: 0 K/UL
NRBC BLD-RTO: 0 /100 WBC (ref 0–0.2)
PLATELET # BLD AUTO: 264 K/UL (ref 164–446)
PMV BLD AUTO: 9.8 FL (ref 9–12.9)
POTASSIUM SERPL-SCNC: 3.8 MMOL/L (ref 3.6–5.5)
RBC # BLD AUTO: 4.08 M/UL (ref 4.2–5.4)
SODIUM SERPL-SCNC: 138 MMOL/L (ref 135–145)
TRIGL SERPL-MCNC: 160 MG/DL (ref 0–149)
TSH SERPL DL<=0.005 MIU/L-ACNC: 4.97 UIU/ML (ref 0.38–5.33)
WBC # BLD AUTO: 10 K/UL (ref 4.8–10.8)

## 2024-12-26 PROCEDURE — 84443 ASSAY THYROID STIM HORMONE: CPT

## 2024-12-26 PROCEDURE — 700111 HCHG RX REV CODE 636 W/ 250 OVERRIDE (IP): Performed by: RADIOLOGY

## 2024-12-26 PROCEDURE — C1751 CATH, INF, PER/CENT/MIDLINE: HCPCS | Performed by: NURSE PRACTITIONER

## 2024-12-26 PROCEDURE — 70496 CT ANGIOGRAPHY HEAD: CPT

## 2024-12-26 PROCEDURE — 99233 SBSQ HOSP IP/OBS HIGH 50: CPT | Performed by: PSYCHIATRY & NEUROLOGY

## 2024-12-26 PROCEDURE — 700111 HCHG RX REV CODE 636 W/ 250 OVERRIDE (IP): Mod: JZ,JG | Performed by: STUDENT IN AN ORGANIZED HEALTH CARE EDUCATION/TRAINING PROGRAM

## 2024-12-26 PROCEDURE — 700117 HCHG RX CONTRAST REV CODE 255: Performed by: RADIOLOGY

## 2024-12-26 PROCEDURE — 770022 HCHG ROOM/CARE - ICU (200)

## 2024-12-26 PROCEDURE — 70498 CT ANGIOGRAPHY NECK: CPT

## 2024-12-26 PROCEDURE — 700111 HCHG RX REV CODE 636 W/ 250 OVERRIDE (IP): Mod: JG | Performed by: NURSE PRACTITIONER

## 2024-12-26 PROCEDURE — 93306 TTE W/DOPPLER COMPLETE: CPT | Mod: 26 | Performed by: INTERNAL MEDICINE

## 2024-12-26 PROCEDURE — 0042T CT-CEREBRAL PERFUSION ANALYSIS: CPT

## 2024-12-26 PROCEDURE — 99291 CRITICAL CARE FIRST HOUR: CPT | Performed by: STUDENT IN AN ORGANIZED HEALTH CARE EDUCATION/TRAINING PROGRAM

## 2024-12-26 PROCEDURE — 700105 HCHG RX REV CODE 258: Performed by: NURSE PRACTITIONER

## 2024-12-26 PROCEDURE — 37246 TRLUML BALO ANGIOP 1ST ART: CPT

## 2024-12-26 PROCEDURE — 80048 BASIC METABOLIC PNL TOTAL CA: CPT

## 2024-12-26 PROCEDURE — A9270 NON-COVERED ITEM OR SERVICE: HCPCS | Performed by: STUDENT IN AN ORGANIZED HEALTH CARE EDUCATION/TRAINING PROGRAM

## 2024-12-26 PROCEDURE — 03CL3ZZ EXTIRPATION OF MATTER FROM LEFT INTERNAL CAROTID ARTERY, PERCUTANEOUS APPROACH: ICD-10-PCS | Performed by: RADIOLOGY

## 2024-12-26 PROCEDURE — 92610 EVALUATE SWALLOWING FUNCTION: CPT

## 2024-12-26 PROCEDURE — 70551 MRI BRAIN STEM W/O DYE: CPT

## 2024-12-26 PROCEDURE — 85025 COMPLETE CBC W/AUTO DIFF WBC: CPT

## 2024-12-26 PROCEDURE — 70450 CT HEAD/BRAIN W/O DYE: CPT

## 2024-12-26 PROCEDURE — 700117 HCHG RX CONTRAST REV CODE 255

## 2024-12-26 PROCEDURE — 83036 HEMOGLOBIN GLYCOSYLATED A1C: CPT

## 2024-12-26 PROCEDURE — 700101 HCHG RX REV CODE 250: Performed by: NURSE PRACTITIONER

## 2024-12-26 PROCEDURE — 700102 HCHG RX REV CODE 250 W/ 637 OVERRIDE(OP): Performed by: STUDENT IN AN ORGANIZED HEALTH CARE EDUCATION/TRAINING PROGRAM

## 2024-12-26 PROCEDURE — A9270 NON-COVERED ITEM OR SERVICE: HCPCS | Performed by: NURSE PRACTITIONER

## 2024-12-26 PROCEDURE — 83735 ASSAY OF MAGNESIUM: CPT

## 2024-12-26 PROCEDURE — 80061 LIPID PANEL: CPT

## 2024-12-26 PROCEDURE — 93306 TTE W/DOPPLER COMPLETE: CPT

## 2024-12-26 PROCEDURE — 82962 GLUCOSE BLOOD TEST: CPT

## 2024-12-26 PROCEDURE — 700102 HCHG RX REV CODE 250 W/ 637 OVERRIDE(OP): Performed by: NURSE PRACTITIONER

## 2024-12-26 RX ORDER — ASPIRIN 325 MG
325 TABLET ORAL ONCE
Status: COMPLETED | OUTPATIENT
Start: 2024-12-26 | End: 2024-12-26

## 2024-12-26 RX ORDER — ASPIRIN 81 MG/1
81 TABLET ORAL DAILY
Status: DISCONTINUED | OUTPATIENT
Start: 2024-12-27 | End: 2024-12-26

## 2024-12-26 RX ORDER — VENLAFAXINE 75 MG/1
37.5 TABLET ORAL 2 TIMES DAILY WITH MEALS
Status: DISCONTINUED | OUTPATIENT
Start: 2024-12-26 | End: 2024-12-27

## 2024-12-26 RX ORDER — ASPIRIN 325 MG
325 TABLET ORAL ONCE
Status: DISCONTINUED | OUTPATIENT
Start: 2024-12-26 | End: 2024-12-26

## 2024-12-26 RX ORDER — ACETAMINOPHEN 325 MG/1
650 TABLET ORAL EVERY 6 HOURS PRN
Status: DISCONTINUED | OUTPATIENT
Start: 2024-12-26 | End: 2024-12-27

## 2024-12-26 RX ORDER — ONDANSETRON 4 MG/1
4 TABLET, ORALLY DISINTEGRATING ORAL EVERY 4 HOURS PRN
Status: DISCONTINUED | OUTPATIENT
Start: 2024-12-26 | End: 2024-12-27

## 2024-12-26 RX ORDER — QUETIAPINE FUMARATE 25 MG/1
25 TABLET, FILM COATED ORAL ONCE
Status: COMPLETED | OUTPATIENT
Start: 2024-12-26 | End: 2024-12-26

## 2024-12-26 RX ORDER — CLOPIDOGREL BISULFATE 75 MG/1
300 TABLET ORAL ONCE
Status: DISCONTINUED | OUTPATIENT
Start: 2024-12-26 | End: 2024-12-26

## 2024-12-26 RX ORDER — AMOXICILLIN 250 MG
2 CAPSULE ORAL EVERY EVENING
Status: DISCONTINUED | OUTPATIENT
Start: 2024-12-26 | End: 2024-12-27

## 2024-12-26 RX ORDER — ASPIRIN 81 MG/1
81 TABLET, CHEWABLE ORAL DAILY
Status: DISCONTINUED | OUTPATIENT
Start: 2024-12-27 | End: 2024-12-27

## 2024-12-26 RX ORDER — HYDRALAZINE HYDROCHLORIDE 20 MG/ML
10 INJECTION INTRAMUSCULAR; INTRAVENOUS
Status: DISCONTINUED | OUTPATIENT
Start: 2024-12-26 | End: 2024-12-26

## 2024-12-26 RX ORDER — LABETALOL HYDROCHLORIDE 5 MG/ML
10 INJECTION, SOLUTION INTRAVENOUS
Status: DISCONTINUED | OUTPATIENT
Start: 2024-12-26 | End: 2024-12-26

## 2024-12-26 RX ORDER — BUSPIRONE HYDROCHLORIDE 5 MG/1
7.5 TABLET ORAL 3 TIMES DAILY
Status: DISCONTINUED | OUTPATIENT
Start: 2024-12-26 | End: 2024-12-27

## 2024-12-26 RX ORDER — POLYETHYLENE GLYCOL 3350 17 G/17G
1 POWDER, FOR SOLUTION ORAL
Status: DISCONTINUED | OUTPATIENT
Start: 2024-12-26 | End: 2024-12-27

## 2024-12-26 RX ORDER — PHENYLEPHRINE HCL IN 0.9% NACL 1 MG/10 ML
SYRINGE (ML) INTRAVENOUS
Status: DISCONTINUED
Start: 2024-12-26 | End: 2024-12-27 | Stop reason: HOSPADM

## 2024-12-26 RX ORDER — CLOPIDOGREL BISULFATE 75 MG/1
75 TABLET ORAL DAILY
Status: DISCONTINUED | OUTPATIENT
Start: 2024-12-27 | End: 2024-12-26

## 2024-12-26 RX ORDER — ATORVASTATIN CALCIUM 40 MG/1
80 TABLET, FILM COATED ORAL EVERY EVENING
Status: DISCONTINUED | OUTPATIENT
Start: 2024-12-26 | End: 2024-12-27

## 2024-12-26 RX ORDER — VARENICLINE TARTRATE 0.5 (11)-1
1 KIT ORAL SEE ADMIN INSTRUCTIONS
Status: ON HOLD | COMMUNITY
Start: 2024-11-20 | End: 2024-12-30

## 2024-12-26 RX ORDER — DIAZEPAM 10 MG/2ML
2.5 INJECTION, SOLUTION INTRAMUSCULAR; INTRAVENOUS
Status: COMPLETED | OUTPATIENT
Start: 2024-12-26 | End: 2024-12-26

## 2024-12-26 RX ORDER — LEVOTHYROXINE SODIUM 88 UG/1
88 TABLET ORAL
Status: DISCONTINUED | OUTPATIENT
Start: 2024-12-27 | End: 2024-12-27

## 2024-12-26 RX ORDER — NITROFURANTOIN 25; 75 MG/1; MG/1
100 CAPSULE ORAL 2 TIMES DAILY
Status: ON HOLD | COMMUNITY
Start: 2024-12-08 | End: 2024-12-30

## 2024-12-26 RX ORDER — EPTIFIBATIDE 0.75 MG/ML
2 INJECTION, SOLUTION INTRAVENOUS CONTINUOUS
Status: ACTIVE | OUTPATIENT
Start: 2024-12-26 | End: 2024-12-26

## 2024-12-26 RX ADMIN — HYDRALAZINE HYDROCHLORIDE 10 MG: 20 INJECTION INTRAMUSCULAR; INTRAVENOUS at 21:36

## 2024-12-26 RX ADMIN — QUETIAPINE FUMARATE 25 MG: 25 TABLET ORAL at 20:51

## 2024-12-26 RX ADMIN — HYDROMORPHONE HYDROCHLORIDE 0.5 MG: 1 INJECTION, SOLUTION INTRAMUSCULAR; INTRAVENOUS; SUBCUTANEOUS at 03:38

## 2024-12-26 RX ADMIN — BUSPIRONE HYDROCHLORIDE 7.5 MG: 5 TABLET ORAL at 22:11

## 2024-12-26 RX ADMIN — NITROGLYCERIN 100 MCG: 20 INJECTION INTRAVENOUS at 13:22

## 2024-12-26 RX ADMIN — HYDROMORPHONE HYDROCHLORIDE 0.5 MG: 1 INJECTION, SOLUTION INTRAMUSCULAR; INTRAVENOUS; SUBCUTANEOUS at 09:52

## 2024-12-26 RX ADMIN — VENLAFAXINE HYDROCHLORIDE 37.5 MG: 75 TABLET ORAL at 17:57

## 2024-12-26 RX ADMIN — HYDROMORPHONE HYDROCHLORIDE 0.5 MG: 1 INJECTION, SOLUTION INTRAMUSCULAR; INTRAVENOUS; SUBCUTANEOUS at 20:47

## 2024-12-26 RX ADMIN — IOHEXOL 40 ML: 350 INJECTION, SOLUTION INTRAVENOUS at 12:45

## 2024-12-26 RX ADMIN — EPTIFIBATIDE 2 MCG/KG/MIN: 0.75 INJECTION, SOLUTION INTRAVENOUS at 11:02

## 2024-12-26 RX ADMIN — DIAZEPAM 2.5 MG: 10 INJECTION, SOLUTION INTRAMUSCULAR; INTRAVENOUS at 23:02

## 2024-12-26 RX ADMIN — IOHEXOL 80 ML: 350 INJECTION, SOLUTION INTRAVENOUS at 12:45

## 2024-12-26 RX ADMIN — TICAGRELOR 180 MG: 90 TABLET ORAL at 15:22

## 2024-12-26 RX ADMIN — ATORVASTATIN CALCIUM 80 MG: 40 TABLET, FILM COATED ORAL at 17:57

## 2024-12-26 RX ADMIN — LABETALOL HYDROCHLORIDE 10 MG: 5 INJECTION, SOLUTION INTRAVENOUS at 20:40

## 2024-12-26 RX ADMIN — ATROPINE SULFATE 0.5 MG: 0.1 INJECTION, SOLUTION ENDOTRACHEAL; INTRAMUSCULAR; INTRAVENOUS; SUBCUTANEOUS at 13:31

## 2024-12-26 RX ADMIN — ASPIRIN 325 MG: 325 TABLET ORAL at 15:13

## 2024-12-26 RX ADMIN — NOREPINEPHRINE BITARTRATE 0.12 MCG/KG/MIN: 1 INJECTION, SOLUTION, CONCENTRATE INTRAVENOUS at 08:38

## 2024-12-26 RX ADMIN — IOHEXOL 60 ML: 300 INJECTION, SOLUTION INTRAVENOUS at 14:00

## 2024-12-26 RX ADMIN — BUSPIRONE HYDROCHLORIDE 7.5 MG: 5 TABLET ORAL at 15:13

## 2024-12-26 RX ADMIN — DIAZEPAM 2.5 MG: 10 INJECTION, SOLUTION INTRAMUSCULAR; INTRAVENOUS at 22:21

## 2024-12-26 RX ADMIN — HYDROMORPHONE HYDROCHLORIDE 0.5 MG: 1 INJECTION, SOLUTION INTRAMUSCULAR; INTRAVENOUS; SUBCUTANEOUS at 16:40

## 2024-12-26 ASSESSMENT — PAIN DESCRIPTION - PAIN TYPE
TYPE: ACUTE PAIN

## 2024-12-26 ASSESSMENT — COGNITIVE AND FUNCTIONAL STATUS - GENERAL
SUGGESTED CMS G CODE MODIFIER DAILY ACTIVITY: CN
DRESSING REGULAR UPPER BODY CLOTHING: TOTAL
MOBILITY SCORE: 6
CLIMB 3 TO 5 STEPS WITH RAILING: TOTAL
EATING MEALS: TOTAL
WALKING IN HOSPITAL ROOM: TOTAL
DRESSING REGULAR LOWER BODY CLOTHING: TOTAL
MOVING TO AND FROM BED TO CHAIR: TOTAL
DAILY ACTIVITIY SCORE: 6
HELP NEEDED FOR BATHING: TOTAL
PERSONAL GROOMING: TOTAL
SUGGESTED CMS G CODE MODIFIER MOBILITY: CN
TOILETING: TOTAL
TURNING FROM BACK TO SIDE WHILE IN FLAT BAD: TOTAL
MOVING FROM LYING ON BACK TO SITTING ON SIDE OF FLAT BED: TOTAL
STANDING UP FROM CHAIR USING ARMS: TOTAL

## 2024-12-26 ASSESSMENT — ENCOUNTER SYMPTOMS
FEVER: 0
NAUSEA: 0
VOMITING: 0
WEAKNESS: 1
ABDOMINAL PAIN: 0
SORE THROAT: 0
CHILLS: 0
EYES NEGATIVE: 1
MUSCULOSKELETAL NEGATIVE: 1
HEADACHES: 0
SENSORY CHANGE: 1
FOCAL WEAKNESS: 0
PALPITATIONS: 0
SPUTUM PRODUCTION: 0
SHORTNESS OF BREATH: 0

## 2024-12-26 ASSESSMENT — FIBROSIS 4 INDEX: FIB4 SCORE: 1.447665060358965417

## 2024-12-26 NOTE — PROGRESS NOTES
Critical Care Medicine   Brief Cross-Coverage Progress Note    Date of service: 12/25/2024  Time: 2156 - 2210        Assessment/Plan:  Infiltration of PIV   S/p L carotid stent placement   - I was notified by the RN that the patient's integrelin bolus dose infiltrated.    - After speaking to pharmacy and Dr. Arciniega, a repeat bolus dose was ordered.   - RN notified me that patient's vasopressor requirements have been labile and staff was unable to place an US guided PIV. Due to the risk of infiltration and the immediate need for integrelin bolus administration to prevent occlusion of the stent, a central line was placed emergently after discussing with patient.     Acute UTI   - Review of UA and discussion with patient, she endorses some burning pain on urination   - Rocephin ordered    I spent 14 min in reviewing the patient's condition, physical examination, laboratory and imaging data, prior documentation, in discussion with bedside RN, Dr. Arciniega & Marisol- Pharmacist in formulating an assessment/plan.    Critical Care time: 14 min. No time overlap, procedures not included in time.

## 2024-12-26 NOTE — PROGRESS NOTES
Pt presents to IR 2. Emergency case pt was not consented, confirmed by this RN. Pt transferred to IR 2 table in Supine position. Patient underwent a Left carotis Stent Placement by Dr. Arciniega. Procedure site was marked by MD and verified using imaging guidance. Pt placed on monitor, prepped and draped in a sterile fashion. Vitals were taken every 5 minutes and remained stable during procedure (see doc flow sheet for results). CO2 waveform capnography was monitored and remained WNL throughout procedure. Report called to Deepak RITTER. Pt transported by Hospital bed with RN to R105.      Pre: Bilateral pedal pulses 1+  Post: Bilateral pedal pulses 1+    XACT carotid stent system  REF:[id number]  LOT:[id number]  EXP:2026-12-31      Angio-Seal 8F  Deployed at 2109  REF: [id number]  LOT: [id number]  EXP: 2025-08-07

## 2024-12-26 NOTE — PROGRESS NOTES
"Neurology Progress Note  Neurohospitalist Service, [facility]    Referring Physician: Malik Mayberry M.D.    Chief Complaint   Patient presents with    Possible Stroke     Per EMS, pt was sitting with her mother when she slumped over. EMS found pt to have R gaze deviation, R lean, and nonverbal. Per EMS, pt was nonverbal until in aircraft when she began c/o numbness, dizziness, and repeating \"I feel like I'm having a heart attack.\" + asa, unk BT. LKW 1535       HPI: Refer to initial documented Neurology H&P, as detailed in the patient's chart.    Interval History:  Following TNK administration patient's symptom improved, however few hours later she had profound right-sided weakness for which stroke imaging were repeated and again revealed right internal carotid artery occlusion and severe high-grade stenosis of proximal left internal carotid artery.  Case was discussed with neuro IR and sent for stent placement.  Patient was started on Integrilin drip, however given her kidney dysfunction Integrilin was at 1 mcg/kg as opposed to 2 mcg/kg/min.  She had some fluctuation in her right arm weakness with some spasm and contracture of right upper extremity.  Integrilin drip was increased to 2 mcg/kg this morning and patient underwent a repeat CT angiogram of the head and neck which showed reocclusion of the stent.  Patient was taken to IR suite and underwent successful thrombectomy of thrombosed left ICA stent with TICI 3 reperfusion.    Review of systems: In addition to what is detailed in the HPI and/or updated in the interval history, all other systems reviewed and are negative.    Past Medical History:    has a past medical history of Depression, Essential hypertension, Hypercholesteremia, Hypothyroidism, and Mixed hyperlipidemia.    FHx:  family history includes Dementia in her father; Heart Disease in her mother; Hyperlipidemia in her brother and sister.    SHx:   reports that she has been " smoking cigarettes. She started smoking about 51 years ago. She has a 51 pack-year smoking history. She has never used smokeless tobacco. She reports current alcohol use of about 7.2 oz of alcohol per week. She reports current drug use. Drug: Inhaled.    Medications:    Current Facility-Administered Medications:     diazePAM (Valium) injection 2.5 mg, 2.5 mg, Intravenous, Once PRN, Anju Banks MD Alert...ICU Electrolyte Replacement per Pharmacy, , Other, PHARMACY TO DOSE, Malik Mayberry M.D.    NITROGLYCERIN 2 MG IV SOLN, , , ,     ticagrelor (Brilinta) tablet 180 mg, 180 mg, Oral, Once **FOLLOWED BY** [START ON 12/27/2024] ticagrelor (Brilinta) tablet 90 mg, 90 mg, Enteral Tube, BID, Malik Mayberry M.D.    eptifibatide 0.75 mg/mL (Integrilin) infusion, 2 mcg/kg/min, Intravenous, Continuous, Malik Mayberry M.D.    labetalol (Normodyne/Trandate) injection 10 mg, 10 mg, Intravenous, Q10 MIN PRN, Josh Arciniega M.D.    hydrALAZINE (Apresoline) injection 10 mg, 10 mg, Intravenous, Q2HRS PRN, Josh Arciniega M.D.    niCARdipine (Cardene) 25 mg in  mL Standard Infusion, 0-15 mg/hr, Intravenous, Continuous, Josh Arciniega M.D.    Pharmacy Consult: Enteral tube insertion - review meds/change route/product selection, 1 Each, Other, PHARMACY TO DOSE, Malik Mayberry M.D.    atorvastatin (Lipitor) tablet 80 mg, 80 mg, Enteral Tube, Q EVENING, Malik Mayberry M.D.    busPIRone (Buspar) tablet 7.5 mg, 7.5 mg, Enteral Tube, TID, Malik Mayberry M.D., 7.5 mg at 12/26/24 1513    [START ON 12/27/2024] levothyroxine (Synthroid) tablet 88 mcg, 88 mcg, Enteral Tube, AM ES, Malik Mayberry M.D.    senna-docusate (Pericolace Or Senokot S) 8.6-50 MG per tablet 2 Tablet, 2 Tablet, Enteral Tube, Q EVENING **AND** polyethylene glycol/lytes (Miralax) Packet 1 Packet, 1 Packet, Enteral Tube, QDAY PRN, Malik Mayberry M.D.    acetaminophen (Tylenol) tablet 650 mg, 650 mg, Enteral Tube, Q6HRS PRN, Malik Mayberry M.D.     ondansetron (Zofran ODT) dispertab 4 mg, 4 mg, Enteral Tube, Q4HRS PRN, Malik Mayberry M.D.    [COMPLETED] aspirin (Asa) tablet 325 mg, 325 mg, Enteral Tube, Once, 325 mg at 12/26/24 1513 **FOLLOWED BY** [START ON 12/27/2024] aspirin (Asa) chewable tab 81 mg, 81 mg, Enteral Tube, DAILY, Malik Mayberry M.D.    venlafaxine (Effexor) tablet 37.5 mg, 37.5 mg, Enteral Tube, BID WITH MEALS, Malik Mayberry M.D.    niCARdipine (Cardene) 25 mg in  mL Standard Infusion, 0-15 mg/hr, Intravenous, Continuous, Anju Banks, Held at 12/25/24 1815    ondansetron (Zofran) syringe/vial injection 4 mg, 4 mg, Intravenous, Q4HRS PRN, Jeremy M Gonda, M.D.    [Held by provider] lisinopril (Prinivil) tablet 10 mg, 10 mg, Oral, DAILY, Jeremy M Gonda, M.D.    [Held by provider] metoprolol SR (Toprol XL) tablet 50 mg, 50 mg, Oral, Q EVENING, Jeremy M Gonda, M.D.    norepinephrine (Levophed) 8 mg in 250 mL NS infusion (premix), 0-1 mcg/kg/min (Ideal), Intravenous, Continuous, Anju Banks, Last Rate: 28.9 mL/hr at 12/26/24 1334, 0.26 mcg/kg/min at 12/26/24 1334    hydrALAZINE (Apresoline) injection 10 mg, 10 mg, Intravenous, Q3HRS PRN, Anju RED Latkrista    HYDROmorphone (Dilaudid) injection 0.5 mg, 0.5 mg, Intravenous, Q4HRS PRN, Anju RED Latona, 0.5 mg at 12/26/24 0952    labetalol (Normodyne/Trandate) injection 10 mg, 10 mg, Intravenous, Q3HRS PRN, Anju RED Latkrista    ipratropium-albuterol (DUONEB) nebulizer solution, 3 mL, Nebulization, Q4H PRN (RT), Anju Banks    Facility-Administered Medications Ordered in Other Encounters:     ATROPINE SULFATE 0.1 MG/ML INJ SOLN, , , ,     PHENYLEPHRINE HCL-NACL 1-0.9 MG/10ML-% IV SOSY, , , ,     Physical Examination:    Vitals:    12/26/24 1345 12/26/24 1350 12/26/24 1355 12/26/24 1405   BP: 96/63 104/62 113/85 134/63   Pulse: (!) 116 (!) 129 (!) 128 (!) 120   Resp: 20 20 (!) 22 19   Temp:       TempSrc:       SpO2: 97% 96% 96% 95%   Weight:       Height:           NEUROLOGICAL EXAM:    Mental status: Awake, alert and fully oriented, follows commands  Speech and language: speech is not dysarthric. The patient is able to name and repeat.  Cranial nerve exam: Pupils are equal, round and reactive to light bilaterally. Visual fields: She has right-sided neglect  Face is symmetric, facial sensation is intact.   Motor exam: Sustain antigravity in left upper and lower extremity with no downward drift.  She has profound weakness of right upper extremity with increased tone.  With reinforcement she is able to squeeze with her right hand.  No movement of right lower extremity.    Sensory exam: No sensory deficits identified   Coordination: no gross ataxia noted on exam  Plantar reflexes: Upgoing bilaterally.  Gait: deferred     Objective Data:    Labs:  Lab Results   Component Value Date/Time    PROTHROMBTM 12.8 12/25/2024 05:02 PM    INR 0.96 12/25/2024 05:02 PM      Lab Results   Component Value Date/Time    WBC 10.0 12/26/2024 04:10 AM    RBC 4.08 (L) 12/26/2024 04:10 AM    HEMOGLOBIN 12.9 12/26/2024 04:10 AM    HEMATOCRIT 38.1 12/26/2024 04:10 AM    MCV 93.4 12/26/2024 04:10 AM    MCH 31.6 12/26/2024 04:10 AM    MCHC 33.9 12/26/2024 04:10 AM    MPV 9.8 12/26/2024 04:10 AM    NEUTSPOLYS 69.00 12/26/2024 04:10 AM    LYMPHOCYTES 16.50 (L) 12/26/2024 04:10 AM    MONOCYTES 13.00 12/26/2024 04:10 AM    EOSINOPHILS 0.30 12/26/2024 04:10 AM    BASOPHILS 0.60 12/26/2024 04:10 AM    HYPOCHROMIA 1+ 04/11/2023 12:12 AM    ANISOCYTOSIS 1+ 04/11/2023 12:12 AM      Lab Results   Component Value Date/Time    SODIUM 138 12/26/2024 04:10 AM    POTASSIUM 3.8 12/26/2024 04:10 AM    CHLORIDE 108 12/26/2024 04:10 AM    CO2 18 (L) 12/26/2024 04:10 AM    GLUCOSE 123 (H) 12/26/2024 04:10 AM    BUN 11 12/26/2024 04:10 AM    CREATININE 0.78 12/26/2024 04:10 AM      Lab Results   Component Value Date/Time    CHOLSTRLTOT 174 12/26/2024 04:10 AM     (H) 12/26/2024 04:10 AM    HDL 38 (A) 12/26/2024 04:10 AM    TRIGLYCERIDE  160 (H) 12/26/2024 04:10 AM       Lab Results   Component Value Date/Time    ALKPHOSPHAT 68 12/25/2024 05:02 PM    ASTSGOT 22 12/25/2024 05:02 PM    ALTSGPT 14 12/25/2024 05:02 PM    TBILIRUBIN 0.5 12/25/2024 05:02 PM        Imaging/Testing:    I interpreted and/or reviewed the patient's neuroimaging    DX-ABDOMEN FOR TUBE PLACEMENT   Final Result      1.  Enteric tube coiled in the fundus of stomach.      CT-CTA NECK WITH & W/O-POST PROCESSING   Final Result      1.  Interval stenting of the distal left common carotid artery into the left internal carotid artery. There is occlusion of the stent with trace flow at the periphery.   2.  Chronic occlusion of the right internal carotid artery from its origin extending to the intracranial portion.   3.  Moderate atherosclerotic narrowing of the distal right vertebral artery.   4.  Moderate to severe narrowing of the right vertebral artery origin.   5.  Right upper lobe mass again noted. Recommend biopsy or PET scan.   6.  Emphysematous and fibrotic changes of the bilateral upper lungs.      Dr. Arciniega is aware of these findings.      CT-CTA HEAD WITH & W/O-POST PROCESS   Final Result      1.  Bilateral occlusions of the internal carotid arteries.      2.The cavernous and supraclinoid internal carotid arteries and anterior and middle cerebral arteries fill from the posterior circulation.      3. Moderate atherosclerotic plaquing of the distal right vertebral artery.         CT-CEREBRAL PERFUSION ANALYSIS   Final Result      1. Cerebral blood flow less than 30% possibly representing completed infarct = 7 mL. Based on distribution of this finding, this is possibly artifact.      2. T Max more than 6 seconds possibly representing combination of completed infarct and ischemia = 209 mL. Based on the distribution of this finding, this is possibly artifact.      3. Mismatched volume possibly representing ischemic brain/penumbra= 202 mL      4.  Please note that this cerebral  "perfusion study and report is Quantitative and targets supratentorial (cerebral) perfusion for evaluation of large vessel territory acute ischemia/infarction. For example, lacunar infarcts, and brainstem/posterior fossa    ischemia/infarction are not evaluated on this study.  Data acquisition is subject to artifacts which can yield non-anatomically plausible perfusion maps which may be due to motion, bolus timing, signal to noise ratio, or other technical factors.    Perfusion map abnormalities which show non-anatomic distributions are likely artifact.   This study is not \"stand-alone\" and should only be utilized for diagnosis, management/treatment in correlation with CT, CTA, and/or MRI and clinical factors.         EC-ECHOCARDIOGRAM COMPLETE W/O CONT   Final Result      CT-HEAD W/O   Final Result      1.  No acute intracranial hemorrhage.   2.  Age-indeterminate left parietal infarct not definitely seen on prior.   3.  Otherwise unchanged findings.               DX-CHEST-FOR LINE PLACEMENT Perform procedure in: Patient's Room   Final Result      Peripherally inserted catheter has been placed and the tip projects appropriately over the superior vena cava.      CT-CTA NECK WITH & W/O-POST PROCESSING   Final Result      Stable exam with occlusion of the right internal carotid artery and high-grade stenosis of the proximal left internal carotid artery. Vertebral artery stenoses are also noted along with fibrotic change at the lung apices and a partially cavitary mass at    the right lung apex.      CT-CTA HEAD WITH & W/O-POST PROCESS   Final Result      There has been no significant interval change from the prior study.      CT-CEREBRAL PERFUSION ANALYSIS   Final Result      1. Cerebral blood flow less than 30% possibly representing completed infarct = 0 mL. Based on distribution of this finding, this is unlikely to represent artifact.      2. T Max more than 6 seconds possibly representing combination of completed " "infarct and ischemia = 4 mL. Based on the distribution of this finding, this is unlikely to represent artifact. This is improved from the prior study.      3. Mismatched volume possibly representing ischemic brain/penumbra= 4 mL. This is improved from the prior study.      4.  Please note that this cerebral perfusion study and report is Quantitative and targets supratentorial (cerebral) perfusion for evaluation of large vessel territory acute ischemia/infarction. For example, lacunar infarcts, and brainstem/posterior fossa    ischemia/infarction are not evaluated on this study.  Data acquisition is subject to artifacts which can yield non-anatomically plausible perfusion maps which may be due to motion, bolus timing, signal to noise ratio, or other technical factors.    Perfusion map abnormalities which show non-anatomic distributions are likely artifact.   This study is not \"stand-alone\" and should only be utilized for diagnosis, management/treatment in correlation with CT, CTA, and/or MRI and clinical factors.         CT-HEAD W/O   Final Result      1.  No significant change from prior.   2.            DX-CHEST-PORTABLE (1 VIEW)   Final Result      No evidence of acute cardiopulmonary process.      CT-CTA NECK WITH & W/O-POST PROCESSING   Final Result      1.  Severe plaque at the carotid bifurcations and proximal internal carotid arteries.   2.  Occlusion of the right internal carotid artery with reconstitution in the supraclinoid region.   3.  Likely near occlusion of the origin of the left internal carotid artery. There is a hypodense filling defect in the proximal left ICA just above the severe calcified plaque which could represent noncalcified plaque or thrombus extending into the    lumen.   4.  Emphysematous or fibrotic changes in the upper lungs. Irregular masslike opacity in the right upper lobe measuring 2.5 x 2.4 cm could represent lung malignancy or infection. Further evaluation is recommended. " "  These findings were discussed with CUCO ALLEN on 12/25/2024 5:55 PM.      CT-CTA HEAD WITH & W/O-POST PROCESS   Final Result      1.  Occlusion of the right intracranial internal carotid artery.      2.  Atherosclerotic plaque involving the intracranial left internal carotid artery with multifocal high-grade stenosis.      3.  No evidence of anterior middle cerebral artery occlusion.      4.  High-grade stenosis involving the intracranial right vertebral artery.      5.  Diminutive left vertebral artery.      6.  Report was called to CUCO ALLEN at 5:53 PM on 12/25/2024.            CT-CEREBRAL PERFUSION ANALYSIS   Final Result      1. Cerebral blood flow less than 30% possibly representing completed infarct = 0 mL. Based on distribution of this finding, this is unlikely to represent artifact.      2. T Max more than 6 seconds possibly representing combination of completed infarct and ischemia = 21 mL. Based on the distribution of this finding, this is unlikely to represent artifact.      3. Mismatched volume possibly representing ischemic brain/penumbra= 21 mL      4.  Please note that this cerebral perfusion study and report is Quantitative and targets supratentorial (cerebral) perfusion for evaluation of large vessel territory acute ischemia/infarction. For example, lacunar infarcts, and brainstem/posterior fossa    ischemia/infarction are not evaluated on this study.  Data acquisition is subject to artifacts which can yield non-anatomically plausible perfusion maps which may be due to motion, bolus timing, signal to noise ratio, or other technical factors.    Perfusion map abnormalities which show non-anatomic distributions are likely artifact.   This study is not \"stand-alone\" and should only be utilized for diagnosis, management/treatment in correlation with CT, CTA, and/or MRI and clinical factors.         CT-HEAD W/O   Final Result      1.  Chronic microvascular ischemic type changes " and probable centrum semiovale lacunar infarcts.   2.  Nonspecific hypodensity in the right occipital region could represent age indeterminate, probably old infarct..   3.  No acute intracranial hemorrhage.   4.  If there is concern for acute ischemia, MRI is recommended               IR-CERV CAROTID STENT WITH PROTECTION    (Results Pending)   MR-BRAIN-W/O    (Results Pending)   IR-CERV CAROTID STENT WITH PROTECTION    (Results Pending)       Assessment and Plan:  Marisol Brown is a 64 y.o. right-handed female with past medical history significant for peripheral vascular disease, currently on aspirin, hypertension, hyperlipidemia, hypothyroidism and current smoker who was brought to emergency room for evaluation of right upper and left lower extremity weakness, associated with slurred speech.  Patient underwent a brain CT which did not reveal acute abnormalities.  CT of the neck revealed severe atherosclerotic plaque with occlusion of right internal carotid artery with near occlusion of origin of left internal carotid artery as well as high-grade stenosis involving the intracranial right vertebral artery.  CT perfusion with 21 mL of ischemic penumbra on the right.  Her NIH scale score was 5.  Patient underwent administration of TNK at 1736 PM on 12/26/2024.  Following TNK administration patient's symptom improved, however few hours later she had profound right-sided weakness for which stroke imaging were repeated and again revealed right internal carotid artery occlusion and severe high-grade stenosis of proximal left internal carotid artery.  Case was discussed with neuro IR and sent for stent placement.  Patient was started on Integrilin drip, however given her kidney dysfunction Integrilin was at 1 mcg/kg as opposed to 2 mcg/kg/min.  She had some fluctuation in her right arm weakness with some spasm and contracture of right upper extremity.  Integrilin drip was increased to 2 mcg/kg this morning and patient  underwent a repeat CT angiogram of the head and neck which showed reocclusion of the stent.  Patient was taken to IR suite and underwent successful thrombectomy of thrombosed left ICA stent with TICI 3 reperfusion.  Echocardiogram is unremarkable.  LDL is 104 and hemoglobin A1c is 5.4.      Plan:  -Continue close neuromonitoring in ICU per post TNK and thrombectomy protocol.  -Given TICI 3 reperfusion, maintain systolic blood pressure 100-140.  - Maintain normoglycemia and avoid hypo- or hypernatremia; aim for normothermia  -On Integrilin drip 2 mcg/kg/min, please load with Brilinta 180 mg and aspirin 325 and discontinue Integrilin drip 2 hours later.  -Continue with Brilinta 90 mg twice a day and aspirin 81 mg daily from tomorrow.  - High intensity statin when safe to swallow, LDL goal less than 70  - Serum studies for stroke risk factors: LDL is 104 and hemoglobin A1c is 5.4.  - To identify stroke territory, obtain MRI brain without contrast  - Evaluate and treat with PT/OT/ST; physiatry consult  - Stroke education  - Follow-up with neurovascular clinic after discharge      The evaluation of the patient, and recommended management, was discussed with Malik Mayberry M.D.    Please note that this dictation was created using voice recognition software. I have made every reasonable attempt to correct obvious errors, but I expect that there are errors of grammar and possibly content that I did not discover before finalizing the note.      Kyle Rivas MD  [facility]

## 2024-12-26 NOTE — OR SURGEON
Immediate Post- Operative Note        Findings: Left carotid stenosis      Procedure(s): Left carotid stent placement embolic production device    Patient is on intergrilin drip 1 mcg/kg/min    Will be converted to DAPT tomorrow          Estimated Blood Loss: Less than 5 ml        Complications: None            12/25/2024     9:18 PM     Josh Arciniega M.D.

## 2024-12-26 NOTE — THERAPY
"Speech Language Pathology   Clinical Swallow Evaluation     Patient Name: Marisol Brown  AGE:  64 y.o., SEX:  female  Medical Record #: [id number]  Date of Service: 12/26/2024      History of Present Illness  64 y.o. female who presented 12/25/2024 with a past medical history significant for hypertension, tobacco and alcohol use as well as CAD status post prior stent who was brought in by care flight from home for 1-1/2 hours of \"feeling numb all over\" and \"I feel like I am having a heart attack.\"     Upon arrival to the ED, patient's NIH stroke scale was between 4 and 5 and a code stroke was activated and patient was seen by neurology Dr. Rivas.  She had a normal head CT but CTA of head showed occluded right ICA and multifocal high-grade stenosis of the left ICA and CT perfusion showed right sided perfusion deficits and the decision was made to administer TNK at 1736.  Left carotid stent placement embolic production device on 12/25/24      CT of head 12/25/24:  Mild generalized volume loss.  Patchy hypodensities in cerebral white matter most likely represent mild chronic microvascular ischemic type changes. Age-indeterminate lacunar infarcts in the right centrum semiovale and there is probably age-indeterminate right parieto-occipital   infarct.  No acute intracranial hemorrhage, major vascular territory infarct, mass effect, midline shift or hydrocephalus.  Paranasal sinuses and mastoids are clear.  No depressed calvarial fracture.      CXR 12/25/24:  A peripherally inserted catheter has been placed.  The tip projects appropriately over the superior vena cava.  Heart size is within normal limits.  No pulmonary infiltrates or consolidations are noted.  No pleural abnormalities are noted.      General Information:  Vitals  O2 Delivery Device: None - Room Air  Level of Consciousness: Awake  Patient Behaviors: Flat Affect  Orientation: Self  Follows Directives: Inconsistent      Prior Living Situation & Level of " Function:  Prior Services: None, Home-Independent  Communication: Unknown  Swallowing: Unknown       Oral Mechanism Evaluation:  Dentition: Edentulous, Missing posterior dentition (edentulous maxilla, missing lower molars)   Facial Symmetry: Total right facial droop (slight R droop)  Facial Sensation: Pt did not follow commands to assess     Labial Observations: Right sided weakness (slight)   Lingual Observations: Midline  Motor Speech: WFL        Laryngeal Function:  Secretion Management: Adequate  Voice Quality: WFL  Cough: Pt did not follow commands to assess         Subjective  Patient received awake but very confused with L head turn. Patient oriented to self and unable to answer correctly place or reason for admission despite being given an option of 3.       Assessment  Current Method of Nutrition: NPO until cleared by speech pathology  Positioning: Donaldson's (60-90 degrees)  Bolus Administration: SLP    O2 Delivery Device: None - Room Air  Factor(s) Affecting Performance: Impaired mental status, Impaired command following  Tracheostomy : No      Swallowing Trials:  Swallowing Trials  Ice: WFL  Thin Liquid (TN0): WFL  Pureed (PU4): WFL  Regular (RG7): Impaired    Comments: Adequate bolus acceptance and containment. Presumed timely AP transit and bolus formation evidenced by absence of oral residue post swallow with trials of thin liquids and purees. Inconsistent mild belching response following trials of thin liquids concerning for possible reflux or esophageal dysfunction. However, no overt s/sx of aspiration appreciated across all trials tested. Voice and breath sounds remained clear. Reduced bite and prolonged mastication of regular solids.       Clinical Impressions  Patient is presenting with clinical signs of an oral dysphagia suspect acute on chronic r/t CVA, AMS, and edentulism (top). Pharyngeal swallow appears to be functional with no overt s/sx of airway invasion during bedside trials. A modified  diet given 1:1 feeding is indicated. Please HOLD PO with any concerns for aspiration and notify SLP. SLP to monitor closely and will consider a diagnostic swallow study with any difficulty.       Recommendations  Diet Consistency: Purees and Thin Liquid  Instrumentation: Instrumental swallow study pending clinical progress  Medication: Whole with liquid, Whole with puree, Crush with pudding/puree, as appropriate, As tolerated  Supervision: 1:1 feeding with constant supervision  Positioning: Fully upright and midline during oral intake, Remain upright for 30 minutes after oral intake, Meals sitting upright in a chair, as tolerated  Risk Management : Small bites/sips, Slow rate of intake  Oral Care: Q4h  Consult Referral(s): Gastroenterologist (?outpatient)      SLP Treatment Plan  Treatment Plan: Dysphagia Treatment, Patient/Family/Caregiver Training  SLP Frequency: 3x Per Week  Estimated Duration: Until Therapy Goals Met      Anticipated Discharge Needs  Discharge Recommendations: Recommend post-acute placement for additional speech therapy services prior to discharge home   Therapy Recommendations Upon DC: Dysphagia Training, Patient / Family / Caregiver Education        Patient / Family Goals  Patient / Family Goal #1: Unable to generate meaningful goal  Short Term Goals  Short Term Goal # 1: Patient will consume a SB/TN diet with no overt s/sx of aspiration given 1:1 feeding.      Latisha Gutierrez, SLP

## 2024-12-26 NOTE — PROCEDURES
Central Line Insertion    Date/Time: 12/25/2024 10:23 PM    Performed by: Anju Banks  Authorized by: Anju Banks    Consent:     Consent obtained:  Verbal and emergent situation    Consent given by:  Patient    Risks discussed:  Arterial puncture, incorrect placement, nerve damage, pneumothorax, infection and bleeding    Alternatives discussed:  No treatment and delayed treatment  Universal protocol:     Procedure explained and questions answered to patient or proxy's satisfaction: yes      Relevant documents present and verified: yes      Test results available and properly labeled: yes      Imaging studies available: yes      Required blood products, implants, devices, and special equipment available: yes      Site/side marked: yes      Immediately prior to procedure, a time out was called: yes      Patient identity confirmed:  Verbally with patient, hospital-assigned identification number and arm band  Pre-procedure details:     Hand hygiene: Hand hygiene performed prior to insertion      Sterile barrier technique: All elements of maximal sterile technique followed      Skin preparation:  ChloraPrep    Skin preparation agent: Skin preparation agent completely dried prior to procedure    Sedation:     Sedation type: No sedation, pre-med with dilaudid for pain.  Anesthesia:     Anesthesia method:  Local infiltration    Local anesthetic:  Lidocaine 1% w/o epi  Procedure details:     Location:  R internal jugular    Procedural supplies:  Triple lumen    Catheter size:  7 Fr    Landmarks identified: yes      Ultrasound guidance: yes      Sterile ultrasound techniques: Sterile gel and sterile probe covers were used      Number of attempts:  1    Successful placement: yes    Post-procedure details:     Post-procedure:  Dressing applied and line sutured    Guidewire: guidewire removal confirmed      Assessment:  Blood return through all ports, no pneumothorax on x-ray, free fluid flow and placement verified by  x-ray    Patient tolerance of procedure:  Tolerated well, no immediate complications  Comments:      Patient is on vasopressors & integrelin s/p ICA stent placement - her integrelin infiltrated & needs rebolused stat - poor PIV access. RIJ CVC inserted, wire accounted for, the line is ok to use.

## 2024-12-26 NOTE — HOSPITAL COURSE
12/25 - admitted to the ICU TNK.  After presenting to the ICU she developed flaccid right arm and right leg weakness.  She was taken to IR for emergent left carotid stent placement.  SBP goal changed to <140 post stent    12/26 - stent reoccluded but distal vessels were perfused by collaterals.  Stent reopened in IR.  Loaded with Brilinta and aspirin.  Neuroexam remains variable.    12/27 - RUE still stiff, weak.  Working with PT.  Continue brilinta/aspirin despite MRI findings as she has already had in-stent thrombosis once.  Fluid bolus and midodrine to help get off presors, hold midodrine for SBP >120.      12/28 - Awake and alert, RUE still very weak as is right leg.  Working with PT.  Will consider starting baclofen if arousal continues to improve.  She will need SNF.  Of for Neuro floor

## 2024-12-26 NOTE — PROGRESS NOTES
4 Eyes Skin Assessment Completed by STONE Duong and STONE Tapia.    Head WDL  Ears WDL  Nose WDL  Mouth WDL  Neck WDL  Breast/Chest WDL  Shoulder Blades WDL  Spine WDL  (R) Arm/Elbow/Hand Red and blanchin  (L) Arm/Elbow/Hand Redness and Blanching  Abdomen WDL  Groin WDL  Scrotum/Coccyx/Buttocks Redness and Blanching  (R) Leg WDL  (L) Leg WDL  (R) Heel/Foot/Toe Redness and Blanching, Calloused  (L) Heel/Foot/Toe Redness and Blanching, Calloused          Devices In Places ECG, Blood Pressure Cuff, Pulse Ox, and SCD's      Interventions In Place TAP System, Pillows, Q2 Turns, Low Air Loss Mattress, ZFlo Pillow, Heels Loaded W/Pillows, and Pressure Redistribution Mattress    Possible Skin Injury No    Pictures Uploaded Into Epic N/A  Wound Consult Placed N/A  RN Wound Prevention Protocol Ordered No

## 2024-12-26 NOTE — PROGRESS NOTES
Cerebral angiogram with mechanical thrombectomy for Left ICA occlusion by Dr. Arciniega.    Emergent consent.     Pre-procedure   pedal pulses   R-DP dopplerable  L-PT dopplerable.   Neuro: arousable, oriented to self, place, confused, squeezes w/ Left hand, nothing on R, B-foot response to stimulation otherwise sens altered, does not move BLE on command    Left femoral access site.   Pt placed on monitor, prepped and draped in a sterile fashion.   Vital signs were taken every 5 minutes and remained within parameters (see doc flow sheets).    Time in IR:1246  Access:1311 L-femoral artery (8Fr)  1st angio:1314  1st pass:1315    2nd angio: 1324    TICI score 3  @ 1334    Closure (end time):1339    Goal SBP per MD: < 140    Penumbra system was used to retrieve clot.   Hemostasis achieved using Angio-Seal and manual pressure deployed at 1339.     Post-procedure   pedal pulses   R-DP dopplersble  L-PT dopplerable  Neuro:oriented to self, can not squeeze with R-hand on command, pt states she can not feel us touching her R-hand or BLE      Report given to Cam RITTER.   Pt was transported to ICU with RN and monitor to R105. Stroke worksheet review during warm handoff with Cam.   ICU responsible for serial checks starting at 1405.  See Stroke Procedure flowsheet for VS, neuro, access, extremity serial assessments.    Left femoral angio-seal (8Fr)  [employer]umo Angio-Seal VIP vascular closure device  REF: [id number]  LOT: [id number]  EXP: 2025-08-07

## 2024-12-26 NOTE — ED NOTES
Medication Reconciliation    Medication reconciliation is incomplete. Unable to obtain because patient could not participate in interview. Home pharmacy closed. Called mother ([phone]) and left a voicemail.    Renee Thompson, Pharmacy Intern

## 2024-12-26 NOTE — ED PROVIDER NOTES
"ER Provider Note    Scribed for Emerson Rincon D.o. by Carolyn Gerco. 12/25/2024  4:58 PM    Primary Care Provider: Nathalie Oakley P.A.-C.    CHIEF COMPLAINT   Stroke rule out    EXTERNAL RECORDS REVIEWED  Inpatient Notes Patient last seen and  admitted here on 10/17/24 for pyelonephritis. Patient not on any blood thinners during this visit, but was taking aspirin 81 mg. Discharged on Bactrim.      HPI/ROS  LIMITATION TO HISTORY   Select: : None  OUTSIDE HISTORIAN(S):  EMS Care flight provides helpful collateral information    Marisol Brown is a 64 y.o. female, with a history of hypertension, who presents to the ED via Care flight to the charge desk as a stroke rule out following an episode of acute generalized numbness onset 1.5 hours ago. EMS reports that the patient was sitting on a bar stool at her mothers house when she \"went numb\". She is unable to specify where the numbness is. Her last known well is 1535 today. When EMS arrived on scene they noted right sided gaze, right lean, and left arm weakness. Care flight adds that the patient has been having repetitive questioning, dizziness, and has been non verbal until coming into the ED just now. She is on aspirin daily but no other blood thinners. She has a history of smoking cigarettes and drinking alcohol. History of stent placement and prior MI. BG en route was 168.    PAST MEDICAL HISTORY  Past Medical History:   Diagnosis Date    Depression     Essential hypertension     Hypercholesteremia     Hypothyroidism     Mixed hyperlipidemia        SURGICAL HISTORY  Past Surgical History:   Procedure Laterality Date    MD CYSTOSCOPY,INSERT URETERAL STENT Left 10/17/2024    Procedure: CYSTOSCOPY, WITH URETERAL STENT INSERTION WITH  URETERAL BIOPSY AND STENT PLACEMENT, and TURVT;  Surgeon: Hari Correa M.D.;  Location: SURGERY [facility];  Service: Urology    MD UPPER GI ENDOSCOPY,DIAGNOSIS N/A 8/11/2021    Procedure: GASTROSCOPY;  Surgeon: Curtis" "GRICELDA Nunez;  Location: SURGERY [facility];  Service: Gastroenterology    AZ COLONOSCOPY,DIAGNOSTIC N/A 8/11/2021    Procedure: COLONOSCOPY;  Surgeon: Curtis Nunez M.D.;  Location: SURGERY [facility];  Service: Gastroenterology    CARPAL TUNNEL RELEASE      Right    DENTAL EXTRACTION(S)      TUBAL LIGATION         FAMILY HISTORY  Family History   Problem Relation Age of Onset    Heart Disease Mother     Dementia Father     Hyperlipidemia Sister     Hyperlipidemia Brother        SOCIAL HISTORY   reports that she has been smoking cigarettes. She started smoking about 51 years ago. She has a 51 pack-year smoking history. She has never used smokeless tobacco. She reports current alcohol use of about 7.2 oz of alcohol per week. She reports current drug use. Drug: Inhaled.    CURRENT MEDICATIONS  Previous Medications    ACETAMINOPHEN (TYLENOL) 500 MG TAB    Take 1 Tablet by mouth every 6 hours as needed for Mild Pain.    ASPIRIN 81 PO    Take 81 mg by mouth every 48 hours.    ATORVASTATIN (LIPITOR) 10 MG TAB    Take 10 mg by mouth every evening.    BUSPIRONE (BUSPAR) 7.5 MG TABLET    Take 1 Tablet by mouth 3 times a day.    HYDROCORTISONE 1 % CREAM    Apply 1 Application. topically 2 times a day.    LEVOTHYROXINE (SYNTHROID) 88 MCG TAB    Take 88 mcg by mouth Every morning on an empty stomach.    LISINOPRIL (PRINIVIL) 10 MG TAB    Take 1 Tab by mouth every day.    METOPROLOL SR (TOPROL XL) 50 MG TABLET SR 24 HR    Take 1 Tablet by mouth every evening.    OMEPRAZOLE (PRILOSEC OTC) 20 MG TABLET    Take 2 Tablets by mouth 2 times a day.    SENNOSIDES-DOCUSATE SODIUM (SENOKOT-S) 8.6-50 MG TABLET    Take 1 Tablet by mouth every day.    SPIRONOLACTONE (ALDACTONE) 25 MG TAB    Take 0.5 Tabs by mouth every day.    VENLAFAXINE XR (EFFEXOR XR) 75 MG CAPSULE SR 24 HR    Take 75 mg by mouth every day.       ALLERGIES  Codeine    PHYSICAL EXAM  Ht 1.676 m (5' 6\")   Wt 85 kg (187 lb 6.3 oz)   BMI 30.25 kg/m² "   Constitutional: Pleasantly confused, Otherwise alert in no apparent distress.  HENT: No signs of trauma, Bilateral external ears normal, Nose normal.   Eyes: Pupils are equal and reactive, Conjunctiva normal, Non-icteric.   Neck: Normal range of motion, No tenderness, Supple, No stridor.   Cardiovascular: Regular rate and rhythm, no murmurs.   Thorax & Lungs: Normal breath sounds, No respiratory distress, No wheezing, No chest tenderness.   Abdomen: Bowel sounds normal, Soft, No tenderness, No masses, No pulsatile masses.   Skin: Warm, Dry, No erythema, No rash.   Back: No bony tenderness, No CVA tenderness.   Extremities: Intact distal pulses, No edema, No tenderness, No cyanosis  Musculoskeletal: Good range of motion in all major joints. No tenderness to palpation or major deformities noted.   Neurologic: right upper extremity weakness, left lower extremity weakness, Alert , No focal deficits noted.     DIAGNOSTIC STUDIES    EKG/LABS  Results for orders placed or performed during the hospital encounter of 12/25/24   CBC WITH DIFFERENTIAL    Collection Time: 12/25/24  5:02 PM   Result Value Ref Range    WBC 17.2 (H) 4.8 - 10.8 K/uL    RBC 4.50 4.20 - 5.40 M/uL    Hemoglobin 13.9 12.0 - 16.0 g/dL    Hematocrit 42.2 37.0 - 47.0 %    MCV 93.8 81.4 - 97.8 fL    MCH 30.9 27.0 - 33.0 pg    MCHC 32.9 32.2 - 35.5 g/dL    RDW 45.1 35.9 - 50.0 fL    Platelet Count 244 164 - 446 K/uL    MPV 10.0 9.0 - 12.9 fL    Neutrophils-Polys 78.70 (H) 44.00 - 72.00 %    Lymphocytes 9.00 (L) 22.00 - 41.00 %    Monocytes 10.80 0.00 - 13.40 %    Eosinophils 0.20 0.00 - 6.90 %    Basophils 0.30 0.00 - 1.80 %    Immature Granulocytes 1.00 (H) 0.00 - 0.90 %    Nucleated RBC 0.00 0.00 - 0.20 /100 WBC    Neutrophils (Absolute) 13.49 (H) 1.82 - 7.42 K/uL    Lymphs (Absolute) 1.55 1.00 - 4.80 K/uL    Monos (Absolute) 1.86 (H) 0.00 - 0.85 K/uL    Eos (Absolute) 0.04 0.00 - 0.51 K/uL    Baso (Absolute) 0.06 0.00 - 0.12 K/uL    Immature  Granulocytes (abs) 0.17 (H) 0.00 - 0.11 K/uL    NRBC (Absolute) 0.00 K/uL   COMP METABOLIC PANEL    Collection Time: 12/25/24  5:02 PM   Result Value Ref Range    Sodium 131 (L) 135 - 145 mmol/L    Potassium 3.6 3.6 - 5.5 mmol/L    Chloride 100 96 - 112 mmol/L    Co2 14 (L) 20 - 33 mmol/L    Anion Gap 17.0 (H) 7.0 - 16.0    Glucose 182 (H) 65 - 99 mg/dL    Bun 12 8 - 22 mg/dL    Creatinine 1.14 0.50 - 1.40 mg/dL    Calcium 8.5 8.5 - 10.5 mg/dL    Correct Calcium 8.7 8.5 - 10.5 mg/dL    AST(SGOT) 22 12 - 45 U/L    ALT(SGPT) 14 2 - 50 U/L    Alkaline Phosphatase 68 30 - 99 U/L    Total Bilirubin 0.5 0.1 - 1.5 mg/dL    Albumin 3.8 3.2 - 4.9 g/dL    Total Protein 7.4 6.0 - 8.2 g/dL    Globulin 3.6 (H) 1.9 - 3.5 g/dL    A-G Ratio 1.1 g/dL   PROTHROMBIN TIME    Collection Time: 12/25/24  5:02 PM   Result Value Ref Range    PT 12.8 12.0 - 14.6 sec    INR 0.96 0.87 - 1.13   APTT    Collection Time: 12/25/24  5:02 PM   Result Value Ref Range    APTT 29.6 24.7 - 36.0 sec   COD (ADULT)    Collection Time: 12/25/24  5:02 PM   Result Value Ref Range    ABO Grouping Only B     Rh Grouping Only POS     Antibody Screen-Cod NEG    TROPONIN    Collection Time: 12/25/24  5:02 PM   Result Value Ref Range    Troponin T 10 6 - 19 ng/L   ABO Rh Confirm    Collection Time: 12/25/24  5:02 PM   Result Value Ref Range    ABO Rh Confirm B POS    ESTIMATED GFR    Collection Time: 12/25/24  5:02 PM   Result Value Ref Range    GFR (CKD-EPI) 53 (A) >60 mL/min/1.73 m 2   URINALYSIS,CULTURE IF INDICATED    Collection Time: 12/25/24  8:58 PM    Specimen: Urine, Clean Catch   Result Value Ref Range    Color Yellow     Character Clear     Specific Gravity 1.021 <1.035    Ph 7.0 5.0 - 8.0    Glucose Negative Negative mg/dL    Ketones Negative Negative mg/dL    Protein Negative Negative mg/dL    Bilirubin Negative Negative    Urobilinogen, Urine 0.2 <=1.0 EU/dL    Nitrite Negative Negative    Leukocyte Esterase Moderate (A) Negative    Occult Blood  Moderate (A) Negative    Micro Urine Req Microscopic    URINE DRUG SCREEN    Collection Time: 24  8:58 PM   Result Value Ref Range    Amphetamines Urine Negative Negative    Barbiturates Negative Negative    Benzodiazepines Negative Negative    Cocaine Metabolite Negative Negative    Fentanyl, Urine Negative Negative    Methadone Negative Negative    Opiates Negative Negative    Oxycodone Negative Negative    Phencyclidine -Pcp Negative Negative    Propoxyphene Negative Negative    Cannabinoid Metab Positive (A) Negative   URINE MICROSCOPIC (W/UA)    Collection Time: 24  8:58 PM   Result Value Ref Range    WBC 3-5 /hpf    RBC 0-2 0 - 2 /hpf    Bacteria Few (A) None /hpf    Epithelial Cells 0-2 0 - 5 /hpf    Urine Casts 0-2 0 - 2 /lpf    Hyaline Cast Present /lpf   EKG (NOW)    Collection Time: 24 11:34 PM   Result Value Ref Range    Report       [facility]    Test Date:  2024  Pt Name:    WESLEY ANN             Department: ER  MRN:        [id number]                      Room:       [facility]  Gender:     Female                       Technician: VIKTOR  :        1960                   Requested By:CUCO ALLEN  Order #:    [id number]                    Reading MD:    Measurements  Intervals                                Axis  Rate:       79                           P:          61  HI:         129                          QRS:        65  QRSD:       102                          T:          -80  QT:         448  QTc:        514    Interpretive Statements  Sinus rhythm  Borderline low voltage, extremity leads  Borderline repolarization abnormality  Prolonged QT interval  Compared to ECG 10/17/2024 08:56:43  Prolonged QT interval now present       I have independently interpreted this EKG    RADIOLOGY/PROCEDURES   The attending emergency physician has independently interpreted the diagnostic imaging associated with this visit and am waiting the final reading from the  radiologist.   My preliminary interpretation is a follows: No intracranial hemorrhage    Radiologist interpretation:  DX-CHEST-FOR LINE PLACEMENT Perform procedure in: Patient's Room   Final Result      Peripherally inserted catheter has been placed and the tip projects appropriately over the superior vena cava.      CT-CTA NECK WITH & W/O-POST PROCESSING   Final Result      Stable exam with occlusion of the right internal carotid artery and high-grade stenosis of the proximal left internal carotid artery. Vertebral artery stenoses are also noted along with fibrotic change at the lung apices and a partially cavitary mass at    the right lung apex.      CT-CTA HEAD WITH & W/O-POST PROCESS   Final Result      There has been no significant interval change from the prior study.      CT-CEREBRAL PERFUSION ANALYSIS   Final Result      1. Cerebral blood flow less than 30% possibly representing completed infarct = 0 mL. Based on distribution of this finding, this is unlikely to represent artifact.      2. T Max more than 6 seconds possibly representing combination of completed infarct and ischemia = 4 mL. Based on the distribution of this finding, this is unlikely to represent artifact. This is improved from the prior study.      3. Mismatched volume possibly representing ischemic brain/penumbra= 4 mL. This is improved from the prior study.      4.  Please note that this cerebral perfusion study and report is Quantitative and targets supratentorial (cerebral) perfusion for evaluation of large vessel territory acute ischemia/infarction. For example, lacunar infarcts, and brainstem/posterior fossa    ischemia/infarction are not evaluated on this study.  Data acquisition is subject to artifacts which can yield non-anatomically plausible perfusion maps which may be due to motion, bolus timing, signal to noise ratio, or other technical factors.    Perfusion map abnormalities which show non-anatomic distributions are likely  "artifact.   This study is not \"stand-alone\" and should only be utilized for diagnosis, management/treatment in correlation with CT, CTA, and/or MRI and clinical factors.         CT-HEAD W/O   Final Result      1.  No significant change from prior.   2.            DX-CHEST-PORTABLE (1 VIEW)   Final Result      No evidence of acute cardiopulmonary process.      CT-CTA NECK WITH & W/O-POST PROCESSING   Final Result      1.  Severe plaque at the carotid bifurcations and proximal internal carotid arteries.   2.  Occlusion of the right internal carotid artery with reconstitution in the supraclinoid region.   3.  Likely near occlusion of the origin of the left internal carotid artery. There is a hypodense filling defect in the proximal left ICA just above the severe calcified plaque which could represent noncalcified plaque or thrombus extending into the    lumen.   4.  Emphysematous or fibrotic changes in the upper lungs. Irregular masslike opacity in the right upper lobe measuring 2.5 x 2.4 cm could represent lung malignancy or infection. Further evaluation is recommended.   These findings were discussed with CUCO ALLEN on 12/25/2024 5:55 PM.      CT-CTA HEAD WITH & W/O-POST PROCESS   Final Result      1.  Occlusion of the right intracranial internal carotid artery.      2.  Atherosclerotic plaque involving the intracranial left internal carotid artery with multifocal high-grade stenosis.      3.  No evidence of anterior middle cerebral artery occlusion.      4.  High-grade stenosis involving the intracranial right vertebral artery.      5.  Diminutive left vertebral artery.      6.  Report was called to CUCO ALLEN at 5:53 PM on 12/25/2024.            CT-CEREBRAL PERFUSION ANALYSIS   Final Result      1. Cerebral blood flow less than 30% possibly representing completed infarct = 0 mL. Based on distribution of this finding, this is unlikely to represent artifact.      2. T Max more than 6 seconds " "possibly representing combination of completed infarct and ischemia = 21 mL. Based on the distribution of this finding, this is unlikely to represent artifact.      3. Mismatched volume possibly representing ischemic brain/penumbra= 21 mL      4.  Please note that this cerebral perfusion study and report is Quantitative and targets supratentorial (cerebral) perfusion for evaluation of large vessel territory acute ischemia/infarction. For example, lacunar infarcts, and brainstem/posterior fossa    ischemia/infarction are not evaluated on this study.  Data acquisition is subject to artifacts which can yield non-anatomically plausible perfusion maps which may be due to motion, bolus timing, signal to noise ratio, or other technical factors.    Perfusion map abnormalities which show non-anatomic distributions are likely artifact.   This study is not \"stand-alone\" and should only be utilized for diagnosis, management/treatment in correlation with CT, CTA, and/or MRI and clinical factors.         CT-HEAD W/O   Final Result      1.  Chronic microvascular ischemic type changes and probable centrum semiovale lacunar infarcts.   2.  Nonspecific hypodensity in the right occipital region could represent age indeterminate, probably old infarct..   3.  No acute intracranial hemorrhage.   4.  If there is concern for acute ischemia, MRI is recommended               EC-ECHOCARDIOGRAM COMPLETE W/O CONT    (Results Pending)   MR-BRAIN-W/O    (Results Pending)   CT-HEAD W/O    (Results Pending)   IR-CERV CAROTID STENT WITH PROTECTION    (Results Pending)       COURSE & MEDICAL DECISION MAKING     ASSESSMENT, COURSE AND PLAN  Care Narrative: Patient arrived as a stroke alert.  Onset of symptoms were 1 and half hours ago patient noted to have right arm left leg weakness.  Plan for CTA and perfusion imaging.  Stroke neurology came to bedside and after risk-benefit discussion recommended TNK.  CTA with likely chronic occlusion of carotid " artery.  Patient will be admitted to the ICU for close neuromonitoring.  Patient with stable blood pressure.  Blood work is notable for metabolic acidosis.  STROKE:   Time seen 4:55 PM (Time)     National Institutes of Health (NIH) Stroke Scale   NIH Stroke Scale    Level of Consciousness: Alert, Keenly Responsive  Ask Month and Age: 1 Question Right  Blink Eyes and Squeeze Hands: Performs Both Tasks  Best Gaze: Partial Gaze Palsy  Visual: No Visual Loss  Facial Palsy: Normal Symmetrical Movements  Motor, Left Arm: No Drift  Motor, Right Arm: No Movement  Motor, Left Leg: No Drift  Motor, Right Leg: Some Effort Against Gravity  Limb Ataxia: Present in One Limb  Sensory Loss: Mild-to-Moderate Sensory Loss  Best Language: Mild-to-Moderate Aphasia  Dysarthria: Mild-to-Moderate Dysarthria  Extinction and Inattention: No Abnormality    NIHSS Score: 124    Yes, this patient is a candidate for thrombolytic therapy.     4:55 PM - Patient seen and examined at the charge desk as a stroke rule out. Dr. Rivas (Neurology) is at bedside. Discussed NIH scoring and thrombolytic therapy. Discussed plan of care, including emergent CT to rule out brain bleeds. Patient agrees to the plan of care. The patient will be medicated with Tnkase. Ordered for labs and imaging to evaluate her symptoms.     5:37 PM I discussed the patient's case and the above findings with Dr. Gonda (ICU) and Dr. Rivas who discussed risks and benefits on Tnkase. Dr. Gonda will hospitalize the patient to the ICU.          DISPOSITION AND DISCUSSIONS  I have discussed management of the patient with the following physicians and ETHAN's:  Dr. Rivas (Neurology) and Dr. Gonda (ICU) see above    Discussion of management with other Q or appropriate source(s): Pharmacy at bedside when the patient arrives      DISPOSITION:  Patient will be hospitalized by Dr. Gonda (ICU)    FINAL DIANGOSIS  1. Right arm weakness    2. Left leg weakness    3. Acute CVA  (cerebrovascular accident) (HCC)    4. Essential hypertension      CRITICAL CARE  The very real possibilty of a deterioration of this patient's condition required the highest level of my preparedness for sudden, emergent intervention.  I provided critical care services, which included medication orders, frequent reevaluations of the patient's condition and response to treatment, ordering and reviewing test results, and discussing the case with various consultants.  The critical care time associated with the care of the patient was 31 minutes. Review chart for interventions. This time is exclusive of any other billable procedures.         Carolyn IBRAHIM (kAbar), am scribing for, and in the presence of, JAZLYN Bay.    Electronically signed by: Carolyn Greco (Akbar), 12/25/2024    Emerson IBRAHIM D* personally performed the services described in this documentation, as scribed by Carolyn Greco in my presence, and it is both accurate and complete.    The note accurately reflects work and decisions made by me.  PHILIP BayO.  12/26/2024  12:01 AM

## 2024-12-26 NOTE — PROGRESS NOTES
Pt admitted from ER Red 1 into R105 at 1825.  Pt moving all extremities, right slightly weaker than left, right upper arm has slight drift.  Pt states she has numbness tingling to her toes but denies numbness/tingling elsewhere.  Pt confused to situation and year, otherwise alert and oriented.  Pt's belongings include cell phone, , drivers license, sweat pants and socks.

## 2024-12-26 NOTE — PROGRESS NOTES
During change of shift report, pt's right arm flaccid, right leg weaker, and left gaze preference. Pt more confused.  Dr. Rivas notified, stat head CT ordered.  Pt down to CT.

## 2024-12-26 NOTE — CARE PLAN
The patient is Watcher - Medium risk of patient condition declining or worsening    Shift Goals  Clinical Goals: neuro checks s/p TNK and IR stent; SBP <140  Patient Goals: rest  Family Goals: chano    Progress made toward(s) clinical / shift goals:    Problem: Optimal Care of the Stroke Patient  Goal: Optimal acute care for the stroke patient  Outcome: Progressing     Problem: Psychosocial - Patient Condition  Goal: Patient's ability to verbalize feelings about condition will improve  Outcome: Progressing     Problem: Hemodynamic Monitoring  Goal: Patient's hemodynamics, fluid balance and neurologic status will be stable or improve  Outcome: Progressing     Problem: Respiratory - Stroke Patient  Goal: Patient will achieve/maintain optimum respiratory rate/effort  Outcome: Progressing     Problem: Dysphagia  Goal: Dysphagia will improve  Outcome: Progressing     Problem: Risk for Aspiration  Goal: Patient's risk for aspiration will be absent or decrease  Outcome: Progressing     Problem: Urinary Elimination  Goal: Establish and maintain regular urinary output  Outcome: Progressing     Problem: Bowel Elimination  Goal: Establish and maintain regular bowel function  Outcome: Progressing     Problem: Knowledge Deficit - Standard  Goal: Patient and family/care givers will demonstrate understanding of plan of care, disease process/condition, diagnostic tests and medications  Outcome: Progressing     Problem: Pain - Standard  Goal: Alleviation of pain or a reduction in pain to the patient’s comfort goal  Outcome: Progressing     Problem: Skin Integrity  Goal: Skin integrity is maintained or improved  Outcome: Progressing     Problem: Fall Risk  Goal: Patient will remain free from falls  Outcome: Progressing       Patient is not progressing towards the following goals:      Problem: Knowledge Deficit - Stroke Education  Goal: Patient's knowledge of stroke and risk factors will improve  Outcome: Not Progressing     Problem:  Neuro Status  Goal: Neuro status will remain stable or improve  Outcome: Not Progressing  Note: Neuro change, CT head obtained; MR brain ordered modified to urgent, pending MR brain       Problem: Self Care  Goal: Patient will have the ability to perform ADLs independently or with assistance (bathe, groom, dress, toilet and feed)  Outcome: Not Progressing

## 2024-12-26 NOTE — ED TRIAGE NOTES
"Marisol Brown  64 y.o. female  Chief Complaint   Patient presents with    Possible Stroke     Per EMS, pt was sitting with her mother when she slumped over. EMS found pt to have R gaze deviation, R lean, and nonverbal. Per EMS, pt was nonverbal until in aircraft when she began c/o numbness, dizziness, and repeating \"I feel like I'm having a heart attack.\" + asa, unk BT. LKW 1535       Pt BIB EMS from home for above complaint. Initial NIH 4 per Evelyn GANNON.     EMS , 168    Stroke Assessment paged PTA, ERP and neuro eval completed at charge desk on arrival. Pt to CT then to Red 1. Neuro and pharmD rounded at bedside for TNK admin.     2 RN neuro assessment completed with primary RN at bedside, bedside report to STONE Alaniz.    /60   Pulse 96   Temp (!) 35.7 °C (96.2 °F) (Temporal)   Resp 19   Ht 1.676 m (5' 6\")   Wt 85 kg (187 lb 6.3 oz)   SpO2 97%   BMI 30.25 kg/m²     Past Medical History:   Diagnosis Date    Depression     Essential hypertension     Hypercholesteremia     Hypothyroidism     Mixed hyperlipidemia        "

## 2024-12-26 NOTE — ASSESSMENT & PLAN NOTE
Without acute decompensation  Holding lisinopril and metoprolol, diuretic for now  Echocardiogram as part of stroke workup    Will reintroduce GDMT when clinically appropriate  -She was on norepinephrine last night and I will continue to hold

## 2024-12-26 NOTE — PROGRESS NOTES
Pt taken to CT per IR MAYANK Vega due to patient rigid/spastic RUE.   Pt then taken to STAT IR. On transport monitor, transported via bed with this RN and CCT.

## 2024-12-26 NOTE — CONSULTS
"Critical Care Consultation/H&P    Date of consult: 12/25/2024    Referring Physician  Emerson Rincon D.O.    Reason for Consultation  Acute CVA    History of Presenting Illness  64 y.o. female who presented 12/25/2024 with a past medical history significant for hypertension, tobacco and alcohol use as well as CAD status post prior stent who was brought in by care flight from home for 1-1/2 hours of \"feeling numb all over\" and \"I feel like I am having a heart attack.\"  Per aeromedical transport staff, patient had right sided weakness with last known well time at 1535.  Her glucose and route was 165 and her blood pressure was adequate.  Upon arrival to the ED, patient's NIH stroke scale was between 4 and 5 and a code stroke was activated and patient was seen by neurology Dr. Rivas.  She had a normal head CT but CTA of head showed occluded right ICA and multifocal high-grade stenosis of the left ICA and CT perfusion showed right sided perfusion deficits and the decision was made to administer TNK at 1736.  Neuro IR was consulted as well.  I was consulted to admit the patient to the ICU for ongoing critical care management.  At the time my evaluation, patient's right upper extremity drift and weakness was improving slightly and she had no new deficits.  She reports quitting alcohol 1 week ago but endorses ongoing tobacco abuse.  She denies headache.    Code Status  Full Code    Review of Systems  Review of Systems   Constitutional:  Negative for chills, fever and malaise/fatigue.   HENT:  Negative for congestion and sore throat.    Eyes:  Negative for blurred vision.   Respiratory:  Negative for cough, sputum production and shortness of breath.    Cardiovascular:  Negative for chest pain, palpitations and leg swelling.   Gastrointestinal:  Negative for abdominal pain, nausea and vomiting.   Genitourinary:  Negative for dysuria.   Musculoskeletal:  Negative for back pain and myalgias.   Skin:  Negative for rash. "   Neurological:  Positive for sensory change and focal weakness. Negative for dizziness, speech change and headaches.   Endo/Heme/Allergies:  Does not bruise/bleed easily.   Psychiatric/Behavioral:  Negative for depression. The patient is not nervous/anxious.    All other systems reviewed and are negative.      Past Medical History   has a past medical history of Depression, Essential hypertension, Hypercholesteremia, Hypothyroidism, and Mixed hyperlipidemia.    Surgical History   has a past surgical history that includes tubal ligation; dental extraction(s); carpal tunnel release; pr upper gi endoscopy,diagnosis (N/A, 8/11/2021); pr colonoscopy,diagnostic (N/A, 8/11/2021); and pr cystoscopy,insert ureteral stent (Left, 10/17/2024).    Family History  family history includes Dementia in her father; Heart Disease in her mother; Hyperlipidemia in her brother and sister.    Social History   reports that she has been smoking cigarettes. She started smoking about 51 years ago. She has a 51 pack-year smoking history. She has never used smokeless tobacco. She reports current alcohol use of about 7.2 oz of alcohol per week. She reports current drug use. Drug: Inhaled.    Medications  Home Medications    **Home medications have not yet been reviewed for this encounter**       Audit from Redirected Encounters    **Home medications have not yet been reviewed for this encounter**       Current Facility-Administered Medications   Medication Dose Route Frequency Provider Last Rate Last Admin    [COMPLETED] iohexol (OMNIPAQUE) 350 mg/mL (IV)  40 mL Intravenous Once PHILIP BayO.   40 mL at 12/25/24 1800    [COMPLETED] iohexol (OMNIPAQUE) 350 mg/mL (IV)  80 mL Intravenous Once SIMÓN Bay.O.   80 mL at 12/25/24 1800    NS infusion   Intravenous Continuous Jeremy M Gonda, M.D.        labetalol (Normodyne/Trandate) injection 10 mg  10 mg Intravenous Q10 MIN PRN Jeremy M Gonda, M.D.        hydrALAZINE  (Apresoline) injection 10 mg  10 mg Intravenous Q2HRS PRN Jeremy M Gonda, M.D.        niCARdipine (Cardene) 25 mg in  mL Standard Infusion  0-15 mg/hr Intravenous Continuous Jeremy M Gonda, M.D.        atorvastatin (Lipitor) tablet 80 mg  80 mg Oral Q EVENING Jeremy M Gonda, M.D.        acetaminophen (Tylenol) tablet 650 mg  650 mg Oral Q6HRS PRN Jeremy M Gonda, M.D.        senna-docusate (Pericolace Or Senokot S) 8.6-50 MG per tablet 2 Tablet  2 Tablet Oral Q EVENING Jeremy M Gonda, M.D.        And    polyethylene glycol/lytes (Miralax) Packet 1 Packet  1 Packet Oral QDAY PRN Jeremy M Gonda, M.D.        ondansetron (Zofran) syringe/vial injection 4 mg  4 mg Intravenous Q4HRS PRN Jeremy M Gonda, M.D.        ondansetron (Zofran ODT) dispertab 4 mg  4 mg Oral Q4HRS PRN Jeremy M Gonda, M.D.        busPIRone (Buspar) TABS 7.5 mg  7.5 mg Oral TID Jeremy M Gonda, M.D.        [START ON 12/26/2024] levothyroxine (Synthroid) tablet 88 mcg  88 mcg Oral AM ES Jeremy M Gonda, M.D.        [START ON 12/26/2024] lisinopril (Prinivil) tablet 10 mg  10 mg Oral DAILY Jeremy M Gonda, M.D.        metoprolol SR (Toprol XL) tablet 50 mg  50 mg Oral Q EVENING Jeremy M Gonda, M.D.        omeprazole (PriLOSEC OTC) TBEC 40 mg  40 mg Oral BID Jeremy M Gonda, M.D.        [START ON 12/26/2024] venlafaxine XR (Effexor XR) capsule 75 mg  75 mg Oral DAILY Jeremy M Gonda, M.D.         Current Outpatient Medications   Medication Sig Dispense Refill    acetaminophen (TYLENOL) 500 MG Tab Take 1 Tablet by mouth every 6 hours as needed for Mild Pain.      busPIRone (BUSPAR) 7.5 MG tablet Take 1 Tablet by mouth 3 times a day. 90 Tablet 0    metoprolol SR (TOPROL XL) 50 MG TABLET SR 24 HR Take 1 Tablet by mouth every evening. 30 Tablet 11    sennosides-docusate sodium (SENOKOT-S) 8.6-50 MG tablet Take 1 Tablet by mouth every day. 30 Tablet 0    omeprazole (PRILOSEC OTC) 20 MG tablet Take 2 Tablets by mouth 2 times a day. 120 Tablet 0     hydrocortisone 1 % Cream Apply 1 Application. topically 2 times a day. 1 Each 0    atorvastatin (LIPITOR) 10 MG Tab Take 10 mg by mouth every evening.      ASPIRIN 81 PO Take 81 mg by mouth every 48 hours.      lisinopril (PRINIVIL) 10 MG Tab Take 1 Tab by mouth every day. 30 Tab 0    spironolactone (ALDACTONE) 25 MG Tab Take 0.5 Tabs by mouth every day. 30 Tab 11    levothyroxine (SYNTHROID) 88 MCG Tab Take 88 mcg by mouth Every morning on an empty stomach.      venlafaxine XR (EFFEXOR XR) 75 MG CAPSULE SR 24 HR Take 75 mg by mouth every day.         Allergies  Allergies   Allergen Reactions    Codeine        Vital Signs last 24 hours  Temp:  [35.7 °C (96.2 °F)] 35.7 °C (96.2 °F)  Pulse:  [93-96] 96  Resp:  [19-22] 19  BP: (109-113)/(54-60) 109/60  SpO2:  [97 %] 97 %    Physical Exam  Physical Exam  Vitals and nursing note reviewed.   Constitutional:       General: She is awake. She is not in acute distress.     Appearance: Normal appearance. She is well-developed and overweight. She is not toxic-appearing.   HENT:      Head: Normocephalic and atraumatic.      Nose: Nose normal. No congestion.      Mouth/Throat:      Mouth: Mucous membranes are dry.      Pharynx: Oropharynx is clear.   Eyes:      General: No scleral icterus.     Extraocular Movements: Extraocular movements intact.      Conjunctiva/sclera: Conjunctivae normal.      Pupils: Pupils are equal, round, and reactive to light.   Neck:      Vascular: No JVD.      Trachea: No tracheal deviation.   Cardiovascular:      Rate and Rhythm: Normal rate and regular rhythm. Occasional Extrasystoles are present.     Chest Wall: PMI is displaced.      Pulses: Normal pulses.      Heart sounds: Normal heart sounds. No murmur heard.  Pulmonary:      Effort: Pulmonary effort is normal. No tachypnea or respiratory distress.      Breath sounds: Normal breath sounds. No wheezing or rales.      Comments: Protecting airway, speaking in full sentences  Abdominal:      General:  Bowel sounds are normal. There is no distension.      Palpations: Abdomen is soft.      Tenderness: There is no abdominal tenderness. There is no guarding or rebound.   Musculoskeletal:         General: No tenderness.      Cervical back: Neck supple. No tenderness.      Right lower leg: No edema.      Left lower leg: No edema.   Skin:     General: Skin is warm and dry.      Capillary Refill: Capillary refill takes less than 2 seconds.      Findings: No rash.   Neurological:      Mental Status: She is alert and oriented to person, place, and time.      Cranial Nerves: No cranial nerve deficit.      Sensory: Sensory deficit present.      Motor: Weakness present.      Coordination: Coordination abnormal.      Comments: Pronator drift on the right with some weakness in the right upper extremity, some drift on the left lower extremity, mild right facial droop, speech intact   Psychiatric:         Mood and Affect: Mood normal.         Behavior: Behavior is cooperative.      Comments: Odd affect         Fluids  No intake or output data in the 24 hours ending 12/25/24 1750    Laboratory  Recent Results (from the past 48 hours)   CBC WITH DIFFERENTIAL    Collection Time: 12/25/24  5:02 PM   Result Value Ref Range    WBC 17.2 (H) 4.8 - 10.8 K/uL    RBC 4.50 4.20 - 5.40 M/uL    Hemoglobin 13.9 12.0 - 16.0 g/dL    Hematocrit 42.2 37.0 - 47.0 %    MCV 93.8 81.4 - 97.8 fL    MCH 30.9 27.0 - 33.0 pg    MCHC 32.9 32.2 - 35.5 g/dL    RDW 45.1 35.9 - 50.0 fL    Platelet Count 244 164 - 446 K/uL    MPV 10.0 9.0 - 12.9 fL    Neutrophils-Polys 78.70 (H) 44.00 - 72.00 %    Lymphocytes 9.00 (L) 22.00 - 41.00 %    Monocytes 10.80 0.00 - 13.40 %    Eosinophils 0.20 0.00 - 6.90 %    Basophils 0.30 0.00 - 1.80 %    Immature Granulocytes 1.00 (H) 0.00 - 0.90 %    Nucleated RBC 0.00 0.00 - 0.20 /100 WBC    Neutrophils (Absolute) 13.49 (H) 1.82 - 7.42 K/uL    Lymphs (Absolute) 1.55 1.00 - 4.80 K/uL    Monos (Absolute) 1.86 (H) 0.00 - 0.85 K/uL     Eos (Absolute) 0.04 0.00 - 0.51 K/uL    Baso (Absolute) 0.06 0.00 - 0.12 K/uL    Immature Granulocytes (abs) 0.17 (H) 0.00 - 0.11 K/uL    NRBC (Absolute) 0.00 K/uL   COMP METABOLIC PANEL    Collection Time: 12/25/24  5:02 PM   Result Value Ref Range    Sodium 131 (L) 135 - 145 mmol/L    Potassium 3.6 3.6 - 5.5 mmol/L    Chloride 100 96 - 112 mmol/L    Co2 14 (L) 20 - 33 mmol/L    Anion Gap 17.0 (H) 7.0 - 16.0    Glucose 182 (H) 65 - 99 mg/dL    Bun 12 8 - 22 mg/dL    Creatinine 1.14 0.50 - 1.40 mg/dL    Calcium 8.5 8.5 - 10.5 mg/dL    Correct Calcium 8.7 8.5 - 10.5 mg/dL    AST(SGOT) 22 12 - 45 U/L    ALT(SGPT) 14 2 - 50 U/L    Alkaline Phosphatase 68 30 - 99 U/L    Total Bilirubin 0.5 0.1 - 1.5 mg/dL    Albumin 3.8 3.2 - 4.9 g/dL    Total Protein 7.4 6.0 - 8.2 g/dL    Globulin 3.6 (H) 1.9 - 3.5 g/dL    A-G Ratio 1.1 g/dL   PROTHROMBIN TIME    Collection Time: 12/25/24  5:02 PM   Result Value Ref Range    PT 12.8 12.0 - 14.6 sec    INR 0.96 0.87 - 1.13   APTT    Collection Time: 12/25/24  5:02 PM   Result Value Ref Range    APTT 29.6 24.7 - 36.0 sec   TROPONIN    Collection Time: 12/25/24  5:02 PM   Result Value Ref Range    Troponin T 10 6 - 19 ng/L   ESTIMATED GFR    Collection Time: 12/25/24  5:02 PM   Result Value Ref Range    GFR (CKD-EPI) 53 (A) >60 mL/min/1.73 m 2       Imaging  CT-HEAD W/O   Final Result      1.  No significant change from prior.   2.            DX-CHEST-PORTABLE (1 VIEW)   Final Result      No evidence of acute cardiopulmonary process.      CT-CTA NECK WITH & W/O-POST PROCESSING   Final Result      1.  Severe plaque at the carotid bifurcations and proximal internal carotid arteries.   2.  Occlusion of the right internal carotid artery with reconstitution in the supraclinoid region.   3.  Likely near occlusion of the origin of the left internal carotid artery. There is a hypodense filling defect in the proximal left ICA just above the severe calcified plaque which could represent  noncalcified plaque or thrombus extending into the    lumen.   4.  Emphysematous or fibrotic changes in the upper lungs. Irregular masslike opacity in the right upper lobe measuring 2.5 x 2.4 cm could represent lung malignancy or infection. Further evaluation is recommended.   These findings were discussed with CUCO ALLEN on 12/25/2024 5:55 PM.      CT-CTA HEAD WITH & W/O-POST PROCESS   Final Result      1.  Occlusion of the right intracranial internal carotid artery.      2.  Atherosclerotic plaque involving the intracranial left internal carotid artery with multifocal high-grade stenosis.      3.  No evidence of anterior middle cerebral artery occlusion.      4.  High-grade stenosis involving the intracranial right vertebral artery.      5.  Diminutive left vertebral artery.      6.  Report was called to CUCO ALLEN at 5:53 PM on 12/25/2024.            CT-CEREBRAL PERFUSION ANALYSIS   Final Result      1. Cerebral blood flow less than 30% possibly representing completed infarct = 0 mL. Based on distribution of this finding, this is unlikely to represent artifact.      2. T Max more than 6 seconds possibly representing combination of completed infarct and ischemia = 21 mL. Based on the distribution of this finding, this is unlikely to represent artifact.      3. Mismatched volume possibly representing ischemic brain/penumbra= 21 mL      4.  Please note that this cerebral perfusion study and report is Quantitative and targets supratentorial (cerebral) perfusion for evaluation of large vessel territory acute ischemia/infarction. For example, lacunar infarcts, and brainstem/posterior fossa    ischemia/infarction are not evaluated on this study.  Data acquisition is subject to artifacts which can yield non-anatomically plausible perfusion maps which may be due to motion, bolus timing, signal to noise ratio, or other technical factors.    Perfusion map abnormalities which show non-anatomic  "distributions are likely artifact.   This study is not \"stand-alone\" and should only be utilized for diagnosis, management/treatment in correlation with CT, CTA, and/or MRI and clinical factors.         CT-HEAD W/O   Final Result      1.  Chronic microvascular ischemic type changes and probable centrum semiovale lacunar infarcts.   2.  Nonspecific hypodensity in the right occipital region could represent age indeterminate, probably old infarct..   3.  No acute intracranial hemorrhage.   4.  If there is concern for acute ischemia, MRI is recommended               EC-ECHOCARDIOGRAM COMPLETE W/O CONT    (Results Pending)   CT-CEREBRAL PERFUSION ANALYSIS    (Results Pending)   CT-CTA NECK WITH & W/O-POST PROCESSING    (Results Pending)   CT-CTA HEAD WITH & W/O-POST PROCESS    (Results Pending)   MR-BRAIN-W/O    (Results Pending)   CT-HEAD W/O    (Results Pending)   IR-CERV CAROTID STENT WITH PROTECTION    (Results Pending)       Assessment/Plan  * Acute CVA (cerebrovascular accident) (HCC)- (present on admission)  Assessment & Plan  Acute ischemic CVA (NIHSS 4-5), with alternating deficits s/p TNK 12/25/2024 @ 1736  Neurology (Dr. Rivas) and neuro IR (Dr. Palma) consulted  Neuro checks per protocol  MRI, Echo, Lipid panel, HgbA1c ordered  Seizure, aspiration, and fall precautions   NPO pending dysphagia screen  SLP/PT/OT, physiatry evaluation  Antiplatelet therapy: Begin 24 hours post TNK, High intensity statin  Strict BP control with goal -180 mmHg given potential for perfusion related changes, DBP <105; hydralazine, labetalol, and nicardipine gtt vs norepinephrine drip as needed    Chronic systolic heart failure (HCC)- (present on admission)  Assessment & Plan  Without acute decompensation  Holding lisinopril and metoprolol, diuretic for now  Echocardiogram as part of stroke workup    Hypothyroidism- (present on admission)  Assessment & Plan  Continue outpatient Synthroid  Recheck thyroid panel    Coronary " artery disease due to lipid rich plaque- (present on admission)  Assessment & Plan  Resume antiplatelet at 24-hour elif post TNK  Continue statin  Hold beta-blocker for now    Essential hypertension- (present on admission)  Assessment & Plan  Goal -180 post TNK  Hold scheduled antihypertensives and monitor with IV as needed labetalol/hydralazine and nicardipine    Dyslipidemia- (present on admission)  Assessment & Plan  Continue statin    Hyponatremia  Assessment & Plan  Monitor with daily BMP  Goal eunatremia    Gastroesophageal reflux disease without esophagitis- (present on admission)  Assessment & Plan  Continue PPI    Leukemoid reaction- (present on admission)  Assessment & Plan  Reactive, doubt infection  Monitor    Tobacco use- (present on admission)  Assessment & Plan  Tobacco cessation education resources to be provided when appropriate        Discussed patient condition and risk of morbidity and/or mortality with RN, Pharmacy, Charge nurse / hot rounds, Patient, neurology, and neuro IR, emergency physician .    The patient remains critically ill.  Critical care time = 39 minutes in directly providing and coordinating critical care and extensive data review.  No time overlap and excludes procedures.    Please note that this dictation was created using voice recognition software. I have made every reasonable attempt to correct obvious errors, but there may be errors of grammar and possibly content that I did not discover before finalizing the note.

## 2024-12-26 NOTE — PROGRESS NOTES
Call placed to motherMarilyn, listed in chart.  Updated on patient's plan of care and MRI screening form completed via telephone.   Call placed to MRI and updated, no time given.  Pt pending MRI.

## 2024-12-26 NOTE — ASSESSMENT & PLAN NOTE
A1C normal  -140  Holding home antihypertensives, currently on very low-dose norepinephrine  -Start midodrine 5 mg 3 times daily on 12/27    Atorvastatin 80mg qhs  Baclofen for spasticity (12/28 is the first day she's been very alert, I will hold baclofen today to ensure this is a trend we see continuing)    Q4 neuro checks    New watershed infarcts and chronic infarcts, mild petechial hemorrhage    SLP eval - currently on TF    Brilinta + Aspirin - continue despite hemorrhage as she already had her left carotid stent thrombose once which required repeat trip to IR for thrombectomy.  Distal perfusion was fed from right sided collaterals.

## 2024-12-26 NOTE — DISCHARGE PLANNING
[facility] Transitional Care Coordination    Referral from: Dr. Gonda    Insurance Provider on Facesheet: [facility]    Potential Rehab Diagnosis: CVA    Chart review indicates patient may have on going medical management and may have therapy needs to possibly meet inpatient rehab facility criteria with the goal of returning to community.    D/C support will need to be verified: Mother    Physiatry consultation pended per protocol.  W/U & TX pending.     Thank you for the referral.

## 2024-12-26 NOTE — DIETARY
"Nutrition Services: Initial Assessment     Day 1 of admit. Marisol Brown is 64 y.o., female with admitting DX of Acute CVA (cerebrovascular accident)     Consult Received for: Enteral Nutrition.    Current Hospital Problems List:   Acute CVA (cerebrovascular accident)   Dyslipidemia  Essential hypertension   Coronary artery disease due to lipid rich plaque   Hypothyroidism  Tobacco use  Leukemoid reaction  Gastroesophageal reflux disease without esophagitis  Chronic systolic heart failure   Hyponatremia     Nutrition Assessment:      Height: 167.6 cm (5' 5.98\")  Weight: 80 kg (176 lb 5.9 oz)  Weight taken via: Bed Scale  BMI Calculated: 28.48  BMI Classification: Overweight     Wt Readings from Last 5 Encounters:   24 80 kg (176 lb 5.9 oz)   10/19/24 84.2 kg (185 lb 10 oz)   23 73.3 kg (161 lb 9.6 oz)   21 72 kg (158 lb 11.7 oz)   21 73.5 kg (162 lb 0.6 oz)     Calculation Equation: MSJ x 1.2 = 1636 kcals/day  Total Calories / day: 1636 - 1840 Calories / k - 23   Total Grams Protein / day: 96 - 112 Grams Protein / k.2 - 1.4    Objective:   Admitted for stroke rule out.  Pertinent Medical Hx: depression, essential hypertension, hypercholesteremia, hypothyroidism, mixed hyperlipidemia  Pt was seen by SLP today for swallow evaluation. Recommended diet of L4/L0 ordered with 1:1 supervision.  Emergent left ICA thrombectomy this afternoon with IR.  Indication for Nutrition Support: Unable to meet nutrition needs orally  Enteral Access:   Enteral Tube 24 Cortrak - Gastric 10 Fr. Right nare (Active)   Pertinent Labs: Glucose 123, Triglycerides 160  Pertinent Meds: Aspirin, Synthroid, Pericolace  Levo @ 0.23 mcg/kg/min  Skin/Wounds: no skin breakdown noted  Food Allergies: none noted  Last BM:  Type 5: Soft blob with clear cut edges (passed easily)   (pta)   Formula based on RD Eval: Vital AF 1.2    Current Diet Order/Intake:   IDDSI Level 4 - Pureed    Nutrition Focused Physical " Exam (NFPE)  Weight Loss: none per chart review  Muscle Mass: Unable to identify at this time  Subcutaneous Fat: Unable to identify at this time  Fluid Accumulation: none per flow sheet  Reduced  Strength: N/A in acute care setting.    Nutrition Diagnosis:      Inadequate Oral Intake related to right sided weakness as evidenced by need for nutrition support..      Unable to fully assess for malnutrition at this time    Nutrition Interventions:      Initiate Vital AF 1.2 at 25 ml/hr continuous and advance per protocol to goal rate of 60 ml/hr.  Goal tube feed volume provides 1728 kcals, 108 g protein, and 1168 ml free water daily.   PO diet per SLP/MD.  Patient aware of active plan of care as appropriate.     Nutrition Monitoring and Evaluation:      Monitor nutrition POC.   Additional fluids per MD/DO  Monitor vital signs pertinent to nutrition.    RD following and will provide updated recommendations as indicated.      BERE Pichardo/AND CRITERIA FOR MALNUTRITION

## 2024-12-26 NOTE — DISCHARGE PLANNING
Case Management Discharge Planning    Admission Date: 12/25/2024  GMLOS: 1.9  ALOS: 1    6-Clicks ADL Score: 6  6-Clicks Mobility Score: 6  PT and/or OT Eval ordered: Yes  Post-acute Referrals Ordered: Yes  Post-acute Choice Obtained: No  Has referral(s) been sent to post-acute provider:  No      Anticipated Discharge Dispo: Discharge Disposition: Disch to IP rehab facility or distinct part unit (62)    DME Needed: No    Action(s) Taken: DC Assessment Complete (See below)    Escalations Completed: None    Medically Clear: No    Next Steps: CM to follow up with IDT regarding DCP needs/barriers    Barriers to Discharge: Medical clearance, Pending PT Evaluation, and Pending Procedures    Is the patient up for discharge tomorrow: No      Care Transition Team Assessment    Patient's orientation is listed as LAURITA per flowsheet; information for assessment obtained through patient's mother, Marilyn Almanza    Case management role discussed; understanding of case management role verbalized   Demographics on facesheet verified  Please see H&P for pertinent PMH and reason for admission  Patient's PCP is: Nathalie Oakley PA-C  Patient's preferred pharmacy is: [facility] in [place], [place]  Housing: Patient lives in an apartment (senior living) on the first floor; no JOIE   IADLS/ADLS: Independent at baseline  Mental Health Concerns: Denies  Substance Abuse: Patient's mother states patient drinks more than she should; did not know how often or quantity of drinks  Monthly Income/employment status: Patient receives SSI/patient is retired  DME: None at baseline  O2: None at baseline  Prior services: Home-Independent  Discharge support: Mother, brother, daughter, son  Transportation: Denies needing assistance  Discharge planning: Rehab; patient's family states patient would be amenable to post-acute placement (if indicated); pending PT/OT eval    Information Source  Orientation Level: Disoriented to situation, Disoriented to time, Oriented  to person, Disoriented to place  Information Given By: Parent  Informant's Name: Marilyn Almanza  Who is responsible for making decisions for patient? : Legal next of kin  Name(s) of Primary Decision Maker: Shanthi Villeda [phone]    Readmission Evaluation  Is this a readmission?: No    Elopement Risk  Legal Hold: No  Ambulatory or Self Mobile in Wheelchair: No-Not an Elopement Risk  Elopement Risk: Not at Risk for Elopement    Interdisciplinary Discharge Planning  Does Admitting Nurse Feel This Could be a Complex Discharge?: No  Primary Care Physician: Nathalie Oakley PA-C  Lives with - Patient's Self Care Capacity: Alone and Able to Care For Self  Patient or legal guardian wants to designate a caregiver: No  Support Systems: Children, Family Member(s), Friends / Neighbors, Parent  Housing / Facility: 1 Story Apartment / Condo  Do You Take your Prescribed Medications Regularly: Yes  Able to Return to Previous ADL's: Future Time w/Therapy  Mobility Issues: No  Prior Services: None, Home-Independent  Assistance Needed: Unknown at this Time    Discharge Preparedness  What is your plan after discharge?: Other (comment)  What are your discharge supports?: Child, Parent  Prior Functional Level: Ambulatory, Drives Self, Independent with Activities of Daily Living, Independent with Medication Management  Difficulity with ADLs: None  Difficulity with IADLs: None    Functional Assesment  Prior Functional Level: Ambulatory, Drives Self, Independent with Activities of Daily Living, Independent with Medication Management    Finances  Financial Barriers to Discharge: No  Prescription Coverage: Yes    Vision / Hearing Impairment  Vision Impairment : Yes  Right Eye Vision: Impaired, Wears Glasses  Left Eye Vision: Impaired, Wears Glasses  Hearing Impairment : No    Values / Beliefs / Concerns  Values / Beliefs Concerns : No    Advance Directive  Advance Directive?: None    Domestic Abuse  Have you ever been the victim of abuse or  violence?: No  Physical Abuse or Sexual Abuse: No  Verbal Abuse or Emotional Abuse: No  Possible Abuse/Neglect Reported to:: Not Applicable    Psychological Assessment  History of Substance Abuse: Alcohol  History of Psychiatric Problems: No  Non-compliant with Treatment: No  Newly Diagnosed Illness: Yes    Discharge Risks or Barriers  Discharge risks or barriers?: Complex medical needs  Patient risk factors: Cognitive / sensory / physical deficit, Complex medical needs, Multiple organizational systems involved, Vulnerable adult    Anticipated Discharge Information  Discharge Disposition: Disch to IP rehab facility or distinct part unit (62)

## 2024-12-26 NOTE — ASSESSMENT & PLAN NOTE
SBP <140 after stent placement    Hold home antihypertensives given need for norepinephrine overnight

## 2024-12-26 NOTE — OR SURGEON
Immediate Post- Operative Note        Findings: Left ICA stent thrombosis      Procedure(s): left ICA thrombectomy     TICI 3      Estimated Blood Loss: Less than 5 ml        Complications: None            12/26/2024     1:52 PM     Josh Arciniega M.D.

## 2024-12-26 NOTE — CARE PLAN
The patient is Watcher - Medium risk of patient condition declining or worsening    Shift Goals  Clinical Goals: SBP <140  Patient Goals: rest  Family Goals: chano    Progress made toward(s) clinical / shift goals:    Problem: Optimal Care of the Stroke Patient  Goal: Optimal emergency care for the stroke patient  Outcome: Progressing  Goal: Optimal acute care for the stroke patient  Outcome: Progressing     Problem: Knowledge Deficit - Stroke Education  Goal: Patient's knowledge of stroke and risk factors will improve  Outcome: Progressing     Problem: Neuro Status  Goal: Neuro status will remain stable or improve  Outcome: Progressing     Problem: Respiratory - Stroke Patient  Goal: Patient will achieve/maintain optimum respiratory rate/effort  Outcome: Progressing     Problem: Urinary Elimination  Goal: Establish and maintain regular urinary output  Outcome: Progressing     Problem: Pain - Standard  Goal: Alleviation of pain or a reduction in pain to the patient’s comfort goal  Outcome: Progressing     Problem: Skin Integrity  Goal: Skin integrity is maintained or improved  Outcome: Progressing

## 2024-12-26 NOTE — PROGRESS NOTES
Patient noted to have neuro change, unable to follow commands on R side, R side contracted and withdrawing to pain.  Unable to assess sensation on R side.  CT head completed per [place], message placed to update [place].

## 2024-12-26 NOTE — CONSULTS
"Neurology STROKE CODE Consultation  Neurohospitalist Service, [facility]    Referring Physician: Jeremy M Gonda, M.D.    STROKE CODE:   Chief Complaint   Patient presents with    Possible Stroke     Per EMS, pt was sitting with her mother when she slumped over. EMS found pt to have R gaze deviation, R lean, and nonverbal. Per EMS, pt was nonverbal until in aircraft when she began c/o numbness, dizziness, and repeating \"I feel like I'm having a heart attack.\" + asa, unk BT. LKW [id number]       To obtain the most accurate data regarding the time called, and time patient seen, refer to the stroke run-sheet and chart.  For time of CT, refer to the radiology report. See A&P below for TNK Decision and door to needle time if and when applicable.    HPI: Marisol Brown is a pleasant 64 y.o. right-handed female with past medical history significant for peripheral vascular disease, currently on aspirin, hypertension, hyperlipidemia, hypothyroidism and current smoker who was brought to emergency room for evaluation of right-sided weakness, slurred speech and left-sided weakness.  The onset of symptom was around 1535 PM.  Apparently she was at her mother's house sitting on the chair when all of a sudden slumped over and noted to have right gaze deviation and became nonverbal.  She had some dizziness and per EMS she kept repeating \"I feel like I am having a heart attack \".  Patient underwent a brain CT which did not reveal acute abnormalities.  CT of the neck revealed severe atherosclerotic plaque with occlusion of right internal carotid artery with near occlusion of origin of left internal carotid artery as well as high-grade stenosis involving the intracranial right vertebral artery.  Her NIH scale score was 5.    Review of systems: In addition to what is detailed in the HPI above, all other systems reviewed and are negative.    Past Medical History:    has a past medical history of Depression, Essential " hypertension, Hypercholesteremia, Hypothyroidism, and Mixed hyperlipidemia.    FHx:  family history includes Dementia in her father; Heart Disease in her mother; Hyperlipidemia in her brother and sister.    SHx:   reports that she has been smoking cigarettes. She started smoking about 51 years ago. She has a 51 pack-year smoking history. She has never used smokeless tobacco. She reports current alcohol use of about 7.2 oz of alcohol per week. She reports current drug use. Drug: Inhaled.    Allergies:  Allergies   Allergen Reactions    Codeine        Medications:    Current Facility-Administered Medications:     [COMPLETED] iohexol (OMNIPAQUE) 350 mg/mL (IV), 40 mL, Intravenous, Once, Emerson GAVINO Rincon D.O., 40 mL at 12/25/24 1800    [COMPLETED] iohexol (OMNIPAQUE) 350 mg/mL (IV), 80 mL, Intravenous, Once, Emerson GAVINO Rincon D.O., 80 mL at 12/25/24 1800    NS infusion, , Intravenous, Continuous, Jeremy M Gonda, M.D.    labetalol (Normodyne/Trandate) injection 10 mg, 10 mg, Intravenous, Q10 MIN PRN, Jeremy M Gonda, M.D.    hydrALAZINE (Apresoline) injection 10 mg, 10 mg, Intravenous, Q2HRS PRN, Jeremy M Gonda, M.D.    niCARdipine (Cardene) 25 mg in  mL Standard Infusion, 0-15 mg/hr, Intravenous, Continuous, Jeremy M Gonda, M.D.    atorvastatin (Lipitor) tablet 80 mg, 80 mg, Oral, Q EVENING, Jeremy M Gonda, M.D.    acetaminophen (Tylenol) tablet 650 mg, 650 mg, Oral, Q6HRS PRN, Jeremy M Gonda, M.D.    senna-docusate (Pericolace Or Senokot S) 8.6-50 MG per tablet 2 Tablet, 2 Tablet, Oral, Q EVENING **AND** polyethylene glycol/lytes (Miralax) Packet 1 Packet, 1 Packet, Oral, QDAY PRN, Jeremy M Gonda, M.D.    ondansetron (Zofran) syringe/vial injection 4 mg, 4 mg, Intravenous, Q4HRS PRN, Jeremy M Gonda, M.D.    ondansetron (Zofran ODT) dispertab 4 mg, 4 mg, Oral, Q4HRS PRN, Jeremy M Gonda, M.D.    busPIRone (Buspar) TABS 7.5 mg, 7.5 mg, Oral, TID, Jeremy M Gonda, M.D.    [START ON 12/26/2024] levothyroxine  (Synthroid) tablet 88 mcg, 88 mcg, Oral, AM ES, Jeremy M Gonda, M.D.    [START ON 12/26/2024] lisinopril (Prinivil) tablet 10 mg, 10 mg, Oral, DAILY, Jeremy M Gonda, M.D.    metoprolol SR (Toprol XL) tablet 50 mg, 50 mg, Oral, Q EVENING, Jeremy M Gonda, M.D.    omeprazole (PriLOSEC OTC) TBEC 40 mg, 40 mg, Oral, BID, Jeremy M Gonda, M.D.    [START ON 12/26/2024] venlafaxine XR (Effexor XR) capsule 75 mg, 75 mg, Oral, DAILY, Jeremy M Gonda, M.D.    Current Outpatient Medications:     acetaminophen (TYLENOL) 500 MG Tab, Take 1 Tablet by mouth every 6 hours as needed for Mild Pain., Disp: , Rfl:     busPIRone (BUSPAR) 7.5 MG tablet, Take 1 Tablet by mouth 3 times a day., Disp: 90 Tablet, Rfl: 0    metoprolol SR (TOPROL XL) 50 MG TABLET SR 24 HR, Take 1 Tablet by mouth every evening., Disp: 30 Tablet, Rfl: 11    sennosides-docusate sodium (SENOKOT-S) 8.6-50 MG tablet, Take 1 Tablet by mouth every day., Disp: 30 Tablet, Rfl: 0    omeprazole (PRILOSEC OTC) 20 MG tablet, Take 2 Tablets by mouth 2 times a day., Disp: 120 Tablet, Rfl: 0    hydrocortisone 1 % Cream, Apply 1 Application. topically 2 times a day., Disp: 1 Each, Rfl: 0    atorvastatin (LIPITOR) 10 MG Tab, Take 10 mg by mouth every evening., Disp: , Rfl:     ASPIRIN 81 PO, Take 81 mg by mouth every 48 hours., Disp: , Rfl:     lisinopril (PRINIVIL) 10 MG Tab, Take 1 Tab by mouth every day., Disp: 30 Tab, Rfl: 0    spironolactone (ALDACTONE) 25 MG Tab, Take 0.5 Tabs by mouth every day., Disp: 30 Tab, Rfl: 11    levothyroxine (SYNTHROID) 88 MCG Tab, Take 88 mcg by mouth Every morning on an empty stomach., Disp: , Rfl:     venlafaxine XR (EFFEXOR XR) 75 MG CAPSULE SR 24 HR, Take 75 mg by mouth every day., Disp: , Rfl:     Physical Examination:    Vitals:    12/25/24 1700 12/25/24 1735 12/25/24 1746   BP:  113/54 109/60   Pulse:  93 96   Resp:  (!) 22 19   Temp:   (!) 35.7 °C (96.2 °F)   TempSrc:   Temporal   SpO2:  97% 97%   Weight: 85 kg (187 lb 6.3 oz)     Height:  "1.676 m (5' 6\")         General:   Patient is awake and in no acute distress  Neck: Full range of motion  Eyes: Midline, Pupils reactive to light.  CV: RRR  Lungs: No respiratory distress  Extremities: No cyanosis, warm, no significant edema.    NEUROLOGICAL EXAM:   Mental status: Awake, alert but disoriented to time, follows commands  Speech and language: speech is slightly dysarthric. The patient is able to name and repeat.  Cranial nerve exam: Pupils are equal, round and reactive to light bilaterally. Visual fields are full. Extraocular muscles are intact.   Face is symmetric. Sensation in the face is intact to light touch.   Hearing to finger rub equal. Palate elevates symmetrically. Shoulder shrug is full. Tongue is midline.  Motor exam: Sustain antigravity in all 4 extremities with  downward drift of right upper and left lower extremity. Tone is normal. No abnormal movements were seen on exam.  Sensory exam: No sensory deficits identified   Coordination: no gross ataxia noted on exam  Plantar reflexes: Equivocal  Gait: deferred     NIH Stroke Scale performed at :    1a. Level of Consciousness (Alert, drowsy, etc): 0= Alert    1b. LOC Questions (Month, age): 1= Answers one correctly    1c. LOC Commands (Open/close eyes make fist/let go): 0= Obeys both correctly    2.   Best Gaze (Eyes open - patient follows examiner's finger on face): 0= Normal    3.   Visual Fields (introduce visual stimulus/threat to patient's field quadrants): 0= No visual loss  4.   Facial Paresis (Show teeth, raise eyebrows and squeeze eyes shut): 0= Normal     5a. Motor Arm - Left (Elevate arm to 90 degrees if patient is sitting, 45 degrees if  supine): 0= No drift    5b. Motor Arm - Right (Elevate arm to 90 degrees if patient is sitting, 45 degrees if supine): 1= Drift    6a. Motor Leg - Left (Elevate leg 30 degrees with patient supine): 1= Drift    6b. Motor Leg - Right  (Elevate leg 30 degrees with patient supine): 0= No drift    7.   " Limb Ataxia (Finger-nose, heel down shin): 0= No ataxia    8.   Sensory (Pin prick to face, arm, trunk and leg - compare side to side): 0= Normal    9.  Best Language (Name item, describe a picture and read sentences): 1= Mild to moderate aphasia    10. Dysarthria (Evaluate speech clarity by patient repeating listed words): 1= Mild to moderate slurring    11. Extinction and Inattention (Use information from prior testing to identify neglect or  double simultaneous stimuli testing): 0= No neglect    Total NIH Score: 5    Baseline modified Curry Scale (MRS): 0 = No symptoms    Objective Data:    Labs:  Lab Results   Component Value Date/Time    PROTHROMBTM 12.8 12/25/2024 05:02 PM    INR 0.96 12/25/2024 05:02 PM      Lab Results   Component Value Date/Time    WBC 17.2 (H) 12/25/2024 05:02 PM    RBC 4.50 12/25/2024 05:02 PM    HEMOGLOBIN 13.9 12/25/2024 05:02 PM    HEMATOCRIT 42.2 12/25/2024 05:02 PM    MCV 93.8 12/25/2024 05:02 PM    MCH 30.9 12/25/2024 05:02 PM    MCHC 32.9 12/25/2024 05:02 PM    MPV 10.0 12/25/2024 05:02 PM    NEUTSPOLYS 78.70 (H) 12/25/2024 05:02 PM    LYMPHOCYTES 9.00 (L) 12/25/2024 05:02 PM    MONOCYTES 10.80 12/25/2024 05:02 PM    EOSINOPHILS 0.20 12/25/2024 05:02 PM    BASOPHILS 0.30 12/25/2024 05:02 PM    HYPOCHROMIA 1+ 04/11/2023 12:12 AM    ANISOCYTOSIS 1+ 04/11/2023 12:12 AM      Lab Results   Component Value Date/Time    SODIUM 131 (L) 12/25/2024 05:02 PM    POTASSIUM 3.6 12/25/2024 05:02 PM    CHLORIDE 100 12/25/2024 05:02 PM    CO2 14 (L) 12/25/2024 05:02 PM    GLUCOSE 182 (H) 12/25/2024 05:02 PM    BUN 12 12/25/2024 05:02 PM    CREATININE 1.14 12/25/2024 05:02 PM      Lab Results   Component Value Date/Time    CHOLSTRLTOT 178 08/30/2021 12:48 AM     (H) 08/30/2021 12:48 AM    HDL 57 08/30/2021 12:48 AM    TRIGLYCERIDE 96 08/30/2021 12:48 AM       Lab Results   Component Value Date/Time    ALKPHOSPHAT 68 12/25/2024 05:02 PM    ASTSGOT 22 12/25/2024 05:02 PM    ALTSGPT 14  "12/25/2024 05:02 PM    TBILIRUBIN 0.5 12/25/2024 05:02 PM        Imaging/Testing:    I interpreted and/or reviewed the patient's neuroimaging    CT-CTA HEAD WITH & W/O-POST PROCESS   Final Result      1.  Occlusion of the right intracranial internal carotid artery.      2.  Atherosclerotic plaque involving the intracranial left internal carotid artery with multifocal high-grade stenosis.      3.  No evidence of anterior middle cerebral artery occlusion.      4.  High-grade stenosis involving the intracranial right vertebral artery.      5.  Diminutive left vertebral artery.      6.  Report was called to [facility] ALLEN at 5:53 PM on 12/25/2024.            CT-CEREBRAL PERFUSION ANALYSIS   Final Result      1. Cerebral blood flow less than 30% possibly representing completed infarct = 0 mL. Based on distribution of this finding, this is unlikely to represent artifact.      2. T Max more than 6 seconds possibly representing combination of completed infarct and ischemia = 21 mL. Based on the distribution of this finding, this is unlikely to represent artifact.      3. Mismatched volume possibly representing ischemic brain/penumbra= 21 mL      4.  Please note that this cerebral perfusion study and report is Quantitative and targets supratentorial (cerebral) perfusion for evaluation of large vessel territory acute ischemia/infarction. For example, lacunar infarcts, and brainstem/posterior fossa    ischemia/infarction are not evaluated on this study.  Data acquisition is subject to artifacts which can yield non-anatomically plausible perfusion maps which may be due to motion, bolus timing, signal to noise ratio, or other technical factors.    Perfusion map abnormalities which show non-anatomic distributions are likely artifact.   This study is not \"stand-alone\" and should only be utilized for diagnosis, management/treatment in correlation with CT, CTA, and/or MRI and clinical factors.         CT-HEAD W/O   Final " "Result      1.  Chronic microvascular ischemic type changes and probable centrum semiovale lacunar infarcts.   2.  Nonspecific hypodensity in the right occipital region could represent age indeterminate, probably old infarct..   3.  No acute intracranial hemorrhage.   4.  If there is concern for acute ischemia, MRI is recommended               DX-CHEST-PORTABLE (1 VIEW)    (Results Pending)   CT-CTA NECK WITH & W/O-POST PROCESSING    (Results Pending)   EC-ECHOCARDIOGRAM COMPLETE W/O CONT    (Results Pending)       Assessment and Plan:  Marisol Brown is a pleasant 64 y.o. right-handed female with past medical history significant for peripheral vascular disease, currently on aspirin, hypertension, hyperlipidemia, hypothyroidism and current smoker who was brought to emergency room for evaluation of right-sided weakness, slurred speech and left-sided weakness.  The onset of symptom was around 1535 PM.  Apparently she was at her mother's house sitting on the chair when all of a sudden slumped over and noted to have right gaze deviation and became nonverbal.  She had some dizziness and per EMS she kept repeating \"I feel like I am having a heart attack \".  Patient underwent a brain CT which did not reveal acute abnormalities.  CT of the neck revealed severe atherosclerotic plaque with occlusion of right internal carotid artery with near occlusion of origin of left internal carotid artery as well as high-grade stenosis involving the intracranial right vertebral artery.  CT perfusion with 21 mL of ischemic penumbra on the right.  Her NIH scale score was 5.    I reviewed the risks (including possible bleeding complications and death) of administration of thrombolytics., benefits, and alternatives with the patient and/or surrogate decision maker who wishes to proceed with the medication.  TNK was administered at 1736 PM.  Case was also discussed with Dr. Arciniega, neuro IR and given possible chronic occlusion of right " renal carotid artery with relatively low NIH scale score, no acute intervention deemed necessary.    Plan:  - ACUTE TREATMENT WITH TNK as managed with pharmacy and coordinated care  - Admit to the intensive care unit  - Neurology checks and vital signs per protocol; perform swallow screen  -Given multifocal stenosis as well as right carotid occlusion maintain blood pressure  130-180.  - Maintain normoglycemia and avoid hypo- or hypernatremia; aim for normothermia  - Hold antiplatelets and any blood thinners for 24 hours status post tNK administration  - High intensity statin when safe to swallow, LDL goal less than 70  - Serum studies for stroke risk factors: lipid panel & hemoglobin A1C  - To identify stroke territory, obtain MRI brain without contrast  - Obtain a 2D echocardiogram.  - Evaluate and treat with PT/OT/ST; physiatry consult  - Stroke education  -Follow-up with neurovascular clinic after discharge    The evaluation of the patient, and recommended management, was discussed with the Jeremy M Gonda, M.D. and Dr. Emerson Rincon D.O.    Upon my evaluation, this patient had a high probability of imminent or life-threatening deterioration due to cerebrovascular accident which required my direct attention, intervention, and personal management.  I personally provided 55 minutes of total critical care time. Time includes: review of laboratory data, review of radiology studies, discussion with consultants, discussion with family/patient, monitoring for potential decompensation.  Interventions were performed as documented in the chart.      Please note that this dictation was created using voice recognition software.  I have made every reasonable attempt to correct obvious errors, but I expect that there are errors of grammar and possibly content that I did not discover before finalizing the note.       Kyle Rivas MD  [facility]

## 2024-12-27 ENCOUNTER — APPOINTMENT (OUTPATIENT)
Dept: RADIOLOGY | Facility: MEDICAL CENTER | Age: 64
End: 2024-12-27
Attending: RADIOLOGY
Payer: MEDICARE

## 2024-12-27 LAB
ALBUMIN SERPL BCP-MCNC: 3.4 G/DL (ref 3.2–4.9)
ALBUMIN/GLOB SERPL: 1.1 G/DL
ALP SERPL-CCNC: 55 U/L (ref 30–99)
ALT SERPL-CCNC: 13 U/L (ref 2–50)
ANION GAP SERPL CALC-SCNC: 12 MMOL/L (ref 7–16)
AST SERPL-CCNC: 47 U/L (ref 12–45)
BILIRUB SERPL-MCNC: 0.3 MG/DL (ref 0.1–1.5)
BUN SERPL-MCNC: 15 MG/DL (ref 8–22)
CALCIUM ALBUM COR SERPL-MCNC: 9.3 MG/DL (ref 8.5–10.5)
CALCIUM SERPL-MCNC: 8.8 MG/DL (ref 8.5–10.5)
CHLORIDE SERPL-SCNC: 113 MMOL/L (ref 96–112)
CO2 SERPL-SCNC: 18 MMOL/L (ref 20–33)
CREAT SERPL-MCNC: 0.7 MG/DL (ref 0.5–1.4)
CRP SERPL HS-MCNC: 9.71 MG/DL (ref 0–0.75)
EKG IMPRESSION: NORMAL
ERYTHROCYTE [DISTWIDTH] IN BLOOD BY AUTOMATED COUNT: 46.3 FL (ref 35.9–50)
GFR SERPLBLD CREATININE-BSD FMLA CKD-EPI: 96 ML/MIN/1.73 M 2
GLOBULIN SER CALC-MCNC: 3.2 G/DL (ref 1.9–3.5)
GLUCOSE SERPL-MCNC: 125 MG/DL (ref 65–99)
HCT VFR BLD AUTO: 32.2 % (ref 37–47)
HGB BLD-MCNC: 10.9 G/DL (ref 12–16)
MAGNESIUM SERPL-MCNC: 2.5 MG/DL (ref 1.5–2.5)
MCH RBC QN AUTO: 31.4 PG (ref 27–33)
MCHC RBC AUTO-ENTMCNC: 33.9 G/DL (ref 32.2–35.5)
MCV RBC AUTO: 92.8 FL (ref 81.4–97.8)
PHOSPHATE SERPL-MCNC: 2.8 MG/DL (ref 2.5–4.5)
PLATELET # BLD AUTO: 256 K/UL (ref 164–446)
PMV BLD AUTO: 9.9 FL (ref 9–12.9)
POTASSIUM SERPL-SCNC: 3.7 MMOL/L (ref 3.6–5.5)
PREALB SERPL-MCNC: 10.4 MG/DL (ref 18–38)
PROT SERPL-MCNC: 6.6 G/DL (ref 6–8.2)
RBC # BLD AUTO: 3.47 M/UL (ref 4.2–5.4)
SODIUM SERPL-SCNC: 143 MMOL/L (ref 135–145)
WBC # BLD AUTO: 7.5 K/UL (ref 4.8–10.8)

## 2024-12-27 PROCEDURE — 700101 HCHG RX REV CODE 250: Performed by: NURSE PRACTITIONER

## 2024-12-27 PROCEDURE — 700105 HCHG RX REV CODE 258: Performed by: NURSE PRACTITIONER

## 2024-12-27 PROCEDURE — 99232 SBSQ HOSP IP/OBS MODERATE 35: CPT | Performed by: PSYCHIATRY & NEUROLOGY

## 2024-12-27 PROCEDURE — 700102 HCHG RX REV CODE 250 W/ 637 OVERRIDE(OP): Performed by: NURSE PRACTITIONER

## 2024-12-27 PROCEDURE — 99223 1ST HOSP IP/OBS HIGH 75: CPT | Performed by: STUDENT IN AN ORGANIZED HEALTH CARE EDUCATION/TRAINING PROGRAM

## 2024-12-27 PROCEDURE — 97167 OT EVAL HIGH COMPLEX 60 MIN: CPT

## 2024-12-27 PROCEDURE — G0316 PR PROLONGED IP/OBS E&M EA 15 MIN: HCPCS | Performed by: STUDENT IN AN ORGANIZED HEALTH CARE EDUCATION/TRAINING PROGRAM

## 2024-12-27 PROCEDURE — 86140 C-REACTIVE PROTEIN: CPT

## 2024-12-27 PROCEDURE — 700102 HCHG RX REV CODE 250 W/ 637 OVERRIDE(OP): Performed by: STUDENT IN AN ORGANIZED HEALTH CARE EDUCATION/TRAINING PROGRAM

## 2024-12-27 PROCEDURE — A9270 NON-COVERED ITEM OR SERVICE: HCPCS | Performed by: STUDENT IN AN ORGANIZED HEALTH CARE EDUCATION/TRAINING PROGRAM

## 2024-12-27 PROCEDURE — 700105 HCHG RX REV CODE 258: Performed by: STUDENT IN AN ORGANIZED HEALTH CARE EDUCATION/TRAINING PROGRAM

## 2024-12-27 PROCEDURE — 84134 ASSAY OF PREALBUMIN: CPT

## 2024-12-27 PROCEDURE — 99291 CRITICAL CARE FIRST HOUR: CPT | Performed by: STUDENT IN AN ORGANIZED HEALTH CARE EDUCATION/TRAINING PROGRAM

## 2024-12-27 PROCEDURE — 84100 ASSAY OF PHOSPHORUS: CPT

## 2024-12-27 PROCEDURE — 97162 PT EVAL MOD COMPLEX 30 MIN: CPT

## 2024-12-27 PROCEDURE — 93010 ELECTROCARDIOGRAM REPORT: CPT | Performed by: INTERNAL MEDICINE

## 2024-12-27 PROCEDURE — 770022 HCHG ROOM/CARE - ICU (200)

## 2024-12-27 PROCEDURE — 80053 COMPREHEN METABOLIC PANEL: CPT

## 2024-12-27 PROCEDURE — 83735 ASSAY OF MAGNESIUM: CPT

## 2024-12-27 PROCEDURE — 97535 SELF CARE MNGMENT TRAINING: CPT

## 2024-12-27 PROCEDURE — A9270 NON-COVERED ITEM OR SERVICE: HCPCS | Performed by: NURSE PRACTITIONER

## 2024-12-27 PROCEDURE — 85027 COMPLETE CBC AUTOMATED: CPT

## 2024-12-27 PROCEDURE — 92610 EVALUATE SWALLOWING FUNCTION: CPT

## 2024-12-27 RX ORDER — BUSPIRONE HYDROCHLORIDE 10 MG/1
7.5 TABLET ORAL 3 TIMES DAILY
Status: DISCONTINUED | OUTPATIENT
Start: 2024-12-27 | End: 2024-12-30 | Stop reason: HOSPADM

## 2024-12-27 RX ORDER — ASPIRIN 81 MG/1
81 TABLET, CHEWABLE ORAL DAILY
Status: DISCONTINUED | OUTPATIENT
Start: 2024-12-28 | End: 2024-12-30 | Stop reason: HOSPADM

## 2024-12-27 RX ORDER — MIDODRINE HYDROCHLORIDE 5 MG/1
5 TABLET ORAL
Status: DISCONTINUED | OUTPATIENT
Start: 2024-12-27 | End: 2024-12-30 | Stop reason: HOSPADM

## 2024-12-27 RX ORDER — LEVOTHYROXINE SODIUM 88 UG/1
88 TABLET ORAL
Status: DISCONTINUED | OUTPATIENT
Start: 2024-12-27 | End: 2024-12-27

## 2024-12-27 RX ORDER — LEVOTHYROXINE SODIUM 88 UG/1
88 TABLET ORAL
Status: DISCONTINUED | OUTPATIENT
Start: 2024-12-28 | End: 2024-12-30 | Stop reason: HOSPADM

## 2024-12-27 RX ORDER — MIDODRINE HYDROCHLORIDE 5 MG/1
5 TABLET ORAL
Status: DISCONTINUED | OUTPATIENT
Start: 2024-12-27 | End: 2024-12-27

## 2024-12-27 RX ORDER — POLYETHYLENE GLYCOL 3350 17 G/17G
1 POWDER, FOR SOLUTION ORAL
Status: DISCONTINUED | OUTPATIENT
Start: 2024-12-27 | End: 2024-12-27

## 2024-12-27 RX ORDER — AMOXICILLIN 250 MG
2 CAPSULE ORAL EVERY EVENING
Status: DISCONTINUED | OUTPATIENT
Start: 2024-12-27 | End: 2024-12-30 | Stop reason: HOSPADM

## 2024-12-27 RX ORDER — BUSPIRONE HYDROCHLORIDE 5 MG/1
7.5 TABLET ORAL 3 TIMES DAILY
Status: DISCONTINUED | OUTPATIENT
Start: 2024-12-27 | End: 2024-12-27

## 2024-12-27 RX ORDER — SODIUM CHLORIDE 9 MG/ML
1000 INJECTION, SOLUTION INTRAVENOUS ONCE
Status: COMPLETED | OUTPATIENT
Start: 2024-12-27 | End: 2024-12-27

## 2024-12-27 RX ORDER — ATORVASTATIN CALCIUM 80 MG/1
80 TABLET, FILM COATED ORAL EVERY EVENING
Status: DISCONTINUED | OUTPATIENT
Start: 2024-12-27 | End: 2024-12-30 | Stop reason: HOSPADM

## 2024-12-27 RX ORDER — ACETAMINOPHEN 325 MG/1
650 TABLET ORAL EVERY 6 HOURS PRN
Status: DISCONTINUED | OUTPATIENT
Start: 2024-12-27 | End: 2024-12-30 | Stop reason: HOSPADM

## 2024-12-27 RX ORDER — VENLAFAXINE 37.5 MG/1
37.5 TABLET ORAL 2 TIMES DAILY WITH MEALS
Status: COMPLETED | OUTPATIENT
Start: 2024-12-27 | End: 2024-12-27

## 2024-12-27 RX ORDER — ASPIRIN 81 MG/1
81 TABLET, CHEWABLE ORAL DAILY
Status: DISCONTINUED | OUTPATIENT
Start: 2024-12-27 | End: 2024-12-27

## 2024-12-27 RX ORDER — POLYETHYLENE GLYCOL 3350 17 G/17G
1 POWDER, FOR SOLUTION ORAL
Status: DISCONTINUED | OUTPATIENT
Start: 2024-12-27 | End: 2024-12-30 | Stop reason: HOSPADM

## 2024-12-27 RX ORDER — ATORVASTATIN CALCIUM 40 MG/1
80 TABLET, FILM COATED ORAL EVERY EVENING
Status: DISCONTINUED | OUTPATIENT
Start: 2024-12-27 | End: 2024-12-27

## 2024-12-27 RX ORDER — ONDANSETRON 4 MG/1
4 TABLET, ORALLY DISINTEGRATING ORAL EVERY 4 HOURS PRN
Status: DISCONTINUED | OUTPATIENT
Start: 2024-12-27 | End: 2024-12-30 | Stop reason: HOSPADM

## 2024-12-27 RX ORDER — VENLAFAXINE HYDROCHLORIDE 75 MG/1
75 CAPSULE, EXTENDED RELEASE ORAL DAILY
Status: DISCONTINUED | OUTPATIENT
Start: 2024-12-28 | End: 2024-12-30 | Stop reason: HOSPADM

## 2024-12-27 RX ORDER — VENLAFAXINE 75 MG/1
37.5 TABLET ORAL 2 TIMES DAILY WITH MEALS
Status: DISCONTINUED | OUTPATIENT
Start: 2024-12-27 | End: 2024-12-27

## 2024-12-27 RX ORDER — AMOXICILLIN 250 MG
2 CAPSULE ORAL EVERY EVENING
Status: DISCONTINUED | OUTPATIENT
Start: 2024-12-27 | End: 2024-12-27

## 2024-12-27 RX ADMIN — ATORVASTATIN CALCIUM 80 MG: 80 TABLET, FILM COATED ORAL at 17:08

## 2024-12-27 RX ADMIN — VENLAFAXINE HYDROCHLORIDE 37.5 MG: 75 TABLET ORAL at 08:01

## 2024-12-27 RX ADMIN — TICAGRELOR 90 MG: 90 TABLET ORAL at 08:31

## 2024-12-27 RX ADMIN — TICAGRELOR 90 MG: 90 TABLET ORAL at 17:08

## 2024-12-27 RX ADMIN — LABETALOL HYDROCHLORIDE 10 MG: 5 INJECTION, SOLUTION INTRAVENOUS at 16:48

## 2024-12-27 RX ADMIN — SODIUM CHLORIDE 1000 ML: 9 INJECTION, SOLUTION INTRAVENOUS at 08:02

## 2024-12-27 RX ADMIN — MIDODRINE HYDROCHLORIDE 5 MG: 5 TABLET ORAL at 08:01

## 2024-12-27 RX ADMIN — BUSPIRONE HYDROCHLORIDE 7.5 MG: 5 TABLET ORAL at 16:47

## 2024-12-27 RX ADMIN — LEVOTHYROXINE SODIUM 88 MCG: 0.09 TABLET ORAL at 05:39

## 2024-12-27 RX ADMIN — SENNOSIDES AND DOCUSATE SODIUM 2 TABLET: 50; 8.6 TABLET ORAL at 17:09

## 2024-12-27 RX ADMIN — ASPIRIN 81 MG: 81 TABLET, CHEWABLE ORAL at 08:31

## 2024-12-27 RX ADMIN — NOREPINEPHRINE BITARTRATE 0.05 MCG/KG/MIN: 1 INJECTION, SOLUTION, CONCENTRATE INTRAVENOUS at 04:00

## 2024-12-27 RX ADMIN — BUSPIRONE HYDROCHLORIDE 7.5 MG: 5 TABLET ORAL at 22:22

## 2024-12-27 RX ADMIN — LABETALOL HYDROCHLORIDE 10 MG: 5 INJECTION, SOLUTION INTRAVENOUS at 22:07

## 2024-12-27 RX ADMIN — VENLAFAXINE HYDROCHLORIDE 37.5 MG: 37.5 TABLET ORAL at 16:48

## 2024-12-27 RX ADMIN — POTASSIUM BICARBONATE 25 MEQ: 978 TABLET, EFFERVESCENT ORAL at 08:02

## 2024-12-27 RX ADMIN — ACETAMINOPHEN 650 MG: 325 TABLET ORAL at 22:21

## 2024-12-27 RX ADMIN — ACETAMINOPHEN 650 MG: 325 TABLET ORAL at 16:47

## 2024-12-27 RX ADMIN — BUSPIRONE HYDROCHLORIDE 7.5 MG: 5 TABLET ORAL at 05:39

## 2024-12-27 ASSESSMENT — COGNITIVE AND FUNCTIONAL STATUS - GENERAL
MOVING FROM LYING ON BACK TO SITTING ON SIDE OF FLAT BED: A LOT
EATING MEALS: TOTAL
PERSONAL GROOMING: A LOT
CLIMB 3 TO 5 STEPS WITH RAILING: TOTAL
MOVING TO AND FROM BED TO CHAIR: TOTAL
DAILY ACTIVITIY SCORE: 8
MOBILITY SCORE: 9
TOILETING: TOTAL
WALKING IN HOSPITAL ROOM: TOTAL
HELP NEEDED FOR BATHING: TOTAL
SUGGESTED CMS G CODE MODIFIER MOBILITY: CM
SUGGESTED CMS G CODE MODIFIER DAILY ACTIVITY: CM
TURNING FROM BACK TO SIDE WHILE IN FLAT BAD: A LOT
DRESSING REGULAR UPPER BODY CLOTHING: A LOT
DRESSING REGULAR LOWER BODY CLOTHING: TOTAL
STANDING UP FROM CHAIR USING ARMS: A LOT

## 2024-12-27 ASSESSMENT — ENCOUNTER SYMPTOMS
PALPITATIONS: 0
NAUSEA: 0
DOUBLE VISION: 0
HEADACHES: 1
MUSCULOSKELETAL NEGATIVE: 1
EYES NEGATIVE: 1
ABDOMINAL PAIN: 0
SHORTNESS OF BREATH: 0
SENSORY CHANGE: 1
COUGH: 0
HEADACHES: 0
VOMITING: 0
SPUTUM PRODUCTION: 0
FOCAL WEAKNESS: 0
BLURRED VISION: 0
SORE THROAT: 0
DIZZINESS: 0
FEVER: 0
WEAKNESS: 1
CHILLS: 0

## 2024-12-27 ASSESSMENT — PAIN DESCRIPTION - PAIN TYPE
TYPE: ACUTE PAIN

## 2024-12-27 ASSESSMENT — GAIT ASSESSMENTS: GAIT LEVEL OF ASSIST: UNABLE TO PARTICIPATE

## 2024-12-27 ASSESSMENT — ACTIVITIES OF DAILY LIVING (ADL): TOILETING: INDEPENDENT

## 2024-12-27 NOTE — CARE PLAN
The patient is Watcher - Medium risk of patient condition declining or worsening    Shift Goals  Clinical Goals: Q30/Q1 Neuro, SB <140  Patient Goals: rest  Family Goals: LAURITA    Progress made toward(s) clinical / shift goals:      Problem: Dysphagia  Goal: Dysphagia will improve  Outcome: Progressing     Problem: Urinary Elimination  Goal: Establish and maintain regular urinary output  Outcome: Progressing     Problem: Self Care  Goal: Patient will have the ability to perform ADLs independently or with assistance (bathe, groom, dress, toilet and feed)  Outcome: Progressing     Problem: Knowledge Deficit - Standard  Goal: Patient and family/care givers will demonstrate understanding of plan of care, disease process/condition, diagnostic tests and medications  Outcome: Progressing     Problem: Pain - Standard  Goal: Alleviation of pain or a reduction in pain to the patient’s comfort goal  Outcome: Progressing     Problem: Fall Risk  Goal: Patient will remain free from falls  Outcome: Progressing

## 2024-12-27 NOTE — PROGRESS NOTES
"Neuro Interventional Radiology   Progress Note     Author: Silvia Stringer D.N.P. Date & Time created: 12/26/2024  6:39 PM   Date of admission  12/25/2024  Note to reader: this note follows the APSO format rather than the historical SOAP format. Assessment and plan located at the top of the note for ease of use.    Chief Complaint  64 y.o. female admitted 12/25/2024 with   Chief Complaint   Patient presents with    Possible Stroke     Per EMS, pt was sitting with her mother when she slumped over. EMS found pt to have R gaze deviation, R lean, and nonverbal. Per EMS, pt was nonverbal until in aircraft when she began c/o numbness, dizziness, and repeating \"I feel like I'm having a heart attack.\" + asa, unk BT. LKW 1535     HPI  Marisol Brown is a 64-year-old female with PMH significant for HTN, HLD, CAD s/p prior stent with chronic systolic heart failure, hypothyroidism, and tobacco and alcohol use who presented by care flight from home for right-sided weakness after 90 minutes of \"feeling numb all over\" and \"I am having a heart attack.\"  (From Dr. Gonda's note).  12/26/2024 CTA demonstrates \"occlusion of the right internal carotid artery and high-grade stenosis of the proximal left internal carotid artery with vertebral artery stenosis\".  12/26/2024 ANIYAH Arciniega completed a left carotid stent placement with embolic protection device with patient to RICU on Integrilin drip at 1 mcg/kg/min secondary to pharmacy recommendation due to renal function.    Interval History ANIYAH  12/26/2024: Patient has decreased LOC, responds to repeated verbal/physical stimuli, and is verbal only with whispered yes or no, denying headache or vision changes.  She is unable to demonstrate cranial nerve movements or smile.  Right upper extremity flaccid. Left upper extremity antigravity with spastic, uncoordinated movements, arm swinging and clenched fist.  Right lower extremity not antigravity, able to move toes.  Left lower extremity no " "spontaneous movement, reflex movement intact.  Unable to determine sensation.  Per conversation with bedside RN, this is an acute neurochange from exam prior to 8:30 AM.  12/26/2024 noncontrast head CT at 840 AM demonstrates \"no acute intracranial hemorrhage, age indeterminant left parietal infarct not definitely seen on prior\".    - Stat CTA head and neck and CT perfusion ordered   - Discussion of patient's symptoms with Dr. Arciniega, Dr. Mayberry, and Dr. Rivas  -12/26/2024 CTA neck at 1215 demonstrates \"occlusion of the distal left common carotid artery stent, chronic occlusion of the right internal carotid artery, moderate atherosclerotic narrowing of the distal right vertebral artery, and moderate to severe narrowing of the right vertebral artery origin\".  -12/26/2024 CT perfusion at 1215 demonstrates   cerebral blood flow less than 30% = 7 mL  Tmax more than 6 seconds= 209 mL  Mismatch volume= 202  -Patient to ANIYAH with Dr. Arciniega for thrombectomy of left ICA stent thrombosis with TICI 3 recanalization.  -Postprocedure loading with Brilinta 180 mg and aspirin 324 mg  -Integrilin infusion overlap for 2 hours (2 mcg/kg/min due to improved renal function)    I reviewed patient's most recent labs including WBC 10.0, Hgb 12.9, CR 0.78, sodium 138.  Coordinated post ANIYAH procedure care with interventional radiologist, intensivists, vascular neurology, and hospital nursing staff.    Assessment/Plan     Principal Problem:    Acute CVA (cerebrovascular accident) (HCC)  Active Problems:    Dyslipidemia    Essential hypertension    Coronary artery disease due to lipid rich plaque    Hypothyroidism    Tobacco use    Leukemoid reaction    Gastroesophageal reflux disease without esophagitis    Chronic systolic heart failure (HCC)    Hyponatremia      Plan ANIYAH  - Neurochecks per [facility] policy  - For any neurochanges, please call Dr. Arciniega  - Goal SBP: 100-140 per vascular neurology recommendation  - Continue Brilinta " 90 mg daily and aspirin 81 mg for 90 days starting 12/27/2024  - Follow up appointment in approximately 1 month with OP Neuro IR, our office to call and schedule (not scheduled at this time)  - MRI brain without contrast  - PT/OT/SLP  Post-angioseal instructions:   - no lifting greater than 5 lbs for 7 days  -  no baths/ swimming/ soaking in tub for 7 days. Shower OK.  - OK to change dressings/band aid as needed.     -Thank you for allowing Interventional Radiology team to participate in the patients care, if any additional care or requests are needed in the future please do not hesitate call or place IR order                   Review of Systems  Physical Exam   Review of Systems   Unable to perform ROS: Acuity of condition      Vitals:    12/26/24 1800   BP: 122/66   Pulse: (!) 105   Resp: 20   Temp: 36.9 °C (98.4 °F)   SpO2: 97%        Physical Exam  Vitals and nursing note reviewed.   Constitutional:       General: She is in acute distress.      Appearance: She is ill-appearing.   HENT:      Head: Atraumatic.   Cardiovascular:      Rate and Rhythm: Normal rate.   Pulmonary:      Effort: Pulmonary effort is normal. No respiratory distress.   Abdominal:      General: There is no distension.   Musculoskeletal:      Right lower leg: No edema.      Left lower leg: No edema.   Skin:     General: Skin is warm and dry.      Coloration: Skin is pale.   Neurological:      Cranial Nerves: Cranial nerve deficit present.      Motor: Weakness (Right upper extremity flaccid) present.      Coordination: Coordination abnormal.   Psychiatric:      Comments: Unable to determine, one-word sentences             Labs    Recent Labs     12/25/24  1702 12/26/24  0410   WBC 17.2* 10.0   RBC 4.50 4.08*   HEMOGLOBIN 13.9 12.9   HEMATOCRIT 42.2 38.1   MCV 93.8 93.4   MCH 30.9 31.6   MCHC 32.9 33.9   RDW 45.1 44.8   PLATELETCT 244 264   MPV 10.0 9.8     Recent Labs     12/25/24  1702 12/26/24  0410   SODIUM 131* 138   POTASSIUM 3.6 3.8    CHLORIDE 100 108   CO2 14* 18*   GLUCOSE 182* 123*   BUN 12 11   CREATININE 1.14 0.78   CALCIUM 8.5 8.9     Recent Labs     12/25/24  1702 12/26/24  0410   ALBUMIN 3.8  --    TBILIRUBIN 0.5  --    ALKPHOSPHAT 68  --    TOTPROTEIN 7.4  --    ALTSGPT 14  --    ASTSGOT 22  --    CREATININE 1.14 0.78     DX-ABDOMEN FOR TUBE PLACEMENT   Final Result      1.  Enteric tube coiled in the fundus of stomach.      CT-CTA NECK WITH & W/O-POST PROCESSING   Final Result      1.  Interval stenting of the distal left common carotid artery into the left internal carotid artery. There is occlusion of the stent with trace flow at the periphery.   2.  Chronic occlusion of the right internal carotid artery from its origin extending to the intracranial portion.   3.  Moderate atherosclerotic narrowing of the distal right vertebral artery.   4.  Moderate to severe narrowing of the right vertebral artery origin.   5.  Right upper lobe mass again noted. Recommend biopsy or PET scan.   6.  Emphysematous and fibrotic changes of the bilateral upper lungs.      Dr. Arciniega is aware of these findings.      CT-CTA HEAD WITH & W/O-POST PROCESS   Final Result      1.  Bilateral occlusions of the internal carotid arteries.      2.The cavernous and supraclinoid internal carotid arteries and anterior and middle cerebral arteries fill from the posterior circulation.      3. Moderate atherosclerotic plaquing of the distal right vertebral artery.         CT-CEREBRAL PERFUSION ANALYSIS   Final Result      1. Cerebral blood flow less than 30% possibly representing completed infarct = 7 mL. Based on distribution of this finding, this is possibly artifact.      2. T Max more than 6 seconds possibly representing combination of completed infarct and ischemia = 209 mL. Based on the distribution of this finding, this is possibly artifact.      3. Mismatched volume possibly representing ischemic brain/penumbra= 202 mL      4.  Please note that this cerebral  "perfusion study and report is Quantitative and targets supratentorial (cerebral) perfusion for evaluation of large vessel territory acute ischemia/infarction. For example, lacunar infarcts, and brainstem/posterior fossa    ischemia/infarction are not evaluated on this study.  Data acquisition is subject to artifacts which can yield non-anatomically plausible perfusion maps which may be due to motion, bolus timing, signal to noise ratio, or other technical factors.    Perfusion map abnormalities which show non-anatomic distributions are likely artifact.   This study is not \"stand-alone\" and should only be utilized for diagnosis, management/treatment in correlation with CT, CTA, and/or MRI and clinical factors.         EC-ECHOCARDIOGRAM COMPLETE W/O CONT   Final Result      CT-HEAD W/O   Final Result      1.  No acute intracranial hemorrhage.   2.  Age-indeterminate left parietal infarct not definitely seen on prior.   3.  Otherwise unchanged findings.               DX-CHEST-FOR LINE PLACEMENT Perform procedure in: Patient's Room   Final Result      Peripherally inserted catheter has been placed and the tip projects appropriately over the superior vena cava.      CT-CTA NECK WITH & W/O-POST PROCESSING   Final Result      Stable exam with occlusion of the right internal carotid artery and high-grade stenosis of the proximal left internal carotid artery. Vertebral artery stenoses are also noted along with fibrotic change at the lung apices and a partially cavitary mass at    the right lung apex.      CT-CTA HEAD WITH & W/O-POST PROCESS   Final Result      There has been no significant interval change from the prior study.      CT-CEREBRAL PERFUSION ANALYSIS   Final Result      1. Cerebral blood flow less than 30% possibly representing completed infarct = 0 mL. Based on distribution of this finding, this is unlikely to represent artifact.      2. T Max more than 6 seconds possibly representing combination of completed " "infarct and ischemia = 4 mL. Based on the distribution of this finding, this is unlikely to represent artifact. This is improved from the prior study.      3. Mismatched volume possibly representing ischemic brain/penumbra= 4 mL. This is improved from the prior study.      4.  Please note that this cerebral perfusion study and report is Quantitative and targets supratentorial (cerebral) perfusion for evaluation of large vessel territory acute ischemia/infarction. For example, lacunar infarcts, and brainstem/posterior fossa    ischemia/infarction are not evaluated on this study.  Data acquisition is subject to artifacts which can yield non-anatomically plausible perfusion maps which may be due to motion, bolus timing, signal to noise ratio, or other technical factors.    Perfusion map abnormalities which show non-anatomic distributions are likely artifact.   This study is not \"stand-alone\" and should only be utilized for diagnosis, management/treatment in correlation with CT, CTA, and/or MRI and clinical factors.         CT-HEAD W/O   Final Result      1.  No significant change from prior.   2.            DX-CHEST-PORTABLE (1 VIEW)   Final Result      No evidence of acute cardiopulmonary process.      CT-CTA NECK WITH & W/O-POST PROCESSING   Final Result      1.  Severe plaque at the carotid bifurcations and proximal internal carotid arteries.   2.  Occlusion of the right internal carotid artery with reconstitution in the supraclinoid region.   3.  Likely near occlusion of the origin of the left internal carotid artery. There is a hypodense filling defect in the proximal left ICA just above the severe calcified plaque which could represent noncalcified plaque or thrombus extending into the    lumen.   4.  Emphysematous or fibrotic changes in the upper lungs. Irregular masslike opacity in the right upper lobe measuring 2.5 x 2.4 cm could represent lung malignancy or infection. Further evaluation is recommended. " "  These findings were discussed with CUCO ALLEN on 12/25/2024 5:55 PM.      CT-CTA HEAD WITH & W/O-POST PROCESS   Final Result      1.  Occlusion of the right intracranial internal carotid artery.      2.  Atherosclerotic plaque involving the intracranial left internal carotid artery with multifocal high-grade stenosis.      3.  No evidence of anterior middle cerebral artery occlusion.      4.  High-grade stenosis involving the intracranial right vertebral artery.      5.  Diminutive left vertebral artery.      6.  Report was called to CUCO ALLEN at 5:53 PM on 12/25/2024.            CT-CEREBRAL PERFUSION ANALYSIS   Final Result      1. Cerebral blood flow less than 30% possibly representing completed infarct = 0 mL. Based on distribution of this finding, this is unlikely to represent artifact.      2. T Max more than 6 seconds possibly representing combination of completed infarct and ischemia = 21 mL. Based on the distribution of this finding, this is unlikely to represent artifact.      3. Mismatched volume possibly representing ischemic brain/penumbra= 21 mL      4.  Please note that this cerebral perfusion study and report is Quantitative and targets supratentorial (cerebral) perfusion for evaluation of large vessel territory acute ischemia/infarction. For example, lacunar infarcts, and brainstem/posterior fossa    ischemia/infarction are not evaluated on this study.  Data acquisition is subject to artifacts which can yield non-anatomically plausible perfusion maps which may be due to motion, bolus timing, signal to noise ratio, or other technical factors.    Perfusion map abnormalities which show non-anatomic distributions are likely artifact.   This study is not \"stand-alone\" and should only be utilized for diagnosis, management/treatment in correlation with CT, CTA, and/or MRI and clinical factors.         CT-HEAD W/O   Final Result      1.  Chronic microvascular ischemic type changes " "and probable centrum semiovale lacunar infarcts.   2.  Nonspecific hypodensity in the right occipital region could represent age indeterminate, probably old infarct..   3.  No acute intracranial hemorrhage.   4.  If there is concern for acute ischemia, MRI is recommended               IR-CERV CAROTID STENT WITH PROTECTION    (Results Pending)   MR-BRAIN-W/O    (Results Pending)   IR-CERV CAROTID STENT WITH PROTECTION    (Results Pending)       INR   Date Value Ref Range Status   12/25/2024 0.96 0.87 - 1.13 Final     Comment:     INR - Non-therapeutic Reference Range: 0.87-1.13  INR - Therapeutic Reference Range: 2.0-4.0       No results found for: \"POCINR\"     Intake/Output Summary (Last 24 hours) at 12/26/2024 1839  Last data filed at 12/26/2024 1800  Gross per 24 hour   Intake 1463.13 ml   Output 1650 ml   Net -186.87 ml      Labs not explicitly included in this progress note were reviewed by the author. Radiology/imaging not explicitly included in this progress note was reviewed by the author.     I have performed a physical exam and reviewed and updated ROS and Plan today (12/26/2024).     97 minutes in directly providing and coordinating care and extensive data review.  No time overlap and excludes procedures.  "

## 2024-12-27 NOTE — CONSULTS
Physical Medicine and Rehabilitation Consultation              Date of initial consultation: 12/27/2024  Requesting provider: Jeremy M Gonda, M.D.  Consulting provider: Holland Funk D.O.  Reason for consultation: assess for acute inpatient rehab appropriateness  LOS: 2 Day(s)    Chief complaint: weakness    HPI: The patient is a 64 y.o. right-handed female with a past medical history of HTN, hyperlipidemia, hypothyroidism, CAD s/p stent, peripheral vascular disease on aspirin and current tobacco smoker who presented on 12/25/2024  4:58 PM with right and left sided weakness, slurred speech. CT brain did not show any acute abnormalities. CTA neck showed severe atherosclerotic plaque with occlusion of the right ICA and near occlusion of L ICA. NIH 5. Patient did received TNK, with initial improvement in symptoms. However a few hours later had profound R sided weakness and imaging was repeated. The patient underwent L ICA stent placement with neuro IR. Imaging later found the stent to have re-occluded. Patient was taken back to IR suite for L ICA thrombectomy with TICI 3 reperfusion. Brain MRI with acute watershed infarct in bilateral cerebral hemisphere with minimal petechial hemorrhage in the left parietal head region. Patient has been seen by PT, OT, and SLP, with recommendation for post acute placement. PM&R consulted to evaluate for possible inpatient rehab admission.      Current labs remarkable for WBC 7.5, Hgb 10.9, sodium 143, glucose 125, AST 47. Vitals remarkable for tachypnea, labile blood pressure, most recently 130/59.     On my evaluation, the patient is complaining of bilateral hand tingling, right sided weakness and tightness. Per the family at bedside, the patient has had previous episodes of weakness and numbness in her arms and legs.  She was having right sided tightness especially in her leg even before this most recent stroke.  The patient appears to be neglecting her right side. Patient  denies fevers, chills, headache, chest pain, cough, abdominal pain, nausea, vomiting, dysuria.      Social Hx:  Patient lives alone in a 1st floor senior living apartment in [place].  The patient's daughter and mother both also live in [place].  If needed, the mother states that the patient can live with her upon discharge.  0 JOIE  At prior level of function, the patient was independent. Was driving.     THERAPY:  PT: Functional mobility   : unable to ambulate for transfer. Sit to stand Max A. Bed mobility max A.     OT: ADLs  : grooming mod A, upper dressing max A, lower dressing total A.     SLP:   : swallow study: Pureed food, thin liquid.     IMAGIN2024: CT head without contrast:  IMPRESSION:  1.  Chronic microvascular ischemic type changes and probable centrum semiovale lacunar infarcts.  2.  Nonspecific hypodensity in the right occipital region could represent age indeterminate, probably old infarct..  3.  No acute intracranial hemorrhage.  4.  If there is concern for acute ischemia, MRI is recommended    2024: CTA head with and without contrast  IMPRESSION:  1.  Occlusion of the right intracranial internal carotid artery.  2.  Atherosclerotic plaque involving the intracranial left internal carotid artery with multifocal high-grade stenosis.  3.  No evidence of anterior middle cerebral artery occlusion.  4.  High-grade stenosis involving the intracranial right vertebral artery.  5.  Diminutive left vertebral artery.    2024: CTA neck with and without contrast  IMPRESSION:  1.  Severe plaque at the carotid bifurcations and proximal internal carotid arteries.  2.  Occlusion of the right internal carotid artery with reconstitution in the supraclinoid region.  3.  Likely near occlusion of the origin of the left internal carotid artery. There is a hypodense filling defect in the proximal left ICA just above the severe calcified plaque which could represent noncalcified  plaque or thrombus extending into the   lumen.  4.  Emphysematous or fibrotic changes in the upper lungs. Irregular masslike opacity in the right upper lobe measuring 2.5 x 2.4 cm could represent lung malignancy or infection. Further evaluation is recommended.    12/26/2024: Transthoracic echocardiogram:  CONCLUSIONS  Compared to the prior study on 10/20/2019, normalization of LV systolic   function with improvement in diastolic function.   Normal left ventricular chamber size. Normal left ventricular wall   thickness. Normal left ventricular systolic function. The ejection   fraction is measured to be 71 % by Fagan's biplane. No regional wall   motion abnormalities.  Normal diastolic function.  No evidence of right to left shunt by agitated saline study.  No significant valvular disease    12/26/2024: CTA head with and without contrast  IMPRESSION:  1.  Bilateral occlusions of the internal carotid arteries.  2.The cavernous and supraclinoid internal carotid arteries and anterior and middle cerebral arteries fill from the posterior circulation.  3. Moderate atherosclerotic plaquing of the distal right vertebral artery.    12/26/2024: CTA neck with and without contrast  IMPRESSION:  1.  Interval stenting of the distal left common carotid artery into the left internal carotid artery. There is occlusion of the stent with trace flow at the periphery.  2.  Chronic occlusion of the right internal carotid artery from its origin extending to the intracranial portion.  3.  Moderate atherosclerotic narrowing of the distal right vertebral artery.  4.  Moderate to severe narrowing of the right vertebral artery origin.  5.  Right upper lobe mass again noted. Recommend biopsy or PET scan.  6.  Emphysematous and fibrotic changes of the bilateral upper lungs.    12/26/2024: MRI brain without contrast:  IMPRESSION:  1.  The diffusion-weighted sequences demonstrates acute infarctions in the bilateral cerebral hemispheres in the  watershed distribution. The predominant portion of the cerebral hemispheres are free from the infarct. There is there is mild petechial hemorrhage in the left parietal infarct.  2.  Chronic right proximal internal carotid occlusion. There is reconstitution of right supraclinoid segment from the anterior communicating artery.  3.  Widely patent left internal carotid carotid, anterior and middle cerebral flow voids.  4.  There are areas of chronic infarcts in the bilateral centrum semiovale.    PROCEDURES:  12/25/2024: Josh Arciniega M.D.   Left carotid stent placement embolic protection device    12/26/2024: Josh Arciniega M.D.   Left ICA thrombectomy    PMH:  Past Medical History:   Diagnosis Date    Depression     Essential hypertension     Hypercholesteremia     Hypothyroidism     Mixed hyperlipidemia        PSH:  Past Surgical History:   Procedure Laterality Date    [place] CYSTOSCOPY,INSERT URETERAL STENT Left 10/17/2024    Procedure: CYSTOSCOPY, WITH URETERAL STENT INSERTION WITH  URETERAL BIOPSY AND STENT PLACEMENT, and TURVT;  Surgeon: Hari Correa M.D.;  Location: SURGERY [facility];  Service: Urology    [place] UPPER GI ENDOSCOPY,DIAGNOSIS N/A 8/11/2021    Procedure: GASTROSCOPY;  Surgeon: Curtis Nunez M.D.;  Location: SURGERY [facility];  Service: Gastroenterology    [place] COLONOSCOPY,DIAGNOSTIC N/A 8/11/2021    Procedure: COLONOSCOPY;  Surgeon: Curtis Nunez M.D.;  Location: [facility];  Service: Gastroenterology    CARPAL TUNNEL RELEASE      Right    DENTAL EXTRACTION(S)      TUBAL LIGATION         FHX:  Family History   Problem Relation Age of Onset    Heart Disease Mother     Dementia Father     Hyperlipidemia Sister     Hyperlipidemia Brother        Medications:  Current Facility-Administered Medications   Medication Dose    [START ON 12/28/2024] aspirin (Asa) chewable tab 81 mg  81 mg    atorvastatin (Lipitor) tablet 80 mg  80 mg    busPIRone (Buspar) tablet 7.5 mg  7.5 mg    [START  "ON 12/28/2024] levothyroxine (Synthroid) tablet 88 mcg  88 mcg    midodrine (Proamatine) tablet 5 mg  5 mg    senna-docusate (Pericolace Or Senokot S) 8.6-50 MG per tablet 2 Tablet  2 Tablet    And    polyethylene glycol/lytes (Miralax) Packet 1 Packet  1 Packet    ticagrelor (Brilinta) tablet 90 mg  90 mg    acetaminophen (Tylenol) tablet 650 mg  650 mg    ondansetron (Zofran ODT) dispertab 4 mg  4 mg    [START ON 12/28/2024] venlafaxine XR (Effexor XR) capsule 75 mg  75 mg    venlafaxine (Effexor) tablet 37.5 mg  37.5 mg    MD Alert...ICU Electrolyte Replacement per Pharmacy      ondansetron (Zofran) syringe/vial injection 4 mg  4 mg    [Held by provider] lisinopril (Prinivil) tablet 10 mg  10 mg    [Held by provider] metoprolol SR (Toprol XL) tablet 50 mg  50 mg    norepinephrine (Levophed) 8 mg in 250 mL NS infusion (premix)  0-1 mcg/kg/min (Ideal)    hydrALAZINE (Apresoline) injection 10 mg  10 mg    HYDROmorphone (Dilaudid) injection 0.5 mg  0.5 mg    labetalol (Normodyne/Trandate) injection 10 mg  10 mg    ipratropium-albuterol (DUONEB) nebulizer solution  3 mL       Allergies:  Allergies   Allergen Reactions    Codeine Unspecified     Unknown reaction         Physical Exam:  Vitals: /62   Pulse 76   Temp 36.6 °C (97.8 °F) (Temporal)   Resp (!) 24   Ht 1.676 m (5' 5.98\")   Wt 80 kg (176 lb 5.9 oz)   SpO2 96%   Gen: NAD  Head: Normocephalic, atraumatic  Eyes/ Nose/ Mouth: PERRL, moist mucous membranes  Cardio: good distal perfusion, warm extremities  Pulm: normal respiratory effort, no cyanosis   Abd: Soft, Nontender, Nondistended   Ext: No peripheral edema. No calf tenderness. No clubbing.    Mental status: Alert, but tired appearing. Oriented to person, place, but not month or year.   Speech: fluent.     CRANIAL NERVES:  2,3: visual acuity grossly impaired, L pupil not reactive to light  3,4,6: Unable to track eyes to the right  5: sensation intact to light touch bilaterally and symmetric  7: no " facial asymmetry  8: hearing grossly intact  9,10: symmetric palate elevation  11: Shoulder shrug present bilaterally  12: tongue protrudes midline    Motor:      Upper Extremity  Myotome R L   Shoulder flexion C5 0/5 4/5   Elbow flexion C5 2/5 4/5   Wrist extension C6 1/5 4/5   Elbow extension C7 1/5 4/5   Finger flexion C8 1/5 4/5   Finger abduction T1 0/5 4/5     Lower Extremity Myotome R L   Hip flexion L2 2/5 4/5   Knee extension L3 0/5 4/5   Ankle dorsiflexion L4 0/5 3/5   Toe extension L5 0/5 3/5   Ankle plantarflexion S1 0/5 3/5     Sensory:   intact to light touch through out arms and legs  Positive double simultaneous test, neglect on the right    DTRs: 2+ in bilateral  biceps, 3+ in bilateral patellar tendons  Upgoing babinski b/l  Positive Baer b/l     Tone: MAS 1 and right elbow flexors and elbow extensors.  Significant tone in right knee flexion and extension, unable to move through full range.  Unable to bring right ankle into neutral due to plantarflexion tone.      Labs: Reviewed and significant for   Recent Labs     12/25/24  1702 12/26/24  0410 12/27/24  0406   RBC 4.50 4.08* 3.47*   HEMOGLOBIN 13.9 12.9 10.9*   HEMATOCRIT 42.2 38.1 32.2*   PLATELETCT 244 264 256   PROTHROMBTM 12.8  --   --    APTT 29.6  --   --    INR 0.96  --   --      Recent Labs     12/25/24  1702 12/26/24  0410 12/27/24  0406   SODIUM 131* 138 143   POTASSIUM 3.6 3.8 3.7   CHLORIDE 100 108 113*   CO2 14* 18* 18*   GLUCOSE 182* 123* 125*   BUN 12 11 15   CREATININE 1.14 0.78 0.70   CALCIUM 8.5 8.9 8.8     Recent Results (from the past 24 hours)   CBC WITHOUT DIFFERENTIAL    Collection Time: 12/27/24  4:06 AM   Result Value Ref Range    WBC 7.5 4.8 - 10.8 K/uL    RBC 3.47 (L) 4.20 - 5.40 M/uL    Hemoglobin 10.9 (L) 12.0 - 16.0 g/dL    Hematocrit 32.2 (L) 37.0 - 47.0 %    MCV 92.8 81.4 - 97.8 fL    MCH 31.4 27.0 - 33.0 pg    MCHC 33.9 32.2 - 35.5 g/dL    RDW 46.3 35.9 - 50.0 fL    Platelet Count 256 164 - 446 K/uL    MPV 9.9  9.0 - 12.9 fL   Comp Metabolic Panel    Collection Time: 24  4:06 AM   Result Value Ref Range    Sodium 143 135 - 145 mmol/L    Potassium 3.7 3.6 - 5.5 mmol/L    Chloride 113 (H) 96 - 112 mmol/L    Co2 18 (L) 20 - 33 mmol/L    Anion Gap 12.0 7.0 - 16.0    Glucose 125 (H) 65 - 99 mg/dL    Bun 15 8 - 22 mg/dL    Creatinine 0.70 0.50 - 1.40 mg/dL    Calcium 8.8 8.5 - 10.5 mg/dL    Correct Calcium 9.3 8.5 - 10.5 mg/dL    AST(SGOT) 47 (H) 12 - 45 U/L    ALT(SGPT) 13 2 - 50 U/L    Alkaline Phosphatase 55 30 - 99 U/L    Total Bilirubin 0.3 0.1 - 1.5 mg/dL    Albumin 3.4 3.2 - 4.9 g/dL    Total Protein 6.6 6.0 - 8.2 g/dL    Globulin 3.2 1.9 - 3.5 g/dL    A-G Ratio 1.1 g/dL   MAGNESIUM    Collection Time: 24  4:06 AM   Result Value Ref Range    Magnesium 2.5 1.5 - 2.5 mg/dL   PHOSPHORUS    Collection Time: 24  4:06 AM   Result Value Ref Range    Phosphorus 2.8 2.5 - 4.5 mg/dL   CRP QUANTITIVE (NON-CARDIAC)    Collection Time: 24  4:06 AM   Result Value Ref Range    Stat C-Reactive Protein 9.71 (H) 0.00 - 0.75 mg/dL   PREALBUMIN    Collection Time: 24  4:06 AM   Result Value Ref Range    Pre-Albumin 10.4 (L) 18.0 - 38.0 mg/dL   ESTIMATED GFR    Collection Time: 24  4:06 AM   Result Value Ref Range    GFR (CKD-EPI) 96 >60 mL/min/1.73 m 2   EKG (NOW)    Collection Time: 24 12:37 PM   Result Value Ref Range    Report       [facility]    Test Date:  2024  Pt Name:    WESLEY ANN             Department: ER  MRN:        [id number]                      Room:       [facility]  Gender:     Female                       Technician: VIKTOR  :        1960                   Requested By:CUCO ALLEN  Order #:    [id number]                    Reading MD: Jose Antonio Hoang MD    Measurements  Intervals                                Axis  Rate:       79                           P:          61  NE:         129                          QRS:        65  QRSD:       102                           T:          -80  QT:         448  QTc:        514    Interpretive Statements  Sinus rhythm  Borderline low voltage, extremity leads  Borderline repolarization abnormality  Prolonged QT interval  Compared to ECG 10/17/2024 08:56:43  Prolonged QT interval now present  Electronically Signed On 12- 12:37:55 PST by Jose Antonio Hoang MD           ASSESSMENT:  Patient is a 64 y.o. female admitted with bilateral watershed cerebral infarcts, R ICA occlusion, L ICA stensosis, R vertebral artery stenosis. S/p L ICA stent, c/b reocculsion, s/p L ICA thrombectomy.     [facility] Code / Diagnosis to Support: 0001.2 - Stroke: Right Body Involvement (Left Brain) and 0001.3 - Stroke: Bilateral Involvement  See DISPO details below for recommendations on appropriate level of rehab for this diagnosis.    Barriers to transfer include: Insurance authorization, TCCs to verify disposition, medical clearance and bed availability     Assessment and Plan:    DISPO:  -recommend SNF  -Not a candidate for IPR due to poor therapy tolerance.    -Please reconsult or reach out via Voalte if further evaluation or medical management is requested     Medical Complexity:    Acute CVA  Right intracranial internal carotid artery occlusion  High-grade stenosis of left internal carotid artery  High-grade stenosis right vertebral artery  Right neglect, bilateral weakness R>L, paresthesias  -presented with bilateral weakness, slurred speech. Imaging revealed near occlusion of L ICA. Patient received TNK, later found to have profound R sided weakness.  -s/p L carotid stent on 12/25 by Dr Arciniega. Complicated by re-occlusion.   -s/p L ICA thrombectomy 12/26 by Dr Arciniega  -MRI showed acute infarcts in the bilateral cerebral hemispheres and watershed distribution and mild petechial hemorrhage in the left parietal infarct.   -brilinta 90mg BID, aspirin daily, statin daily  -BP goal: -140, target 120.   -PT and OT while in-house      Hx of HTN, hypotension  -lisinopril and metroprolol on hold  -midodrine 5mg TID, to keep SBP near 120  -norepi drip    Spasticity  -Significant increased tone in right lower extremity, mild increased tone in right upper extremity.  This tone is likely limiting her function, and may also be causing some discomfort.   -Patient is a candidate for baclofen.  However, patient appears very sleepy currently, and baclofen is likely to worsen this.  Consider baclofen once alertness improves.     Anxiety  -buspar, venlafaxine    Hypothyroidism  -syndthroid    Hx of Urinary stent   -patient's family states that a urologist recently inserted a stent. She has an appointment to have it removed 1/17/25.     Bowel Management  -senna/docusate      DVT PPX: SCDs      Thank you for allowing us to participate in the care of this patient.     Total time spent: 90 minutes.     Holland Funk D.O.   Physical Medicine and Rehabilitation     Please note that this dictation was created using voice recognition software. I have made every reasonable attempt to correct obvious errors, but there may be errors of grammar and possibly content that I did not discover before finalizing the note.

## 2024-12-27 NOTE — PROGRESS NOTES
"Neurology Progress Note  Neurohospitalist Service, [facility]iences    Referring Physician: Malik Mayberry M.D.    Chief Complaint   Patient presents with    Possible Stroke     Per EMS, pt was sitting with her mother when she slumped over. EMS found pt to have R gaze deviation, R lean, and nonverbal. Per EMS, pt was nonverbal until in aircraft when she began c/o numbness, dizziness, and repeating \"I feel like I'm having a heart attack.\" + asa, unk BT. LKW 1535       HPI: Refer to initial documented Neurology H&P, as detailed in the patient's chart.    Interval History:  No acute events overnight, became hypotensive with systolic in the 80s requiring pressors.  No significant changes on exam.    Review of systems: In addition to what is detailed in the HPI and/or updated in the interval history, all other systems reviewed and are negative.    Past Medical History:    has a past medical history of Depression, Essential hypertension, Hypercholesteremia, Hypothyroidism, and Mixed hyperlipidemia.    FHx:  family history includes Dementia in her father; Heart Disease in her mother; Hyperlipidemia in her brother and sister.    SHx:   reports that she has been smoking cigarettes. She started smoking about 51 years ago. She has a 51 pack-year smoking history. She has never used smokeless tobacco. She reports current alcohol use of about 7.2 oz of alcohol per week. She reports current drug use. Drug: Inhaled.    Medications:    Current Facility-Administered Medications:     [COMPLETED] aspirin (Asa) tablet 325 mg, 325 mg, Enteral Tube, Once, 325 mg at 12/26/24 1513 **FOLLOWED BY** [START ON 12/28/2024] aspirin (Asa) chewable tab 81 mg, 81 mg, Oral, DAILY, Malik Mayberry M.D.    atorvastatin (Lipitor) tablet 80 mg, 80 mg, Oral, Q EVENING, Malik Mayberry M.D.    busPIRone (Buspar) tablet 7.5 mg, 7.5 mg, Oral, TID, Malik Mayberry M.D.    [START ON 12/28/2024] levothyroxine (Synthroid) tablet 88 mcg, 88 mcg, " Oral, AM ES, Malik Mayberry M.D.    midodrine (Proamatine) tablet 5 mg, 5 mg, Oral, TID WITH MEALS, Malik Mayberry M.D.    senna-docusate (Pericolace Or Senokot S) 8.6-50 MG per tablet 2 Tablet, 2 Tablet, Oral, Q EVENING **AND** polyethylene glycol/lytes (Miralax) Packet 1 Packet, 1 Packet, Oral, QDAY PRN, Malik Mayberry M.D.    [COMPLETED] ticagrelor (Brilinta) tablet 180 mg, 180 mg, Oral, Once, 180 mg at 12/26/24 1522 **FOLLOWED BY** ticagrelor (Brilinta) tablet 90 mg, 90 mg, Oral, BID, Malik Mayberry M.D.    acetaminophen (Tylenol) tablet 650 mg, 650 mg, Oral, Q6HRS PRN, Malik Mayberry M.D.    ondansetron (Zofran ODT) dispertab 4 mg, 4 mg, Oral, Q4HRS PRN, Malik Mayberry M.D.    [START ON 12/28/2024] venlafaxine XR (Effexor XR) capsule 75 mg, 75 mg, Oral, DAILY, Malik Mayberry M.D.    venlafaxine (Effexor) tablet 37.5 mg, 37.5 mg, Oral, BID WITH MEALS, Malik Mayberry M.D. MD Alert...ICU Electrolyte Replacement per Pharmacy, , Other, PHARMACY TO DOSE, Malik Mayberry M.D.    ondansetron (Zofran) syringe/vial injection 4 mg, 4 mg, Intravenous, Q4HRS PRN, Jeremy M Gonda, M.D.    [Held by provider] lisinopril (Prinivil) tablet 10 mg, 10 mg, Oral, DAILY, Jeremy M Gonda, M.D.    [Held by provider] metoprolol SR (Toprol XL) tablet 50 mg, 50 mg, Oral, Q EVENING, Jeremy M Gonda, M.D.    norepinephrine (Levophed) 8 mg in 250 mL NS infusion (premix), 0-1 mcg/kg/min (Ideal), Intravenous, Continuous, Anju L. Latona, Stopped at 12/27/24 0805    hydrALAZINE (Apresoline) injection 10 mg, 10 mg, Intravenous, Q3HRS PRN, Anju L. Latona, 10 mg at 12/26/24 2136    HYDROmorphone (Dilaudid) injection 0.5 mg, 0.5 mg, Intravenous, Q4HRS PRN, Anju L. Latona, 0.5 mg at 12/26/24 2047    labetalol (Normodyne/Trandate) injection 10 mg, 10 mg, Intravenous, Q3HRS PRN, Anju L. Latona, 10 mg at 12/26/24 2040    ipratropium-albuterol (DUONEB) nebulizer solution, 3 mL, Nebulization, Q4H PRN (RT), Anju Banks    Physical  Examination:    Vitals:    12/27/24 0830 12/27/24 0900 12/27/24 1000 12/27/24 1100   BP: 116/57 120/79 126/71 137/62   Pulse: 97 88 72 76   Resp: (!) 41 (!) 38 (!) 22 (!) 24   Temp:       TempSrc:       SpO2: 95% 97% 93% 96%   Weight:       Height:           General:   Patient is awake and in no acute distress  Neck: Full range of motion  Eyes: Midline, Pupils reactive to light.  CV: RRR  Lungs: No respiratory distress  Extremities: No cyanosis, warm, no significant edema.    NEUROLOGICAL EXAM:   Mental status: Awake, alert and fully oriented, follows commands  Speech and language: speech is not dysarthric. The patient is able to name and repeat.  Cranial nerve exam: Pupils are equal, round and reactive to light bilaterally. Visual fields: She has right-sided neglect  Face is symmetric, facial sensation is intact.   Motor exam: Sustain antigravity in left upper and lower extremity with no downward drift.  She has profound weakness of right upper extremity with increased tone.  With reinforcement she is able to squeeze with right hand.  No movement of right lower extremity.    Sensory exam: No sensory deficits identified   Coordination: no gross ataxia noted on exam  Plantar reflexes: Upgoing bilaterally.  Gait: deferred     Objective Data:    Labs:  Lab Results   Component Value Date/Time    PROTHROMBTM 12.8 12/25/2024 05:02 PM    INR 0.96 12/25/2024 05:02 PM      Lab Results   Component Value Date/Time    WBC 7.5 12/27/2024 04:06 AM    RBC 3.47 (L) 12/27/2024 04:06 AM    HEMOGLOBIN 10.9 (L) 12/27/2024 04:06 AM    HEMATOCRIT 32.2 (L) 12/27/2024 04:06 AM    MCV 92.8 12/27/2024 04:06 AM    MCH 31.4 12/27/2024 04:06 AM    MCHC 33.9 12/27/2024 04:06 AM    MPV 9.9 12/27/2024 04:06 AM    NEUTSPOLYS 69.00 12/26/2024 04:10 AM    LYMPHOCYTES 16.50 (L) 12/26/2024 04:10 AM    MONOCYTES 13.00 12/26/2024 04:10 AM    EOSINOPHILS 0.30 12/26/2024 04:10 AM    BASOPHILS 0.60 12/26/2024 04:10 AM    HYPOCHROMIA 1+ 04/11/2023 12:12 AM     ANISOCYTOSIS 1+ 04/11/2023 12:12 AM      Lab Results   Component Value Date/Time    SODIUM 143 12/27/2024 04:06 AM    POTASSIUM 3.7 12/27/2024 04:06 AM    CHLORIDE 113 (H) 12/27/2024 04:06 AM    CO2 18 (L) 12/27/2024 04:06 AM    GLUCOSE 125 (H) 12/27/2024 04:06 AM    BUN 15 12/27/2024 04:06 AM    CREATININE 0.70 12/27/2024 04:06 AM      Lab Results   Component Value Date/Time    CHOLSTRLTOT 174 12/26/2024 04:10 AM     (H) 12/26/2024 04:10 AM    HDL 38 (A) 12/26/2024 04:10 AM    TRIGLYCERIDE 160 (H) 12/26/2024 04:10 AM       Lab Results   Component Value Date/Time    ALKPHOSPHAT 55 12/27/2024 04:06 AM    ASTSGOT 47 (H) 12/27/2024 04:06 AM    ALTSGPT 13 12/27/2024 04:06 AM    TBILIRUBIN 0.3 12/27/2024 04:06 AM        Imaging/Testing:    I interpreted and/or reviewed the patient's neuroimaging    MR-BRAIN-W/O   Final Result      1.  The diffusion-weighted sequences demonstrates acute infarctions in the bilateral cerebral hemispheres in the watershed distribution. The predominant portion of the cerebral hemispheres are free from the infarct. There is there is mild petechial    hemorrhage in the left parietal infarct.   2.  Chronic right proximal internal carotid occlusion. There is reconstitution of right supraclinoid segment from the anterior communicating artery.   3.  Widely patent left internal carotid carotid, anterior and middle cerebral flow voids.   4.  There are areas of chronic infarcts in the bilateral centrum semiovale.      DX-ABDOMEN FOR TUBE PLACEMENT   Final Result      1.  Enteric tube coiled in the fundus of stomach.      CT-CTA NECK WITH & W/O-POST PROCESSING   Final Result      1.  Interval stenting of the distal left common carotid artery into the left internal carotid artery. There is occlusion of the stent with trace flow at the periphery.   2.  Chronic occlusion of the right internal carotid artery from its origin extending to the intracranial portion.   3.  Moderate atherosclerotic  "narrowing of the distal right vertebral artery.   4.  Moderate to severe narrowing of the right vertebral artery origin.   5.  Right upper lobe mass again noted. Recommend biopsy or PET scan.   6.  Emphysematous and fibrotic changes of the bilateral upper lungs.      Dr. Arciniega is aware of these findings.      CT-CTA HEAD WITH & W/O-POST PROCESS   Final Result      1.  Bilateral occlusions of the internal carotid arteries.      2.The cavernous and supraclinoid internal carotid arteries and anterior and middle cerebral arteries fill from the posterior circulation.      3. Moderate atherosclerotic plaquing of the distal right vertebral artery.         CT-CEREBRAL PERFUSION ANALYSIS   Final Result      1. Cerebral blood flow less than 30% possibly representing completed infarct = 7 mL. Based on distribution of this finding, this is possibly artifact.      2. T Max more than 6 seconds possibly representing combination of completed infarct and ischemia = 209 mL. Based on the distribution of this finding, this is possibly artifact.      3. Mismatched volume possibly representing ischemic brain/penumbra= 202 mL      4.  Please note that this cerebral perfusion study and report is Quantitative and targets supratentorial (cerebral) perfusion for evaluation of large vessel territory acute ischemia/infarction. For example, lacunar infarcts, and brainstem/posterior fossa    ischemia/infarction are not evaluated on this study.  Data acquisition is subject to artifacts which can yield non-anatomically plausible perfusion maps which may be due to motion, bolus timing, signal to noise ratio, or other technical factors.    Perfusion map abnormalities which show non-anatomic distributions are likely artifact.   This study is not \"stand-alone\" and should only be utilized for diagnosis, management/treatment in correlation with CT, CTA, and/or MRI and clinical factors.         EC-ECHOCARDIOGRAM COMPLETE W/O CONT   Final Result    "   CT-HEAD W/O   Final Result      1.  No acute intracranial hemorrhage.   2.  Age-indeterminate left parietal infarct not definitely seen on prior.   3.  Otherwise unchanged findings.               DX-CHEST-FOR LINE PLACEMENT Perform procedure in: Patient's Room   Final Result      Peripherally inserted catheter has been placed and the tip projects appropriately over the superior vena cava.      CT-CTA NECK WITH & W/O-POST PROCESSING   Final Result      Stable exam with occlusion of the right internal carotid artery and high-grade stenosis of the proximal left internal carotid artery. Vertebral artery stenoses are also noted along with fibrotic change at the lung apices and a partially cavitary mass at    the right lung apex.      CT-CTA HEAD WITH & W/O-POST PROCESS   Final Result      There has been no significant interval change from the prior study.      CT-CEREBRAL PERFUSION ANALYSIS   Final Result      1. Cerebral blood flow less than 30% possibly representing completed infarct = 0 mL. Based on distribution of this finding, this is unlikely to represent artifact.      2. T Max more than 6 seconds possibly representing combination of completed infarct and ischemia = 4 mL. Based on the distribution of this finding, this is unlikely to represent artifact. This is improved from the prior study.      3. Mismatched volume possibly representing ischemic brain/penumbra= 4 mL. This is improved from the prior study.      4.  Please note that this cerebral perfusion study and report is Quantitative and targets supratentorial (cerebral) perfusion for evaluation of large vessel territory acute ischemia/infarction. For example, lacunar infarcts, and brainstem/posterior fossa    ischemia/infarction are not evaluated on this study.  Data acquisition is subject to artifacts which can yield non-anatomically plausible perfusion maps which may be due to motion, bolus timing, signal to noise ratio, or other technical factors.   "  Perfusion map abnormalities which show non-anatomic distributions are likely artifact.   This study is not \"stand-alone\" and should only be utilized for diagnosis, management/treatment in correlation with CT, CTA, and/or MRI and clinical factors.         CT-HEAD W/O   Final Result      1.  No significant change from prior.   2.            DX-CHEST-PORTABLE (1 VIEW)   Final Result      No evidence of acute cardiopulmonary process.      CT-CTA NECK WITH & W/O-POST PROCESSING   Final Result      1.  Severe plaque at the carotid bifurcations and proximal internal carotid arteries.   2.  Occlusion of the right internal carotid artery with reconstitution in the supraclinoid region.   3.  Likely near occlusion of the origin of the left internal carotid artery. There is a hypodense filling defect in the proximal left ICA just above the severe calcified plaque which could represent noncalcified plaque or thrombus extending into the    lumen.   4.  Emphysematous or fibrotic changes in the upper lungs. Irregular masslike opacity in the right upper lobe measuring 2.5 x 2.4 cm could represent lung malignancy or infection. Further evaluation is recommended.   These findings were discussed with CUCO ALLEN on 12/25/2024 5:55 PM.      CT-CTA HEAD WITH & W/O-POST PROCESS   Final Result      1.  Occlusion of the right intracranial internal carotid artery.      2.  Atherosclerotic plaque involving the intracranial left internal carotid artery with multifocal high-grade stenosis.      3.  No evidence of anterior middle cerebral artery occlusion.      4.  High-grade stenosis involving the intracranial right vertebral artery.      5.  Diminutive left vertebral artery.      6.  Report was called to CUCO ALLEN at 5:53 PM on 12/25/2024.            CT-CEREBRAL PERFUSION ANALYSIS   Final Result      1. Cerebral blood flow less than 30% possibly representing completed infarct = 0 mL. Based on distribution of this " "finding, this is unlikely to represent artifact.      2. T Max more than 6 seconds possibly representing combination of completed infarct and ischemia = 21 mL. Based on the distribution of this finding, this is unlikely to represent artifact.      3. Mismatched volume possibly representing ischemic brain/penumbra= 21 mL      4.  Please note that this cerebral perfusion study and report is Quantitative and targets supratentorial (cerebral) perfusion for evaluation of large vessel territory acute ischemia/infarction. For example, lacunar infarcts, and brainstem/posterior fossa    ischemia/infarction are not evaluated on this study.  Data acquisition is subject to artifacts which can yield non-anatomically plausible perfusion maps which may be due to motion, bolus timing, signal to noise ratio, or other technical factors.    Perfusion map abnormalities which show non-anatomic distributions are likely artifact.   This study is not \"stand-alone\" and should only be utilized for diagnosis, management/treatment in correlation with CT, CTA, and/or MRI and clinical factors.         CT-HEAD W/O   Final Result      1.  Chronic microvascular ischemic type changes and probable centrum semiovale lacunar infarcts.   2.  Nonspecific hypodensity in the right occipital region could represent age indeterminate, probably old infarct..   3.  No acute intracranial hemorrhage.   4.  If there is concern for acute ischemia, MRI is recommended               IR-CERV CAROTID STENT WITH PROTECTION    (Results Pending)   IR-CERV CAROTID STENT WITH PROTECTION    (Results Pending)       Assessment and Plan:  Marisol Brown is a 64 y.o. right-handed female with past medical history significant for peripheral vascular disease, on aspirin, hypertension, hyperlipidemia, hypothyroidism and current smoker who was brought to emergency room for evaluation of right upper and left lower extremity weakness, associated with slurred speech.  Patient underwent " a brain CT which did not reveal acute abnormalities.  CT of the neck revealed severe atherosclerotic plaque with occlusion of right internal carotid artery with near occlusion of origin of left internal carotid artery as well as high-grade stenosis involving the intracranial right vertebral artery.  CT perfusion with 21 mL of ischemic penumbra on the right.  Her NIH scale score was 5.  Patient underwent administration of TNK at 1736 PM on 12/26/2024.  Following TNK administration patient's symptom improved, however few hours later she had profound right-sided weakness for which stroke imaging were repeated and again revealed right internal carotid artery occlusion and severe high-grade stenosis of proximal left internal carotid artery.  Case was discussed with neuro IR and sent for stent placement.  Patient was started on Integrilin drip, however given her kidney dysfunction Integrilin was at 1 mcg/kg as opposed to 2 mcg/kg/min.  She had some fluctuation in her right arm weakness with some spasm and contracture of right upper extremity.  Integrilin drip was increased to 2 mcg/kg and patient underwent a repeat CT angiogram of the head and neck which showed reocclusion of the stent.  Patient was taken to IR suite and underwent successful thrombectomy of thrombosed left ICA stent with TICI 3 reperfusion on 12/26/2024 with some improvement in her right-sided weakness.  Echocardiogram is unremarkable.  LDL is 104 and hemoglobin A1c is 5.4.  Brain MRI with acute watershed infarct in bilateral cerebral hemisphere with minimal petechial hemorrhage in the left parietal head region.      Plan:  -Continue close neuromonitoring in ICU per post TNK and thrombectomy protocol.  -Every 2 hours neurochecks.  -Given TICI 3 reperfusion, maintain systolic blood pressure 100-140.  Target 120.  Please use pressors if blood pressure drops below 100  - Maintain normoglycemia and avoid hypo- or hypernatremia; aim for normothermia  -Continue  with Brilinta 90 mg twice a day and aspirin 81 mg daily from tomorrow.  - High intensity statin when safe to swallow, LDL goal less than 70  - Serum studies for stroke risk factors: LDL is 104 and hemoglobin A1c is 5.4.  - Evaluate and treat with PT/OT/ST; physiatry consult  - Stroke education  - Follow-up with neurovascular clinic after discharge      The evaluation of the patient, and recommended management, was discussed with Malik Mayberyr M.D.    Please note that this dictation was created using voice recognition software. I have made every reasonable attempt to correct obvious errors, but I expect that there are errors of grammar and possibly content that I did not discover before finalizing the note.      Kyle Rivas MD  [facility]

## 2024-12-27 NOTE — PROGRESS NOTES
"Neuro Interventional Radiology   Progress Note     Author: MAYANK Patel   Date & Time created: 12/27/2024  8:02 AM   Date of admission  12/25/2024  Note to reader: this note follows the APSO format rather than the historical SOAP format. Assessment and plan located at the top of the note for ease of use.    Chief Complaint  64 y.o. female admitted 12/25/2024 with   Chief Complaint   Patient presents with    Possible Stroke     Per EMS, pt was sitting with her mother when she slumped over. EMS found pt to have R gaze deviation, R lean, and nonverbal. Per EMS, pt was nonverbal until in aircraft when she began c/o numbness, dizziness, and repeating \"I feel like I'm having a heart attack.\" + asa, unk BT. LKW 1535     HPI  Marisol Brown is a 64-year-old female with PMH significant for HTN, HLD, CAD s/p prior stent with chronic systolic heart failure, hypothyroidism, and tobacco and alcohol use who presented by care flight from home for right-sided weakness after 90 minutes of \"feeling numb all over\" and \"I am having a heart attack.\"  (From Dr. Gonda's note).  12/26/2024 CTA demonstrates \"occlusion of the right internal carotid artery and high-grade stenosis of the proximal left internal carotid artery with vertebral artery stenosis\".  12/26/2024 ANIYAH Arciniega completed a left carotid stent placement with embolic protection device with patient to RICU on Integrilin drip at 1 mcg/kg/min secondary to pharmacy recommendation due to renal function.    Interval History ANIYAH  12/26/24-Patient with neuro changes today-decreased LOC and flaccid right upper extremity that was concerning for in-stent thrombosis.  Integrilin had been infusing however at a lower dose due to her decreased renal function.  Patient renal function improved therefore Integrilin infusion increased from 1 mcg/kg/min to 2 mcg/kg/min.  She underwent repeat CT angiogram of the head and neck which showed reocclusion of the left carotid stent; CTP with " showed she was taken back to IR suite and Dr. Arciniega (ANIYAH) performed a successful thrombectomy of thrombosed left ICA stent with TICI 3 reperfusion-patient loaded with Brilinta and aspirin    12/27/24-low-dose norepinephrine infusing for blood pressure goal.  I reviewed most recent imaging of MRI head which showed acute watershed infarct in bilateral cerebral hemispheres, minimal petechial hemorrhage in the left parietal region.I reviewed today's labs: WBC 7.5; H&H 10.9/32.2, Cr 0.70; Coags INR 0.96, lipid panel -HDL 38-triglycerides 160; No micro. Right Cath groin site dressing with blood noted on dressing however not completely saturated.  No bruising, hematoma or active bleeding noted at site. Groin soft to palp.  I discussed plan of care with Dr. Arciniega, ICU team, and the patient.  I reviewed IDT notes.     Assessment/Plan     Principal Problem:    Acute CVA (cerebrovascular accident) (HCC)  Active Problems:    Dyslipidemia    Essential hypertension    Coronary artery disease due to lipid rich plaque    Hypothyroidism    Tobacco use    Leukemoid reaction    Gastroesophageal reflux disease without esophagitis    Chronic systolic heart failure (HCC)    Hyponatremia      Plan ANIYAH    - Neurochecks per [facility] [place] policy  - For any neurochanges, please call Dr. Arciniega  - Goal SBP: 100-140 per vascular neurology recommendation  - Continue Brilinta 90 mg daily and aspirin 81 mg for 90 days starting 12/27/2024  - Follow up appointment in approximately 1 month with OP Neuro IR, our office to call and schedule (not scheduled at this time)  - MRI brain without contrast  - PT/OT/SLP  Post-angioseal instructions:   - no lifting greater than 5 lbs for 7 days  - no baths/ swimming/ soaking in tub for 7 days. Shower OK.  - OK to change dressings/band aid as needed.     -Thank you for allowing Interventional Radiology team to participate in the patients care, if any additional care or requests are needed in the  "future please do not hesitate call or place IR order                   Review of Systems  Physical Exam   Review of Systems   Constitutional:  Negative for fever.   HENT:  Negative for hearing loss.    Eyes:  Negative for blurred vision and double vision.   Respiratory:  Negative for cough and shortness of breath.    Cardiovascular:  Negative for chest pain.   Gastrointestinal:  Negative for abdominal pain, nausea and vomiting.   Neurological:  Positive for weakness and headaches. Negative for dizziness.        \"Yeah my right arm and hand don't work that well but I can feel you touching and moving it\"      Vitals:    12/27/24 0630   BP:    Pulse: (!) 101   Resp: (!) 23   Temp:    SpO2: 96%        Physical Exam  Vitals and nursing note reviewed.   HENT:      Head: Atraumatic.   Cardiovascular:      Rate and Rhythm: Normal rate.      Comments: right Cath groin site dressing with blood noted on dressing however not completely saturated.  No bruising, hematoma or active bleeding noted at site  Groin soft to palp-   Pulmonary:      Effort: Pulmonary effort is normal. No respiratory distress.   Chest:   Breasts:     Breasts are symmetrical.   Abdominal:      General: There is no distension.      Palpations: Abdomen is soft.   Musculoskeletal:      Right lower leg: No edema.      Left lower leg: No edema.   Skin:     General: Skin is warm and dry.      Capillary Refill: Capillary refill takes less than 2 seconds.   Neurological:      Mental Status: She is alert and oriented to person, place, and time.      Motor: Weakness: Right upper extremity flaccid.      Comments:   -Speech is clear  -VICKI @ 3-3.5 mm  -Face is symmetric  -Left upper and left lower extremity sustained antigravity without downward drift  -She is able to lift her right lower extremity a few inches off of the bed without good control and unable to hold.  She is able to wiggle her toes sensation intact.  -She was unable to lift her right upper extremity " however she was able to lightly squeeze her right hand with sensation intact   Psychiatric:         Mood and Affect: Mood normal.         Behavior: Behavior normal. Behavior is cooperative.      Comments: Unable to determine, one-word sentences             Labs    Recent Labs     12/25/24  1702 12/26/24 0410 12/27/24  0406   WBC 17.2* 10.0 7.5   RBC 4.50 4.08* 3.47*   HEMOGLOBIN 13.9 12.9 10.9*   HEMATOCRIT 42.2 38.1 32.2*   MCV 93.8 93.4 92.8   MCH 30.9 31.6 31.4   MCHC 32.9 33.9 33.9   RDW 45.1 44.8 46.3   PLATELETCT 244 264 256   MPV 10.0 9.8 9.9     Recent Labs     12/25/24  1702 12/26/24 0410 12/27/24  0406   SODIUM 131* 138 143   POTASSIUM 3.6 3.8 3.7   CHLORIDE 100 108 113*   CO2 14* 18* 18*   GLUCOSE 182* 123* 125*   BUN 12 11 15   CREATININE 1.14 0.78 0.70   CALCIUM 8.5 8.9 8.8     Recent Labs     12/25/24  1702 12/26/24 0410 12/27/24  0406   ALBUMIN 3.8  --  3.4   TBILIRUBIN 0.5  --  0.3   ALKPHOSPHAT 68  --  55   TOTPROTEIN 7.4  --  6.6   ALTSGPT 14  --  13   ASTSGOT 22  --  47*   CREATININE 1.14 0.78 0.70     MR-BRAIN-W/O   Final Result      1.  The diffusion-weighted sequences demonstrates acute infarctions in the bilateral cerebral hemispheres in the watershed distribution. The predominant portion of the cerebral hemispheres are free from the infarct. There is there is mild petechial    hemorrhage in the left parietal infarct.   2.  Chronic right proximal internal carotid occlusion. There is reconstitution of right supraclinoid segment from the anterior communicating artery.   3.  Widely patent left internal carotid carotid, anterior and middle cerebral flow voids.   4.  There are areas of chronic infarcts in the bilateral centrum semiovale.      DX-ABDOMEN FOR TUBE PLACEMENT   Final Result      1.  Enteric tube coiled in the fundus of stomach.      CT-CTA NECK WITH & W/O-POST PROCESSING   Final Result      1.  Interval stenting of the distal left common carotid artery into the left internal carotid  artery. There is occlusion of the stent with trace flow at the periphery.   2.  Chronic occlusion of the right internal carotid artery from its origin extending to the intracranial portion.   3.  Moderate atherosclerotic narrowing of the distal right vertebral artery.   4.  Moderate to severe narrowing of the right vertebral artery origin.   5.  Right upper lobe mass again noted. Recommend biopsy or PET scan.   6.  Emphysematous and fibrotic changes of the bilateral upper lungs.      Dr. Arciniega is aware of these findings.      CT-CTA HEAD WITH & W/O-POST PROCESS   Final Result      1.  Bilateral occlusions of the internal carotid arteries.      2.The cavernous and supraclinoid internal carotid arteries and anterior and middle cerebral arteries fill from the posterior circulation.      3. Moderate atherosclerotic plaquing of the distal right vertebral artery.         CT-CEREBRAL PERFUSION ANALYSIS   Final Result      1. Cerebral blood flow less than 30% possibly representing completed infarct = 7 mL. Based on distribution of this finding, this is possibly artifact.      2. T Max more than 6 seconds possibly representing combination of completed infarct and ischemia = 209 mL. Based on the distribution of this finding, this is possibly artifact.      3. Mismatched volume possibly representing ischemic brain/penumbra= 202 mL      4.  Please note that this cerebral perfusion study and report is Quantitative and targets supratentorial (cerebral) perfusion for evaluation of large vessel territory acute ischemia/infarction. For example, lacunar infarcts, and brainstem/posterior fossa    ischemia/infarction are not evaluated on this study.  Data acquisition is subject to artifacts which can yield non-anatomically plausible perfusion maps which may be due to motion, bolus timing, signal to noise ratio, or other technical factors.    Perfusion map abnormalities which show non-anatomic distributions are likely artifact.    "This study is not \"stand-alone\" and should only be utilized for diagnosis, management/treatment in correlation with CT, CTA, and/or MRI and clinical factors.         EC-ECHOCARDIOGRAM COMPLETE W/O CONT   Final Result      CT-HEAD W/O   Final Result      1.  No acute intracranial hemorrhage.   2.  Age-indeterminate left parietal infarct not definitely seen on prior.   3.  Otherwise unchanged findings.               DX-CHEST-FOR LINE PLACEMENT Perform procedure in: Patient's Room   Final Result      Peripherally inserted catheter has been placed and the tip projects appropriately over the superior vena cava.      CT-CTA NECK WITH & W/O-POST PROCESSING   Final Result      Stable exam with occlusion of the right internal carotid artery and high-grade stenosis of the proximal left internal carotid artery. Vertebral artery stenoses are also noted along with fibrotic change at the lung apices and a partially cavitary mass at    the right lung apex.      CT-CTA HEAD WITH & W/O-POST PROCESS   Final Result      There has been no significant interval change from the prior study.      CT-CEREBRAL PERFUSION ANALYSIS   Final Result      1. Cerebral blood flow less than 30% possibly representing completed infarct = 0 mL. Based on distribution of this finding, this is unlikely to represent artifact.      2. T Max more than 6 seconds possibly representing combination of completed infarct and ischemia = 4 mL. Based on the distribution of this finding, this is unlikely to represent artifact. This is improved from the prior study.      3. Mismatched volume possibly representing ischemic brain/penumbra= 4 mL. This is improved from the prior study.      4.  Please note that this cerebral perfusion study and report is Quantitative and targets supratentorial (cerebral) perfusion for evaluation of large vessel territory acute ischemia/infarction. For example, lacunar infarcts, and brainstem/posterior fossa    ischemia/infarction are not " "evaluated on this study.  Data acquisition is subject to artifacts which can yield non-anatomically plausible perfusion maps which may be due to motion, bolus timing, signal to noise ratio, or other technical factors.    Perfusion map abnormalities which show non-anatomic distributions are likely artifact.   This study is not \"stand-alone\" and should only be utilized for diagnosis, management/treatment in correlation with CT, CTA, and/or MRI and clinical factors.         CT-HEAD W/O   Final Result      1.  No significant change from prior.   2.            DX-CHEST-PORTABLE (1 VIEW)   Final Result      No evidence of acute cardiopulmonary process.      CT-CTA NECK WITH & W/O-POST PROCESSING   Final Result      1.  Severe plaque at the carotid bifurcations and proximal internal carotid arteries.   2.  Occlusion of the right internal carotid artery with reconstitution in the supraclinoid region.   3.  Likely near occlusion of the origin of the left internal carotid artery. There is a hypodense filling defect in the proximal left ICA just above the severe calcified plaque which could represent noncalcified plaque or thrombus extending into the    lumen.   4.  Emphysematous or fibrotic changes in the upper lungs. Irregular masslike opacity in the right upper lobe measuring 2.5 x 2.4 cm could represent lung malignancy or infection. Further evaluation is recommended.   These findings were discussed with CUCO ALLEN on 12/25/2024 5:55 PM.      CT-CTA HEAD WITH & W/O-POST PROCESS   Final Result      1.  Occlusion of the right intracranial internal carotid artery.      2.  Atherosclerotic plaque involving the intracranial left internal carotid artery with multifocal high-grade stenosis.      3.  No evidence of anterior middle cerebral artery occlusion.      4.  High-grade stenosis involving the intracranial right vertebral artery.      5.  Diminutive left vertebral artery.      6.  Report was called to CUCO MANCIA" "LETI ALLEN at 5:53 PM on 12/25/2024.            CT-CEREBRAL PERFUSION ANALYSIS   Final Result      1. Cerebral blood flow less than 30% possibly representing completed infarct = 0 mL. Based on distribution of this finding, this is unlikely to represent artifact.      2. T Max more than 6 seconds possibly representing combination of completed infarct and ischemia = 21 mL. Based on the distribution of this finding, this is unlikely to represent artifact.      3. Mismatched volume possibly representing ischemic brain/penumbra= 21 mL      4.  Please note that this cerebral perfusion study and report is Quantitative and targets supratentorial (cerebral) perfusion for evaluation of large vessel territory acute ischemia/infarction. For example, lacunar infarcts, and brainstem/posterior fossa    ischemia/infarction are not evaluated on this study.  Data acquisition is subject to artifacts which can yield non-anatomically plausible perfusion maps which may be due to motion, bolus timing, signal to noise ratio, or other technical factors.    Perfusion map abnormalities which show non-anatomic distributions are likely artifact.   This study is not \"stand-alone\" and should only be utilized for diagnosis, management/treatment in correlation with CT, CTA, and/or MRI and clinical factors.         CT-HEAD W/O   Final Result      1.  Chronic microvascular ischemic type changes and probable centrum semiovale lacunar infarcts.   2.  Nonspecific hypodensity in the right occipital region could represent age indeterminate, probably old infarct..   3.  No acute intracranial hemorrhage.   4.  If there is concern for acute ischemia, MRI is recommended               IR-CERV CAROTID STENT WITH PROTECTION    (Results Pending)   IR-CERV CAROTID STENT WITH PROTECTION    (Results Pending)       INR   Date Value Ref Range Status   12/25/2024 0.96 0.87 - 1.13 Final     Comment:     INR - Non-therapeutic Reference Range: 0.87-1.13  INR - " "Therapeutic Reference Range: 2.0-4.0       No results found for: \"POCINR\"     Intake/Output Summary (Last 24 hours) at 12/26/2024 1839  Last data filed at 12/26/2024 1800  Gross per 24 hour   Intake 1463.13 ml   Output 1650 ml   Net -186.87 ml      Labs not explicitly included in this progress note were reviewed by the author. Radiology/imaging not explicitly included in this progress note was reviewed by the author.     I have performed a physical exam and reviewed and updated ROS and Plan today (12/27/2024).     50  minutes in directly providing and coordinating care and extensive data review.  No time overlap and excludes procedures.  "

## 2024-12-27 NOTE — PROGRESS NOTES
Upon neuro assessment for 1545, patient noted to have unequal pupils, L pupil 5mm round brisk, R pupil 4mm round brisk.      Dr. Rivas and Dr. Arciniega informed.  Dr. Rivas to come and evaluate.

## 2024-12-27 NOTE — THERAPY
Occupational Therapy   Initial Evaluation     Patient Name: Marisol Brown  Age:  64 y.o., Sex:  female  Medical Record #: [id number]  Today's Date: 12/27/2024     Precautions  Precautions: Fall Risk, Swallow Precautions  Comments: ?expressive aphasia    Assessment  Patient is 64 y.o. female with a diagnosis of CT of the neck revealed severe atherosclerotic plaque with occlusion of right internal carotid artery with near occlusion of origin of left internal carotid artery as well as high-grade stenosis involving the intracranial right vertebral artery. .  Pt currently limited by decreased functional mobility, activity tolerance, sensation, strength, AROM, coordination, balance, which are affecting pt's ability to complete ADLs/IADLs at baseline. Pt would benefit from OT services in the acute care setting to maximize functional recovery.       Plan    Occupational Therapy Initial Treatment Plan   Treatment Interventions: (P) Self Care / Activities of Daily Living, Neuro Re-Education / Balance, Therapeutic Activity  Treatment Frequency: (P) 4 Times per Week  Duration: (P) Until Therapy Goals Met       Discharge Recommendations: (P) Recommend post-acute placement for additional occupational therapy services prior to discharge home        12/27/24 0948   Prior Living Situation   Prior Services Home-Independent   Housing / Facility 1 Story Apartment / Condo  (first floor)   Steps Into Home 0   Bathroom Set up Walk In Shower   Equipment Owned Grab Bar(s) In Tub / Shower;Tub / Shower Seat   Lives with - Patient's Self Care Capacity Alone and Able to Care For Self   Prior Level of ADL Function   Self Feeding Independent   Grooming / Hygiene Independent   Bathing Independent   Dressing Independent   Toileting Independent   Active ROM Upper Body   Active ROM Upper Body  X   Comments no active movement R UE   Neurological Concerns   Neurological Concerns Yes   Sitting Posture During ADL's Posterior Lean;Pushes to the Right    Coordination Upper Body   Coordination X   Comments no movement R UE   Balance Assessment   Sitting Balance (Static) Poor   Sitting Balance (Dynamic) Poor -   Standing Balance (Static) Poor -   Standing Balance (Dynamic) Trace   Weight Shift Sitting Poor   Weight Shift Standing Absent   ADL Assessment   Grooming Moderate Assist   Upper Body Dressing Maximal Assist   Lower Body Dressing Total Assist   Toileting Total Assist   Functional Mobility   Sit to Stand Maximal Assist   Bed, Chair, Wheelchair Transfer Unable to Participate   Visual Perception   Visual Perception  X   Visual Fields Right Field Cut  (to mid line R eye)   Short Term Goals   Short Term Goal # 1 min A with UB dressing   Short Term Goal # 2 mod A with LB dressing   Short Term Goal # 3 mod A with ADL txfs   Occupational Therapy Initial Treatment Plan    Treatment Interventions Self Care / Activities of Daily Living;Neuro Re-Education / Balance;Therapeutic Activity   Treatment Frequency 4 Times per Week   Duration Until Therapy Goals Met   Anticipated Discharge Equipment and Recommendations   Discharge Recommendations Recommend post-acute placement for additional occupational therapy services prior to discharge home

## 2024-12-27 NOTE — THERAPY
"Speech Language Pathology   Clinical Swallow Evaluation     Patient Name: Marisol Brown  AGE:  64 y.o., SEX:  female  Medical Record #: [id number]  Date of Service: 12/27/2024      History of Present Illness  64 y.o. female who presented 12/25/2024 with a past medical history significant for hypertension, tobacco and alcohol use as well as CAD status post prior stent who was brought in by care flight from home for 1-1/2 hours of \"feeling numb all over\" and \"I feel like I am having a heart attack.\"      Upon arrival to the ED, patient's NIH stroke scale was between 4 and 5 and a code stroke was activated and patient was seen by neurology Dr. Rivas.  She had a normal head CT but CTA of head showed occluded right ICA and multifocal high-grade stenosis of the left ICA and CT perfusion showed right sided perfusion deficits and the decision was made to administer TNK at 1736.  Left carotid stent placement embolic production device on 12/25/24.      CT of head 12/25/24:  Mild generalized volume loss.  Patchy hypodensities in cerebral white matter most likely represent mild chronic microvascular ischemic type changes. Age-indeterminate lacunar infarcts in the right centrum semiovale and there is probably age-indeterminate right parieto-occipital   infarct.  No acute intracranial hemorrhage, major vascular territory infarct, mass effect, midline shift or hydrocephalus.  Paranasal sinuses and mastoids are clear.  No depressed calvarial fracture.     CXR 12/25/24:  A peripherally inserted catheter has been placed.  The tip projects appropriately over the superior vena cava.  Heart size is within normal limits.  No pulmonary infiltrates or consolidations are noted.  No pleural abnormalities are noted.    MRI of brain 12/26/24:   1.  The diffusion-weighted sequences demonstrates acute infarctions in the bilateral cerebral hemispheres in the watershed distribution. The predominant portion of the cerebral hemispheres are free " from the infarct. There is there is mild petechial   hemorrhage in the left parietal infarct.  2.  Chronic right proximal internal carotid occlusion. There is reconstitution of right supraclinoid segment from the anterior communicating artery.  3.  Widely patent left internal carotid carotid, anterior and middle cerebral flow voids.  4.  There are areas of chronic infarcts in the bilateral centrum semiovale.        General Information:  Vitals  O2 Delivery Device: None - Room Air  Level of Consciousness: Awake  Patient Behaviors: Restless  Orientation: Self, General place (confusion regarding reason for admission (stated heart attack) and unable to state month or year)  Follows Directives: Yes - simple commands only      Prior Living Situation & Level of Function:  Prior Services: None, Home-Independent  Lives with - Patient's Self Care Capacity: Alone and Able to Care For Self  Communication: Unknown  Swallowing: Patient reported regular diet at baseline but has difficulty chewing solids w/o dentures       Oral Mechanism Evaluation:  Dentition: Edentulous, Denture(s) not available at time of evaluation   Facial Symmetry: Equal  Facial Sensation: Equal     Labial Observations: WFL   Lingual Observations: Midline  Motor Speech: WFL            Laryngeal Function:  Secretion Management: Adequate  Voice Quality: WFL  Cough: Perceptually WNL         Subjective  Repeat clinical swallow evaluation performed 2/2 to change in status yesterday (12/26) following initial swallow evalation and requiring IR intervention for decline in neurological status, now s/p L ICA thrombectomy. IRIS was placed after procedure but pulled out this AM. Patient with improved verbal output and more appropriate repsonses to SLP questions/prompts although remains forgetful and confused. Patient demonstrated difficulty attending visual attention to R side.      Assessment  Current Method of Nutrition: NPO until cleared by speech  pathology  Positioning: Donaldson's (60-90 degrees)  Bolus Administration: SLP    O2 Delivery Device: None - Room Air  Factor(s) Affecting Performance: Impaired mental status  Tracheostomy : No      Swallowing Trials:  Swallowing Trials  Ice: WFL  Thin Liquid (TN0): WFL  Pureed (PU4): WFL  Regular (RG7): Impaired    Comments: Adequate bolus acceptance and containment. Presumed timely AP transit and bolus formation evidenced by absence of oral residue post swallow. Patient had mild belching x2 following trials of 8 oz thin liquids. No overt s/sx of aspiration appreciated across all trials tested. Voice and breath sounds remained clear. Mastication was slow and pt reported saltine cracker was painful to her gums. Pt agreeable to pureed solids until dentures can be brought to pt.       Clinical Impressions  Improved ORALIA/mentation compared to initial swallow evaluation completed 12/26/24. Patient continues to present with an oral dysphagia suspect acute on chronic r/t CVA, AMS, and edentulism (top). Pharyngeal swallow appears to be functional with no overt s/sx of airway invasion during bedside trials. A modified diet given 1:1 feeding is indicated. Please HOLD PO with any concerns for aspiration and notify SLP. SLP to monitor closely and will consider a diagnostic swallow study with any difficulty.       Recommendations  Diet Consistency: Resume Pureed and Thin Liquid diet  Instrumentation: Instrumental swallow study pending clinical progress  Medication: Whole with liquid, One pill at a time, As tolerated  Supervision: 1:1 feeding with constant supervision  Positioning: Fully upright and midline during oral intake, Remain upright for 30 minutes after oral intake, Meals sitting upright in a chair, as tolerated  Risk Management : Small bites/sips, Slow rate of intake  Oral Care: Q4h  Consult Referral(s): Gastroenterologist (?outpatient)      SLP Treatment Plan  Treatment Plan: Dysphagia Treatment, Patient/Family/Caregiver  "Training  SLP Frequency: 3x Per Week  Estimated Duration: Until Therapy Goals Met      Anticipated Discharge Needs  Discharge Recommendations: Recommend post-acute placement for additional speech therapy services prior to discharge home   Therapy Recommendations Upon DC: Dysphagia Training, Cognitive-Linguistic Training, Patient / Family / Caregiver Education        Patient / Family Goals  Patient / Family Goal #1: \"yes\" to being repositioned  Short Term Goals  Short Term Goal # 1: Patient will consume a PU/TN diet with no overt s/sx of aspiration given 1:1 feeding.      Latisha Gutierrez, SLP   "

## 2024-12-27 NOTE — PROGRESS NOTES
Critical Care Progress Note    Date of admission  12/25/2024    Chief Complaint  64 y.o. female with history of HTN, alcohol and tobacco use, CAD s/p stenting in the past was admitted with bilateral weakness and dysarthria.  CT brain was normal but CTA neck showed severe atherosclerotic plaque with occlusion of the right ICA and near total occlusion of the left ICA as well as high-grade stenosis of the right vertebral artery.  She was given TNK at 1736 on 12/25.  She was then admitted to the ICU however later in the evening had acute right arm flaccidity and was taken to IR where a left carotid stent was placed and she was brought to the ICU on an Integrilin infusion.    Hospital Course  12/25 - admitted to the ICU TNK.  After presenting to the ICU she developed flaccid right arm and right leg weakness.  She was taken to IR for emergent left carotid stent placement.  SBP goal changed to <140 post stent    12/26 - stent reoccluded but distal vessels were perfused by collaterals.  Stent reopened in IR.  Loaded with Brilinta and aspirin.  Neuroexam remains variable.    12/27 - RUE still stiff, weak.  Working with PT.  Continue brilinta/aspirin despite MRI findings as she has already had in-stent thrombosis once.  Fluid bolus and midodrine to help get off presors, hold midodrine for SBP >120.      Interval Problem Update  Reviewed last 24 hour events:  Tmax: Afebrile  Diet: Advance per SLP  Vasopressors:   Norepinephrine    Infusions: none    Antibx:   Ceftriaxone 12/25 only (given for LE in urine)    Intake / Output  Urine Output past 24 hours: 1450 mL    Lab Trends  Hgb 13 --> 11    CO2 14  --> 18 --> 18          Review of Systems  Review of Systems   Constitutional:  Negative for chills, fever and malaise/fatigue.   HENT:  Negative for congestion and sore throat.    Eyes: Negative.    Respiratory:  Negative for sputum production and shortness of breath.    Cardiovascular:  Negative for chest pain and  palpitations.   Gastrointestinal:  Negative for abdominal pain, nausea and vomiting.   Genitourinary: Negative.    Musculoskeletal: Negative.    Skin: Negative.    Neurological:  Positive for sensory change and weakness. Negative for focal weakness and headaches.   All other systems reviewed and are negative.       Vital Signs for last 24 hours   Temp:  [36.2 °C (97.2 °F)-36.9 °C (98.4 °F)] 36.6 °C (97.8 °F)  Pulse:  [] 88  Resp:  [13-50] 38  BP: ()/(44-90) 120/79  SpO2:  [88 %-99 %] 97 %    Hemodynamic parameters for last 24 hours       Respiratory Information for the last 24 hours       Physical Exam   Physical Exam  Vitals and nursing note reviewed. Exam conducted with a chaperone present.   Constitutional:       General: She is not in acute distress.     Appearance: Normal appearance. She is not ill-appearing.   HENT:      Head: Normocephalic.      Mouth/Throat:      Mouth: Mucous membranes are moist.   Eyes:      Extraocular Movements: Extraocular movements intact.   Cardiovascular:      Rate and Rhythm: Normal rate and regular rhythm.      Pulses: Normal pulses.   Pulmonary:      Effort: Pulmonary effort is normal. No respiratory distress.   Abdominal:      General: There is no distension.      Palpations: Abdomen is soft.      Tenderness: There is no abdominal tenderness. There is no guarding or rebound.   Musculoskeletal:         General: Normal range of motion.      Cervical back: Normal range of motion and neck supple.   Skin:     General: Skin is warm and dry.      Capillary Refill: Capillary refill takes less than 2 seconds.   Neurological:      Mental Status: Mental status is at baseline.      Cranial Nerves: Dysarthria present.      Sensory: Sensory deficit present.      Motor: Weakness (RUE/RLE) present.         Medications  Current Facility-Administered Medications   Medication Dose Route Frequency Provider Last Rate Last Admin    [START ON 12/28/2024] aspirin (Asa) chewable tab 81 mg  81  mg Oral DAILY Malik Mayberry M.D.        atorvastatin (Lipitor) tablet 80 mg  80 mg Oral Q EVENING Malik Mayberry M.D.        busPIRone (Buspar) tablet 7.5 mg  7.5 mg Oral TID Malik Mayberry M.D.        [START ON 12/28/2024] levothyroxine (Synthroid) tablet 88 mcg  88 mcg Oral AM ES Malik Mayberry M.D.        midodrine (Proamatine) tablet 5 mg  5 mg Oral TID WITH MEALS Malik Mayberry M.D.        senna-docusate (Pericolace Or Senokot S) 8.6-50 MG per tablet 2 Tablet  2 Tablet Oral Q EVENING Malik Mayberry M.D.        And    polyethylene glycol/lytes (Miralax) Packet 1 Packet  1 Packet Oral QDAY PRN Malik Mayberry M.D.        ticagrelor (Brilinta) tablet 90 mg  90 mg Oral BID Malik Mayberry M.D.        acetaminophen (Tylenol) tablet 650 mg  650 mg Oral Q6HRS PRN Malik Mayberry M.D.        ondansetron (Zofran ODT) dispertab 4 mg  4 mg Oral Q4HRS PRN Malik Mayberry M.D.        [START ON 12/28/2024] venlafaxine XR (Effexor XR) capsule 75 mg  75 mg Oral DAILY Malik Mayberry M.D.        venlafaxine (Effexor) tablet 37.5 mg  37.5 mg Oral BID WITH MEALS Malik Mayberry M.D. MD Alert...ICU Electrolyte Replacement per Pharmacy   Other PHARMACY TO DOSE Malik Mayberry M.D.        ondansetron (Zofran) syringe/vial injection 4 mg  4 mg Intravenous Q4HRS PRN Jeremy M Gonda, M.D.        [Held by provider] lisinopril (Prinivil) tablet 10 mg  10 mg Oral DAILY Jeremy M Gonda, M.D.        [Held by provider] metoprolol SR (Toprol XL) tablet 50 mg  50 mg Oral Q EVENING Jeremy M Gonda, M.D.        norepinephrine (Levophed) 8 mg in 250 mL NS infusion (premix)  0-1 mcg/kg/min (Ideal) Intravenous Continuous Anju Banks   Stopped at 12/27/24 0805    hydrALAZINE (Apresoline) injection 10 mg  10 mg Intravenous Q3HRS PRN Anju L. Latona   10 mg at 12/26/24 2136    HYDROmorphone (Dilaudid) injection 0.5 mg  0.5 mg Intravenous Q4HRS PRN Anju L. Latona   0.5 mg at 12/26/24 2047    labetalol (Normodyne/Trandate) injection 10 mg  10 mg  Intravenous Q3HRS PRN Anju L. Latona   10 mg at 12/26/24 2040    ipratropium-albuterol (DUONEB) nebulizer solution  3 mL Nebulization Q4H PRN (RT) Anju L. Latona           Fluids    Intake/Output Summary (Last 24 hours) at 12/27/2024 1004  Last data filed at 12/27/2024 0800  Gross per 24 hour   Intake 808.73 ml   Output 675 ml   Net 133.73 ml       Laboratory          Recent Labs     12/25/24  1702 12/26/24  0410 12/27/24  0406   SODIUM 131* 138 143   POTASSIUM 3.6 3.8 3.7   CHLORIDE 100 108 113*   CO2 14* 18* 18*   BUN 12 11 15   CREATININE 1.14 0.78 0.70   MAGNESIUM  --  2.5 2.5   PHOSPHORUS  --   --  2.8   CALCIUM 8.5 8.9 8.8     Recent Labs     12/25/24 1702 12/26/24  0410 12/27/24  0406   ALTSGPT 14  --  13   ASTSGOT 22  --  47*   ALKPHOSPHAT 68  --  55   TBILIRUBIN 0.5  --  0.3   PREALBUMIN  --   --  10.4*   GLUCOSE 182* 123* 125*     Recent Labs     12/25/24  1702 12/26/24  0410 12/27/24  0406   WBC 17.2* 10.0 7.5   NEUTSPOLYS 78.70* 69.00  --    LYMPHOCYTES 9.00* 16.50*  --    MONOCYTES 10.80 13.00  --    EOSINOPHILS 0.20 0.30  --    BASOPHILS 0.30 0.60  --    ASTSGOT 22  --  47*   ALTSGPT 14  --  13   ALKPHOSPHAT 68  --  55   TBILIRUBIN 0.5  --  0.3     Recent Labs     12/25/24 1702 12/26/24  0410 12/27/24  0406   RBC 4.50 4.08* 3.47*   HEMOGLOBIN 13.9 12.9 10.9*   HEMATOCRIT 42.2 38.1 32.2*   PLATELETCT 244 264 256   PROTHROMBTM 12.8  --   --    APTT 29.6  --   --    INR 0.96  --   --        Imaging  CT:    Reviewed  MRI:   Reviewed - mild petechial hemorrhagic transformation    Assessment/Plan  * Acute CVA (cerebrovascular accident) (HCC)- (present on admission)  Assessment & Plan  A1C normal  -140  Holding home antihypertensives, currently on very low-dose norepinephrine  -Start midodrine 5 mg 3 times daily on 12/27    Atorvastatin 80mg qhs    Q2 neuro checks    New watershed infarcts and chronic infarcts, mild petechial hemorrhage    SLP eval - currently on TF    Brilinta + Aspirin - continue  despite hemorrhage as she already had her left carotid stent thrombose once which required repeat trip to IR for thrombectomy.  Distal perfusion was fed from right sided collaterals.    Chronic systolic heart failure (HCC)- (present on admission)  Assessment & Plan  Without acute decompensation  Holding lisinopril and metoprolol, diuretic for now  Echocardiogram as part of stroke workup    Will reintroduce GDMT when clinically appropriate  -She was on norepinephrine last night and I will continue to hold    Hyponatremia  Assessment & Plan  Resolved  DC maintenance fluids given heart failure    Gastroesophageal reflux disease without esophagitis- (present on admission)  Assessment & Plan  Continue PPI    Leukemoid reaction- (present on admission)  Assessment & Plan  Resolved    Tobacco use- (present on admission)  Assessment & Plan  Tobacco cessation education resources to be provided when appropriate    Hypothyroidism- (present on admission)  Assessment & Plan  TSH normal  Continue Synthroid    Coronary artery disease due to lipid rich plaque- (present on admission)  Assessment & Plan  Resume antiplatelet at 24-hour elif post TNK  Continue statin  Hold beta-blocker for now    Essential hypertension- (present on admission)  Assessment & Plan  SBP <140 after stent placement    Hold home antihypertensives given need for norepinephrine overnight    Dyslipidemia- (present on admission)  Assessment & Plan  Continue statin         VTE:  Contraindicated - on DAPT and has mild petechial hemorrhagic transformation (will start if cleared by Neurology)  Ulcer:  Home PPI  Lines: Central Line  Ongoing indication addressed    I have performed a physical exam and reviewed and updated ROS and Plan today (12/27/2024). In review of yesterday's note (12/26/2024), there are no changes except as documented above.     Discussed patient condition and risk of morbidity and/or mortality with Family, RN, RT, Pharmacy, , Code  status disscussed, Charge nurse / hot rounds, Patient, and neurology    The patient remains critically ill.  Critical care time = 47 minutes in directly providing and coordinating critical care and extensive data review.  No time overlap and excludes procedures.

## 2024-12-27 NOTE — THERAPY
Physical Therapy   Initial Evaluation     Patient Name: Marisol Brown  Age:  64 y.o., Sex:  female  Medical Record #: [id number]  Today's Date: 12/27/2024     Precautions  Precautions: Fall Risk;Swallow Precautions  Comments: (P) ?expressive aphasia    Assessment    64 y.o. female was admitted for CVA with reports on R gaze deviation, R lean with weakness and non-verbal.  She underwent L ICA stent placement d/t high grade stenosis and did well post op, but was noted to have R weakness a few hours later d/t clot with occlusion of the stent.  She is now s/p L ICA thrombectomy.  PMHx includes PVD, HTN. Hypothyroidism, and (+) smoker,  She lives alone in a 1st floor apartment, no steps and was independent for mobility without an AD.  On eval, pt presents with BLE hypertonicity with spasms/ cramping with movement, weakness with the R leg buckling in standing, decreased balance requiring Lily to maintain upright in sitting and maxA in standing, and decreased activity tolerance impairing her mobility.  She will benefit from continued acute PT services to address the above deficits in order to return home alone safely.  Recommend post acute PT services.    Plan    Physical Therapy Initial Treatment Plan   Treatment Plan : (P) Bed Mobility, Equipment, Family / Caregiver Training, Gait Training, Manual Therapy, Neuro Re-Education / Balance, Self Care / Home Evaluation, Therapeutic Activities, Therapeutic Exercise  Treatment Frequency: (P) 5 Times per Week  Duration: (P) Until Therapy Goals Met    DC Equipment Recommendations: (P) Unable to determine at this time  Discharge Recommendations: (P) Recommend post-acute placement for additional physical therapy services prior to discharge home            Objective       12/27/24 0833   Initial Contact Note    Initial Contact Note Order Received and Verified, Physical Therapy Evaluation in Progress with Full Report to Follow.   Precautions   Precautions Fall Risk;Swallow Precautions    Comments ?expressive aphasia   Vitals   O2 Delivery Device None - Room Air   Pain 0 - 10 Group   Therapist Pain Assessment During Activity  (cramping B legs.  Nurse reports pt has been having legs cramps when they move her.)   Prior Living Situation   Housing / Facility 1 Story Apartment / Condo  (1st floor)   Steps Into Home 0   Bathroom Set up Walk In Shower;Grab Bars;Shower Chair   Equipment Owned Tub / Shower Seat;Grab Bar(s) In Tub / Shower   Lives with - Patient's Self Care Capacity Alone and Able to Care For Self   Comments has a dgo and 2 cats- well behaved.   Prior Level of Functional Mobility   Bed Mobility Independent   Transfer Status Independent   Ambulation Independent   Ambulation Distance community   Comments drives, doesn't work   History of Falls   History of Falls No   Date of Last Fall   (Pt reports no falls x1 year)   Cognition    Cognition / Consciousness X   Speech/ Communication Expressive Aphasia   Orientation Level   (oriented to name and location)   Level of Consciousness Alert   Ability To Follow Commands 1 Step   Sequencing Impaired   Passive ROM Lower Body   Passive ROM Lower Body WDL   Active ROM Lower Body    Active ROM Lower Body  X   Comments B hypertonicity   Strength Lower Body   Comments difficulty to assess d/t increased tone   Sensation Lower Body   Lower Extremity Sensation   WDL   Lower Body Muscle Tone   Lower Body Muscle Tone  X   Modified Areli Scale Right Lower Extremity 2   Modified Areli Scale Left Lower Extremity 2   Coordination Lower Body    Coordination Lower Body  X   Comments BLE hypertonicity   Balance Assessment   Sitting Balance (Static) Poor -   Sitting Balance (Dynamic) Trace +   Standing Balance (Static) Trace   Standing Balance (Dynamic) Trace   Weight Shift Sitting Poor   Weight Shift Standing Poor   Comments with PT   Bed Mobility    Supine to Sit Maximal Assist   Sit to Supine Maximal Assist   Scooting Maximal Assist   Rolling Maximal Assist to  Rt.   Comments HOB partially elevated, rail   Gait Analysis   Gait Level Of Assist Unable to Participate   Functional Mobility   Sit to Stand Maximal Assist  (RLE rich, stands on toes, R lean in standing)   Bed, Chair, Wheelchair Transfer Unable to Participate   Mobility with PT   ICU Target Mobility Level   ICU Mobility - Targeted Level Level 3A   Activity Tolerance   Sitting Edge of Bed 2 min   Edema / Skin Assessment   Edema / Skin  Not Assessed   Short Term Goals    Short Term Goal # 1 Pt will perform supine < > sit Lily with bed flat, no rail in 6 visits in order to set up for upright mobility   Short Term Goal # 2 Pt will perform sit < > stand Lily in 6 visits in order to prepare for ambulation   Short Term Goal # 3 Pt will ambulate 10' iLly with LRAD in 6 visits in order to progress household ambulation   Education Group   Education Provided Role of Physical Therapist;Transfer Status   Role of Physical Therapist Patient Response Patient;Acceptance;Explanation;Action Demonstration   Transfer Status Patient Response Patient;Acceptance;Explanation;Action Demonstration;Reinforcement Needed   Additional Comments bed mobility- demonstragted, needs reinforcement   Physical Therapy Initial Treatment Plan    Treatment Plan  Bed Mobility;Equipment;Family / Caregiver Training;Gait Training;Manual Therapy;Neuro Re-Education / Balance;Self Care / Home Evaluation;Therapeutic Activities;Therapeutic Exercise   Treatment Frequency 5 Times per Week   Duration Until Therapy Goals Met   Problem List    Problems Impaired Bed Mobility;Impaired Transfers;Impaired Ambulation;Functional Strength Deficit;Impaired Balance;Impaired Coordination;Decreased Activity Tolerance;Pain   Anticipated Discharge Equipment and Recommendations   DC Equipment Recommendations Unable to determine at this time   Discharge Recommendations Recommend post-acute placement for additional physical therapy services prior to discharge home    Interdisciplinary Plan of Care Collaboration   IDT Collaboration with  Nursing   Patient Position at End of Therapy In Bed;Call Light within Reach;Tray Table within Reach   Collaboration Comments RN updated   Session Information   Date / Session Number  12/27 -1 (1/5, 1/2/2025)

## 2024-12-28 PROBLEM — R91.8 LUNG MASS: Status: ACTIVE | Noted: 2024-12-28

## 2024-12-28 PROBLEM — I50.22 CHRONIC SYSTOLIC HEART FAILURE (HCC): Status: RESOLVED | Noted: 2023-04-11 | Resolved: 2024-12-28

## 2024-12-28 PROBLEM — E87.6 HYPOKALEMIA: Status: ACTIVE | Noted: 2024-12-28

## 2024-12-28 LAB
ALBUMIN SERPL BCP-MCNC: 3.3 G/DL (ref 3.2–4.9)
ALBUMIN/GLOB SERPL: 1.1 G/DL
ALP SERPL-CCNC: 50 U/L (ref 30–99)
ALT SERPL-CCNC: 40 U/L (ref 2–50)
ANION GAP SERPL CALC-SCNC: 12 MMOL/L (ref 7–16)
AST SERPL-CCNC: 142 U/L (ref 12–45)
BILIRUB SERPL-MCNC: 0.6 MG/DL (ref 0.1–1.5)
BUN SERPL-MCNC: 15 MG/DL (ref 8–22)
CALCIUM ALBUM COR SERPL-MCNC: 9 MG/DL (ref 8.5–10.5)
CALCIUM SERPL-MCNC: 8.4 MG/DL (ref 8.5–10.5)
CHLORIDE SERPL-SCNC: 113 MMOL/L (ref 96–112)
CO2 SERPL-SCNC: 17 MMOL/L (ref 20–33)
CREAT SERPL-MCNC: 0.44 MG/DL (ref 0.5–1.4)
ERYTHROCYTE [DISTWIDTH] IN BLOOD BY AUTOMATED COUNT: 45.7 FL (ref 35.9–50)
GFR SERPLBLD CREATININE-BSD FMLA CKD-EPI: 107 ML/MIN/1.73 M 2
GLOBULIN SER CALC-MCNC: 3 G/DL (ref 1.9–3.5)
GLUCOSE SERPL-MCNC: 86 MG/DL (ref 65–99)
HCT VFR BLD AUTO: 30.7 % (ref 37–47)
HGB BLD-MCNC: 10.3 G/DL (ref 12–16)
MAGNESIUM SERPL-MCNC: 2.3 MG/DL (ref 1.5–2.5)
MCH RBC QN AUTO: 30.8 PG (ref 27–33)
MCHC RBC AUTO-ENTMCNC: 33.6 G/DL (ref 32.2–35.5)
MCV RBC AUTO: 91.9 FL (ref 81.4–97.8)
PHOSPHATE SERPL-MCNC: 3.2 MG/DL (ref 2.5–4.5)
PLATELET # BLD AUTO: 249 K/UL (ref 164–446)
PMV BLD AUTO: 10.2 FL (ref 9–12.9)
POTASSIUM SERPL-SCNC: 3.4 MMOL/L (ref 3.6–5.5)
PROT SERPL-MCNC: 6.3 G/DL (ref 6–8.2)
RBC # BLD AUTO: 3.34 M/UL (ref 4.2–5.4)
SODIUM SERPL-SCNC: 142 MMOL/L (ref 135–145)
WBC # BLD AUTO: 6.1 K/UL (ref 4.8–10.8)

## 2024-12-28 PROCEDURE — A9270 NON-COVERED ITEM OR SERVICE: HCPCS | Performed by: STUDENT IN AN ORGANIZED HEALTH CARE EDUCATION/TRAINING PROGRAM

## 2024-12-28 PROCEDURE — 99232 SBSQ HOSP IP/OBS MODERATE 35: CPT | Performed by: PSYCHIATRY & NEUROLOGY

## 2024-12-28 PROCEDURE — 84100 ASSAY OF PHOSPHORUS: CPT

## 2024-12-28 PROCEDURE — 99233 SBSQ HOSP IP/OBS HIGH 50: CPT | Performed by: STUDENT IN AN ORGANIZED HEALTH CARE EDUCATION/TRAINING PROGRAM

## 2024-12-28 PROCEDURE — 83735 ASSAY OF MAGNESIUM: CPT

## 2024-12-28 PROCEDURE — 700111 HCHG RX REV CODE 636 W/ 250 OVERRIDE (IP): Mod: JZ | Performed by: NURSE PRACTITIONER

## 2024-12-28 PROCEDURE — 80053 COMPREHEN METABOLIC PANEL: CPT

## 2024-12-28 PROCEDURE — 700102 HCHG RX REV CODE 250 W/ 637 OVERRIDE(OP): Performed by: STUDENT IN AN ORGANIZED HEALTH CARE EDUCATION/TRAINING PROGRAM

## 2024-12-28 PROCEDURE — 99223 1ST HOSP IP/OBS HIGH 75: CPT | Performed by: HOSPITALIST

## 2024-12-28 PROCEDURE — 85027 COMPLETE CBC AUTOMATED: CPT

## 2024-12-28 PROCEDURE — 770006 HCHG ROOM/CARE - MED/SURG/GYN SEMI*

## 2024-12-28 RX ORDER — POTASSIUM CHLORIDE 1500 MG/1
60 TABLET, EXTENDED RELEASE ORAL ONCE
Status: COMPLETED | OUTPATIENT
Start: 2024-12-28 | End: 2024-12-28

## 2024-12-28 RX ADMIN — POTASSIUM CHLORIDE 60 MEQ: 1500 TABLET, EXTENDED RELEASE ORAL at 08:08

## 2024-12-28 RX ADMIN — LABETALOL HYDROCHLORIDE 10 MG: 5 INJECTION, SOLUTION INTRAVENOUS at 02:04

## 2024-12-28 RX ADMIN — BUSPIRONE HYDROCHLORIDE 7.5 MG: 5 TABLET ORAL at 07:31

## 2024-12-28 RX ADMIN — ATORVASTATIN CALCIUM 80 MG: 80 TABLET, FILM COATED ORAL at 17:34

## 2024-12-28 RX ADMIN — BUSPIRONE HYDROCHLORIDE 7.5 MG: 5 TABLET ORAL at 15:30

## 2024-12-28 RX ADMIN — VENLAFAXINE HYDROCHLORIDE 75 MG: 75 CAPSULE, EXTENDED RELEASE ORAL at 05:04

## 2024-12-28 RX ADMIN — HYDRALAZINE HYDROCHLORIDE 10 MG: 20 INJECTION INTRAMUSCULAR; INTRAVENOUS at 04:08

## 2024-12-28 RX ADMIN — ASPIRIN 81 MG: 81 TABLET, CHEWABLE ORAL at 05:05

## 2024-12-28 RX ADMIN — TICAGRELOR 90 MG: 90 TABLET ORAL at 05:05

## 2024-12-28 RX ADMIN — TICAGRELOR 90 MG: 90 TABLET ORAL at 17:34

## 2024-12-28 RX ADMIN — SENNOSIDES AND DOCUSATE SODIUM 2 TABLET: 50; 8.6 TABLET ORAL at 17:34

## 2024-12-28 RX ADMIN — LEVOTHYROXINE SODIUM 88 MCG: 0.09 TABLET ORAL at 05:04

## 2024-12-28 ASSESSMENT — ENCOUNTER SYMPTOMS
WEAKNESS: 1
DOUBLE VISION: 0
BLURRED VISION: 0
NAUSEA: 0
CHILLS: 0
ABDOMINAL PAIN: 0
SORE THROAT: 0
SPUTUM PRODUCTION: 0
PALPITATIONS: 0
FOCAL WEAKNESS: 0
HEADACHES: 0
COUGH: 0
VOMITING: 0
SHORTNESS OF BREATH: 0
EYES NEGATIVE: 1
SENSORY CHANGE: 1
MUSCULOSKELETAL NEGATIVE: 1
FEVER: 0

## 2024-12-28 ASSESSMENT — PAIN DESCRIPTION - PAIN TYPE
TYPE: ACUTE PAIN

## 2024-12-28 ASSESSMENT — FIBROSIS 4 INDEX: FIB4 SCORE: 5.86

## 2024-12-28 ASSESSMENT — LIFESTYLE VARIABLES: SUBSTANCE_ABUSE: 1

## 2024-12-28 NOTE — CONSULTS
Hospital Medicine Consultation    Date of Service  12/28/2024    Referring Physician  Malik Mayberry M.D.    Consulting Physician  Yunier Karimi M.D.    Reason for Consultation  stroke    History of Presenting Illness  64 y.o. female who presented 12/25/2024 with right sided weakness.  Ms. Brown has a past medical history of tobacco use, coronary artery disease with stents times 2 in 2019, hypertension that was brought to the emergency room on 12/25/2024 with right sided weakness.  CT of the head was negative though CTA of the neck revealed occlusion of the right internal carotid artery with reconstitution more distally and near occlusion at the origin of the left internal carotid artery.  She underwent left carotid stent placement by Dr. Arciniega interventional radiology with subsequent admission to the ICU on dual antiplatelet therapy.  She developed stuttering symptoms of right arm weakness therefore went back and had findings of thrombosed left internal carotid artery stent and underwent thrombectomy with TICI 3 flow subsequently.  Due to hypotension, she required IV pressors.  An MRI of the brain revealed acute infarcts in bilateral cerebral hemispheres and watershed distribution.  Incidentally, CT of the neck revealed a right upper lung mass which will be followed up as an outpatient and a PET scan may be appropriate.    Review of Systems  Review of Systems   Unable to perform ROS: Mental acuity       Past Medical History   has a past medical history of Depression, Essential hypertension, Hypercholesteremia, Hypothyroidism, and Mixed hyperlipidemia.    Surgical History   has a past surgical history that includes tubal ligation; dental extraction(s); carpal tunnel release; pr upper gi endoscopy,diagnosis (N/A, 8/11/2021); pr colonoscopy,diagnostic (N/A, 8/11/2021); and pr cystoscopy,insert ureteral stent (Left, 10/17/2024).    Family History  family history includes Dementia in her father; Heart Disease in  her mother; Hyperlipidemia in her brother and sister.    Social History   reports that she has been smoking cigarettes. She started smoking about 51 years ago. She has a 51 pack-year smoking history. She has never used smokeless tobacco. She reports current alcohol use of about 7.2 oz of alcohol per week. She reports current drug use. Drug: Inhaled.    Medications  Prior to Admission Medications   Prescriptions Last Dose Informant Patient Reported? Taking?   Varenicline Tartrate, Starter, 0.5 MG X 11 & 1 MG X 42 Tablet Therapy Pack Unknown Patient's Home Pharmacy Yes No   Sig: Take 1 Each by mouth see administration instructions.   nitrofurantoin (MACROBID) 100 MG Cap Unknown Patient's Home Pharmacy Yes No   Sig: Take 100 mg by mouth 2 times a day. 7 days      Facility-Administered Medications: None       Allergies  Allergies   Allergen Reactions    Codeine Unspecified     Unknown reaction       Physical Exam  Temp:  [36.4 °C (97.5 °F)-36.7 °C (98 °F)] 36.7 °C (98 °F)  Pulse:  [63-97] 72  Resp:  [14-73] 18  BP: (104-168)/() 118/56  SpO2:  [93 %-100 %] 96 %    Physical Exam  Constitutional:       Appearance: She is ill-appearing.   Cardiovascular:      Rate and Rhythm: Normal rate and regular rhythm.   Pulmonary:      Effort: Pulmonary effort is normal.      Breath sounds: Normal breath sounds.   Abdominal:      General: There is no distension.      Tenderness: There is no abdominal tenderness.   Musculoskeletal:      Right lower leg: No edema.      Left lower leg: No edema.   Neurological:      Mental Status: She is alert.      Comments: [place] to person only.  Speech is clear  1/5 right leg  Good  right hand but cannot flex her left elbow         Fluids      Laboratory  Recent Labs     12/26/24  0410 12/27/24  0406 12/28/24  0307   WBC 10.0 7.5 6.1   RBC 4.08* 3.47* 3.34*   HEMOGLOBIN 12.9 10.9* 10.3*   HEMATOCRIT 38.1 32.2* 30.7*   MCV 93.4 92.8 91.9   MCH 31.6 31.4 30.8   MCHC 33.9 33.9 33.6   RDW 44.8  46.3 45.7   PLATELETCT 264 256 249   MPV 9.8 9.9 10.2     Recent Labs     12/26/24  0410 12/27/24  0406 12/28/24  0307   SODIUM 138 143 142   POTASSIUM 3.8 3.7 3.4*   CHLORIDE 108 113* 113*   CO2 18* 18* 17*   GLUCOSE 123* 125* 86   BUN 11 15 15   CREATININE 0.78 0.70 0.44*   CALCIUM 8.9 8.8 8.4*     Recent Labs     12/25/24  1702   APTT 29.6   INR 0.96          Recent Labs     12/26/24  0410   TRIGLYCERIDE 160*   HDL 38*   *        Imaging  MR-BRAIN-W/O   Final Result      1.  The diffusion-weighted sequences demonstrates acute infarctions in the bilateral cerebral hemispheres in the watershed distribution. The predominant portion of the cerebral hemispheres are free from the infarct. There is there is mild petechial    hemorrhage in the left parietal infarct.   2.  Chronic right proximal internal carotid occlusion. There is reconstitution of right supraclinoid segment from the anterior communicating artery.   3.  Widely patent left internal carotid carotid, anterior and middle cerebral flow voids.   4.  There are areas of chronic infarcts in the bilateral centrum semiovale.      DX-ABDOMEN FOR TUBE PLACEMENT   Final Result      1.  Enteric tube coiled in the fundus of stomach.      CT-CTA NECK WITH & W/O-POST PROCESSING   Final Result      1.  Interval stenting of the distal left common carotid artery into the left internal carotid artery. There is occlusion of the stent with trace flow at the periphery.   2.  Chronic occlusion of the right internal carotid artery from its origin extending to the intracranial portion.   3.  Moderate atherosclerotic narrowing of the distal right vertebral artery.   4.  Moderate to severe narrowing of the right vertebral artery origin.   5.  Right upper lobe mass again noted. Recommend biopsy or PET scan.   6.  Emphysematous and fibrotic changes of the bilateral upper lungs.      Dr. Arciniega is aware of these findings.      CT-CTA HEAD WITH & W/O-POST PROCESS   Final  "Result      1.  Bilateral occlusions of the internal carotid arteries.      2.The cavernous and supraclinoid internal carotid arteries and anterior and middle cerebral arteries fill from the posterior circulation.      3. Moderate atherosclerotic plaquing of the distal right vertebral artery.         CT-CEREBRAL PERFUSION ANALYSIS   Final Result      1. Cerebral blood flow less than 30% possibly representing completed infarct = 7 mL. Based on distribution of this finding, this is possibly artifact.      2. T Max more than 6 seconds possibly representing combination of completed infarct and ischemia = 209 mL. Based on the distribution of this finding, this is possibly artifact.      3. Mismatched volume possibly representing ischemic brain/penumbra= 202 mL      4.  Please note that this cerebral perfusion study and report is Quantitative and targets supratentorial (cerebral) perfusion for evaluation of large vessel territory acute ischemia/infarction. For example, lacunar infarcts, and brainstem/posterior fossa    ischemia/infarction are not evaluated on this study.  Data acquisition is subject to artifacts which can yield non-anatomically plausible perfusion maps which may be due to motion, bolus timing, signal to noise ratio, or other technical factors.    Perfusion map abnormalities which show non-anatomic distributions are likely artifact.   This study is not \"stand-alone\" and should only be utilized for diagnosis, management/treatment in correlation with CT, CTA, and/or MRI and clinical factors.         EC-ECHOCARDIOGRAM COMPLETE W/O CONT   Final Result      CT-HEAD W/O   Final Result      1.  No acute intracranial hemorrhage.   2.  Age-indeterminate left parietal infarct not definitely seen on prior.   3.  Otherwise unchanged findings.               DX-CHEST-FOR LINE PLACEMENT Perform procedure in: Patient's Room   Final Result      Peripherally inserted catheter has been placed and the tip projects " "appropriately over the superior vena cava.      CT-CTA NECK WITH & W/O-POST PROCESSING   Final Result      Stable exam with occlusion of the right internal carotid artery and high-grade stenosis of the proximal left internal carotid artery. Vertebral artery stenoses are also noted along with fibrotic change at the lung apices and a partially cavitary mass at    the right lung apex.      CT-CTA HEAD WITH & W/O-POST PROCESS   Final Result      There has been no significant interval change from the prior study.      CT-CEREBRAL PERFUSION ANALYSIS   Final Result      1. Cerebral blood flow less than 30% possibly representing completed infarct = 0 mL. Based on distribution of this finding, this is unlikely to represent artifact.      2. T Max more than 6 seconds possibly representing combination of completed infarct and ischemia = 4 mL. Based on the distribution of this finding, this is unlikely to represent artifact. This is improved from the prior study.      3. Mismatched volume possibly representing ischemic brain/penumbra= 4 mL. This is improved from the prior study.      4.  Please note that this cerebral perfusion study and report is Quantitative and targets supratentorial (cerebral) perfusion for evaluation of large vessel territory acute ischemia/infarction. For example, lacunar infarcts, and brainstem/posterior fossa    ischemia/infarction are not evaluated on this study.  Data acquisition is subject to artifacts which can yield non-anatomically plausible perfusion maps which may be due to motion, bolus timing, signal to noise ratio, or other technical factors.    Perfusion map abnormalities which show non-anatomic distributions are likely artifact.   This study is not \"stand-alone\" and should only be utilized for diagnosis, management/treatment in correlation with CT, CTA, and/or MRI and clinical factors.         CT-HEAD W/O   Final Result      1.  No significant change from prior.   2.          "   DX-CHEST-PORTABLE (1 VIEW)   Final Result      No evidence of acute cardiopulmonary process.      CT-CTA NECK WITH & W/O-POST PROCESSING   Final Result      1.  Severe plaque at the carotid bifurcations and proximal internal carotid arteries.   2.  Occlusion of the right internal carotid artery with reconstitution in the supraclinoid region.   3.  Likely near occlusion of the origin of the left internal carotid artery. There is a hypodense filling defect in the proximal left ICA just above the severe calcified plaque which could represent noncalcified plaque or thrombus extending into the    lumen.   4.  Emphysematous or fibrotic changes in the upper lungs. Irregular masslike opacity in the right upper lobe measuring 2.5 x 2.4 cm could represent lung malignancy or infection. Further evaluation is recommended.   These findings were discussed with CUCO ALLEN on 12/25/2024 5:55 PM.      CT-CTA HEAD WITH & W/O-POST PROCESS   Final Result      1.  Occlusion of the right intracranial internal carotid artery.      2.  Atherosclerotic plaque involving the intracranial left internal carotid artery with multifocal high-grade stenosis.      3.  No evidence of anterior middle cerebral artery occlusion.      4.  High-grade stenosis involving the intracranial right vertebral artery.      5.  Diminutive left vertebral artery.      6.  Report was called to CUCO ALLEN at 5:53 PM on 12/25/2024.            CT-CEREBRAL PERFUSION ANALYSIS   Final Result      1. Cerebral blood flow less than 30% possibly representing completed infarct = 0 mL. Based on distribution of this finding, this is unlikely to represent artifact.      2. T Max more than 6 seconds possibly representing combination of completed infarct and ischemia = 21 mL. Based on the distribution of this finding, this is unlikely to represent artifact.      3. Mismatched volume possibly representing ischemic brain/penumbra= 21 mL      4.  Please note that  "this cerebral perfusion study and report is Quantitative and targets supratentorial (cerebral) perfusion for evaluation of large vessel territory acute ischemia/infarction. For example, lacunar infarcts, and brainstem/posterior fossa    ischemia/infarction are not evaluated on this study.  Data acquisition is subject to artifacts which can yield non-anatomically plausible perfusion maps which may be due to motion, bolus timing, signal to noise ratio, or other technical factors.    Perfusion map abnormalities which show non-anatomic distributions are likely artifact.   This study is not \"stand-alone\" and should only be utilized for diagnosis, management/treatment in correlation with CT, CTA, and/or MRI and clinical factors.         CT-HEAD W/O   Final Result      1.  Chronic microvascular ischemic type changes and probable centrum semiovale lacunar infarcts.   2.  Nonspecific hypodensity in the right occipital region could represent age indeterminate, probably old infarct..   3.  No acute intracranial hemorrhage.   4.  If there is concern for acute ischemia, MRI is recommended               IR-CERV CAROTID STENT WITH PROTECTION    (Results Pending)   IR-CERV CAROTID STENT WITH PROTECTION    (Results Pending)       Assessment/Plan  * Acute CVA (cerebrovascular accident) (HCC)- (present on admission)  Assessment & Plan  Acute bilateral cerebral hemispheres secondary to bilateral carotid stenosis status post stent to the left carotid.    Dual antiplatelet therapy  High intensity statin  Physical therapy, Occupational Therapy, speech therapy  Rehab consulted  With ongoing right-sided weakness  Tobacco cessation  Blood pressure support with midodrine status post pressors    Essential hypertension- (present on admission)  Assessment & Plan  History of  Due to low blood pressure in the setting of carotid artery occlusion she has required IV pressors now on midodrine  Goal systolic blood pressure 100 140 with goal of " 120  Continue midodrine for now which likely will be tapered off    Lung mass- (present on admission)  Assessment & Plan  CT of the neck revealed a right upper lobe lung mass  That is concerning in the setting of lung standing tobacco use  Outpatient PET scan is appropriate    Tobacco use- (present on admission)  Assessment & Plan  Long history of tobacco dependence  6 cessation encouraged at length    Hypothyroidism- (present on admission)  Assessment & Plan  Continue synthroid  TSH 4.9    Coronary artery disease due to lipid rich plaque- (present on admission)  Assessment & Plan  History of 2 coronary artery stents in 2019  Echocardiogram reveals a normal ejection fraction of 71%    Hypokalemia- (present on admission)  Assessment & Plan  Replacement given    Hyponatremia- (present on admission)  Assessment & Plan  resolved

## 2024-12-28 NOTE — ASSESSMENT & PLAN NOTE
History of  Due to low blood pressure in the setting of carotid artery occlusion she has required IV pressors now on midodrine  Goal systolic blood pressure 100 140 with goal of 120  Continue midodrine for now which likely will be tapered off when BP increasing beyond goal

## 2024-12-28 NOTE — CARE PLAN
The patient is Watcher - Medium risk of patient condition declining or worsening    Shift Goals  Clinical Goals: Q2 neuro  Patient Goals: rest  Family Goals: LAURITA    Progress made toward(s) clinical / shift goals:    Problem: Optimal Care of the Stroke Patient  Goal: Optimal acute care for the stroke patient  Outcome: Progressing     Problem: Knowledge Deficit - Stroke Education  Goal: Patient's knowledge of stroke and risk factors will improve  Outcome: Progressing     Problem: Psychosocial - Patient Condition  Goal: Patient's ability to verbalize feelings about condition will improve  Outcome: Progressing     Problem: Discharge Planning - Stroke  Goal: Ensure Stroke Core Measures are met prior to discharge  Outcome: Progressing     Problem: Neuro Status  Goal: Neuro status will remain stable or improve  Outcome: Progressing     Problem: Hemodynamic Monitoring  Goal: Patient's hemodynamics, fluid balance and neurologic status will be stable or improve  Outcome: Progressing     Problem: Pain - Standard  Goal: Alleviation of pain or a reduction in pain to the patient’s comfort goal  Outcome: Progressing     Problem: Skin Integrity  Goal: Skin integrity is maintained or improved  Outcome: Progressing     Problem: Fall Risk  Goal: Patient will remain free from falls  Outcome: Progressing

## 2024-12-28 NOTE — PROGRESS NOTES
Critical Care Progress Note    Date of admission  12/25/2024    Chief Complaint  64 y.o. female with history of HTN, alcohol and tobacco use, CAD s/p stenting in the past was admitted with bilateral weakness and dysarthria.  CT brain was normal but CTA neck showed severe atherosclerotic plaque with occlusion of the right ICA and near total occlusion of the left ICA as well as high-grade stenosis of the right vertebral artery.  She was given TNK at 1736 on 12/25.  She was then admitted to the ICU however later in the evening had acute right arm flaccidity and was taken to IR where a left carotid stent was placed and she was brought to the ICU on an Integrilin infusion.    Hospital Course  12/25 - admitted to the ICU TNK.  After presenting to the ICU she developed flaccid right arm and right leg weakness.  She was taken to IR for emergent left carotid stent placement.  SBP goal changed to <140 post stent    12/26 - stent reoccluded but distal vessels were perfused by collaterals.  Stent reopened in IR.  Loaded with Brilinta and aspirin.  Neuroexam remains variable.    12/27 - RUE still stiff, weak.  Working with PT.  Continue brilinta/aspirin despite MRI findings as she has already had in-stent thrombosis once.  Fluid bolus and midodrine to help get off presors, hold midodrine for SBP >120.      12/28 - Awake and alert, RUE still very weak as is right leg.  Working with PT.  Will consider starting baclofen if arousal continues to improve.  She will need SNF.  Of for Neuro floor    Interval Problem Update  Reviewed last 24 hour events:  Tmax: Afebrile  Diet: Advance per SLP  Vasopressors: None    Infusions: none    Antibx:   Ceftriaxone 12/25 only (given for LE in urine)    Intake / Output  Urine Output past 24 hours: 1650 mL    Lab Trends  Hgb 13 --> 11 --> 6    CO2 14  --> 18 --> 18 --> 17    K 3.4          Review of Systems  Review of Systems   Constitutional:  Negative for chills, fever and  malaise/fatigue.   HENT:  Negative for congestion and sore throat.    Eyes: Negative.    Respiratory:  Negative for sputum production and shortness of breath.    Cardiovascular:  Negative for chest pain and palpitations.   Gastrointestinal:  Negative for abdominal pain, nausea and vomiting.   Genitourinary: Negative.    Musculoskeletal: Negative.    Skin: Negative.    Neurological:  Positive for sensory change and weakness. Negative for focal weakness and headaches.   All other systems reviewed and are negative.       Vital Signs for last 24 hours   Temp:  [36.4 °C (97.5 °F)-36.7 °C (98 °F)] 36.7 °C (98 °F)  Pulse:  [63-97] 72  Resp:  [14-73] 18  BP: (104-168)/() 118/56  SpO2:  [93 %-100 %] 96 %    Hemodynamic parameters for last 24 hours       Respiratory Information for the last 24 hours       Physical Exam   Physical Exam  Vitals and nursing note reviewed. Exam conducted with a chaperone present.   Constitutional:       General: She is not in acute distress.     Appearance: Normal appearance. She is not ill-appearing.   HENT:      Head: Normocephalic.      Mouth/Throat:      Mouth: Mucous membranes are moist.   Eyes:      Extraocular Movements: Extraocular movements intact.   Cardiovascular:      Rate and Rhythm: Normal rate and regular rhythm.      Pulses: Normal pulses.   Pulmonary:      Effort: Pulmonary effort is normal. No respiratory distress.   Abdominal:      General: There is no distension.      Palpations: Abdomen is soft.      Tenderness: There is no abdominal tenderness. There is no guarding or rebound.   Musculoskeletal:         General: Normal range of motion.      Cervical back: Normal range of motion and neck supple.   Skin:     General: Skin is warm and dry.      Capillary Refill: Capillary refill takes less than 2 seconds.   Neurological:      Mental Status: Mental status is at baseline.      Cranial Nerves: Dysarthria present.      Sensory: Sensory deficit present.      Motor: Weakness  (RUE/RLE) present.         Medications  Current Facility-Administered Medications   Medication Dose Route Frequency Provider Last Rate Last Admin    potassium chloride SA (Kdur) tablet 60 mEq  60 mEq Oral Once Malik Mayberry M.D.        aspirin (Asa) chewable tab 81 mg  81 mg Oral DAILY Malik Mayberry M.D.   81 mg at 12/28/24 0505    atorvastatin (Lipitor) tablet 80 mg  80 mg Oral Q EVENING Malik Mayberry M.D.   80 mg at 12/27/24 1708    busPIRone (Buspar) tablet 7.5 mg  7.5 mg Oral TID Malik Mayberry M.D.   7.5 mg at 12/28/24 0731    levothyroxine (Synthroid) tablet 88 mcg  88 mcg Oral AM ES Malik Mayberry M.D.   88 mcg at 12/28/24 0504    midodrine (Proamatine) tablet 5 mg  5 mg Oral TID WITH MEALS Malik Mayberry M.D.        senna-docusate (Pericolace Or Senokot S) 8.6-50 MG per tablet 2 Tablet  2 Tablet Oral Q EVENING Malik Mayberry M.D.   2 Tablet at 12/27/24 1709    And    polyethylene glycol/lytes (Miralax) Packet 1 Packet  1 Packet Oral QDAY PRN Malik Mayberry M.D.        ticagrelor (Brilinta) tablet 90 mg  90 mg Oral BID Malik Mayberry M.D.   90 mg at 12/28/24 0505    acetaminophen (Tylenol) tablet 650 mg  650 mg Oral Q6HRS PRN Malik Mayberry M.D.   650 mg at 12/27/24 2221    ondansetron (Zofran ODT) dispertab 4 mg  4 mg Oral Q4HRS PRN Malik Mayberry M.D.        venlafaxine XR (Effexor XR) capsule 75 mg  75 mg Oral DAILY Malik Mayberry M.D.   75 mg at 12/28/24 0504    MD Alert...ICU Electrolyte Replacement per Pharmacy   Other PHARMACY TO DOSE Malik Mayberry M.D.        ondansetron (Zofran) syringe/vial injection 4 mg  4 mg Intravenous Q4HRS PRN Jeremy M Gonda, M.D.        hydrALAZINE (Apresoline) injection 10 mg  10 mg Intravenous Q3HRS PRN Anju L. Latona   10 mg at 12/28/24 0408    HYDROmorphone (Dilaudid) injection 0.5 mg  0.5 mg Intravenous Q4HRS PRN Anju JACOBSON. Latona   0.5 mg at 12/26/24 2047    labetalol (Normodyne/Trandate) injection 10 mg  10 mg Intravenous Q3HRS PRN Anju JACOBSON. Latona   10 mg at  12/28/24 0204    ipratropium-albuterol (DUONEB) nebulizer solution  3 mL Nebulization Q4H PRN (RT) Anju Banks           Fluids    Intake/Output Summary (Last 24 hours) at 12/28/2024 0801  Last data filed at 12/28/2024 0600  Gross per 24 hour   Intake 1840 ml   Output 410 ml   Net 1430 ml       Laboratory          Recent Labs     12/26/24 0410 12/27/24 0406 12/28/24  0307   SODIUM 138 143 142   POTASSIUM 3.8 3.7 3.4*   CHLORIDE 108 113* 113*   CO2 18* 18* 17*   BUN 11 15 15   CREATININE 0.78 0.70 0.44*   MAGNESIUM 2.5 2.5 2.3   PHOSPHORUS  --  2.8 3.2   CALCIUM 8.9 8.8 8.4*     Recent Labs     12/25/24 1702 12/26/24 0410 12/27/24 0406 12/28/24  0307   ALTSGPT 14  --  13 40   ASTSGOT 22  --  47* 142*   ALKPHOSPHAT 68  --  55 50   TBILIRUBIN 0.5  --  0.3 0.6   PREALBUMIN  --   --  10.4*  --    GLUCOSE 182* 123* 125* 86     Recent Labs     12/25/24 1702 12/26/24 0410 12/27/24 0406 12/28/24  0307   WBC 17.2* 10.0 7.5 6.1   NEUTSPOLYS 78.70* 69.00  --   --    LYMPHOCYTES 9.00* 16.50*  --   --    MONOCYTES 10.80 13.00  --   --    EOSINOPHILS 0.20 0.30  --   --    BASOPHILS 0.30 0.60  --   --    ASTSGOT 22  --  47* 142*   ALTSGPT 14  --  13 40   ALKPHOSPHAT 68  --  55 50   TBILIRUBIN 0.5  --  0.3 0.6     Recent Labs     12/25/24 1702 12/26/24 0410 12/27/24 0406 12/28/24  0307   RBC 4.50 4.08* 3.47* 3.34*   HEMOGLOBIN 13.9 12.9 10.9* 10.3*   HEMATOCRIT 42.2 38.1 32.2* 30.7*   PLATELETCT 244 264 256 249   PROTHROMBTM 12.8  --   --   --    APTT 29.6  --   --   --    INR 0.96  --   --   --        Imaging  CT:    Reviewed  MRI:   Reviewed - mild petechial hemorrhagic transformation    Assessment/Plan  * Acute CVA (cerebrovascular accident) (HCC)- (present on admission)  Assessment & Plan  A1C normal  -140  Holding home antihypertensives, currently on very low-dose norepinephrine  -Start midodrine 5 mg 3 times daily on 12/27    Atorvastatin 80mg qhs  Baclofen for spasticity (12/28 is the first day she's  been very alert, I will hold baclofen today to ensure this is a trend we see continuing)    Q4 neuro checks    New watershed infarcts and chronic infarcts, mild petechial hemorrhage    SLP eval - currently on TF    Brilinta + Aspirin - continue despite hemorrhage as she already had her left carotid stent thrombose once which required repeat trip to IR for thrombectomy.  Distal perfusion was fed from right sided collaterals.    Hypokalemia- (present on admission)  Assessment & Plan  Check BMP daily  Goal is 4  Replete as necessary     Hyponatremia- (present on admission)  Assessment & Plan  Resolved  DC maintenance fluids given heart failure    Gastroesophageal reflux disease without esophagitis- (present on admission)  Assessment & Plan  Continue PPI    Leukemoid reaction- (present on admission)  Assessment & Plan  Resolved    Tobacco use- (present on admission)  Assessment & Plan  Tobacco cessation education resources to be provided when appropriate    Hypothyroidism- (present on admission)  Assessment & Plan  TSH normal  Continue Synthroid    Coronary artery disease due to lipid rich plaque- (present on admission)  Assessment & Plan  Resume antiplatelet at 24-hour elif post TNK  Continue statin  Hold beta-blocker for now    Essential hypertension- (present on admission)  Assessment & Plan  SBP <140 after stent placement    Hold home antihypertensives given need for norepinephrine overnight    Dyslipidemia- (present on admission)  Assessment & Plan  Continue statin         VTE:  Contraindicated - on DAPT and has mild petechial hemorrhagic transformation (will start if cleared by Neurology)  Ulcer:  Home PPI  Lines: Central Line  Ongoing indication addressed    I have performed a physical exam and reviewed and updated ROS and Plan today (12/28/2024). In review of yesterday's note (12/27/2024), there are no changes except as documented above.     Discussed patient condition and risk of morbidity and/or mortality with  Family, RN, RT, Pharmacy, , Code status disscussed, Charge nurse / hot rounds, Patient, and neurology    Ok to transfer patient out of ICU today. [facility] will sign off at transfer. Please call with questions.

## 2024-12-28 NOTE — ASSESSMENT & PLAN NOTE
History of 2 coronary artery stents in 2019  Echocardiogram reveals a normal ejection fraction of 71%

## 2024-12-28 NOTE — ASSESSMENT & PLAN NOTE
Acute bilateral cerebral hemispheres secondary to bilateral carotid stenosis status post stent to the left carotid.    Dual antiplatelet therapy  High intensity statin  Physical therapy, Occupational Therapy, speech therapy  Rehab consulted, not a candidate for IPR  PT/OT/SLP  Tobacco cessation  Blood pressure support with midodrine status post pressors

## 2024-12-28 NOTE — PROGRESS NOTES
Patient refused to participate in her neurological exam at 0600. She would not answer any of the orientation questions and told this RN to leave her alone. A second RN attempted but was met with the same response.     Charge RN notified and was able top conduct her neurological exam.

## 2024-12-28 NOTE — CARE PLAN
The patient is Stable - Low risk of patient condition declining or worsening    Shift Goals  Clinical Goals: Transfer to floor  Patient Goals: Comfort  Family Goals: LAURITA    Med/surg orders placed, hemodynamics within parameters, 2x bowel movements, mobilized with serasteady x3, mobility improving at each interval, patient and family educated on stroke prevention followup and next steps    Progress made toward(s) clinical / shift goals:    Problem: Optimal Care of the Stroke Patient  Goal: Optimal emergency care for the stroke patient  Outcome: Progressing  Goal: Optimal acute care for the stroke patient  Outcome: Progressing     Problem: Knowledge Deficit - Stroke Education  Goal: Patient's knowledge of stroke and risk factors will improve  Outcome: Progressing     Problem: Psychosocial - Patient Condition  Goal: Patient's ability to verbalize feelings about condition will improve  Outcome: Progressing  Goal: Patient's ability to re-evaluate and adapt role responsibilities will improve  Outcome: Progressing     Problem: Discharge Planning - Stroke  Goal: Ensure Stroke Core Measures are met prior to discharge  Outcome: Progressing  Goal: Patient’s continuum of care needs will be met  Outcome: Progressing     Problem: Neuro Status  Goal: Neuro status will remain stable or improve  Outcome: Progressing     Problem: Hemodynamic Monitoring  Goal: Patient's hemodynamics, fluid balance and neurologic status will be stable or improve  Outcome: Progressing     Problem: Respiratory - Stroke Patient  Goal: Patient will achieve/maintain optimum respiratory rate/effort  Outcome: Progressing     Problem: Dysphagia  Goal: Dysphagia will improve  Outcome: Progressing     Problem: Risk for Aspiration  Goal: Patient's risk for aspiration will be absent or decrease  Outcome: Progressing     Problem: Urinary Elimination  Goal: Establish and maintain regular urinary output  Outcome: Progressing     Problem: Bowel Elimination  Goal:  Establish and maintain regular bowel function  Outcome: Progressing     Problem: Mobility - Stroke  Goal: Patient's capacity to carry out activities will improve  Outcome: Progressing  Goal: Spasticity will be prevented or improved  Outcome: Progressing  Goal: Subluxation will be prevented or improved  Outcome: Progressing     Problem: Self Care  Goal: Patient will have the ability to perform ADLs independently or with assistance (bathe, groom, dress, toilet and feed)  Outcome: Progressing     Problem: Knowledge Deficit - Standard  Goal: Patient and family/care givers will demonstrate understanding of plan of care, disease process/condition, diagnostic tests and medications  Outcome: Progressing     Problem: Pain - Standard  Goal: Alleviation of pain or a reduction in pain to the patient’s comfort goal  Outcome: Progressing       Patient is not progressing towards the following goals:

## 2024-12-28 NOTE — PROGRESS NOTES
"Neurology Progress Note  Neurohospitalist Service, [facility]    Referring Physician: Malik Mayberry M.D.    Chief Complaint   Patient presents with    Possible Stroke     Per EMS, pt was sitting with her mother when she slumped over. EMS found pt to have R gaze deviation, R lean, and nonverbal. Per EMS, pt was nonverbal until in aircraft when she began c/o numbness, dizziness, and repeating \"I feel like I'm having a heart attack.\" + asa, unk BT. LKW 1535       HPI: Refer to initial documented Neurology H&P, as detailed in the patient's chart.    Interval History:  No acute events overnight, she is doing better today and able to look to the right and past midline, although still has some visual field cut on the right.  Mother and daughter are at bedside.      Review of systems: In addition to what is detailed in the HPI and/or updated in the interval history, all other systems reviewed and are negative.    Past Medical History:    has a past medical history of Depression, Essential hypertension, Hypercholesteremia, Hypothyroidism, and Mixed hyperlipidemia.    FHx:  family history includes Dementia in her father; Heart Disease in her mother; Hyperlipidemia in her brother and sister.    SHx:   reports that she has been smoking cigarettes. She started smoking about 51 years ago. She has a 51 pack-year smoking history. She has never used smokeless tobacco. She reports current alcohol use of about 7.2 oz of alcohol per week. She reports current drug use. Drug: Inhaled.    Medications:    Current Facility-Administered Medications:     [COMPLETED] aspirin (Asa) tablet 325 mg, 325 mg, Enteral Tube, Once, 325 mg at 12/26/24 1513 **FOLLOWED BY** aspirin (Asa) chewable tab 81 mg, 81 mg, Oral, DAILY, Malik Mayberry M.D., 81 mg at 12/28/24 0505    atorvastatin (Lipitor) tablet 80 mg, 80 mg, Oral, Q EVENING, Malik Mayberry M.D., 80 mg at 12/27/24 1708    busPIRone (Buspar) tablet 7.5 mg, 7.5 mg, Oral, TID, " Malik Mayberry M.D., 7.5 mg at 12/28/24 0731    levothyroxine (Synthroid) tablet 88 mcg, 88 mcg, Oral, AM ES, Malik Mayberry M.D., 88 mcg at 12/28/24 0504    midodrine (Proamatine) tablet 5 mg, 5 mg, Oral, TID WITH MEALS, Malik Mayberry M.D.    senna-docusate (Pericolace Or Senokot S) 8.6-50 MG per tablet 2 Tablet, 2 Tablet, Oral, Q EVENING, 2 Tablet at 12/27/24 1709 **AND** polyethylene glycol/lytes (Miralax) Packet 1 Packet, 1 Packet, Oral, QDAY PRN, Malik Mayberry M.D.    [COMPLETED] ticagrelor (Brilinta) tablet 180 mg, 180 mg, Oral, Once, 180 mg at 12/26/24 1522 **FOLLOWED BY** ticagrelor (Brilinta) tablet 90 mg, 90 mg, Oral, BID, Malik Mayberry M.D., 90 mg at 12/28/24 0505    acetaminophen (Tylenol) tablet 650 mg, 650 mg, Oral, Q6HRS PRN, Malik Mayberry M.D., 650 mg at 12/27/24 2221    ondansetron (Zofran ODT) dispertab 4 mg, 4 mg, Oral, Q4HRS PRN, Malik Mayberry M.D.    venlafaxine XR (Effexor XR) capsule 75 mg, 75 mg, Oral, DAILY, Malik Mayberry M.D., 75 mg at 12/28/24 0504    MD Alert...ICU Electrolyte Replacement per Pharmacy, , Other, PHARMACY TO DOSE, Malik Mayberry M.D.    ondansetron (Zofran) syringe/vial injection 4 mg, 4 mg, Intravenous, Q4HRS PRN, Jeremy M Gonda, M.D.    hydrALAZINE (Apresoline) injection 10 mg, 10 mg, Intravenous, Q3HRS PRN, Anju RED Latona, 10 mg at 12/28/24 0408    HYDROmorphone (Dilaudid) injection 0.5 mg, 0.5 mg, Intravenous, Q4HRS PRN, Anju L. Latona, 0.5 mg at 12/26/24 2047    labetalol (Normodyne/Trandate) injection 10 mg, 10 mg, Intravenous, Q3HRS PRN, Anju L. Latona, 10 mg at 12/28/24 0204    ipratropium-albuterol (DUONEB) nebulizer solution, 3 mL, Nebulization, Q4H PRN (RT), Anju Banks    Physical Examination:    Vitals:    12/28/24 0600 12/28/24 0700 12/28/24 0800 12/28/24 1110   BP: 118/56 (!) 152/65 134/63 118/65   Pulse: 72 62 62 66   Resp: 18 16 15 14   Temp: 36.7 °C (98 °F)  36.4 °C (97.6 °F) 36.4 °C (97.5 °F)   TempSrc: Temporal  Temporal Temporal   SpO2: 96%  97% 96% 94%   Weight:       Height:           NEUROLOGICAL EXAM:   Mental status: Awake, alert but disoriented to year and month, she thinks it is October of 1924, follows commands  Speech and language: speech is not dysarthric. The patient is able to name and repeat.  Cranial nerve exam: Pupils are equal, round and reactive to light bilaterally. Visual fields: She has right-sided neglect  Face is symmetric, facial sensation is intact.   Motor exam: Sustain antigravity in left upper and lower extremity with no downward drift.  She has profound weakness of right upper extremity with increased tone.  With reinforcement she is able to squeeze with right hand.  She has antigravity with mild downward drift of right lower extremity.  Sensory exam: No sensory deficits identified   Coordination: no gross ataxia noted on exam  Plantar reflexes: Upgoing bilaterally.  Gait: deferred     Objective Data:    Labs:  Lab Results   Component Value Date/Time    PROTHROMBTM 12.8 12/25/2024 05:02 PM    INR 0.96 12/25/2024 05:02 PM      Lab Results   Component Value Date/Time    WBC 6.1 12/28/2024 03:07 AM    RBC 3.34 (L) 12/28/2024 03:07 AM    HEMOGLOBIN 10.3 (L) 12/28/2024 03:07 AM    HEMATOCRIT 30.7 (L) 12/28/2024 03:07 AM    MCV 91.9 12/28/2024 03:07 AM    MCH 30.8 12/28/2024 03:07 AM    MCHC 33.6 12/28/2024 03:07 AM    MPV 10.2 12/28/2024 03:07 AM    NEUTSPOLYS 69.00 12/26/2024 04:10 AM    LYMPHOCYTES 16.50 (L) 12/26/2024 04:10 AM    MONOCYTES 13.00 12/26/2024 04:10 AM    EOSINOPHILS 0.30 12/26/2024 04:10 AM    BASOPHILS 0.60 12/26/2024 04:10 AM    HYPOCHROMIA 1+ 04/11/2023 12:12 AM    ANISOCYTOSIS 1+ 04/11/2023 12:12 AM      Lab Results   Component Value Date/Time    SODIUM 142 12/28/2024 03:07 AM    POTASSIUM 3.4 (L) 12/28/2024 03:07 AM    CHLORIDE 113 (H) 12/28/2024 03:07 AM    CO2 17 (L) 12/28/2024 03:07 AM    GLUCOSE 86 12/28/2024 03:07 AM    BUN 15 12/28/2024 03:07 AM    CREATININE 0.44 (L) 12/28/2024 03:07 AM      Lab  Results   Component Value Date/Time    CHOLSTRLTOT 174 12/26/2024 04:10 AM     (H) 12/26/2024 04:10 AM    HDL 38 (A) 12/26/2024 04:10 AM    TRIGLYCERIDE 160 (H) 12/26/2024 04:10 AM       Lab Results   Component Value Date/Time    ALKPHOSPHAT 50 12/28/2024 03:07 AM    ASTSGOT 142 (H) 12/28/2024 03:07 AM    ALTSGPT 40 12/28/2024 03:07 AM    TBILIRUBIN 0.6 12/28/2024 03:07 AM        Imaging/Testing:    I interpreted and/or reviewed the patient's neuroimaging    MR-BRAIN-W/O   Final Result      1.  The diffusion-weighted sequences demonstrates acute infarctions in the bilateral cerebral hemispheres in the watershed distribution. The predominant portion of the cerebral hemispheres are free from the infarct. There is there is mild petechial    hemorrhage in the left parietal infarct.   2.  Chronic right proximal internal carotid occlusion. There is reconstitution of right supraclinoid segment from the anterior communicating artery.   3.  Widely patent left internal carotid carotid, anterior and middle cerebral flow voids.   4.  There are areas of chronic infarcts in the bilateral centrum semiovale.      DX-ABDOMEN FOR TUBE PLACEMENT   Final Result      1.  Enteric tube coiled in the fundus of stomach.      CT-CTA NECK WITH & W/O-POST PROCESSING   Final Result      1.  Interval stenting of the distal left common carotid artery into the left internal carotid artery. There is occlusion of the stent with trace flow at the periphery.   2.  Chronic occlusion of the right internal carotid artery from its origin extending to the intracranial portion.   3.  Moderate atherosclerotic narrowing of the distal right vertebral artery.   4.  Moderate to severe narrowing of the right vertebral artery origin.   5.  Right upper lobe mass again noted. Recommend biopsy or PET scan.   6.  Emphysematous and fibrotic changes of the bilateral upper lungs.      Dr. Arciniega is aware of these findings.      CT-CTA HEAD WITH & W/O-POST  "PROCESS   Final Result      1.  Bilateral occlusions of the internal carotid arteries.      2.The cavernous and supraclinoid internal carotid arteries and anterior and middle cerebral arteries fill from the posterior circulation.      3. Moderate atherosclerotic plaquing of the distal right vertebral artery.         CT-CEREBRAL PERFUSION ANALYSIS   Final Result      1. Cerebral blood flow less than 30% possibly representing completed infarct = 7 mL. Based on distribution of this finding, this is possibly artifact.      2. T Max more than 6 seconds possibly representing combination of completed infarct and ischemia = 209 mL. Based on the distribution of this finding, this is possibly artifact.      3. Mismatched volume possibly representing ischemic brain/penumbra= 202 mL      4.  Please note that this cerebral perfusion study and report is Quantitative and targets supratentorial (cerebral) perfusion for evaluation of large vessel territory acute ischemia/infarction. For example, lacunar infarcts, and brainstem/posterior fossa    ischemia/infarction are not evaluated on this study.  Data acquisition is subject to artifacts which can yield non-anatomically plausible perfusion maps which may be due to motion, bolus timing, signal to noise ratio, or other technical factors.    Perfusion map abnormalities which show non-anatomic distributions are likely artifact.   This study is not \"stand-alone\" and should only be utilized for diagnosis, management/treatment in correlation with CT, CTA, and/or MRI and clinical factors.         EC-ECHOCARDIOGRAM COMPLETE W/O CONT   Final Result      CT-HEAD W/O   Final Result      1.  No acute intracranial hemorrhage.   2.  Age-indeterminate left parietal infarct not definitely seen on prior.   3.  Otherwise unchanged findings.               DX-CHEST-FOR LINE PLACEMENT Perform procedure in: Patient's Room   Final Result      Peripherally inserted catheter has been placed and the tip " "projects appropriately over the superior vena cava.      CT-CTA NECK WITH & W/O-POST PROCESSING   Final Result      Stable exam with occlusion of the right internal carotid artery and high-grade stenosis of the proximal left internal carotid artery. Vertebral artery stenoses are also noted along with fibrotic change at the lung apices and a partially cavitary mass at    the right lung apex.      CT-CTA HEAD WITH & W/O-POST PROCESS   Final Result      There has been no significant interval change from the prior study.      CT-CEREBRAL PERFUSION ANALYSIS   Final Result      1. Cerebral blood flow less than 30% possibly representing completed infarct = 0 mL. Based on distribution of this finding, this is unlikely to represent artifact.      2. T Max more than 6 seconds possibly representing combination of completed infarct and ischemia = 4 mL. Based on the distribution of this finding, this is unlikely to represent artifact. This is improved from the prior study.      3. Mismatched volume possibly representing ischemic brain/penumbra= 4 mL. This is improved from the prior study.      4.  Please note that this cerebral perfusion study and report is Quantitative and targets supratentorial (cerebral) perfusion for evaluation of large vessel territory acute ischemia/infarction. For example, lacunar infarcts, and brainstem/posterior fossa    ischemia/infarction are not evaluated on this study.  Data acquisition is subject to artifacts which can yield non-anatomically plausible perfusion maps which may be due to motion, bolus timing, signal to noise ratio, or other technical factors.    Perfusion map abnormalities which show non-anatomic distributions are likely artifact.   This study is not \"stand-alone\" and should only be utilized for diagnosis, management/treatment in correlation with CT, CTA, and/or MRI and clinical factors.         CT-HEAD W/O   Final Result      1.  No significant change from prior.   2.          "   DX-CHEST-PORTABLE (1 VIEW)   Final Result      No evidence of acute cardiopulmonary process.      CT-CTA NECK WITH & W/O-POST PROCESSING   Final Result      1.  Severe plaque at the carotid bifurcations and proximal internal carotid arteries.   2.  Occlusion of the right internal carotid artery with reconstitution in the supraclinoid region.   3.  Likely near occlusion of the origin of the left internal carotid artery. There is a hypodense filling defect in the proximal left ICA just above the severe calcified plaque which could represent noncalcified plaque or thrombus extending into the    lumen.   4.  Emphysematous or fibrotic changes in the upper lungs. Irregular masslike opacity in the right upper lobe measuring 2.5 x 2.4 cm could represent lung malignancy or infection. Further evaluation is recommended.   These findings were discussed with CUCO ALLEN on 12/25/2024 5:55 PM.      CT-CTA HEAD WITH & W/O-POST PROCESS   Final Result      1.  Occlusion of the right intracranial internal carotid artery.      2.  Atherosclerotic plaque involving the intracranial left internal carotid artery with multifocal high-grade stenosis.      3.  No evidence of anterior middle cerebral artery occlusion.      4.  High-grade stenosis involving the intracranial right vertebral artery.      5.  Diminutive left vertebral artery.      6.  Report was called to CUCO ALLEN at 5:53 PM on 12/25/2024.            CT-CEREBRAL PERFUSION ANALYSIS   Final Result      1. Cerebral blood flow less than 30% possibly representing completed infarct = 0 mL. Based on distribution of this finding, this is unlikely to represent artifact.      2. T Max more than 6 seconds possibly representing combination of completed infarct and ischemia = 21 mL. Based on the distribution of this finding, this is unlikely to represent artifact.      3. Mismatched volume possibly representing ischemic brain/penumbra= 21 mL      4.  Please note that  "this cerebral perfusion study and report is Quantitative and targets supratentorial (cerebral) perfusion for evaluation of large vessel territory acute ischemia/infarction. For example, lacunar infarcts, and brainstem/posterior fossa    ischemia/infarction are not evaluated on this study.  Data acquisition is subject to artifacts which can yield non-anatomically plausible perfusion maps which may be due to motion, bolus timing, signal to noise ratio, or other technical factors.    Perfusion map abnormalities which show non-anatomic distributions are likely artifact.   This study is not \"stand-alone\" and should only be utilized for diagnosis, management/treatment in correlation with CT, CTA, and/or MRI and clinical factors.         CT-HEAD W/O   Final Result      1.  Chronic microvascular ischemic type changes and probable centrum semiovale lacunar infarcts.   2.  Nonspecific hypodensity in the right occipital region could represent age indeterminate, probably old infarct..   3.  No acute intracranial hemorrhage.   4.  If there is concern for acute ischemia, MRI is recommended               IR-CERV CAROTID STENT WITH PROTECTION    (Results Pending)   IR-CERV CAROTID STENT WITH PROTECTION    (Results Pending)       Assessment and Plan:  Marisol Brown is a 64 y.o. right-handed female with past medical history significant for peripheral vascular disease, on aspirin, hypertension, hyperlipidemia, hypothyroidism and current smoker who was brought to emergency room for evaluation of right upper and left lower extremity weakness, associated with slurred speech.  Patient underwent a brain CT which did not reveal acute abnormalities.  CT of the neck revealed severe atherosclerotic plaque with occlusion of right internal carotid artery with near occlusion of origin of left internal carotid artery as well as high-grade stenosis involving the intracranial right vertebral artery.  CT perfusion with 21 mL of ischemic penumbra " on the right.  Her NIH scale score was 5.  Patient underwent administration of TNK at 1736 PM on 12/26/2024.  Following TNK administration patient's symptom improved, however few hours later she had profound right-sided weakness for which stroke imaging were repeated and again revealed right internal carotid artery occlusion and severe high-grade stenosis of proximal left internal carotid artery.  Case was discussed with neuro IR and sent for stent placement.  Patient was started on Integrilin drip, however given her kidney dysfunction Integrilin was at 1 mcg/kg as opposed to 2 mcg/kg/min.  She had some fluctuation in her right arm weakness with some spasm and contracture of right upper extremity.  Integrilin drip was increased to 2 mcg/kg and patient underwent a repeat CT angiogram of the head and neck which showed reocclusion of the stent.  Patient was taken to IR suite and underwent successful thrombectomy of thrombosed left ICA stent with TICI 3 reperfusion on 12/26/2024 with some improvement in her right-sided weakness.  Echocardiogram is unremarkable.  LDL is 104 and hemoglobin A1c is 5.4.  Brain MRI with acute watershed infarct in bilateral cerebral hemisphere with minimal petechial hemorrhage in the left parietal head region.      Plan:  -Okay to transfer out of ICU.  -Every 4 hours neurochecks.  -Given TICI 3 reperfusion, maintain systolic blood pressure 100-140.  Target 120.  Please use pressors if blood pressure drops below 100  - Maintain normoglycemia and avoid hypo- or hypernatremia; aim for normothermia  -Continue with Brilinta 90 mg twice a day and aspirin 81 mg daily from tomorrow.  - High intensity statin when safe to swallow, LDL goal less than 70  - Serum studies for stroke risk factors: LDL is 104 and hemoglobin A1c is 5.4.  - Evaluate and treat with PT/OT/ST; physiatry consult  - Stroke education  -Patient was counseled about stroke risk factor modification in particular smoking cessation and  drinking cessation.  - Follow-up with neurovascular clinic after discharge  -Neurology will sign off, please call me if there is any question.  -Discussed with her mother and daughter at bedside and answered all their questions.      The evaluation of the patient, and recommended management, was discussed with Malik Mayberry M.D.    Please note that this dictation was created using voice recognition software. I have made every reasonable attempt to correct obvious errors, but I expect that there are errors of grammar and possibly content that I did not discover before finalizing the note.      Kyle Rivas MD  [facility]

## 2024-12-28 NOTE — DISCHARGE PLANNING
Please review the consult from Dr. Funk regarding post acute recommendations.  [facility] will no longer follow.  Please reach out to myself with any questions.

## 2024-12-28 NOTE — ASSESSMENT & PLAN NOTE
CT of the neck revealed a right upper lobe lung mass  That is concerning in the setting of lung standing tobacco use  Outpatient PET scan is appropriate  Outpatient pulmonary nodule clinic orders placed

## 2024-12-29 DIAGNOSIS — I65.22 CAROTID ARTERY STENOSIS, UNILATERAL, LEFT: ICD-10-CM

## 2024-12-29 PROBLEM — E87.20 NORMAL ANION GAP METABOLIC ACIDOSIS: Status: ACTIVE | Noted: 2024-12-29

## 2024-12-29 PROCEDURE — 770006 HCHG ROOM/CARE - MED/SURG/GYN SEMI*

## 2024-12-29 PROCEDURE — 700102 HCHG RX REV CODE 250 W/ 637 OVERRIDE(OP): Performed by: STUDENT IN AN ORGANIZED HEALTH CARE EDUCATION/TRAINING PROGRAM

## 2024-12-29 PROCEDURE — A9270 NON-COVERED ITEM OR SERVICE: HCPCS | Performed by: STUDENT IN AN ORGANIZED HEALTH CARE EDUCATION/TRAINING PROGRAM

## 2024-12-29 PROCEDURE — 700111 HCHG RX REV CODE 636 W/ 250 OVERRIDE (IP): Mod: JZ | Performed by: NURSE PRACTITIONER

## 2024-12-29 PROCEDURE — 99232 SBSQ HOSP IP/OBS MODERATE 35: CPT | Performed by: STUDENT IN AN ORGANIZED HEALTH CARE EDUCATION/TRAINING PROGRAM

## 2024-12-29 RX ADMIN — LEVOTHYROXINE SODIUM 88 MCG: 0.09 TABLET ORAL at 04:27

## 2024-12-29 RX ADMIN — TICAGRELOR 90 MG: 90 TABLET ORAL at 17:18

## 2024-12-29 RX ADMIN — ASPIRIN 81 MG: 81 TABLET, CHEWABLE ORAL at 04:27

## 2024-12-29 RX ADMIN — TICAGRELOR 90 MG: 90 TABLET ORAL at 04:27

## 2024-12-29 RX ADMIN — BUSPIRONE HYDROCHLORIDE 7.5 MG: 5 TABLET ORAL at 00:00

## 2024-12-29 RX ADMIN — VENLAFAXINE HYDROCHLORIDE 75 MG: 75 CAPSULE, EXTENDED RELEASE ORAL at 04:27

## 2024-12-29 RX ADMIN — BUSPIRONE HYDROCHLORIDE 7.5 MG: 5 TABLET ORAL at 22:08

## 2024-12-29 RX ADMIN — BUSPIRONE HYDROCHLORIDE 7.5 MG: 5 TABLET ORAL at 15:31

## 2024-12-29 RX ADMIN — BUSPIRONE HYDROCHLORIDE 7.5 MG: 5 TABLET ORAL at 08:11

## 2024-12-29 RX ADMIN — ATORVASTATIN CALCIUM 80 MG: 80 TABLET, FILM COATED ORAL at 17:18

## 2024-12-29 RX ADMIN — SENNOSIDES AND DOCUSATE SODIUM 2 TABLET: 50; 8.6 TABLET ORAL at 17:19

## 2024-12-29 RX ADMIN — HYDROMORPHONE HYDROCHLORIDE 0.5 MG: 1 INJECTION, SOLUTION INTRAMUSCULAR; INTRAVENOUS; SUBCUTANEOUS at 04:41

## 2024-12-29 ASSESSMENT — ENCOUNTER SYMPTOMS
WEAKNESS: 1
HEADACHES: 0
CONSTIPATION: 0
NAUSEA: 0
BACK PAIN: 0
NECK PAIN: 0
NERVOUS/ANXIOUS: 0
BRUISES/BLEEDS EASILY: 0
DIARRHEA: 0
BLOOD IN STOOL: 0
DEPRESSION: 0
ABDOMINAL PAIN: 0
VOMITING: 0
MYALGIAS: 0
SHORTNESS OF BREATH: 0
FOCAL WEAKNESS: 1

## 2024-12-29 ASSESSMENT — PAIN DESCRIPTION - PAIN TYPE
TYPE: ACUTE PAIN

## 2024-12-29 ASSESSMENT — FIBROSIS 4 INDEX: FIB4 SCORE: 5.86

## 2024-12-29 NOTE — DISCHARGE PLANNING
DC Transport Scheduled    Transport Company Scheduled:  [employer]  Spoke with JONAH at [facility] to schedule transport.    Scheduled Date: 12/30/2024  Scheduled Time: 1000    Transport Type: [facility]  Destination: [facility]   Destination address: [address] [place] [id number]    Notified care team of scheduled transport via [employer].     If there are any changes needed to the DC transportation scheduled, please contact [facility] at ext. [phone] between the hours of [phone]. If outside those hours, contact the ED Case Manager at ext. [phone].

## 2024-12-29 NOTE — CARE PLAN
The patient is Stable - Low risk of patient condition declining or worsening    Shift Goals  Clinical Goals: stable neuro status, safety, maintain skin integrity  Patient Goals: get better  Family Goals: chano    Progress made toward(s) clinical / shift goals:  Patient updated on POC. Patient remains A+Ox2-3. Q4hr neuro checks in place. Fall and aspiration precautions in place. Q2hr turns with pillows, TAPS, barrier cream, heel mepilexes, and linen changes in place to maintain skin integrity. Telesitter in place. Bed alarm is on. Bed is in the lowest position and locked. Call light within reach.    Problem: Optimal Care of the Stroke Patient  Goal: Optimal emergency care for the stroke patient  Outcome: Progressing     Problem: Knowledge Deficit - Stroke Education  Goal: Patient's knowledge of stroke and risk factors will improve  Outcome: Progressing     Problem: Psychosocial - Patient Condition  Goal: Patient's ability to verbalize feelings about condition will improve  Outcome: Progressing     Problem: Discharge Planning - Stroke  Goal: Ensure Stroke Core Measures are met prior to discharge  Outcome: Progressing     Problem: Neuro Status  Goal: Neuro status will remain stable or improve  Outcome: Progressing     Problem: Hemodynamic Monitoring  Goal: Patient's hemodynamics, fluid balance and neurologic status will be stable or improve  Outcome: Progressing     Problem: Skin Integrity  Goal: Skin integrity is maintained or improved  Outcome: Progressing     Problem: Fall Risk  Goal: Patient will remain free from falls  Outcome: Progressing       Patient is not progressing towards the following goals: N/A

## 2024-12-29 NOTE — PROGRESS NOTES
4 Eyes Skin Assessment Completed by STONE Jacobo and STONE Tapia.    Head WDL  Ears WDL  Nose WDL  Mouth WDL  Neck Tegaderm and gauze on R side.  Breast/Chest Redness and Blanching  Shoulder Blades Redness and Blanching  Spine Redness and Blanching  (R) Arm/Elbow/Hand Redness, Blanching, and Bruising  (L) Arm/Elbow/Hand Redness, Blanching, and Bruising  Abdomen WDL  Groin Bruising  Scrotum/Coccyx/Buttocks Redness and Blanching  (R) Leg Redness, Blanching, and Bruising  (L) Leg Redness, Blanching, and Bruising  (R) Heel/Foot/Toe Redness and Blanching  (L) Heel/Foot/Toe Redness and Blanching          Devices In Places SCD's      Interventions In Place Heel Mepilex, TAP System, Pillows, Q2 Turns, and Barrier Cream    Possible Skin Injury No    Pictures Uploaded Into Epic N/A  Wound Consult Placed N/A  RN Wound Prevention Protocol Ordered Yes

## 2024-12-29 NOTE — PROGRESS NOTES
"Neuro Interventional Radiology   Progress Note     Author: Silvia Stringer D.N.P. Date & Time created: 12/28/2024  4:56 PM   Date of admission  12/25/2024  Note to reader: this note follows the APSO format rather than the historical SOAP format. Assessment and plan located at the top of the note for ease of use.    Chief Complaint  64 y.o. female admitted 12/25/2024 with   Chief Complaint   Patient presents with    Possible Stroke     Per EMS, pt was sitting with her mother when she slumped over. EMS found pt to have R gaze deviation, R lean, and nonverbal. Per EMS, pt was nonverbal until in aircraft when she began c/o numbness, dizziness, and repeating \"I feel like I'm having a heart attack.\" + asa, unk BT. LKW 1535     HPI  Marisol Brown is a 64-year-old female with PMH significant for HTN, HLD, CAD s/p prior stent with chronic systolic heart failure, hypothyroidism, and tobacco and alcohol use who presented by care flight from home for right-sided weakness after 90 minutes of \"feeling numb all over\" and \"I am having a heart attack.\"  (From Dr. Gonda's note).  12/26/2024 CTA demonstrates \"occlusion of the right internal carotid artery and high-grade stenosis of the proximal left internal carotid artery with vertebral artery stenosis\".  12/26/2024 ANIYAH Arciniega completed a left carotid stent placement with embolic protection device with patient to RICU on Integrilin drip at 1 mcg/kg/min secondary to pharmacy recommendation due to renal function.    Interval History ANIYAH  12/26/2024: Patient has decreased LOC, responds to repeated verbal/physical stimuli, and is verbal only with whispered yes or no, denying headache or vision changes.  She is unable to demonstrate cranial nerve movements or smile.  Right upper extremity flaccid. Left upper extremity antigravity with spastic, uncoordinated movements, arm swinging and clenched fist.  Right lower extremity not antigravity, able to move toes.  Left lower extremity no " "spontaneous movement, reflex movement intact.  Unable to determine sensation.  Per conversation with bedside RN, this is an acute neurochange from exam prior to 8:30 AM.  12/26/2024 noncontrast head CT at 840 AM demonstrates \"no acute intracranial hemorrhage, age indeterminant left parietal infarct not definitely seen on prior\".    - Stat CTA head and neck and CT perfusion ordered   - Discussion of patient's symptoms with Dr. Arciniega, Dr. Mayberry, and Dr. Rivas  -12/26/2024 CTA neck at 1215 demonstrates \"occlusion of the distal left common carotid artery stent, chronic occlusion of the right internal carotid artery, moderate atherosclerotic narrowing of the distal right vertebral artery, and moderate to severe narrowing of the right vertebral artery origin\".  -12/26/2024 CT perfusion at 1215 demonstrates   cerebral blood flow less than 30% = 7 mL  Tmax more than 6 seconds= 209 mL  Mismatch volume= 202  -Patient to ANIYAH with Dr. Arciniega for thrombectomy of left ICA stent thrombosis with TICI 3 recanalization.  -Postprocedure loading with Brilinta 180 mg and aspirin 324 mg  -Integrilin infusion overlap for 2 hours (2 mcg/kg/min due to improved renal function)    I reviewed patient's most recent labs including WBC 10.0, Hgb 12.9, CR 0.78, sodium 138.  Coordinated post ANIYAH procedure care with interventional radiologist, intensivists, vascular neurology, and hospital nursing staff.    12/27/24-low-dose norepinephrine infusing for blood pressure goal.  I reviewed most recent imaging of MRI head which showed acute watershed infarct in bilateral cerebral hemispheres, minimal petechial hemorrhage in the left parietal region.I reviewed today's labs: WBC 7.5; H&H 10.9/32.2, Cr 0.70; Coags INR 0.96, lipid panel -HDL 38-triglycerides 160; No micro. Right Cath groin site dressing with blood noted on dressing however not completely saturated.  No bruising, hematoma or active bleeding noted at site. Groin soft to palp.  " I discussed plan of care with Dr. Arciniega, ICU team, and the patient.  I reviewed IDT notes.     12/28/24: Patient is alert and oriented to self, answering most questions appropriately, with occasional abstract utterance or response.  Supportive daughter and mother at bedside.  Patient is up to bedside chair with two-person assist.  She denies headache or vision changes in spite of right peripheral vision field cut.  Bilateral upper extremities antigravity without consistent fine motor control, some contracture to right hand.  Bilateral lower extremities antigravity without consistent fine motor control, right stronger than left.  Pedal dorsiflexion/plantarflexion weak bilaterally.  I reviewed patient's most recent labs including WBC 6.1, Hgb 10.3, CR 0.44, sodium 142.  Coordinated post IR procedure care with patient, family, neurointerventional radiologist, and hospital nursing staff.    Assessment/Plan     Principal Problem:    Acute CVA (cerebrovascular accident) (HCC)  Active Problems:    Dyslipidemia    Essential hypertension    Coronary artery disease due to lipid rich plaque    Hypothyroidism    Tobacco use    Leukemoid reaction    Gastroesophageal reflux disease without esophagitis    Hyponatremia    Hypokalemia    Lung mass      Plan ANIYAH  - Neurochecks every 4 hours per vascular neurology recommendation  - For any neurochanges, please call Dr. Arciniega  - Goal SBP: 100-140 per vascular neurology recommendation  - Continue Brilinta 90 mg daily and aspirin 81 mg for 90 days starting 12/27/2024  - Follow up appointment in approximately 1 month with OP Neuro IR, our office to call and schedule (Ordered)  - MRI brain without contrast  - PT/OT/SLP  Post-angioseal instructions:   - no lifting greater than 5 lbs for 7 days  -  no baths/ swimming/ soaking in tub for 7 days. Shower OK.  - OK to change dressings/band aid as needed.     -Thank you for allowing Interventional Radiology team to participate in the  patients care, if any additional care or requests are needed in the future please do not hesitate call or place IR order                   Review of Systems  Physical Exam   Review of Systems   Unable to perform ROS: Acuity of condition   Constitutional:  Negative for chills and fever.   HENT:  Negative for hearing loss.    Eyes:  Negative for blurred vision and double vision.   Respiratory:  Negative for cough and shortness of breath.    Cardiovascular:  Negative for chest pain and leg swelling.   Gastrointestinal:  Negative for abdominal pain.   Neurological:  Negative for headaches.   Psychiatric/Behavioral:  Positive for substance abuse (Marijuana and alcohol).       Vitals:    12/28/24 1535   BP: 131/67   Pulse: 79   Resp: 16   Temp: 36.6 °C (97.8 °F)   SpO2: 94%        Physical Exam  Vitals and nursing note reviewed.   Constitutional:       General: She is not in acute distress.     Appearance: She is ill-appearing.   HENT:      Head: Atraumatic.   Cardiovascular:      Rate and Rhythm: Normal rate.   Pulmonary:      Effort: Pulmonary effort is normal. No respiratory distress.   Abdominal:      General: There is no distension.   Musculoskeletal:      Right lower leg: No edema.      Left lower leg: No edema.   Skin:     General: Skin is warm and dry.      Coloration: Skin is pale.   Neurological:      Mental Status: She is alert and oriented to person, place, and time.      Cranial Nerves: No cranial nerve deficit.      Motor: Weakness (Right upper extremity flaccid) present.      Coordination: Coordination abnormal.   Psychiatric:         Mood and Affect: Mood is anxious.         Behavior: Behavior normal. Behavior is cooperative.             Labs    Recent Labs     12/26/24  0410 12/27/24  0406 12/28/24  0307   WBC 10.0 7.5 6.1   RBC 4.08* 3.47* 3.34*   HEMOGLOBIN 12.9 10.9* 10.3*   HEMATOCRIT 38.1 32.2* 30.7*   MCV 93.4 92.8 91.9   MCH 31.6 31.4 30.8   MCHC 33.9 33.9 33.6   RDW 44.8 46.3 45.7   PLATELETCT 264  256 249   MPV 9.8 9.9 10.2     Recent Labs     12/26/24  0410 12/27/24  0406 12/28/24  0307   SODIUM 138 143 142   POTASSIUM 3.8 3.7 3.4*   CHLORIDE 108 113* 113*   CO2 18* 18* 17*   GLUCOSE 123* 125* 86   BUN 11 15 15   CREATININE 0.78 0.70 0.44*   CALCIUM 8.9 8.8 8.4*     Recent Labs     12/25/24  1702 12/26/24  0410 12/27/24  0406 12/28/24  0307   ALBUMIN 3.8  --  3.4 3.3   TBILIRUBIN 0.5  --  0.3 0.6   ALKPHOSPHAT 68  --  55 50   TOTPROTEIN 7.4  --  6.6 6.3   ALTSGPT 14  --  13 40   ASTSGOT 22  --  47* 142*   CREATININE 1.14 0.78 0.70 0.44*     MR-BRAIN-W/O   Final Result      1.  The diffusion-weighted sequences demonstrates acute infarctions in the bilateral cerebral hemispheres in the watershed distribution. The predominant portion of the cerebral hemispheres are free from the infarct. There is there is mild petechial    hemorrhage in the left parietal infarct.   2.  Chronic right proximal internal carotid occlusion. There is reconstitution of right supraclinoid segment from the anterior communicating artery.   3.  Widely patent left internal carotid carotid, anterior and middle cerebral flow voids.   4.  There are areas of chronic infarcts in the bilateral centrum semiovale.      DX-ABDOMEN FOR TUBE PLACEMENT   Final Result      1.  Enteric tube coiled in the fundus of stomach.      CT-CTA NECK WITH & W/O-POST PROCESSING   Final Result      1.  Interval stenting of the distal left common carotid artery into the left internal carotid artery. There is occlusion of the stent with trace flow at the periphery.   2.  Chronic occlusion of the right internal carotid artery from its origin extending to the intracranial portion.   3.  Moderate atherosclerotic narrowing of the distal right vertebral artery.   4.  Moderate to severe narrowing of the right vertebral artery origin.   5.  Right upper lobe mass again noted. Recommend biopsy or PET scan.   6.  Emphysematous and fibrotic changes of the bilateral upper lungs.  "     Dr. Arciniega is aware of these findings.      CT-CTA HEAD WITH & W/O-POST PROCESS   Final Result      1.  Bilateral occlusions of the internal carotid arteries.      2.The cavernous and supraclinoid internal carotid arteries and anterior and middle cerebral arteries fill from the posterior circulation.      3. Moderate atherosclerotic plaquing of the distal right vertebral artery.         CT-CEREBRAL PERFUSION ANALYSIS   Final Result      1. Cerebral blood flow less than 30% possibly representing completed infarct = 7 mL. Based on distribution of this finding, this is possibly artifact.      2. T Max more than 6 seconds possibly representing combination of completed infarct and ischemia = 209 mL. Based on the distribution of this finding, this is possibly artifact.      3. Mismatched volume possibly representing ischemic brain/penumbra= 202 mL      4.  Please note that this cerebral perfusion study and report is Quantitative and targets supratentorial (cerebral) perfusion for evaluation of large vessel territory acute ischemia/infarction. For example, lacunar infarcts, and brainstem/posterior fossa    ischemia/infarction are not evaluated on this study.  Data acquisition is subject to artifacts which can yield non-anatomically plausible perfusion maps which may be due to motion, bolus timing, signal to noise ratio, or other technical factors.    Perfusion map abnormalities which show non-anatomic distributions are likely artifact.   This study is not \"stand-alone\" and should only be utilized for diagnosis, management/treatment in correlation with CT, CTA, and/or MRI and clinical factors.         EC-ECHOCARDIOGRAM COMPLETE W/O CONT   Final Result      CT-HEAD W/O   Final Result      1.  No acute intracranial hemorrhage.   2.  Age-indeterminate left parietal infarct not definitely seen on prior.   3.  Otherwise unchanged findings.               DX-CHEST-FOR LINE PLACEMENT Perform procedure in: Patient's Room " "  Final Result      Peripherally inserted catheter has been placed and the tip projects appropriately over the superior vena cava.      CT-CTA NECK WITH & W/O-POST PROCESSING   Final Result      Stable exam with occlusion of the right internal carotid artery and high-grade stenosis of the proximal left internal carotid artery. Vertebral artery stenoses are also noted along with fibrotic change at the lung apices and a partially cavitary mass at    the right lung apex.      CT-CTA HEAD WITH & W/O-POST PROCESS   Final Result      There has been no significant interval change from the prior study.      CT-CEREBRAL PERFUSION ANALYSIS   Final Result      1. Cerebral blood flow less than 30% possibly representing completed infarct = 0 mL. Based on distribution of this finding, this is unlikely to represent artifact.      2. T Max more than 6 seconds possibly representing combination of completed infarct and ischemia = 4 mL. Based on the distribution of this finding, this is unlikely to represent artifact. This is improved from the prior study.      3. Mismatched volume possibly representing ischemic brain/penumbra= 4 mL. This is improved from the prior study.      4.  Please note that this cerebral perfusion study and report is Quantitative and targets supratentorial (cerebral) perfusion for evaluation of large vessel territory acute ischemia/infarction. For example, lacunar infarcts, and brainstem/posterior fossa    ischemia/infarction are not evaluated on this study.  Data acquisition is subject to artifacts which can yield non-anatomically plausible perfusion maps which may be due to motion, bolus timing, signal to noise ratio, or other technical factors.    Perfusion map abnormalities which show non-anatomic distributions are likely artifact.   This study is not \"stand-alone\" and should only be utilized for diagnosis, management/treatment in correlation with CT, CTA, and/or MRI and clinical factors.         CT-HEAD " W/O   Final Result      1.  No significant change from prior.   2.            DX-CHEST-PORTABLE (1 VIEW)   Final Result      No evidence of acute cardiopulmonary process.      CT-CTA NECK WITH & W/O-POST PROCESSING   Final Result      1.  Severe plaque at the carotid bifurcations and proximal internal carotid arteries.   2.  Occlusion of the right internal carotid artery with reconstitution in the supraclinoid region.   3.  Likely near occlusion of the origin of the left internal carotid artery. There is a hypodense filling defect in the proximal left ICA just above the severe calcified plaque which could represent noncalcified plaque or thrombus extending into the    lumen.   4.  Emphysematous or fibrotic changes in the upper lungs. Irregular masslike opacity in the right upper lobe measuring 2.5 x 2.4 cm could represent lung malignancy or infection. Further evaluation is recommended.   These findings were discussed with CUCO ALLEN on 12/25/2024 5:55 PM.      CT-CTA HEAD WITH & W/O-POST PROCESS   Final Result      1.  Occlusion of the right intracranial internal carotid artery.      2.  Atherosclerotic plaque involving the intracranial left internal carotid artery with multifocal high-grade stenosis.      3.  No evidence of anterior middle cerebral artery occlusion.      4.  High-grade stenosis involving the intracranial right vertebral artery.      5.  Diminutive left vertebral artery.      6.  Report was called to CUCO ALLEN at 5:53 PM on 12/25/2024.            CT-CEREBRAL PERFUSION ANALYSIS   Final Result      1. Cerebral blood flow less than 30% possibly representing completed infarct = 0 mL. Based on distribution of this finding, this is unlikely to represent artifact.      2. T Max more than 6 seconds possibly representing combination of completed infarct and ischemia = 21 mL. Based on the distribution of this finding, this is unlikely to represent artifact.      3. Mismatched volume  "possibly representing ischemic brain/penumbra= 21 mL      4.  Please note that this cerebral perfusion study and report is Quantitative and targets supratentorial (cerebral) perfusion for evaluation of large vessel territory acute ischemia/infarction. For example, lacunar infarcts, and brainstem/posterior fossa    ischemia/infarction are not evaluated on this study.  Data acquisition is subject to artifacts which can yield non-anatomically plausible perfusion maps which may be due to motion, bolus timing, signal to noise ratio, or other technical factors.    Perfusion map abnormalities which show non-anatomic distributions are likely artifact.   This study is not \"stand-alone\" and should only be utilized for diagnosis, management/treatment in correlation with CT, CTA, and/or MRI and clinical factors.         CT-HEAD W/O   Final Result      1.  Chronic microvascular ischemic type changes and probable centrum semiovale lacunar infarcts.   2.  Nonspecific hypodensity in the right occipital region could represent age indeterminate, probably old infarct..   3.  No acute intracranial hemorrhage.   4.  If there is concern for acute ischemia, MRI is recommended               IR-CERV CAROTID STENT WITH PROTECTION    (Results Pending)   IR-CERV CAROTID STENT WITH PROTECTION    (Results Pending)       INR   Date Value Ref Range Status   12/25/2024 0.96 0.87 - 1.13 Final     Comment:     INR - Non-therapeutic Reference Range: 0.87-1.13  INR - Therapeutic Reference Range: 2.0-4.0       No results found for: \"POCINR\"     Intake/Output Summary (Last 24 hours) at 12/26/2024 1839  Last data filed at 12/26/2024 1800  Gross per 24 hour   Intake 1463.13 ml   Output 1650 ml   Net -186.87 ml      Labs not explicitly included in this progress note were reviewed by the author. Radiology/imaging not explicitly included in this progress note was reviewed by the author.     I have performed a physical exam and reviewed and updated ROS and " Plan today (12/28/2024).     41 minutes in directly providing and coordinating care and extensive data review.  No time overlap and excludes procedures.

## 2024-12-29 NOTE — DISCHARGE PLANNING
DPA sent SNF blanket to [place] area per morning discussion.    [id number]  DPA spoke to Faith with [facility]. Confirmed tomorrow at 1000 transport works best. RN CM aware.

## 2024-12-30 VITALS
DIASTOLIC BLOOD PRESSURE: 72 MMHG | RESPIRATION RATE: 16 BRPM | WEIGHT: 179.23 LBS | BODY MASS INDEX: 28.81 KG/M2 | SYSTOLIC BLOOD PRESSURE: 116 MMHG | HEART RATE: 70 BPM | OXYGEN SATURATION: 97 % | TEMPERATURE: 97.9 F | HEIGHT: 66 IN

## 2024-12-30 PROBLEM — E87.6 HYPOKALEMIA: Status: RESOLVED | Noted: 2024-12-28 | Resolved: 2024-12-30

## 2024-12-30 PROBLEM — E87.1 HYPONATREMIA: Status: RESOLVED | Noted: 2024-12-25 | Resolved: 2024-12-30

## 2024-12-30 PROBLEM — I65.23 BILATERAL CAROTID ARTERY STENOSIS: Status: ACTIVE | Noted: 2024-12-30

## 2024-12-30 PROCEDURE — A9270 NON-COVERED ITEM OR SERVICE: HCPCS | Performed by: STUDENT IN AN ORGANIZED HEALTH CARE EDUCATION/TRAINING PROGRAM

## 2024-12-30 PROCEDURE — 99238 HOSP IP/OBS DSCHRG MGMT 30/<: CPT | Performed by: STUDENT IN AN ORGANIZED HEALTH CARE EDUCATION/TRAINING PROGRAM

## 2024-12-30 PROCEDURE — 700111 HCHG RX REV CODE 636 W/ 250 OVERRIDE (IP): Mod: JZ | Performed by: NURSE PRACTITIONER

## 2024-12-30 PROCEDURE — 700102 HCHG RX REV CODE 250 W/ 637 OVERRIDE(OP): Performed by: STUDENT IN AN ORGANIZED HEALTH CARE EDUCATION/TRAINING PROGRAM

## 2024-12-30 RX ORDER — POLYETHYLENE GLYCOL 3350 17 G/17G
17 POWDER, FOR SOLUTION ORAL
Status: SHIPPED
Start: 2024-12-30

## 2024-12-30 RX ORDER — VENLAFAXINE HYDROCHLORIDE 75 MG/1
75 CAPSULE, EXTENDED RELEASE ORAL DAILY
Status: SHIPPED
Start: 2024-12-30

## 2024-12-30 RX ORDER — LEVOTHYROXINE SODIUM 88 UG/1
88 TABLET ORAL
Status: SHIPPED
Start: 2024-12-30

## 2024-12-30 RX ORDER — ACETAMINOPHEN 500 MG
1000 TABLET ORAL EVERY 6 HOURS PRN
Status: SHIPPED
Start: 2024-12-30

## 2024-12-30 RX ORDER — ONDANSETRON 4 MG/1
4 TABLET, ORALLY DISINTEGRATING ORAL EVERY 4 HOURS PRN
Status: SHIPPED
Start: 2024-12-30

## 2024-12-30 RX ORDER — IPRATROPIUM BROMIDE AND ALBUTEROL SULFATE 2.5; .5 MG/3ML; MG/3ML
3 SOLUTION RESPIRATORY (INHALATION) EVERY 4 HOURS PRN
Status: SHIPPED
Start: 2024-12-30

## 2024-12-30 RX ORDER — BUSPIRONE HYDROCHLORIDE 7.5 MG/1
7.5 TABLET ORAL 3 TIMES DAILY
Status: SHIPPED
Start: 2024-12-30

## 2024-12-30 RX ORDER — AMOXICILLIN 250 MG
2 CAPSULE ORAL EVERY EVENING
Status: SHIPPED
Start: 2024-12-30

## 2024-12-30 RX ORDER — ATORVASTATIN CALCIUM 80 MG/1
80 TABLET, FILM COATED ORAL EVERY EVENING
Status: SHIPPED
Start: 2024-12-30

## 2024-12-30 RX ORDER — ASPIRIN 81 MG/1
81 TABLET, CHEWABLE ORAL DAILY
Status: SHIPPED
Start: 2024-12-31

## 2024-12-30 RX ADMIN — ASPIRIN 81 MG: 81 TABLET, CHEWABLE ORAL at 05:09

## 2024-12-30 RX ADMIN — LEVOTHYROXINE SODIUM 88 MCG: 0.09 TABLET ORAL at 05:09

## 2024-12-30 RX ADMIN — BUSPIRONE HYDROCHLORIDE 7.5 MG: 5 TABLET ORAL at 05:09

## 2024-12-30 RX ADMIN — HYDROMORPHONE HYDROCHLORIDE 0.5 MG: 1 INJECTION, SOLUTION INTRAMUSCULAR; INTRAVENOUS; SUBCUTANEOUS at 00:19

## 2024-12-30 RX ADMIN — TICAGRELOR 90 MG: 90 TABLET ORAL at 05:09

## 2024-12-30 RX ADMIN — VENLAFAXINE HYDROCHLORIDE 75 MG: 75 CAPSULE, EXTENDED RELEASE ORAL at 05:09

## 2024-12-30 ASSESSMENT — PAIN DESCRIPTION - PAIN TYPE
TYPE: ACUTE PAIN
TYPE: ACUTE PAIN

## 2024-12-30 NOTE — PROGRESS NOTES
Patient picked up by [facility] for transport to [place]. Report given to Julia at [place] and [facility] transport. IV removed. All personal belongings including dentures and cell phone taken with patient.

## 2024-12-30 NOTE — CARE PLAN
The patient is Stable - Low risk of patient condition declining or worsening    Shift Goals  Clinical Goals: -140 DBP below 105  Patient Goals: rest  Family Goals: chano    Progress made toward(s) clinical / shift goals:    Problem: Neuro Status  Goal: Neuro status will remain stable or improve  Outcome: Progressing  Note: Patient remained A&O x3 throughout this shift, patients right side is weaker than left and neglects right side.      Problem: Nutrition  Goal: Patient's nutritional and fluid intake will be adequate or improve  Outcome: Progressing  Note: Patient ate % of meals this shift, patient was able to feed self and drank fluids.       Patient is not progressing towards the following goals:

## 2024-12-30 NOTE — DISCHARGE INSTRUCTIONS
"Ischemic Stroke  Discharge Instructions    You experienced an Ischemic Stroke.  Ischemic stroke is the most common type of stroke and happens when an artery in the brain becomes blocked by a plaque fragment or blood clot. Typically, these blockages travel from the heart or larger arteries that supply the brain.  The brain needs a constant supply of blood, which carries oxygen and nutrients it needs to function.  A stroke occurs when one of these arteries to the brain is either blocked or bursts. As a result, part of the brain does not get the blood it needs, so it starts to die.     Thrombolytic Treatment for Ischemic Stroke    You received thrombolytic treatment for an Ischemic Stroke. Thrombolytics are medicines that can break up blood clots. These medicines help to treat a stroke when given as soon as possible after stroke symptoms start.  The medicine was given to you through an IV tube.  In some cases, the medicine may go to the affected area through a thin tube (catheter). This tube is usually put in at the top of your leg.    Get help right away if:  You have blood in your vomit, pee, or poop.  You have a serious fall or accident, or you hit your head.  You have symptoms of an allergy to the medicine, such as a rash or trouble breathing.  You have other signs of a stroke, such as:  A sudden, very bad headache with no known cause.  Feeling like you may vomit (nausea).  Vomiting.  A seizure    Stroke Risk Factors    You are at increased risk of having another stroke event.  See your Patient Stroke Guide to help reduce your stroke risk. These are your specific risk factors:  Age - Over 55  High blood pressure  Smoking or Tobacco Use     Get help right away if you have any signs of a stroke.  \"BE FAST\" is an easy way to remember the main warning signs of a stroke:  B - Balance. Dizziness, sudden trouble walking, or loss of balance.  E - Eyes. Trouble seeing or a change in how you see.  F - Face. Sudden weakness " or loss of feeling in the face. The face or eyelid may droop on one side.  A - Arms. Weakness or loss of feeling in an arm. This happens all of a sudden and most often on one side of the body.  S - Speech. Sudden trouble speaking, slurred speech, or trouble understanding what people say.  T - Time. Time to call emergency services. Write down what time symptoms started.

## 2024-12-30 NOTE — DISCHARGE PLANNING
Care Transition Team Final Discharge Disposition    Actual Discharge Information  Discharge Disposition: D/T to SNF with Medicare cert in anticipation of skilled care (03)    Case Management Discharge Planning    Admission Date: 12/25/2024  GMLOS: 1.9  ALOS: 5    6-Clicks ADL Score: 8  6-Clicks Mobility Score: 9  PT and/or OT Eval ordered: Yes  Post-acute Referrals Ordered: Yes  Post-acute Choice Obtained: Yes  Has referral(s) been sent to post-acute provider:  Yes      Anticipated Discharge Dispo: Discharge Disposition: D/T to SNF with Medicare cert in anticipation of skilled care (03)    DME Needed: No    Action(s) Taken: OTHER    Received hand off from RNCM stating that transportation and transfer to [facility] has been arranged.  [facility] packet completed and given to bedside RN.  Pt signed transfer form.  At pt's request, LMSW facilitated pt telephone call to pt's mother to notify of transfer.  Pt's mother Marilyn was given [facility] address and telephone number.  IMM delivered, faxed to [facility] for processing.    Escalations Completed: None    Medically Clear: Yes    Barriers to Discharge: None    Is the patient up for discharge tomorrow: No

## 2024-12-30 NOTE — ASSESSMENT & PLAN NOTE
Chronic  Unclear significance  No apparent renal dysfunction nor medication etiology  Repeat BMP only if clinical change

## 2024-12-30 NOTE — PROGRESS NOTES
Attempted to call report to Sean.  took down my name and phone number and said nurse will give a call back.

## 2024-12-30 NOTE — DISCHARGE SUMMARY
"Discharge Summary    CHIEF COMPLAINT ON ADMISSION  Chief Complaint   Patient presents with    Possible Stroke     Per EMS, pt was sitting with her mother when she slumped over. EMS found pt to have R gaze deviation, R lean, and nonverbal. Per EMS, pt was nonverbal until in aircraft when she began c/o numbness, dizziness, and repeating \"I feel like I'm having a heart attack.\" + asa, unk BT. LKW [id number]       Reason for Admission  Stroke    Admission Date  12/25/2024     Discharge Date  12/30/2024     CODE STATUS  Full Code    HPI & HOSPITAL COURSE  This is a 64 y.o. female with tobacco use DO, CAD s/p ROSS, hypothyroidism, and HTN here with acute bilateral watershed infarcts.    She presented with whole-body paresthesias and Right-sided weakness. NIHSS on presentation was 4-5 invoking Code Stroke. CTH was without apparent infarct but CTA demonstrated Right ICA cclusion and multifocal high-grade Left ICA stenosis. CT perfusion demonstrated Right-sided perfusion deficits prompting administration of TNK. She was admitted to the ICU for post-lytic [id number]. Neurology and Neuro-IR were consulted, for which she underwent Left carotid stent 12/25/24 and was initiated on DAPT. Hospitalization was complicated by in-stent thrombosis requiring Left ICA thrombectomy 12/26/24. She developed hypotension which required pressors, with conversion to midodrine midodrine to maintain perfusion pressures >100. Midodrine was eventually weaned off prior to discharge. She was evaluated by PT/OT.SLP and recommended for post-acute placement. Physiatry was consulted but due to inability to tolerate extended skilled therapy she was not a candidate for Acute Rehabilitation. She was accepted to SNF for post-acute placement.    During hospitalization she had an incidental RUL mass noted on the CTA Neck, for which she is referred to pulmonary nodule clinic for further surveillance or diagnosis due to her tobacco use DO.    Therefore, she is " discharged in fair and stable condition to skilled nursing facility.    The patient met 2-midnight criteria for an inpatient stay at the time of discharge.      FOLLOW UP ITEMS POST DISCHARGE    CVA - stroke bridge clinic, DAPT    Pulmonary nodule - pulmonary nodule clinic    Tobacco use - encourage cessation    DISCHARGE DIAGNOSES  Principal Problem:    Acute CVA (cerebrovascular accident) (HCC) (POA: Yes)  Active Problems:    Coronary artery disease due to lipid rich plaque (POA: Yes)    Lung mass (POA: Yes)    Normal anion gap metabolic acidosis (POA: Yes)    Bilateral carotid artery stenosis (POA: Yes)    Dyslipidemia (POA: Yes)    Essential hypertension (POA: Yes)    Acquired hypothyroidism (POA: Yes)    Tobacco use (POA: Yes)  Resolved Problems:    Chronic systolic heart failure (HCC) (POA: Yes)    Hyponatremia (POA: Yes)    Hypokalemia (POA: Yes)      FOLLOW UP  No future appointments.  [facility]  [address] [facility] [address]  [phone]  Go to  post-acute care    [facility] - PULMONARY MEDICINE  [address]  [facility] [address]  [phone]  Schedule an appointment as soon as possible for a visit in 1 month(s)  lung nodule follow up    [facility] NEUROLOGY  [address]  [facility] [id number] [phone]  Schedule an appointment as soon as possible for a visit in 1 month(s)  after-stroke follow up      MEDICATIONS ON DISCHARGE     Medication List        START taking these medications        Instructions   acetaminophen 500 MG Tabs  Commonly known as: Tylenol   Take 2 Tablets by mouth every 6 hours as needed for Mild Pain.  Dose: 1,000 mg     aspirin 81 MG Chew chewable tablet  Start taking on: December 31, 2024  Commonly known as: Asa   Chew 1 Tablet every day.  Dose: 81 mg     atorvastatin 80 MG tablet  Commonly known as: Lipitor   Take 1 Tablet by mouth every evening.  Dose: 80 mg     ipratropium-albuterol 0.5-2.5 (3) MG/3ML nebulizer  solution  Commonly known as: Duoneb   Take 3 mL by nebulization every four hours as needed for Shortness of Breath.  Dose: 3 mL     ondansetron 4 MG Tbdp  Commonly known as: Zofran ODT   Take 1 Tablet by mouth every four hours as needed for Nausea/Vomiting (give PO if IV route is unavailable.).  Dose: 4 mg     polyethylene glycol/lytes Pack  Commonly known as: Miralax   Take 1 Packet by mouth 1 time a day as needed (if no bowel movement in last 2 days).  Dose: 17 g     senna-docusate 8.6-50 MG Tabs  Commonly known as: Pericolace Or Senokot S   Take 2 Tablets by mouth every evening.  Dose: 2 Tablet     ticagrelor 90 MG Tabs tablet  Commonly known as: Brilinta   Take 1 Tablet by mouth 2 times a day.  Dose: 90 mg            CONTINUE taking these medications        Instructions   busPIRone 7.5 MG tablet  Commonly known as: Buspar   Take 1 Tablet by mouth 3 times a day.  Dose: 7.5 mg     levothyroxine 88 MCG Tabs  Commonly known as: Synthroid   Take 1 Tablet by mouth every morning on an empty stomach.  Dose: 88 mcg     venlafaxine XR 75 MG Cp24  Commonly known as: Effexor XR   Take 1 Capsule by mouth every day.  Dose: 75 mg            STOP taking these medications      Macrobid 100 MG Caps  Generic drug: nitrofurantoin     Varenicline Tartrate (Starter) 0.5 MG X 11 & 1 MG X 42 Tbpk              Allergies  Allergies   Allergen Reactions    Codeine Unspecified     Unknown reaction       DIET  Orders Placed This Encounter   Procedures    Diet: Diet Tube Feed; Formula: Other - Specify formula comments in the field to the right (Vital AF 1.2); Goal Rate (mL/Hour): 60; Duration: 24 HR; Tube Feed Time Unit: Hours/Day     Start at 25mL/hr. Advance per protocol to final goal rate.     Standing Status:   Standing     Number of Occurrences:   1     Order Specific Question:   Diet     Answer:   Diet Tube Feed [35]     Order Specific Question:   Formula:     Answer:   Other - Specify formula comments in the field to the right      Comments:   Vital AF 1.2     Order Specific Question:   Goal Rate (mL/Hour)     Answer:   60     Order Specific Question:   Route     Answer:   Enteral Tube     Order Specific Question:   Duration     Answer:   24 HR     Order Specific Question:   Tube Feed Time Unit     Answer:   Hours/Day    Diet Order Diet: Level 4 - Pureed; Liquid level: Level 0 - Thin; Tray Modifications (optional): SLP - 1:1 Supervision by Nursing     Standing Status:   Standing     Number of Occurrences:   1     Order Specific Question:   Diet:     Answer:   Level 4 - Pureed [25]     Order Specific Question:   Liquid level     Answer:   Level 0 - Thin     Order Specific Question:   Tray Modifications (optional)     Answer:   SLP - 1:1 Supervision by Nursing       ACTIVITY  As tolerated and directed by skilled nursing.  Weight bearing as tolerated    LINES, DRAINS, AND WOUNDS  This is an automated list. Peripheral IVs will be removed prior to discharge.  Peripheral IV 12/28/24 18 G Anterior;Left Upper arm (Active)   Site Assessment Clean;Dry;Intact 12/29/24 2000   Dressing Type Transparent 12/29/24 2000   Line Status Scrubbed the hub prior to access;Flushed;Saline locked 12/29/24 2000   Dressing Status Clean;Dry;Intact 12/29/24 2000   Dressing Intervention N/A 12/29/24 2000   Dressing Change Due 01/04/25 12/29/24 0800   Infiltration Grading ([facility]) 0 12/29/24 0800   Phlebitis Scale ([facility] Only) 0 12/29/24 0800          Peripheral IV 12/28/24 18 G Anterior;Left Upper arm (Active)   Site Assessment Clean;Dry;Intact 12/29/24 2000   Dressing Type Transparent 12/29/24 2000   Line Status Scrubbed the hub prior to access;Flushed;Saline locked 12/29/24 2000   Dressing Status Clean;Dry;Intact 12/29/24 2000   Dressing Intervention N/A 12/29/24 2000   Dressing Change Due 01/04/25 12/29/24 0800   Infiltration Grading ([facility]) 0 12/29/24 0800   Phlebitis Scale ([facility] Only) 0 12/29/24 0800               MENTAL STATUS ON TRANSFER     Alert,  appropriately conversant, moves all extremities        CONSULTATIONS  Critical Care  Neurology  Neuro-Interventional Radiology  [facility]    PROCEDURES  Immediate Post- Operative Note           Findings: Left carotid stenosis        Procedure(s): Left carotid stent placement embolic production device     Patient is on intergrilin drip 1 mcg/kg/min     Will be converted to DAPT tomorrow              Estimated Blood Loss: Less than 5 ml           Complications: None                 12/25/2024     9:18 PM     Josh Arciniega M.D.       Central Line Insertion     Date/Time: 12/25/2024 10:23 PM     Performed by: Anju Banks  Authorized by: Anju Banks    Consent:     Consent obtained:  Verbal and emergent situation    Consent given by:  Patient    Risks discussed:  Arterial puncture, incorrect placement, nerve damage, pneumothorax, infection and bleeding    Alternatives discussed:  No treatment and delayed treatment  Universal protocol:     Procedure explained and questions answered to patient or proxy's satisfaction: yes      Relevant documents present and verified: yes      Test results available and properly labeled: yes      Imaging studies available: yes      Required blood products, implants, devices, and special equipment available: yes      Site/side marked: yes      Immediately prior to procedure, a time out was called: yes      Patient identity confirmed:  Verbally with patient, hospital-assigned identification number and arm band  Pre-procedure details:     Hand hygiene: Hand hygiene performed prior to insertion      Sterile barrier technique: All elements of maximal sterile technique followed      Skin preparation:  ChloraPrep    Skin preparation agent: Skin preparation agent completely dried prior to procedure    Sedation:     Sedation type: No sedation, pre-med with dilaudid for pain.  Anesthesia:     Anesthesia method:  Local infiltration    Local anesthetic:  Lidocaine 1% w/o  epi  Procedure details:     Location:  R internal jugular    Procedural supplies:  Triple lumen    Catheter size:  7 Fr    Landmarks identified: yes      Ultrasound guidance: yes      Sterile ultrasound techniques: Sterile gel and sterile probe covers were used      Number of attempts:  1    Successful placement: yes    Post-procedure details:     Post-procedure:  Dressing applied and line sutured    Guidewire: guidewire removal confirmed      Assessment:  Blood return through all ports, no pneumothorax on x-ray, free fluid flow and placement verified by x-ray    Patient tolerance of procedure:  Tolerated well, no immediate complications  Comments:      Patient is on vasopressors & integrelin s/p ICA stent placement - her integrelin infiltrated & needs rebolused stat - poor PIV access. RIJ CVC inserted, wire accounted for, the line is ok to use.     Immediate Post- Operative Note           Findings: Left ICA stent thrombosis        Procedure(s): left ICA thrombectomy      TICI 3        Estimated Blood Loss: Less than 5 ml           Complications: None                 12/26/2024     1:52 PM     Josh Arciniega M.D.    LABORATORY  Lab Results   Component Value Date    SODIUM 142 12/28/2024    POTASSIUM 3.4 (L) 12/28/2024    CHLORIDE 113 (H) 12/28/2024    CO2 17 (L) 12/28/2024    GLUCOSE 86 12/28/2024    BUN 15 12/28/2024    CREATININE 0.44 (L) 12/28/2024        Lab Results   Component Value Date    WBC 6.1 12/28/2024    HEMOGLOBIN 10.3 (L) 12/28/2024    HEMATOCRIT 30.7 (L) 12/28/2024    PLATELETCT 249 12/28/2024        Total time of the discharge process exceeds 19 minutes.

## 2024-12-30 NOTE — DISCHARGE PLANNING
LUCY received a call from Faith Estrada.   Informed that pt is missing d/c summary and PASSAR.  RN MCKAY Cormier aware.

## 2024-12-30 NOTE — CARE PLAN
The patient is Watcher - Medium risk of patient condition declining or worsening    Shift Goals  Clinical Goals: -140,DBP<105  Patient Goals: rest,sleep  Family Goals: chano    Progress made toward(s) clinical / shift goals:  stable neuro status & VS  Problem: Optimal Care of the Stroke Patient  Goal: Optimal emergency care for the stroke patient  Outcome: Progressing     Problem: Knowledge Deficit - Stroke Education  Goal: Patient's knowledge of stroke and risk factors will improve  Outcome: Progressing     Problem: Neuro Status  Goal: Neuro status will remain stable or improve  Outcome: Progressing     Problem: Hemodynamic Monitoring  Goal: Patient's hemodynamics, fluid balance and neurologic status will be stable or improve  Outcome: Progressing       Patient is not progressing towards the following goals:

## 2024-12-30 NOTE — PROGRESS NOTES
Hospital Medicine Daily Progress Note    Date of Service  12/29/2024    Chief Complaint  Marisol Brown is a 64 y.o. female with tobacco use, CAD s/p ROSS, and HTN admitted 12/25/2024 with Right-sided weakness.    Hospital Course  No notes on file    Interval Problem Update    KORY ON  She has ongoing RUE and RLE weakness.  She does not think she'll have the support for outpatient HH.  Agreeable to SNF, accepted to [facility] tomorrow.  Denies pain, dyspnea, N/V, F/C, bleeding, bowel/bladder dysfunction.    CBC reviewed, Hgb stable 10.9.  Cmp reviewed, NAGMA 17/12, K 3.4.  Mg normal.  P normal.    I have discussed this patient's plan of care and discharge plan at IDT rounds today with Case Management, Nursing, Nursing leadership, and other members of the IDT team.    Consultants/Specialty  critical care, neurology, and physiatry    Code Status  Full Code    Disposition  The patient is medically cleared for discharge to home or a post-acute facility.  Anticipate discharge to: skilled nursing facility    I have placed the appropriate orders for post-discharge needs.    Review of Systems  Review of Systems   Constitutional:  Negative for malaise/fatigue.   Respiratory:  Negative for shortness of breath.    Cardiovascular:  Negative for chest pain.   Gastrointestinal:  Negative for abdominal pain, blood in stool, constipation, diarrhea, melena, nausea and vomiting.   Genitourinary:  Negative for dysuria, frequency and urgency.   Musculoskeletal:  Negative for back pain, joint pain, myalgias and neck pain.   Neurological:  Positive for focal weakness and weakness. Negative for headaches.   Endo/Heme/Allergies:  Does not bruise/bleed easily.   Psychiatric/Behavioral:  Negative for depression. The patient is not nervous/anxious.         Physical Exam  Temp:  [36.5 °C (97.7 °F)-36.6 °C (97.9 °F)] 36.6 °C (97.9 °F)  Pulse:  [63-78] 69  Resp:  [16-18] 18  BP: (102-144)/(65-80) 144/76  SpO2:  [95 %-98 %] 95 %    Physical  Exam  Vitals and nursing note reviewed.   Constitutional:       General: She is not in acute distress.     Appearance: She is ill-appearing (Chronically). She is not toxic-appearing or diaphoretic.   HENT:      Head: Normocephalic.      Nose: Nose normal.      Mouth/Throat:      Mouth: Mucous membranes are moist.   Eyes:      General: No scleral icterus.     Conjunctiva/sclera: Conjunctivae normal.   Cardiovascular:      Rate and Rhythm: Normal rate and regular rhythm.      Pulses: Normal pulses.      Heart sounds: Normal heart sounds. No murmur heard.     No friction rub. No gallop.   Pulmonary:      Effort: Pulmonary effort is normal. No respiratory distress.      Breath sounds: Normal breath sounds. No wheezing, rhonchi or rales.   Abdominal:      General: Abdomen is flat. Bowel sounds are normal. There is no distension.      Palpations: Abdomen is soft.      Tenderness: There is no abdominal tenderness. There is no guarding or rebound.   Genitourinary:     Comments: No yancey  Musculoskeletal:      Cervical back: Neck supple.      Right lower leg: No edema.      Left lower leg: No edema.   Skin:     General: Skin is warm and dry.   Neurological:      Mental Status: She is alert.      Motor: Weakness (0/5 RUE, 1/5 RLE, 5/5 LUE, 5/5 LLE) present.      Comments: Appropriately conversant   Psychiatric:         Mood and Affect: Mood normal.         Behavior: Behavior normal.         Thought Content: Thought content normal.         Judgment: Judgment normal.         Fluids    Intake/Output Summary (Last 24 hours) at 12/29/2024 1656  Last data filed at 12/29/2024 1000  Gross per 24 hour   Intake 100 ml   Output --   Net 100 ml        Laboratory  Recent Labs     12/27/24  0406 12/28/24  0307   WBC 7.5 6.1   RBC 3.47* 3.34*   HEMOGLOBIN 10.9* 10.3*   HEMATOCRIT 32.2* 30.7*   MCV 92.8 91.9   MCH 31.4 30.8   MCHC 33.9 33.6   RDW 46.3 45.7   PLATELETCT 256 249   MPV 9.9 10.2     Recent Labs     12/27/24  0406 12/28/24  0307    SODIUM 143 142   POTASSIUM 3.7 3.4*   CHLORIDE 113* 113*   CO2 18* 17*   GLUCOSE 125* 86   BUN 15 15   CREATININE 0.70 0.44*   CALCIUM 8.8 8.4*                   Imaging  MR-BRAIN-W/O   Final Result      1.  The diffusion-weighted sequences demonstrates acute infarctions in the bilateral cerebral hemispheres in the watershed distribution. The predominant portion of the cerebral hemispheres are free from the infarct. There is there is mild petechial    hemorrhage in the left parietal infarct.   2.  Chronic right proximal internal carotid occlusion. There is reconstitution of right supraclinoid segment from the anterior communicating artery.   3.  Widely patent left internal carotid carotid, anterior and middle cerebral flow voids.   4.  There are areas of chronic infarcts in the bilateral centrum semiovale.      DX-ABDOMEN FOR TUBE PLACEMENT   Final Result      1.  Enteric tube coiled in the fundus of stomach.      IR-CERV CAROTID STENT WITH PROTECTION   Final Result      1.  Occluded left carotid stent.   2.  Successful mechanical thrombectomy and angioplasty.   3.  TICI3      CT-CTA NECK WITH & W/O-POST PROCESSING   Final Result      1.  Interval stenting of the distal left common carotid artery into the left internal carotid artery. There is occlusion of the stent with trace flow at the periphery.   2.  Chronic occlusion of the right internal carotid artery from its origin extending to the intracranial portion.   3.  Moderate atherosclerotic narrowing of the distal right vertebral artery.   4.  Moderate to severe narrowing of the right vertebral artery origin.   5.  Right upper lobe mass again noted. Recommend biopsy or PET scan.   6.  Emphysematous and fibrotic changes of the bilateral upper lungs.      Dr. Arciniega is aware of these findings.      CT-CTA HEAD WITH & W/O-POST PROCESS   Final Result      1.  Bilateral occlusions of the internal carotid arteries.      2.The cavernous and supraclinoid internal  "carotid arteries and anterior and middle cerebral arteries fill from the posterior circulation.      3. Moderate atherosclerotic plaquing of the distal right vertebral artery.         CT-CEREBRAL PERFUSION ANALYSIS   Final Result      1. Cerebral blood flow less than 30% possibly representing completed infarct = 7 mL. Based on distribution of this finding, this is possibly artifact.      2. T Max more than 6 seconds possibly representing combination of completed infarct and ischemia = 209 mL. Based on the distribution of this finding, this is possibly artifact.      3. Mismatched volume possibly representing ischemic brain/penumbra= 202 mL      4.  Please note that this cerebral perfusion study and report is Quantitative and targets supratentorial (cerebral) perfusion for evaluation of large vessel territory acute ischemia/infarction. For example, lacunar infarcts, and brainstem/posterior fossa    ischemia/infarction are not evaluated on this study.  Data acquisition is subject to artifacts which can yield non-anatomically plausible perfusion maps which may be due to motion, bolus timing, signal to noise ratio, or other technical factors.    Perfusion map abnormalities which show non-anatomic distributions are likely artifact.   This study is not \"stand-alone\" and should only be utilized for diagnosis, management/treatment in correlation with CT, CTA, and/or MRI and clinical factors.         EC-ECHOCARDIOGRAM COMPLETE W/O CONT   Final Result      CT-HEAD W/O   Final Result      1.  No acute intracranial hemorrhage.   2.  Age-indeterminate left parietal infarct not definitely seen on prior.   3.  Otherwise unchanged findings.               DX-CHEST-FOR LINE PLACEMENT Perform procedure in: Patient's Room   Final Result      Peripherally inserted catheter has been placed and the tip projects appropriately over the superior vena cava.      IR-CERV CAROTID STENT WITH PROTECTION   Final Result      1.  Severe symptomatic " "left carotid stenosis which is supplying the both cerebral hemisphere.   2.  Successful left carotid stent placement with embolic protection device.      CT-CTA NECK WITH & W/O-POST PROCESSING   Final Result      Stable exam with occlusion of the right internal carotid artery and high-grade stenosis of the proximal left internal carotid artery. Vertebral artery stenoses are also noted along with fibrotic change at the lung apices and a partially cavitary mass at    the right lung apex.      CT-CTA HEAD WITH & W/O-POST PROCESS   Final Result      There has been no significant interval change from the prior study.      CT-CEREBRAL PERFUSION ANALYSIS   Final Result      1. Cerebral blood flow less than 30% possibly representing completed infarct = 0 mL. Based on distribution of this finding, this is unlikely to represent artifact.      2. T Max more than 6 seconds possibly representing combination of completed infarct and ischemia = 4 mL. Based on the distribution of this finding, this is unlikely to represent artifact. This is improved from the prior study.      3. Mismatched volume possibly representing ischemic brain/penumbra= 4 mL. This is improved from the prior study.      4.  Please note that this cerebral perfusion study and report is Quantitative and targets supratentorial (cerebral) perfusion for evaluation of large vessel territory acute ischemia/infarction. For example, lacunar infarcts, and brainstem/posterior fossa    ischemia/infarction are not evaluated on this study.  Data acquisition is subject to artifacts which can yield non-anatomically plausible perfusion maps which may be due to motion, bolus timing, signal to noise ratio, or other technical factors.    Perfusion map abnormalities which show non-anatomic distributions are likely artifact.   This study is not \"stand-alone\" and should only be utilized for diagnosis, management/treatment in correlation with CT, CTA, and/or MRI and clinical factors. "         CT-HEAD W/O   Final Result      1.  No significant change from prior.   2.            DX-CHEST-PORTABLE (1 VIEW)   Final Result      No evidence of acute cardiopulmonary process.      CT-CTA NECK WITH & W/O-POST PROCESSING   Final Result      1.  Severe plaque at the carotid bifurcations and proximal internal carotid arteries.   2.  Occlusion of the right internal carotid artery with reconstitution in the supraclinoid region.   3.  Likely near occlusion of the origin of the left internal carotid artery. There is a hypodense filling defect in the proximal left ICA just above the severe calcified plaque which could represent noncalcified plaque or thrombus extending into the    lumen.   4.  Emphysematous or fibrotic changes in the upper lungs. Irregular masslike opacity in the right upper lobe measuring 2.5 x 2.4 cm could represent lung malignancy or infection. Further evaluation is recommended.   These findings were discussed with CUCO ALLEN on 12/25/2024 5:55 PM.      CT-CTA HEAD WITH & W/O-POST PROCESS   Final Result      1.  Occlusion of the right intracranial internal carotid artery.      2.  Atherosclerotic plaque involving the intracranial left internal carotid artery with multifocal high-grade stenosis.      3.  No evidence of anterior middle cerebral artery occlusion.      4.  High-grade stenosis involving the intracranial right vertebral artery.      5.  Diminutive left vertebral artery.      6.  Report was called to CUCO ALLEN at 5:53 PM on 12/25/2024.            CT-CEREBRAL PERFUSION ANALYSIS   Final Result      1. Cerebral blood flow less than 30% possibly representing completed infarct = 0 mL. Based on distribution of this finding, this is unlikely to represent artifact.      2. T Max more than 6 seconds possibly representing combination of completed infarct and ischemia = 21 mL. Based on the distribution of this finding, this is unlikely to represent artifact.      3.  "Mismatched volume possibly representing ischemic brain/penumbra= 21 mL      4.  Please note that this cerebral perfusion study and report is Quantitative and targets supratentorial (cerebral) perfusion for evaluation of large vessel territory acute ischemia/infarction. For example, lacunar infarcts, and brainstem/posterior fossa    ischemia/infarction are not evaluated on this study.  Data acquisition is subject to artifacts which can yield non-anatomically plausible perfusion maps which may be due to motion, bolus timing, signal to noise ratio, or other technical factors.    Perfusion map abnormalities which show non-anatomic distributions are likely artifact.   This study is not \"stand-alone\" and should only be utilized for diagnosis, management/treatment in correlation with CT, CTA, and/or MRI and clinical factors.         CT-HEAD W/O   Final Result      1.  Chronic microvascular ischemic type changes and probable centrum semiovale lacunar infarcts.   2.  Nonspecific hypodensity in the right occipital region could represent age indeterminate, probably old infarct..   3.  No acute intracranial hemorrhage.   4.  If there is concern for acute ischemia, MRI is recommended                    Assessment/Plan  * Acute CVA (cerebrovascular accident) (HCC)- (present on admission)  Assessment & Plan  Acute bilateral cerebral hemispheres secondary to bilateral carotid stenosis status post stent to the left carotid.    Dual antiplatelet therapy  High intensity statin  Physical therapy, Occupational Therapy, speech therapy  Rehab consulted, not a candidate for IPR  PT/OT/SLP  Tobacco cessation  Blood pressure support with midodrine status post pressors    Normal anion gap metabolic acidosis- (present on admission)  Assessment & Plan  Chronic  Unclear significance  No apparent renal dysfunction nor medication etiology  Repeat BMP only if clinical change    Lung mass- (present on admission)  Assessment & Plan  CT of the neck " revealed a right upper lobe lung mass  That is concerning in the setting of lung standing tobacco use  Outpatient PET scan is appropriate  Outpatient pulmonary nodule clinic orders placed    Hypokalemia- (present on admission)  Assessment & Plan  Replacement given    Hyponatremia- (present on admission)  Assessment & Plan  resolved    Coronary artery disease due to lipid rich plaque- (present on admission)  Assessment & Plan  History of 2 coronary artery stents in 2019  Echocardiogram reveals a normal ejection fraction of 71%    Tobacco use- (present on admission)  Assessment & Plan  Long history of tobacco dependence  Cessation encouraged at length    Acquired hypothyroidism- (present on admission)  Assessment & Plan  Continue synthroid  TSH 4.9    Essential hypertension- (present on admission)  Assessment & Plan  History of  Due to low blood pressure in the setting of carotid artery occlusion she has required IV pressors now on midodrine  Goal systolic blood pressure 100 140 with goal of 120  Continue midodrine for now which likely will be tapered off when BP increasing beyond goal    Dyslipidemia- (present on admission)  Assessment & Plan  High-intensity statin       VTE prophylaxis:   SCDs/TEDs   pharmacologic prophylaxis contraindicated due to DAPT    I have performed a physical exam and reviewed and updated ROS and Plan today (12/29/2024). In review of yesterday's note (12/28/2024), there are no changes except as documented above.

## 2025-01-22 NOTE — PROGRESS NOTES
"  Neuro Interventional Service Consultation     Telephone Appointment Visit   This telephone visit was initiated by the provider and the patient / patient's mother Marilyn verbally consented.    Reason for Call:  Hospital Follow-up    HPI:    Marisol Brown is a 64 y.o. female seen in clinic for evaluation after endovascular neurointervention performed at [facility] on 12/25/24.     Neurointerventional radiologist: Josh Arciniega MD  PCP: Teo Reyes MD (no fax, no electronic records, requires printing and mailing of notes)  Urologist: Hari Correa MD    History of Present Illness:  Transition of Care 1/23/25:  presented to the ED on 12/25/24  with generalized numbness, right sided gaze, aphasia, and left arm weakness. She was taking aspirin at the time of her event.  has a history of HTN, HLD, tobacco use, CAD with stent placement. Imaging revealed a chronically occluded right ICA and an acutely occluded left carotid artery and the patient was referred for emergent thrombectomy. Dr. Arciniega performed thrombectomy of the left ICA and placed a stent for severe stenosis. The following day she developed symptom return and imaging demonstrated stent occlusion. She underwent thrombectomy of the occluded stent and was placed on Brilinta and aspirin. The left carotid supplies both hemispheres and she had completed scattered strokes in both right and left sides. She was discharged from [facility] to [facility] on 12/30/24 with prescriptions for Brilinta and aspirin.    She is seen today for evaluation after the procedure. Today, the patient is still in rehab. She is right handed and has significant right side weakness. She has improved in that she can spread her fingers and can stand with assistance but still requires a high level of care. She denies dysarthria and vision changes. Her mother notes that she is usually oriented and appropriate but sometimes becomes confused, stating \"I need to go outside " "and take care of that,\" which has been present since her stroke. She specifically denies return of presenting symptoms. The procedure access site had healed without swelling, significant pain, and discharge; there are no new distal extremity complaints of claudication and temperature change although the patient states she has no feeling in the right leg. She has some contracture of her right foot and has a brace. She reports bilateral foot numbness and peripheral edema Rt > Lt. The patient endorses compliance with medications of Brilinta and aspirin and endorses adequate supply to complete the recommended course of 3 months for the stent implant. Marilyn is unsure of the medications she is being given at the facility. She is not bruising easily and denies side effects of unprovoked hemorrhagic events. She lives in [place] and is in a rehab facility. She is accompanied by a support person Marilyn (mother) to today's consultation.    Anticoagulation/ antiplatelet therapy: aspirin monotherapy at time of stroke; DAPT with Brilinta and aspirin post stent implant  Hyperlipidemia: yes  Hypertension: yes  Smoking: former  Diabetes Mellitus: no    Past Medical History:   Diagnosis Date    Depression     Essential hypertension     Hypercholesteremia     Hypothyroidism     Mixed hyperlipidemia      Past Surgical History:   Procedure Laterality Date    [place] CYSTOSCOPY,INSERT URETERAL STENT Left 10/17/2024    Procedure: CYSTOSCOPY, WITH URETERAL STENT INSERTION WITH  URETERAL BIOPSY AND STENT PLACEMENT, and TURVT;  Surgeon: Hari Correa M.D.;  Location: SURGERY [facility];  Service: Urology    [place] UPPER GI ENDOSCOPY,DIAGNOSIS N/A 8/11/2021    Procedure: GASTROSCOPY;  Surgeon: Curtis Nunez M.D.;  Location: [facility];  Service: Gastroenterology    [place] COLONOSCOPY,DIAGNOSTIC N/A 8/11/2021    Procedure: COLONOSCOPY;  Surgeon: Curtis Nunez M.D.;  Location: [facility];  Service: Gastroenterology    [facility]" TUNNEL RELEASE      Right    DENTAL EXTRACTION(S)      TUBAL LIGATION       Social History     Socioeconomic History    Marital status:      Spouse name: Not on file    Number of children: Not on file    Years of education: Not on file    Highest education level: Not on file   Occupational History    Not on file   Tobacco Use    Smoking status: Every Day     Current packs/day: 1.00     Average packs/day: 1 pack/day for 51.1 years (51.1 ttl pk-yrs)     Types: Cigarettes     Start date: 1974    Smokeless tobacco: Never   Vaping Use    Vaping status: Never Used   Substance and Sexual Activity    Alcohol use: Yes     Alcohol/week: 7.2 oz     Types: 12 Cans of beer per week    Drug use: Yes     Types: Inhaled    Sexual activity: Not on file   Other Topics Concern    Not on file   Social History Narrative    Not on file     Social Drivers of Health     Financial Resource Strain: Not on file   Food Insecurity: No Food Insecurity (10/17/2024)    Hunger Vital Sign     Worried About Running Out of Food in the Last Year: Never true     Ran Out of Food in the Last Year: Never true   Transportation Needs: No Transportation Needs (10/17/2024)    PRAPARE - Transportation     Lack of Transportation (Medical): No     Lack of Transportation (Non-Medical): No   Physical Activity: Not on file   Stress: Not on file   Social Connections: Not on file   Intimate Partner Violence: Not At Risk (10/17/2024)    Humiliation, Afraid, Rape, and Kick questionnaire     Fear of Current or Ex-Partner: No     Emotionally Abused: No     Physically Abused: No     Sexually Abused: No   Housing Stability: Low Risk  (10/17/2024)    Housing Stability Vital Sign     Unable to Pay for Housing in the Last Year: No     Number of Times Moved in the Last Year: 1     Homeless in the Last Year: No     Family History   Problem Relation Age of Onset    Heart Disease Mother     Dementia Father     Hyperlipidemia Sister     Hyperlipidemia Brother         ROS    A comprehensive 14-point review of systems was negative except as described above.     Labs:    Latest Reference Range & Units Most Recent   WBC 4.8 - 10.8 K/uL 6.1  12/28/24 03:07   RBC 4.20 - 5.40 M/uL 3.34 (L)  12/28/24 03:07   Hemoglobin 12.0 - 16.0 g/dL 10.3 (L)  12/28/24 03:07   Hematocrit 37.0 - 47.0 % 30.7 (L)  12/28/24 03:07   MCV 81.4 - 97.8 fL 91.9  12/28/24 03:07   MCH 27.0 - 33.0 pg 30.8  12/28/24 03:07   MCHC 32.2 - 35.5 g/dL 33.6  12/28/24 03:07   RDW 35.9 - 50.0 fL 45.7  12/28/24 03:07   Platelet Count 164 - 446 K/uL 249  12/28/24 03:07   MPV 9.0 - 12.9 fL 10.2  12/28/24 03:07   Neutrophils-Polys 44.00 - 72.00 % 69.00  12/26/24 04:10   Neutrophils (Absolute) 1.82 - 7.42 K/uL 6.92  12/26/24 04:10   Lymphocytes 22.00 - 41.00 % 16.50 (L)  12/26/24 04:10   Lymphs (Absolute) 1.00 - 4.80 K/uL 1.66  12/26/24 04:10   Monocytes 0.00 - 13.40 % 13.00  12/26/24 04:10   Monos (Absolute) 0.00 - 0.85 K/uL 1.31 (H)  12/26/24 04:10   Eosinophils 0.00 - 6.90 % 0.30  12/26/24 04:10   Eos (Absolute) 0.00 - 0.51 K/uL 0.03  12/26/24 04:10   Basophils 0.00 - 1.80 % 0.60  12/26/24 04:10   Baso (Absolute) 0.00 - 0.12 K/uL 0.06  12/26/24 04:10   Immature Granulocytes 0.00 - 0.90 % 0.60  12/26/24 04:10   Immature Granulocytes (abs) 0.00 - 0.11 K/uL 0.06  12/26/24 04:10   Nucleated RBC 0.00 - 0.20 /100 WBC 0.00  12/26/24 04:10   NRBC (Absolute) K/uL 0.00  12/26/24 04:10   Plt Estimation  Normal  4/11/23 00:12   RBC Morphology  Present  4/11/23 00:12   Hypochromia  1+  4/11/23 00:12   Anisocytosis  1+  4/11/23 00:12   Macrocytosis  1+  4/11/23 00:12   Microcytosis  1+  4/11/23 00:12   Polychromia  1+  4/11/23 00:12   Poikilocytosis  1+  4/11/23 00:12   Ovalocytes  1+  4/11/23 00:12   Comments-Diff  see below  4/11/23 00:12   Peripheral Smear Review  see below  4/11/23 00:12   Reticulocyte Count 0.8 - 2.1 % 1.8  4/11/23 13:03   Retic, Absolute 0.04 - 0.06 M/uL 0.09 (H)  4/11/23 13:03   Imm. Reticulocyte Fraction 9.3  - 17.4 % 30.6 (H)  4/11/23 13:03   Retic Hgb Equivalent 29.0 - 35.0 pg/cell 24.6 (L)  4/11/23 13:03   Sodium 135 - 145 mmol/L 142  12/28/24 03:07   Potassium 3.6 - 5.5 mmol/L 3.4 (L)  12/28/24 03:07   Chloride 96 - 112 mmol/L 113 (H)  12/28/24 03:07   Co2 20 - 33 mmol/L 17 (L)  12/28/24 03:07   Anion Gap 7.0 - 16.0  12.0  12/28/24 03:07   Glucose 65 - 99 mg/dL 86  12/28/24 03:07   Bun 8 - 22 mg/dL 15  12/28/24 03:07   Creatinine 0.50 - 1.40 mg/dL 0.44 (L)  12/28/24 03:07   GFR (CKD-EPI) >60 mL/min/1.73 m 2 107  12/28/24 03:07   Calcium 8.5 - 10.5 mg/dL 8.4 (L)  12/28/24 03:07   Correct Calcium 8.5 - 10.5 mg/dL 9.0  12/28/24 03:07   AST(SGOT) 12 - 45 U/L 142 (H)  12/28/24 03:07   ALT(SGPT) 2 - 50 U/L 40  12/28/24 03:07   Alkaline Phosphatase 30 - 99 U/L 50  12/28/24 03:07   Total Bilirubin 0.1 - 1.5 mg/dL 0.6  12/28/24 03:07   Albumin 3.2 - 4.9 g/dL 3.3  12/28/24 03:07   Total Protein 6.0 - 8.2 g/dL 6.3  12/28/24 03:07   Globulin 1.9 - 3.5 g/dL 3.0  12/28/24 03:07   A-G Ratio g/dL 1.1  12/28/24 03:07   Phosphorus 2.5 - 4.5 mg/dL 3.2  12/28/24 03:07   Magnesium 1.5 - 2.5 mg/dL 2.3  12/28/24 03:07   Pre-Albumin 18.0 - 38.0 mg/dL 10.4 (L)  12/27/24 04:06   Lactic Acid 0.5 - 2.0 mmol/L 1.4  4/11/23 13:03   Iron 40 - 170 ug/dL 20 (L)  4/11/23 00:12   Total Iron Binding 250 - 450 ug/dL 354  4/11/23 00:12   % Saturation 15 - 55 % 6 (L)  4/11/23 00:12   Unsat Iron Binding 110 - 370 ug/dL 334  4/11/23 00:12   Glycohemoglobin 4.0 - 5.6 % 5.4  12/26/24 04:10   Estim. Avg Glu mg/dL 108  12/26/24 04:10   Cholesterol,Tot 100 - 199 mg/dL 174  12/26/24 04:10   Triglycerides 0 - 149 mg/dL 160 (H)  12/26/24 04:10   HDL >=40 mg/dL 38 !  12/26/24 04:10   LDL <100 mg/dL 104 (H)  12/26/24 04:10   Amphetamines Urine Negative  Negative  12/25/24 20:58   Barbiturates Negative  Negative  12/25/24 20:58   Benzodiazepines Negative  Negative  12/25/24 20:58   Cannabinoid Metab Negative  Positive !  12/25/24 20:58   Cocaine Metabolite Negative   "Negative  12/25/24 20:58   Methadone Negative  Negative  12/25/24 20:58   Opiates Negative  Negative  12/25/24 20:58   Oxycodone Negative  Negative  12/25/24 20:58   Phencyclidine -Pcp Negative  Negative  12/25/24 20:58   Propoxyphene Negative  Negative  12/25/24 20:58   Troponin T 6 - 19 ng/L 10  12/25/24 17:02   Urobilinogen, Urine <=1.0 EU/dL 0.2  12/25/24 20:58   POC Glucose, Blood 65 - 99 mg/dL 115 (H)  12/26/24 05:27   INR 0.87 - 1.13  0.96  12/25/24 17:02   PT 12.0 - 14.6 sec 12.8  12/25/24 17:02   APTT 24.7 - 36.0 sec 29.6  12/25/24 17:02   Color  Yellow  12/25/24 20:58   Character  Clear  12/25/24 20:58   Specific Gravity <1.035  1.021  12/25/24 20:58   Ph 5.0 - 8.0  7.0  12/25/24 20:58   Glucose Negative mg/dL Negative  12/25/24 20:58   Ketones Negative mg/dL Negative  12/25/24 20:58   Bilirubin Negative  Negative  12/25/24 20:58   Occult Blood Negative  Moderate !  12/25/24 20:58   Protein Negative mg/dL Negative  12/25/24 20:58   Nitrite Negative  Negative  12/25/24 20:58   Leukocyte Esterase Negative  Moderate !  12/25/24 20:58   Micro Urine Req  Microscopic  12/25/24 20:58   WBC /hpf 3-5  12/25/24 20:58   RBC 0 - 2 /hpf 0-2  12/25/24 20:58   Epithelial Cells 0 - 5 /hpf 0-2  12/25/24 20:58   Bacteria None /hpf Few !  12/25/24 20:58   Hyaline Cast /lpf Present  12/25/24 20:58   Urine Casts 0 - 2 /lpf 0-2  12/25/24 20:58   Ferritin 10.0 - 291.0 ng/mL 30.6  4/11/23 13:03   TSH 0.380 - 5.330 uIU/mL 4.970  12/26/24 04:10   Stat C-Reactive Protein 0.00 - 0.75 mg/dL 9.71 (H)  12/27/24 04:06   (L): Data is abnormally low  (H): Data is abnormally high  !: Data is abnormal    Pathology:  CONSULTANTS' DIAGNOSIS:     \"Review of [id number] from [facility], [place], [place];   10/17/2024   Urinary bladder tumor, transurethral resection            * Consistent with polypoid cystitis\"     Radiology:   MRI brain 12/27/24 at [facility]:  1.  The diffusion-weighted sequences demonstrates acute infarctions in the " "bilateral cerebral hemispheres in the watershed distribution. The predominant portion of the cerebral hemispheres are free from the infarct. There is there is mild petechial   hemorrhage in the left parietal infarct.  2.  Chronic right proximal internal carotid occlusion. There is reconstitution of right supraclinoid segment from the anterior communicating artery.  3.  Widely patent left internal carotid carotid, anterior and middle cerebral flow voids.  4.  There are areas of chronic infarcts in the bilateral centrum semiovale.      Neurointerventional Radiology Procedure 12/26/24 at [facility]:  1.  Occluded left carotid stent.  2.  Successful mechanical thrombectomy and angioplasty.  3.  TICI3      CTP 12/26/24 at [facility]:  1. Cerebral blood flow less than 30% possibly representing completed infarct = 7 mL. Based on distribution of this finding, this is possibly artifact.  2. T Max more than 6 seconds possibly representing combination of completed infarct and ischemia = 209 mL. Based on the distribution of this finding, this is possibly artifact.  3. Mismatched volume possibly representing ischemic brain/penumbra= 202 mL  4.  Please note that this cerebral perfusion study and report is Quantitative and targets supratentorial (cerebral) perfusion for evaluation of large vessel territory acute ischemia/infarction. For example, lacunar infarcts, and brainstem/posterior fossa   ischemia/infarction are not evaluated on this study.  Data acquisition is subject to artifacts which can yield non-anatomically plausible perfusion maps which may be due to motion, bolus timing, signal to noise ratio, or other technical factors.   Perfusion map abnormalities which show non-anatomic distributions are likely artifact.   This study is not \"stand-alone\" and should only be utilized for diagnosis, management/treatment in correlation with CT, CTA, and/or MRI and clinical factors.    CTA Head 12/26/24 at [facility]:  1.  Bilateral occlusions " of the internal carotid arteries.  2.The cavernous and supraclinoid internal carotid arteries and anterior and middle cerebral arteries fill from the posterior circulation.  3. Moderate atherosclerotic plaquing of the distal right vertebral artery.    CTA Neck 12/26/24 at [facility]:  1.  Interval stenting of the distal left common carotid artery into the left internal carotid artery. There is occlusion of the stent with trace flow at the periphery.  2.  Chronic occlusion of the right internal carotid artery from its origin extending to the intracranial portion.  3.  Moderate atherosclerotic narrowing of the distal right vertebral artery.  4.  Moderate to severe narrowing of the right vertebral artery origin.  5.  Right upper lobe mass again noted. Recommend biopsy or PET scan.  6.  Emphysematous and fibrotic changes of the bilateral upper lungs.     Dr. Arciniega is aware of these findings.     CT Head w/o 12/26/24 at [facility]:  1.  No acute intracranial hemorrhage.  2.  Age-indeterminate left parietal infarct not definitely seen on prior.  3.  Otherwise unchanged findings.    TTE 12/26/24 at [facility]:  CONCLUSIONS  Compared to the prior study on 10/20/2019, normalization of LV systolic   function with improvement in diastolic function.   Normal left ventricular chamber size. Normal left ventricular wall   thickness. Normal left ventricular systolic function. The ejection   fraction is measured to be 71 % by Fagan's biplane. No regional wall   motion abnormalities.  Normal diastolic function.  No evidence of right to left shunt by agitated saline study.  No significant valvular disease    Neurointerventional Radiology Procedure 12/25/24 at [facility]:  1.  Severe symptomatic left carotid stenosis which is supplying the both cerebral hemisphere.  2.  Successful left carotid stent placement with embolic protection device.            CTP 12/25/24 at [facility]:  1. Cerebral blood flow less than 30% possibly representing completed  "infarct = 0 mL. Based on distribution of this finding, this is unlikely to represent artifact.  2. T Max more than 6 seconds possibly representing combination of completed infarct and ischemia = 4 mL. Based on the distribution of this finding, this is unlikely to represent artifact. This is improved from the prior study.  3. Mismatched volume possibly representing ischemic brain/penumbra= 4 mL. This is improved from the prior study.  4.  Please note that this cerebral perfusion study and report is Quantitative and targets supratentorial (cerebral) perfusion for evaluation of large vessel territory acute ischemia/infarction. For example, lacunar infarcts, and brainstem/posterior fossa   ischemia/infarction are not evaluated on this study.  Data acquisition is subject to artifacts which can yield non-anatomically plausible perfusion maps which may be due to motion, bolus timing, signal to noise ratio, or other technical factors.   Perfusion map abnormalities which show non-anatomic distributions are likely artifact.   This study is not \"stand-alone\" and should only be utilized for diagnosis, management/treatment in correlation with CT, CTA, and/or MRI and clinical factors.    CTA Head 12/25/24 at [facility]:  There has been no significant interval change from the prior study.     CTA Neck 12/25/24 at [facility]:  Stable exam with occlusion of the right internal carotid artery and high-grade stenosis of the proximal left internal carotid artery. Vertebral artery stenoses are also noted along with fibrotic change at the lung apices and a partially cavitary mass at   the right lung apex.     CT Head w/o 12/25/24 at [facility]:  1.  No significant change from prior.  2. (Sic)    CTP 12/25/24 at [facility]:  1. Cerebral blood flow less than 30% possibly representing completed infarct = 0 mL. Based on distribution of this finding, this is unlikely to represent artifact.  2. T Max more than 6 seconds possibly representing combination of " "completed infarct and ischemia = 21 mL. Based on the distribution of this finding, this is unlikely to represent artifact.  3. Mismatched volume possibly representing ischemic brain/penumbra= 21 mL  4.  Please note that this cerebral perfusion study and report is Quantitative and targets supratentorial (cerebral) perfusion for evaluation of large vessel territory acute ischemia/infarction. For example, lacunar infarcts, and brainstem/posterior fossa   ischemia/infarction are not evaluated on this study.  Data acquisition is subject to artifacts which can yield non-anatomically plausible perfusion maps which may be due to motion, bolus timing, signal to noise ratio, or other technical factors.   Perfusion map abnormalities which show non-anatomic distributions are likely artifact.   This study is not \"stand-alone\" and should only be utilized for diagnosis, management/treatment in correlation with CT, CTA, and/or MRI and clinical factors.    CTA Head 12/25/24 at [facility]:  1.  Occlusion of the right intracranial internal carotid artery.  2.  Atherosclerotic plaque involving the intracranial left internal carotid artery with multifocal high-grade stenosis.  3.  No evidence of anterior middle cerebral artery occlusion.  4.  High-grade stenosis involving the intracranial right vertebral artery.  5.  Diminutive left vertebral artery.  6.  Report was called to CUCO ALLEN at 5:53 PM on 12/25/2024.    CTA Neck 12/25/24 at [facility]:  1.  Severe plaque at the carotid bifurcations and proximal internal carotid arteries.  2.  Occlusion of the right internal carotid artery with reconstitution in the supraclinoid region.  3.  Likely near occlusion of the origin of the left internal carotid artery. There is a hypodense filling defect in the proximal left ICA just above the severe calcified plaque which could represent noncalcified plaque or thrombus extending into the   lumen.  4.  Emphysematous or fibrotic changes in the " upper lungs. Irregular masslike opacity in the right upper lobe measuring 2.5 x 2.4 cm could represent lung malignancy or infection. Further evaluation is recommended.  These findings were discussed with CUCO ALLEN on 12/25/2024 5:55 PM.       CT Head w/o 12/25/24 at [facility]:  1.  Chronic microvascular ischemic type changes and probable centrum semiovale lacunar infarcts.  2.  Nonspecific hypodensity in the right occipital region could represent age indeterminate, probably old infarct..  3.  No acute intracranial hemorrhage.  4.  If there is concern for acute ischemia, MRI is recommended      Current Outpatient Medications   Medication Sig Dispense Refill    venlafaxine XR (EFFEXOR XR) 75 MG CAPSULE SR 24 HR Take 1 Capsule by mouth every day.      levothyroxine (SYNTHROID) 88 MCG Tab Take 1 Tablet by mouth every morning on an empty stomach.      busPIRone (BUSPAR) 7.5 MG tablet Take 1 Tablet by mouth 3 times a day.      acetaminophen (TYLENOL) 500 MG Tab Take 2 Tablets by mouth every 6 hours as needed for Mild Pain.      aspirin (ASA) 81 MG Chew Tab chewable tablet Chew 1 Tablet every day.      atorvastatin (LIPITOR) 80 MG tablet Take 1 Tablet by mouth every evening.      ipratropium-albuterol (DUONEB) 0.5-2.5 (3) MG/3ML nebulizer solution Take 3 mL by nebulization every four hours as needed for Shortness of Breath.      ondansetron (ZOFRAN ODT) 4 MG TABLET DISPERSIBLE Take 1 Tablet by mouth every four hours as needed for Nausea/Vomiting (give PO if IV route is unavailable.).      senna-docusate (PERICOLACE OR SENOKOT S) 8.6-50 MG Tab Take 2 Tablets by mouth every evening.      polyethylene glycol/lytes (MIRALAX) Pack Take 1 Packet by mouth 1 time a day as needed (if no bowel movement in last 2 days).      ticagrelor (BRILINTA) 90 MG Tab tablet Take 1 Tablet by mouth 2 times a day.       No current facility-administered medications for this visit.       Allergies   Allergen Reactions    Codeine Unspecified      Unknown reaction       Physical Exam  Neurological:      Mental Status: She is alert and oriented to person, place, and time.   Psychiatric:         Mood and Affect: Mood normal.         Cognition and Memory: Memory normal.         Judgment: Judgment normal.       [facility] Modified Ranking Scale: 4 = Moderately severe disability; unable to walk without assistance and unable to attend to own bodily needs without assistance    Impression:   1. Symptomatic left carotid artery stenosis with recurrence, status post stent placement and angioplasty.  2. Chronically occluded right carotid artery.   3. Bilateral vertebral artery origin stenoses, recommend medical management unless symptomatic.  4. Stroke secondary to above.  5. Right lung mass.  6. Hyperlipidemia.  7. Hypertension.  8. Tobacco dependence with current cessation.  9. Right hemiparesis.    Plan:   We discussed the method of the procedure at length including the use of catheters, contrast, and xrays to facilitate diagnostic procedures and stents and embolic agents to perform endovascular intervention. The patient continues to recover from the procedure with significant right residual deficits. She has not had a [facility] appointment and her mother was provided with that contact information today. All questions were answered. We explained that the patient will need to have surveillance imaging after the procedure to monitor for recurrence of the condition, which can be managed by us unless her local PCP manages monitoring, and that future treatment depends on multiple factors including lab studies, imaging, and performance status. We reviewed known risk factors for vascular health that include hypertension, hyperglycemia, hyperlipidemia, and tobacco use, and modifiable risk factors were discussed. We have planned that the next imaging study will a carotid ultrasound performed in May. DAPT with Brilinta and aspirin should be continued uninterrupted for 3  months post stent implant followed by aspirin monotherapy for live UNLESS dual therapy otherwise recommended by her other treating providers. Medication side effects, specifically bleeding, were again reviewed and stroke symptoms were reviewed with instructions to activate EMS.    Total Call Time Spent (mins): 17    MAYANK Estevez   Neuro Interventional Service   [address] ([id number])  [place] [address]  [phone]

## 2025-01-23 ENCOUNTER — HOSPITAL ENCOUNTER (OUTPATIENT)
Dept: RADIOLOGY | Facility: MEDICAL CENTER | Age: 65
End: 2025-01-23
Payer: MEDICARE

## 2025-01-23 DIAGNOSIS — I65.22 CAROTID ARTERY STENOSIS, UNILATERAL, LEFT: ICD-10-CM

## 2025-03-05 ENCOUNTER — OFFICE VISIT (OUTPATIENT)
Dept: SLEEP MEDICINE | Facility: MEDICAL CENTER | Age: 65
End: 2025-03-05
Attending: INTERNAL MEDICINE
Payer: MEDICARE

## 2025-03-05 VITALS
DIASTOLIC BLOOD PRESSURE: 60 MMHG | HEIGHT: 65 IN | HEART RATE: 95 BPM | OXYGEN SATURATION: 98 % | SYSTOLIC BLOOD PRESSURE: 82 MMHG | BODY MASS INDEX: 30.16 KG/M2 | WEIGHT: 181 LBS | RESPIRATION RATE: 18 BRPM

## 2025-03-05 DIAGNOSIS — I63.9 ACUTE CVA (CEREBROVASCULAR ACCIDENT) (HCC): ICD-10-CM

## 2025-03-05 DIAGNOSIS — I73.9 PVD (PERIPHERAL VASCULAR DISEASE) (HCC): ICD-10-CM

## 2025-03-05 DIAGNOSIS — R91.8 MASS OF UPPER LOBE OF RIGHT LUNG: ICD-10-CM

## 2025-03-05 DIAGNOSIS — I25.83 CORONARY ARTERY DISEASE DUE TO LIPID RICH PLAQUE: ICD-10-CM

## 2025-03-05 DIAGNOSIS — Z87.891 FORMER SMOKER: ICD-10-CM

## 2025-03-05 DIAGNOSIS — I25.10 CORONARY ARTERY DISEASE DUE TO LIPID RICH PLAQUE: ICD-10-CM

## 2025-03-05 PROCEDURE — 99213 OFFICE O/P EST LOW 20 MIN: CPT | Performed by: INTERNAL MEDICINE

## 2025-03-05 PROCEDURE — 3074F SYST BP LT 130 MM HG: CPT | Performed by: INTERNAL MEDICINE

## 2025-03-05 PROCEDURE — 3078F DIAST BP <80 MM HG: CPT | Performed by: INTERNAL MEDICINE

## 2025-03-05 PROCEDURE — 99204 OFFICE O/P NEW MOD 45 MIN: CPT | Performed by: INTERNAL MEDICINE

## 2025-03-05 ASSESSMENT — ENCOUNTER SYMPTOMS
NAUSEA: 0
WHEEZING: 0
CONSTIPATION: 0
STRIDOR: 0
BACK PAIN: 0
PND: 0
COUGH: 0
EYE REDNESS: 0
PHOTOPHOBIA: 0
SPEECH CHANGE: 0
PALPITATIONS: 0
SHORTNESS OF BREATH: 0
SPUTUM PRODUCTION: 0
HEMOPTYSIS: 0
FEVER: 0
EYE PAIN: 0
SINUS PAIN: 0
VOMITING: 0
DIZZINESS: 0
DEPRESSION: 0
FOCAL WEAKNESS: 0
DOUBLE VISION: 0
CHILLS: 0
BLURRED VISION: 0
MYALGIAS: 0
DIARRHEA: 0
SORE THROAT: 0
CLAUDICATION: 0
WEIGHT LOSS: 0
EYE DISCHARGE: 0
WEAKNESS: 0
ABDOMINAL PAIN: 0
TREMORS: 0
ORTHOPNEA: 0
NECK PAIN: 0
FALLS: 0
HEADACHES: 0
HEARTBURN: 0
DIAPHORESIS: 0

## 2025-03-05 ASSESSMENT — FIBROSIS 4 INDEX: FIB4 SCORE: 5.86

## 2025-03-05 NOTE — PROGRESS NOTES
Chief Complaint   Patient presents with    New Patient     REF BY  DR. CANNON FOR LUNG MASS, CXR 12/25/24       HPI: This patient is a 64 y.o. female presenting for evaluation of RUL lung mass found incidentally on CTA neck during admission in December for acute CVA. PMHx is significant for CAD s/p PCI, PVD s/p L carotid stent placement in December and on ASA and brilinta. She has a hx of GIB and pyelonephritis last fall. She is a former smoker with 40 pk year hx and quit 12/2024 after her recent hospital stay. She is also tx for dyslipidemia and hypothyroid. She is currently in rehab due to RLE weakness following CVA. She worked mainly in  and retail. She has cats at home. No family hx of atopic or autoimmune disease. Pt herself has no hx of pulmonary disease. On her CTA neck 12/25/24 she was noted to have b/l paraseptal emphysema and RUL 2.4x2.5 cm nodule  with ? Cavitary portion. No prior imaging. No CT chest was ordered. Pt denies chronic cough, sob, f/c, chest pain or weight loss.     Past Medical History:   Diagnosis Date    Depression     Essential hypertension     Hypercholesteremia     Hypothyroidism     Mixed hyperlipidemia        Social History     Socioeconomic History    Marital status:      Spouse name: Not on file    Number of children: Not on file    Years of education: Not on file    Highest education level: Not on file   Occupational History    Not on file   Tobacco Use    Smoking status: Every Day     Current packs/day: 1.00     Average packs/day: 1 pack/day for 51.2 years (51.2 ttl pk-yrs)     Types: Cigarettes     Start date: 1974    Smokeless tobacco: Never   Vaping Use    Vaping status: Never Used   Substance and Sexual Activity    Alcohol use: Yes     Alcohol/week: 7.2 oz     Types: 12 Cans of beer per week    Drug use: Yes     Types: Inhaled    Sexual activity: Not on file   Other Topics Concern    Not on file   Social History Narrative    Not on file     Social Drivers  of Health     Financial Resource Strain: Not on file   Food Insecurity: No Food Insecurity (10/17/2024)    Hunger Vital Sign     Worried About Running Out of Food in the Last Year: Never true     Ran Out of Food in the Last Year: Never true   Transportation Needs: No Transportation Needs (10/17/2024)    PRAPARE - Transportation     Lack of Transportation (Medical): No     Lack of Transportation (Non-Medical): No   Physical Activity: Not on file   Stress: Not on file   Social Connections: Not on file   Intimate Partner Violence: Not At Risk (10/17/2024)    Humiliation, Afraid, Rape, and Kick questionnaire     Fear of Current or Ex-Partner: No     Emotionally Abused: No     Physically Abused: No     Sexually Abused: No   Housing Stability: Low Risk  (10/17/2024)    Housing Stability Vital Sign     Unable to Pay for Housing in the Last Year: No     Number of Times Moved in the Last Year: 1     Homeless in the Last Year: No       Family History   Problem Relation Age of Onset    Heart Disease Mother     Dementia Father     Hyperlipidemia Sister     Hyperlipidemia Brother        Current Outpatient Medications on File Prior to Visit   Medication Sig Dispense Refill    levothyroxine (SYNTHROID) 88 MCG Tab Take 1 Tablet by mouth every morning on an empty stomach.      busPIRone (BUSPAR) 7.5 MG tablet Take 1 Tablet by mouth 3 times a day.      acetaminophen (TYLENOL) 500 MG Tab Take 2 Tablets by mouth every 6 hours as needed for Mild Pain.      aspirin (ASA) 81 MG Chew Tab chewable tablet Chew 1 Tablet every day.      atorvastatin (LIPITOR) 80 MG tablet Take 1 Tablet by mouth every evening.      ondansetron (ZOFRAN ODT) 4 MG TABLET DISPERSIBLE Take 1 Tablet by mouth every four hours as needed for Nausea/Vomiting (give PO if IV route is unavailable.).      senna-docusate (PERICOLACE OR SENOKOT S) 8.6-50 MG Tab Take 2 Tablets by mouth every evening.      polyethylene glycol/lytes (MIRALAX) Pack Take 1 Packet by mouth 1 time a  "day as needed (if no bowel movement in last 2 days).      venlafaxine XR (EFFEXOR XR) 75 MG CAPSULE SR 24 HR Take 1 Capsule by mouth every day.      ipratropium-albuterol (DUONEB) 0.5-2.5 (3) MG/3ML nebulizer solution Take 3 mL by nebulization every four hours as needed for Shortness of Breath. (Patient not taking: Reported on 3/5/2025)      ticagrelor (BRILINTA) 90 MG Tab tablet Take 1 Tablet by mouth 2 times a day.       No current facility-administered medications on file prior to visit.       Allergies: Codeine    ROS:   Review of Systems   Constitutional:  Negative for chills, diaphoresis, fever, malaise/fatigue and weight loss.   HENT:  Negative for congestion, ear discharge, ear pain, hearing loss, nosebleeds, sinus pain, sore throat and tinnitus.    Eyes:  Negative for blurred vision, double vision, photophobia, pain, discharge and redness.   Respiratory:  Negative for cough, hemoptysis, sputum production, shortness of breath, wheezing and stridor.    Cardiovascular:  Negative for chest pain, palpitations, orthopnea, claudication, leg swelling and PND.   Gastrointestinal:  Negative for abdominal pain, constipation, diarrhea, heartburn, nausea and vomiting.   Genitourinary:  Negative for dysuria and urgency.   Musculoskeletal:  Negative for back pain, falls, joint pain, myalgias and neck pain.   Skin:  Negative for itching and rash.   Neurological:  Negative for dizziness, tremors, speech change, focal weakness, weakness and headaches.   Endo/Heme/Allergies:  Negative for environmental allergies.   Psychiatric/Behavioral:  Negative for depression.        BP (!) 82/60 (BP Location: Left arm, Patient Position: Sitting, BP Cuff Size: Adult)   Pulse 95   Resp 18   Ht 1.651 m (5' 5\")   Wt 82.1 kg (181 lb)   SpO2 98%     Physical Exam:  Physical Exam  Constitutional:       General: She is not in acute distress.     Appearance: Normal appearance. She is well-developed and normal weight.   HENT:      Head: " Normocephalic and atraumatic.      Right Ear: External ear normal.      Left Ear: External ear normal.      Nose: Nose normal. No congestion.      Mouth/Throat:      Mouth: Mucous membranes are moist.      Pharynx: Oropharynx is clear. No oropharyngeal exudate.   Eyes:      General: No scleral icterus.     Extraocular Movements: Extraocular movements intact.      Conjunctiva/sclera: Conjunctivae normal.      Pupils: Pupils are equal, round, and reactive to light.   Neck:      Vascular: No JVD.      Trachea: No tracheal deviation.   Cardiovascular:      Rate and Rhythm: Normal rate and regular rhythm.      Heart sounds: Normal heart sounds. No murmur heard.     No friction rub. No gallop.   Pulmonary:      Effort: Pulmonary effort is normal. No accessory muscle usage or respiratory distress.      Breath sounds: Normal breath sounds. No wheezing or rales.   Abdominal:      General: There is no distension.      Palpations: Abdomen is soft.      Tenderness: There is no abdominal tenderness.   Musculoskeletal:         General: No tenderness or deformity. Normal range of motion.      Cervical back: Normal range of motion and neck supple.      Right lower leg: No edema.      Left lower leg: No edema.   Lymphadenopathy:      Cervical: No cervical adenopathy.   Skin:     General: Skin is warm and dry.      Findings: No rash.      Nails: There is no clubbing.   Neurological:      Mental Status: She is alert and oriented to person, place, and time.      Cranial Nerves: No cranial nerve deficit.      Gait: Gait normal.   Psychiatric:         Behavior: Behavior normal.         PFTs as reviewed by me personally: none    Imaging as reviewed by me personally:as per hpI    Assessment:  1. Mass of upper lobe of right lung  ON-[id number] BASE TO MID-THIGH    PULMONARY FUNCTION TESTS -Test requested: Complete Pulmonary Function Test      2. Former smoker        3. Acute CVA (cerebrovascular accident) (HCC)        4. PVD (peripheral  vascular disease) (HCC)        5. Coronary artery disease due to lipid rich plaque            Plan:  Highly concerning for primary malignancy and is has now been over 2 mos since her initial imaging. We will obtain PET stat to determine where to biopsy and PFT although given CAD, PVC and CVD, she may not be a good surgical candidate.   Tobacco free since December.  S/p carotid stent currently in rehab due to deficits related to CVA  On statin therapy and DAPT  See above. S/p revascularization  Return in about 4 weeks (around 4/2/2025) for any provider, PFT at time of f/u and PET.

## 2025-03-18 ENCOUNTER — TELEPHONE (OUTPATIENT)
Dept: NEUROLOGY | Facility: MEDICAL CENTER | Age: 65
End: 2025-03-18
Payer: MEDICARE

## 2025-03-18 NOTE — TELEPHONE ENCOUNTER
Called patient between 75 and 105 days after discharge. Assessed Modified Whitsett Scale to be 5.  Spoke with nurse at SNF facility.

## 2025-04-14 ENCOUNTER — HOSPITAL ENCOUNTER (OUTPATIENT)
Dept: RADIOLOGY | Facility: MEDICAL CENTER | Age: 65
End: 2025-04-14
Attending: INTERNAL MEDICINE
Payer: MEDICARE

## 2025-04-14 DIAGNOSIS — R91.8 MASS OF UPPER LOBE OF RIGHT LUNG: ICD-10-CM

## 2025-04-18 ENCOUNTER — NON-PROVIDER VISIT (OUTPATIENT)
Dept: SLEEP MEDICINE | Facility: MEDICAL CENTER | Age: 65
End: 2025-04-18
Attending: INTERNAL MEDICINE
Payer: MEDICARE

## 2025-04-18 VITALS — WEIGHT: 170 LBS | BODY MASS INDEX: 26.68 KG/M2 | HEIGHT: 67 IN

## 2025-04-18 DIAGNOSIS — R91.8 MASS OF UPPER LOBE OF RIGHT LUNG: ICD-10-CM

## 2025-04-18 PROCEDURE — 94726 PLETHYSMOGRAPHY LUNG VOLUMES: CPT | Mod: 26 | Performed by: INTERNAL MEDICINE

## 2025-04-18 PROCEDURE — 94060 EVALUATION OF WHEEZING: CPT | Mod: 26 | Performed by: INTERNAL MEDICINE

## 2025-04-18 PROCEDURE — 94726 PLETHYSMOGRAPHY LUNG VOLUMES: CPT | Performed by: INTERNAL MEDICINE

## 2025-04-18 PROCEDURE — 94060 EVALUATION OF WHEEZING: CPT | Performed by: INTERNAL MEDICINE

## 2025-04-18 PROCEDURE — 94729 DIFFUSING CAPACITY: CPT | Performed by: INTERNAL MEDICINE

## 2025-04-18 PROCEDURE — 94729 DIFFUSING CAPACITY: CPT | Mod: 26 | Performed by: INTERNAL MEDICINE

## 2025-04-18 ASSESSMENT — FIBROSIS 4 INDEX: FIB4 SCORE: 5.86

## 2025-04-18 NOTE — PROCEDURES
Technician: AMBER Renee    Technician Comment:  Good patient effort & cooperation.  The results of this test meet the ATS/ERS standards for acceptability & reproducibility.  Test was performed on the [employer] Body Plethysmograph-Elite DX system.  Predicted values were GLI-2012 for spirometry, GLI-2020 for DLCO, ITS for Lung Volumes.  The DLCO was uncorrected for Hgb.  A bronchodilator of Albuterol HFA -2puffs via spacer administered.  DLCO performed during dilation period.    Interpretation:  Normal baseline spirometry with a negative bronchodilator response.  Flow-volume loops are blunted.  There is a 23% improvement in mid flows with bronchodilator.  Full lung volumes demonstrate mild air trapping by RV/TLC criteria.  The DLCO is normal.    Summary:  Subtle findings of small airways disease.  Correlate clinically.    I, Javy Sotelo DO, am the author of this note.     Javy Sotelo DO, [facility]  Staff Pulmonologist and Intensivist  [facility]     Please note that this dictation was created using voice recognition software. The accuracy of the dictation is limited to the abilities of the software. I have made every reasonable attempt to correct obvious errors, but I expect that there are errors of grammar and possibly content that I did not discover before finalizing the note.

## 2025-04-21 ENCOUNTER — HOSPITAL ENCOUNTER (OUTPATIENT)
Dept: RADIOLOGY | Facility: MEDICAL CENTER | Age: 65
End: 2025-04-21
Attending: INTERNAL MEDICINE
Payer: MEDICARE

## 2025-04-21 LAB — GLUCOSE BLD-MCNC: 87 MG/DL (ref 65–99)

## 2025-04-21 PROCEDURE — A9552 F18 FDG: HCPCS

## 2025-04-24 ENCOUNTER — OFFICE VISIT (OUTPATIENT)
Dept: NEUROLOGY | Facility: MEDICAL CENTER | Age: 65
End: 2025-04-24
Attending: PSYCHIATRY & NEUROLOGY
Payer: MEDICARE

## 2025-04-24 VITALS
TEMPERATURE: 97.3 F | HEART RATE: 101 BPM | OXYGEN SATURATION: 94 % | HEIGHT: 63 IN | BODY MASS INDEX: 30.98 KG/M2 | WEIGHT: 174.82 LBS | DIASTOLIC BLOOD PRESSURE: 72 MMHG | RESPIRATION RATE: 16 BRPM | SYSTOLIC BLOOD PRESSURE: 124 MMHG

## 2025-04-24 DIAGNOSIS — I63.9 ACUTE CVA (CEREBROVASCULAR ACCIDENT) (HCC): ICD-10-CM

## 2025-04-24 PROCEDURE — 3074F SYST BP LT 130 MM HG: CPT | Performed by: PSYCHIATRY & NEUROLOGY

## 2025-04-24 PROCEDURE — 99212 OFFICE O/P EST SF 10 MIN: CPT | Performed by: PSYCHIATRY & NEUROLOGY

## 2025-04-24 PROCEDURE — 3078F DIAST BP <80 MM HG: CPT | Performed by: PSYCHIATRY & NEUROLOGY

## 2025-04-24 PROCEDURE — 99214 OFFICE O/P EST MOD 30 MIN: CPT | Performed by: PSYCHIATRY & NEUROLOGY

## 2025-04-24 ASSESSMENT — FIBROSIS 4 INDEX: FIB4 SCORE: 5.86

## 2025-04-24 ASSESSMENT — PATIENT HEALTH QUESTIONNAIRE - PHQ9
5. POOR APPETITE OR OVEREATING: 1 - SEVERAL DAYS
CLINICAL INTERPRETATION OF PHQ2 SCORE: 3
SUM OF ALL RESPONSES TO PHQ QUESTIONS 1-9: 12

## 2025-04-24 NOTE — PROGRESS NOTES
Spoke to patient she's having a bone density test and she is claustrophobic. Patient is wanting a valium to calm her for test. We did not receive message until today and patient's testing is at 10:45 am. I explained  to patient what happen with medication. We discussed her trying without medication since the scanner is not enclosed. Patient agreed to try since she did not know it not closed. If patient cannot tolerate testing she will mychart me and I will Dr Foreman know she needs oral sedation.

## 2025-04-24 NOTE — PROGRESS NOTES
[facility] NEUROLOGY CLINIC    Date of Service: 04/24/25    Referred by: Jeremy M Gonda, M.D.  [address]  [address]  [place] [address]      Reason for referral  Stroke    Previously evaluated by a neurologist   Inpatient    History of present illness (HPI)   Marisol Brown is a 64-year-old female with known chronic complete occlusion of the right internal carotid artery (ICA) presented to the ED on 12/25/2024 with new-onset whole-body paresthesias and right-sided weakness. CTA revealed complete occlusion of the right ICA and high-grade stenosis of the left ICA, which was perfusing both hemispheres. She was treated with tenecteplase (TNK) and underwent emergent left ICA stenting; however, the stent subsequently thrombosed. Interventional radiology performed a successful thrombectomy with restoration of TICI 3 flow.    She was admitted to the ICU for five days, during which she developed hypotension requiring vasopressor support. Imaging during her hospitalization incidentally identified a right upper lobe (RUL) pulmonary mass on CTA. Outpatient PET-CT was consistent with a primary lung neoplasm.    She comes from a rehab facility in [place] and is doing PT 4 times/week. She is still not able to walk without support. She feels the weakness is more pronounced on the right side. No new symptoms.     Review of Systems (ROS)  Negative except as above.     Medical history  Past Medical History:   Diagnosis Date    Depression     Essential hypertension     Hypercholesteremia     Hypothyroidism     Mixed hyperlipidemia          Surgical history  Past Surgical History:   Procedure Laterality Date    MS CYSTOSCOPY,INSERT URETERAL STENT Left 10/17/2024    Procedure: CYSTOSCOPY, WITH URETERAL STENT INSERTION WITH  URETERAL BIOPSY AND STENT PLACEMENT, and TURVT;  Surgeon: Hari Correa M.D.;  Location: SURGERY [facility];  Service: Urology    MS UPPER GI ENDOSCOPY,DIAGNOSIS N/A 8/11/2021    Procedure: GASTROSCOPY;   Surgeon: Curtis Nunez M.D.;  Location: SURGERY [facility];  Service: Gastroenterology    SD COLONOSCOPY-FLEXIBLE N/A 8/11/2021    Procedure: COLONOSCOPY;  Surgeon: Curtis Nunez M.D.;  Location: SURGERY [facility];  Service: Gastroenterology    CARPAL TUNNEL RELEASE      Right    DENTAL EXTRACTION(S)      TUBAL LIGATION           Relevant family history  Family History   Problem Relation Age of Onset    Heart Disease Mother     Dementia Father     Hyperlipidemia Sister     Hyperlipidemia Brother          Social history  Social History     Tobacco Use    Smoking status: Every Day     Current packs/day: 1.00     Average packs/day: 1 pack/day for 51.3 years (51.3 ttl pk-yrs)     Types: Cigarettes     Start date: 1974    Smokeless tobacco: Never   Substance Use Topics    Alcohol use: Yes     Alcohol/week: 7.2 oz     Types: 12 Cans of beer per week         Medications    Outpatient Medications Marked as Taking for the 4/24/25 encounter (Office Visit) with Aren Desir M.D.   Medication Sig Dispense Refill    venlafaxine XR (EFFEXOR XR) 75 MG CAPSULE SR 24 HR Take 1 Capsule by mouth every day.      levothyroxine (SYNTHROID) 88 MCG Tab Take 1 Tablet by mouth every morning on an empty stomach.      busPIRone (BUSPAR) 7.5 MG tablet Take 1 Tablet by mouth 3 times a day.      acetaminophen (TYLENOL) 500 MG Tab Take 2 Tablets by mouth every 6 hours as needed for Mild Pain.      aspirin (ASA) 81 MG Chew Tab chewable tablet Chew 1 Tablet every day.      atorvastatin (LIPITOR) 80 MG tablet Take 1 Tablet by mouth every evening.      ondansetron (ZOFRAN ODT) 4 MG TABLET DISPERSIBLE Take 1 Tablet by mouth every four hours as needed for Nausea/Vomiting (give PO if IV route is unavailable.).      senna-docusate (PERICOLACE OR SENOKOT S) 8.6-50 MG Tab Take 2 Tablets by mouth every evening.      polyethylene glycol/lytes (MIRALAX) Pack Take 1 Packet by mouth 1 time a day as needed (if no bowel movement in last 2 days).       ticagrelor (BRILINTA) 90 MG Tab tablet Take 1 Tablet by mouth 2 times a day.           Screening    Depression Screening    Little interest or pleasure in doing things?  2 - more than half the days  Feeling down, depressed , or hopeless? 1 - several days  Trouble falling or staying asleep, or sleeping too much?  1 - several days  Feeling tired or having little energy?  1 - several days  Poor appetite or overeating?  1 - several days  Feeling bad about yourself - or that you are a failure or have let yourself or your family down? 1 - several days  Trouble concentrating on things, such as reading the newspaper or watching television? 3 - nearly every day  Moving or speaking so slowly that other people could have noticed.  Or the opposite - being so fidgety or restless that you have been moving around a lot more than usual?  1 - several days  Thoughts that you would be better off dead, or of hurting yourself?  1 - several days  Patient Health Questionnaire Score: 12      If depressive symptoms identified deferred to follow up visit unless specifically addressed in assesment and plan.    Interpretation of PHQ-9 Total Score   Score Severity   1-4 No Depression   5-9 Mild Depression   10-14 Moderate Depression   15-19 Moderately Severe Depression   20-27 Severe Depression          [place] Suicide Severity Rating Scale     Wish to be Dead?: No  Suicidal Thoughts: No    Suicidal Thoughts with Method Without Specific Plan or Intent to Act:    Suicidal Intent Without Specific Plan:    Suicide Intent with Specific Plan:    Suicide Behavior Question:    How long ago did you do any of these?:    C-SSRS Risk Level:      Additional Suicide Screening Questions    Suspected or Confirmed Suicide Attempted?:    Harming or killing others?:      [place] Suicide Reassessment     New or continued thoughts about killing self?:    Preparing to end life?:              Physical exam    Vitals:    04/24/25 1017   BP: 124/72   Pulse: (!)  101   Resp: 16   Temp: 36.3 °C (97.3 °F)   SpO2: 94%         Alert and oriented. No cranial neuropathies. Normal speech fluency and comprehension.   Proximal weakness of both upper and lower extremities, R> L.   Cannot stand up on her own.       Lab Results  Lab Results   Component Value Date/Time    WBC 6.1 12/28/2024 03:07 AM    RBC 3.34 (L) 12/28/2024 03:07 AM    HEMOGLOBIN 10.3 (L) 12/28/2024 03:07 AM    HEMATOCRIT 30.7 (L) 12/28/2024 03:07 AM    MCV 91.9 12/28/2024 03:07 AM    MCH 30.8 12/28/2024 03:07 AM    MCHC 33.6 12/28/2024 03:07 AM    MPV 10.2 12/28/2024 03:07 AM    NEUTSPOLYS 69.00 12/26/2024 04:10 AM    LYMPHOCYTES 16.50 (L) 12/26/2024 04:10 AM    MONOCYTES 13.00 12/26/2024 04:10 AM    EOSINOPHILS 0.30 12/26/2024 04:10 AM    BASOPHILS 0.60 12/26/2024 04:10 AM    HYPOCHROMIA 1+ 04/11/2023 12:12 AM    ANISOCYTOSIS 1+ 04/11/2023 12:12 AM          Lab Results   Component Value Date/Time    SODIUM 142 12/28/2024 03:07 AM    POTASSIUM 3.4 (L) 12/28/2024 03:07 AM    CHLORIDE 113 (H) 12/28/2024 03:07 AM    CO2 17 (L) 12/28/2024 03:07 AM    GLUCOSE 86 12/28/2024 03:07 AM    BUN 15 12/28/2024 03:07 AM    CREATININE 0.44 (L) 12/28/2024 03:07 AM       Latest Reference Range & Units 12/26/24 04:10   Glycohemoglobin 4.0 - 5.6 % 5.4      Latest Reference Range & Units 12/26/24 04:10   Cholesterol,Tot 100 - 199 mg/dL 174   Triglycerides 0 - 149 mg/dL 160 (H)   HDL >=40 mg/dL 38 !   LDL <100 mg/dL 104 (H)   (H): Data is abnormally high  !: Data is abnormal     Latest Reference Range & Units 12/26/24 04:10   TSH 0.380 - 5.330 uIU/mL 4.970       NEURO-DIAGNOSTICS  IR: 12/25/24    1.  Severe symptomatic left carotid stenosis which is supplying the both cerebral hemisphere.  2.  Successful left carotid stent placement with embolic protection device.          IR: 12/26/24  1.  Occluded left carotid stent.  2.  Successful mechanical thrombectomy and angioplasty.  3.  TICI3    MRI brain: 12/26/25  1.  The diffusion-weighted  sequences demonstrates acute infarctions in the bilateral cerebral hemispheres in the watershed distribution. The predominant portion of the cerebral hemispheres are free from the infarct. There is there is mild petechial   hemorrhage in the left parietal infarct.  2.  Chronic right proximal internal carotid occlusion. There is reconstitution of right supraclinoid segment from the anterior communicating artery.  3.  Widely patent left internal carotid carotid, anterior and middle cerebral flow voids.  4.  There are areas of chronic infarcts in the bilateral centrum semiovale.        PET/CT: 4/14/25  1.  Hypermetabolic mass in the right upper lobe, in keeping with lung cancer.  2.  Mild increased uptake in the right costovertebral junction of T11, indeterminate but could be degenerative or inflammation.  3.  No hypermetabolic lymph nodes seen.  4.  Moderate left hydronephrosis despite left double-J ureteral stent in place.      Impression and Plan  Briefly, 64 y.o. female with watershed strokes from bilateral ICA stenosis s/p Left ICA stenting and thrombectomy.   She is recovering at a rehab facility, and is still not able to ambulate without support. She will continue with DAPT until June, and I recommended that she follows with neuro-IR outpatient. I placed a referral order.     During her hospitalization, a lung mass was found, highly concerning for malignancy. She has an appointment with a lung specialist in May.       Numerous questions were answered to the best of my knowledge. The patient is in agreement with the plan. Return to the clinic in 6 months.     ADMINISTRATIVE BILLING  I spent a total of 31 minutes on this patient encounter.        Aren Desir MD  Diplomate of the American Board of Psychiatry and Neurology.  General Neurology & Neuro-Oncology.  [facility].   of Clinical Neurology at [facility].

## 2025-06-23 ENCOUNTER — TELEPHONE (OUTPATIENT)
Dept: RADIOLOGY | Facility: MEDICAL CENTER | Age: 65
End: 2025-06-23
Payer: MEDICARE

## 2025-06-23 NOTE — TELEPHONE ENCOUNTER
Unable to reach pt, call placed to alternate contact Marilyn (mother) to relay findings: Carotid USD from [facility] demonstrates chronic rt carotid artery occlusion and patent left LAWRENCE at 6 months post intervention. Next USD in 1 year.

## 2025-06-24 ENCOUNTER — RESULTS FOLLOW-UP (OUTPATIENT)
Dept: SLEEP MEDICINE | Facility: MEDICAL CENTER | Age: 65
End: 2025-06-24

## 2025-06-24 DIAGNOSIS — R91.8 MASS OF UPPER LOBE OF RIGHT LUNG: Primary | ICD-10-CM

## 2025-06-24 DIAGNOSIS — Z87.891 FORMER SMOKER: ICD-10-CM

## 2025-07-09 ENCOUNTER — APPOINTMENT (OUTPATIENT)
Dept: ADMISSIONS | Facility: MEDICAL CENTER | Age: 65
End: 2025-07-09
Attending: INTERNAL MEDICINE
Payer: MEDICARE

## 2025-07-10 ENCOUNTER — PRE-ADMISSION TESTING (OUTPATIENT)
Dept: ADMISSIONS | Facility: MEDICAL CENTER | Age: 65
End: 2025-07-10
Attending: INTERNAL MEDICINE
Payer: MEDICARE

## 2025-07-10 VITALS — BODY MASS INDEX: 28.22 KG/M2 | HEIGHT: 66 IN

## 2025-07-10 RX ORDER — BETHANECHOL CHLORIDE 5 MG
5 TABLET ORAL EVERY MORNING
COMMUNITY
Start: 2025-07-05

## 2025-07-10 RX ORDER — GABAPENTIN 300 MG/1
300 CAPSULE ORAL 3 TIMES DAILY
COMMUNITY

## 2025-07-10 RX ORDER — NYSTATIN 100000 U/G
CREAM TOPICAL
COMMUNITY
Start: 2025-07-05

## 2025-07-10 NOTE — PREPROCEDURE INSTRUCTIONS
Pre-admit telephone appointment completed. Reviewed the Preparing for your procedure handout with RN for [facility] over the phone. Instructed per anesthesia/pharmacy guidelines regarding taking, holding, or contacting provider for instructions on regularly prescribed medications before surgery.     Spoke with STONE Henriquez with [facility]. [phone]. Station 2 Nurse if [place] not available.

## 2025-07-10 NOTE — PREADMIT AVS NOTE
Current Medications   Medication Instructions    nystatin (MYCOSTATIN) 559182 UNIT/GM Cream topical cream Hold medication day of procedure    bethanechol (URECHOLINE) 5 MG tablet Continue taking medication as prescribed, including morning of procedure     gabapentin (NEURONTIN) 300 MG Cap Continue taking medication as prescribed, including morning of procedure     venlafaxine XR (EFFEXOR XR) 75 MG CAPSULE SR 24 HR Continue taking medication as prescribed, including morning of procedure     levothyroxine (SYNTHROID) 88 MCG Tab Continue taking medication as prescribed, including morning of procedure     busPIRone (BUSPAR) 7.5 MG tablet Continue taking medication as prescribed, including morning of procedure     acetaminophen (TYLENOL) 500 MG Tab As needed medication, may take if needed, including morning of procedure     atorvastatin (LIPITOR) 80 MG tablet Continue taking as prescribed.    ipratropium-albuterol (DUONEB) 0.5-2.5 (3) MG/3ML nebulizer solution As needed medication, may take if needed, including morning of procedure     ondansetron (ZOFRAN ODT) 4 MG TABLET DISPERSIBLE As needed medication, may take if needed, including morning of procedure     senna-docusate (PERICOLACE OR SENOKOT S) 8.6-50 MG Tab Hold medication day of procedure    polyethylene glycol/lytes (MIRALAX) Pack Hold medication day of procedure

## 2025-07-20 ENCOUNTER — ANESTHESIA EVENT (OUTPATIENT)
Dept: SURGERY | Facility: MEDICAL CENTER | Age: 65
End: 2025-07-20
Payer: MEDICARE

## 2025-07-21 ENCOUNTER — HOSPITAL ENCOUNTER (OUTPATIENT)
Facility: MEDICAL CENTER | Age: 65
End: 2025-07-21
Attending: INTERNAL MEDICINE | Admitting: INTERNAL MEDICINE
Payer: MEDICARE

## 2025-07-21 ENCOUNTER — RESULTS FOLLOW-UP (OUTPATIENT)
Dept: SLEEP MEDICINE | Facility: MEDICAL CENTER | Age: 65
End: 2025-07-21

## 2025-07-21 ENCOUNTER — APPOINTMENT (OUTPATIENT)
Dept: RADIOLOGY | Facility: MEDICAL CENTER | Age: 65
End: 2025-07-21
Attending: INTERNAL MEDICINE
Payer: MEDICARE

## 2025-07-21 ENCOUNTER — ANESTHESIA (OUTPATIENT)
Dept: SURGERY | Facility: MEDICAL CENTER | Age: 65
End: 2025-07-21
Payer: MEDICARE

## 2025-07-21 VITALS
DIASTOLIC BLOOD PRESSURE: 74 MMHG | RESPIRATION RATE: 12 BRPM | SYSTOLIC BLOOD PRESSURE: 142 MMHG | BODY MASS INDEX: 29.73 KG/M2 | OXYGEN SATURATION: 94 % | WEIGHT: 184.97 LBS | HEIGHT: 66 IN | HEART RATE: 86 BPM | TEMPERATURE: 98.1 F

## 2025-07-21 DIAGNOSIS — R91.8 MASS OF UPPER LOBE OF RIGHT LUNG: ICD-10-CM

## 2025-07-21 DIAGNOSIS — Z87.891 FORMER SMOKER: ICD-10-CM

## 2025-07-21 DIAGNOSIS — C34.11 MALIGNANT NEOPLASM OF UPPER LOBE OF RIGHT LUNG (HCC): Primary | ICD-10-CM

## 2025-07-21 LAB
ANION GAP SERPL CALC-SCNC: 13 MMOL/L (ref 7–16)
BUN SERPL-MCNC: 10 MG/DL (ref 8–22)
CALCIUM SERPL-MCNC: 8.9 MG/DL (ref 8.5–10.5)
CHLORIDE SERPL-SCNC: 108 MMOL/L (ref 96–112)
CO2 SERPL-SCNC: 20 MMOL/L (ref 20–33)
CREAT SERPL-MCNC: 0.83 MG/DL (ref 0.5–1.4)
CYTOLOGY REG CYTOL: NORMAL
EKG IMPRESSION: NORMAL
ERYTHROCYTE [DISTWIDTH] IN BLOOD BY AUTOMATED COUNT: 52.9 FL (ref 35.9–50)
GFR SERPLBLD CREATININE-BSD FMLA CKD-EPI: 78 ML/MIN/1.73 M 2
GLUCOSE SERPL-MCNC: 91 MG/DL (ref 65–99)
GRAM STN SPEC: NORMAL
HCT VFR BLD AUTO: 38.8 % (ref 37–47)
HGB BLD-MCNC: 12.4 G/DL (ref 12–16)
MCH RBC QN AUTO: 28 PG (ref 27–33)
MCHC RBC AUTO-ENTMCNC: 32 G/DL (ref 32.2–35.5)
MCV RBC AUTO: 87.6 FL (ref 81.4–97.8)
PATHOLOGY CONSULT NOTE: NORMAL
PLATELET # BLD AUTO: 289 K/UL (ref 164–446)
PMV BLD AUTO: 9.8 FL (ref 9–12.9)
POTASSIUM SERPL-SCNC: 3.7 MMOL/L (ref 3.6–5.5)
RBC # BLD AUTO: 4.43 M/UL (ref 4.2–5.4)
SIGNIFICANT IND 70042: NORMAL
SITE SITE: NORMAL
SODIUM SERPL-SCNC: 141 MMOL/L (ref 135–145)
SOURCE SOURCE: NORMAL
WBC # BLD AUTO: 7.4 K/UL (ref 4.8–10.8)

## 2025-07-21 PROCEDURE — 160193 HCHG PACU STANDARD - 1ST 60 MINS: Performed by: INTERNAL MEDICINE

## 2025-07-21 PROCEDURE — 93005 ELECTROCARDIOGRAM TRACING: CPT | Mod: TC | Performed by: INTERNAL MEDICINE

## 2025-07-21 PROCEDURE — C1726 CATH, BAL DIL, NON-VASCULAR: HCPCS | Performed by: INTERNAL MEDICINE

## 2025-07-21 PROCEDURE — 700101 HCHG RX REV CODE 250: Performed by: ANESTHESIOLOGY

## 2025-07-21 PROCEDURE — 160046 HCHG PACU - 1ST 60 MINS PHASE II: Performed by: INTERNAL MEDICINE

## 2025-07-21 PROCEDURE — 88305 TISSUE EXAM BY PATHOLOGIST: CPT | Performed by: PATHOLOGY

## 2025-07-21 PROCEDURE — 88112 CYTOPATH CELL ENHANCE TECH: CPT | Performed by: PATHOLOGY

## 2025-07-21 PROCEDURE — 502714 HCHG ROBOTIC SURGERY SERVICES: Performed by: INTERNAL MEDICINE

## 2025-07-21 PROCEDURE — C1889 IMPLANT/INSERT DEVICE, NOC: HCPCS | Performed by: INTERNAL MEDICINE

## 2025-07-21 PROCEDURE — 88172 CYTP DX EVAL FNA 1ST EA SITE: CPT | Performed by: PATHOLOGY

## 2025-07-21 PROCEDURE — 85027 COMPLETE CBC AUTOMATED: CPT

## 2025-07-21 PROCEDURE — 93010 ELECTROCARDIOGRAM REPORT: CPT | Performed by: INTERNAL MEDICINE

## 2025-07-21 PROCEDURE — 160192 HCHG ANESTHESIA COMPLEX: Performed by: INTERNAL MEDICINE

## 2025-07-21 PROCEDURE — 88173 CYTOPATH EVAL FNA REPORT: CPT | Mod: 26 | Performed by: PATHOLOGY

## 2025-07-21 PROCEDURE — 88329 PATH CONSLTJ DRG SURG: CPT | Mod: 59 | Performed by: PATHOLOGY

## 2025-07-21 PROCEDURE — 160015 HCHG STAT PREOP MINUTES: Performed by: INTERNAL MEDICINE

## 2025-07-21 PROCEDURE — 88333 PATH CONSLTJ SURG CYTO XM 1: CPT | Performed by: PATHOLOGY

## 2025-07-21 PROCEDURE — 87070 CULTURE OTHR SPECIMN AEROBIC: CPT

## 2025-07-21 PROCEDURE — 160042 HCHG SURGERY MINUTES - EA ADDL 1 MIN LEVEL 5: Performed by: INTERNAL MEDICINE

## 2025-07-21 PROCEDURE — 160002 HCHG RECOVERY MINUTES (STAT): Performed by: INTERNAL MEDICINE

## 2025-07-21 PROCEDURE — 88305 TISSUE EXAM BY PATHOLOGIST: CPT | Mod: 26 | Performed by: PATHOLOGY

## 2025-07-21 PROCEDURE — 160025 RECOVERY II MINUTES (STATS): Performed by: INTERNAL MEDICINE

## 2025-07-21 PROCEDURE — 160031 HCHG SURGERY MINUTES - 1ST 30 MINS LEVEL 5: Performed by: INTERNAL MEDICINE

## 2025-07-21 PROCEDURE — 71045 X-RAY EXAM CHEST 1 VIEW: CPT

## 2025-07-21 PROCEDURE — 88333 PATH CONSLTJ SURG CYTO XM 1: CPT | Mod: 26,59 | Performed by: PATHOLOGY

## 2025-07-21 PROCEDURE — 700111 HCHG RX REV CODE 636 W/ 250 OVERRIDE (IP): Performed by: ANESTHESIOLOGY

## 2025-07-21 PROCEDURE — 88173 CYTOPATH EVAL FNA REPORT: CPT | Performed by: PATHOLOGY

## 2025-07-21 PROCEDURE — 88172 CYTP DX EVAL FNA 1ST EA SITE: CPT | Mod: 26,59 | Performed by: PATHOLOGY

## 2025-07-21 PROCEDURE — 80048 BASIC METABOLIC PNL TOTAL CA: CPT

## 2025-07-21 PROCEDURE — 71250 CT THORAX DX C-: CPT

## 2025-07-21 PROCEDURE — 160048 HCHG OR STATISTICAL LEVEL 1-5: Performed by: INTERNAL MEDICINE

## 2025-07-21 PROCEDURE — 700105 HCHG RX REV CODE 258: Performed by: INTERNAL MEDICINE

## 2025-07-21 PROCEDURE — 88112 CYTOPATH CELL ENHANCE TECH: CPT | Mod: 26,59 | Performed by: PATHOLOGY

## 2025-07-21 PROCEDURE — 36415 COLL VENOUS BLD VENIPUNCTURE: CPT

## 2025-07-21 PROCEDURE — 87205 SMEAR GRAM STAIN: CPT

## 2025-07-21 PROCEDURE — C1887 CATHETER, GUIDING: HCPCS | Performed by: INTERNAL MEDICINE

## 2025-07-21 RX ORDER — EPINEPHRINE 1 MG/ML(1)
AMPUL (ML) INJECTION PRN
Status: DISCONTINUED | OUTPATIENT
Start: 2025-07-21 | End: 2025-07-21 | Stop reason: SURG

## 2025-07-21 RX ORDER — ONDANSETRON 2 MG/ML
INJECTION INTRAMUSCULAR; INTRAVENOUS PRN
Status: DISCONTINUED | OUTPATIENT
Start: 2025-07-21 | End: 2025-07-21 | Stop reason: SURG

## 2025-07-21 RX ORDER — OXYCODONE HCL 5 MG/5 ML
5 SOLUTION, ORAL ORAL
Status: DISCONTINUED | OUTPATIENT
Start: 2025-07-21 | End: 2025-07-21 | Stop reason: HOSPADM

## 2025-07-21 RX ORDER — HYDROMORPHONE HYDROCHLORIDE 1 MG/ML
0.1 INJECTION, SOLUTION INTRAMUSCULAR; INTRAVENOUS; SUBCUTANEOUS
Status: DISCONTINUED | OUTPATIENT
Start: 2025-07-21 | End: 2025-07-21 | Stop reason: HOSPADM

## 2025-07-21 RX ORDER — LIDOCAINE HYDROCHLORIDE 20 MG/ML
INJECTION, SOLUTION EPIDURAL; INFILTRATION; INTRACAUDAL; PERINEURAL PRN
Status: DISCONTINUED | OUTPATIENT
Start: 2025-07-21 | End: 2025-07-21 | Stop reason: SURG

## 2025-07-21 RX ORDER — SODIUM CHLORIDE, SODIUM LACTATE, POTASSIUM CHLORIDE, CALCIUM CHLORIDE 600; 310; 30; 20 MG/100ML; MG/100ML; MG/100ML; MG/100ML
INJECTION, SOLUTION INTRAVENOUS CONTINUOUS
Status: ACTIVE | OUTPATIENT
Start: 2025-07-21 | End: 2025-07-21

## 2025-07-21 RX ORDER — HALOPERIDOL 5 MG/ML
1 INJECTION INTRAMUSCULAR
Status: DISCONTINUED | OUTPATIENT
Start: 2025-07-21 | End: 2025-07-21 | Stop reason: HOSPADM

## 2025-07-21 RX ORDER — HYDROMORPHONE HYDROCHLORIDE 1 MG/ML
0.2 INJECTION, SOLUTION INTRAMUSCULAR; INTRAVENOUS; SUBCUTANEOUS
Status: DISCONTINUED | OUTPATIENT
Start: 2025-07-21 | End: 2025-07-21 | Stop reason: HOSPADM

## 2025-07-21 RX ORDER — ROCURONIUM BROMIDE 10 MG/ML
INJECTION, SOLUTION INTRAVENOUS PRN
Status: DISCONTINUED | OUTPATIENT
Start: 2025-07-21 | End: 2025-07-21 | Stop reason: HOSPADM

## 2025-07-21 RX ORDER — MEPERIDINE HYDROCHLORIDE 25 MG/ML
12.5 INJECTION INTRAMUSCULAR; INTRAVENOUS; SUBCUTANEOUS
Status: DISCONTINUED | OUTPATIENT
Start: 2025-07-21 | End: 2025-07-21 | Stop reason: HOSPADM

## 2025-07-21 RX ORDER — OXYCODONE HCL 5 MG/5 ML
10 SOLUTION, ORAL ORAL
Status: DISCONTINUED | OUTPATIENT
Start: 2025-07-21 | End: 2025-07-21 | Stop reason: HOSPADM

## 2025-07-21 RX ORDER — DEXAMETHASONE SODIUM PHOSPHATE 4 MG/ML
INJECTION, SOLUTION INTRA-ARTICULAR; INTRALESIONAL; INTRAMUSCULAR; INTRAVENOUS; SOFT TISSUE PRN
Status: DISCONTINUED | OUTPATIENT
Start: 2025-07-21 | End: 2025-07-21 | Stop reason: SURG

## 2025-07-21 RX ORDER — LABETALOL HYDROCHLORIDE 5 MG/ML
5 INJECTION, SOLUTION INTRAVENOUS
Status: DISCONTINUED | OUTPATIENT
Start: 2025-07-21 | End: 2025-07-21 | Stop reason: HOSPADM

## 2025-07-21 RX ORDER — CEFAZOLIN SODIUM 1 G/3ML
INJECTION, POWDER, FOR SOLUTION INTRAMUSCULAR; INTRAVENOUS PRN
Status: DISCONTINUED | OUTPATIENT
Start: 2025-07-21 | End: 2025-07-21 | Stop reason: SURG

## 2025-07-21 RX ORDER — ALBUTEROL SULFATE 5 MG/ML
2.5 SOLUTION RESPIRATORY (INHALATION)
Status: DISCONTINUED | OUTPATIENT
Start: 2025-07-21 | End: 2025-07-21 | Stop reason: HOSPADM

## 2025-07-21 RX ORDER — HYDRALAZINE HYDROCHLORIDE 20 MG/ML
5 INJECTION INTRAMUSCULAR; INTRAVENOUS
Status: DISCONTINUED | OUTPATIENT
Start: 2025-07-21 | End: 2025-07-21 | Stop reason: HOSPADM

## 2025-07-21 RX ORDER — LABETALOL HYDROCHLORIDE 5 MG/ML
INJECTION, SOLUTION INTRAVENOUS PRN
Status: DISCONTINUED | OUTPATIENT
Start: 2025-07-21 | End: 2025-07-21 | Stop reason: SURG

## 2025-07-21 RX ORDER — EPHEDRINE SULFATE 50 MG/ML
5 INJECTION, SOLUTION INTRAVENOUS
Status: DISCONTINUED | OUTPATIENT
Start: 2025-07-21 | End: 2025-07-21 | Stop reason: HOSPADM

## 2025-07-21 RX ORDER — HYDROMORPHONE HYDROCHLORIDE 1 MG/ML
0.4 INJECTION, SOLUTION INTRAMUSCULAR; INTRAVENOUS; SUBCUTANEOUS
Status: DISCONTINUED | OUTPATIENT
Start: 2025-07-21 | End: 2025-07-21 | Stop reason: HOSPADM

## 2025-07-21 RX ORDER — PHENYLEPHRINE HCL IN 0.9% NACL 1 MG/10 ML
SYRINGE (ML) INTRAVENOUS PRN
Status: DISCONTINUED | OUTPATIENT
Start: 2025-07-21 | End: 2025-07-21 | Stop reason: SURG

## 2025-07-21 RX ORDER — SODIUM CHLORIDE, SODIUM LACTATE, POTASSIUM CHLORIDE, CALCIUM CHLORIDE 600; 310; 30; 20 MG/100ML; MG/100ML; MG/100ML; MG/100ML
INJECTION, SOLUTION INTRAVENOUS CONTINUOUS
Status: DISCONTINUED | OUTPATIENT
Start: 2025-07-21 | End: 2025-07-21 | Stop reason: HOSPADM

## 2025-07-21 RX ORDER — MIDAZOLAM HYDROCHLORIDE 1 MG/ML
1 INJECTION INTRAMUSCULAR; INTRAVENOUS
Status: DISCONTINUED | OUTPATIENT
Start: 2025-07-21 | End: 2025-07-21 | Stop reason: HOSPADM

## 2025-07-21 RX ORDER — DIPHENHYDRAMINE HYDROCHLORIDE 50 MG/ML
12.5 INJECTION, SOLUTION INTRAMUSCULAR; INTRAVENOUS
Status: DISCONTINUED | OUTPATIENT
Start: 2025-07-21 | End: 2025-07-21 | Stop reason: HOSPADM

## 2025-07-21 RX ORDER — ONDANSETRON 2 MG/ML
4 INJECTION INTRAMUSCULAR; INTRAVENOUS
Status: DISCONTINUED | OUTPATIENT
Start: 2025-07-21 | End: 2025-07-21 | Stop reason: HOSPADM

## 2025-07-21 RX ADMIN — CEFAZOLIN 2 G: 1 INJECTION, POWDER, FOR SOLUTION INTRAMUSCULAR; INTRAVENOUS at 09:28

## 2025-07-21 RX ADMIN — FENTANYL CITRATE 100 MCG: 50 INJECTION, SOLUTION INTRAMUSCULAR; INTRAVENOUS at 09:31

## 2025-07-21 RX ADMIN — LABETALOL HYDROCHLORIDE 10 MG: 5 INJECTION, SOLUTION INTRAVENOUS at 10:59

## 2025-07-21 RX ADMIN — LIDOCAINE HYDROCHLORIDE 100 MG: 20 INJECTION, SOLUTION EPIDURAL; INFILTRATION; INTRACAUDAL; PERINEURAL at 09:31

## 2025-07-21 RX ADMIN — DEXAMETHASONE SODIUM PHOSPHATE 4 MG: 4 INJECTION INTRA-ARTICULAR; INTRALESIONAL; INTRAMUSCULAR; INTRAVENOUS; SOFT TISSUE at 09:31

## 2025-07-21 RX ADMIN — LABETALOL HYDROCHLORIDE 10 MG: 5 INJECTION, SOLUTION INTRAVENOUS at 10:13

## 2025-07-21 RX ADMIN — SODIUM CHLORIDE, POTASSIUM CHLORIDE, SODIUM LACTATE AND CALCIUM CHLORIDE: 600; 310; 30; 20 INJECTION, SOLUTION INTRAVENOUS at 09:28

## 2025-07-21 RX ADMIN — ROCURONIUM BROMIDE 10 MG: 10 INJECTION INTRAVENOUS at 10:39

## 2025-07-21 RX ADMIN — ONDANSETRON 8 MG: 2 INJECTION INTRAMUSCULAR; INTRAVENOUS at 09:31

## 2025-07-21 RX ADMIN — LABETALOL HYDROCHLORIDE 10 MG: 5 INJECTION, SOLUTION INTRAVENOUS at 09:39

## 2025-07-21 RX ADMIN — EPINEPHRINE 1 MG: 1 INJECTION, SOLUTION INTRAMUSCULAR; SUBCUTANEOUS at 10:59

## 2025-07-21 RX ADMIN — SUGAMMADEX 200 MG: 100 INJECTION, SOLUTION INTRAVENOUS at 10:55

## 2025-07-21 RX ADMIN — ROCURONIUM BROMIDE 50 MG: 10 INJECTION INTRAVENOUS at 09:31

## 2025-07-21 RX ADMIN — Medication 100 MCG: at 09:49

## 2025-07-21 RX ADMIN — Medication 100 MCG: at 10:39

## 2025-07-21 RX ADMIN — PROPOFOL 50 MG: 10 INJECTION, EMULSION INTRAVENOUS at 09:39

## 2025-07-21 RX ADMIN — PROPOFOL 50 MG: 10 INJECTION, EMULSION INTRAVENOUS at 09:35

## 2025-07-21 RX ADMIN — PROPOFOL 50 MG: 10 INJECTION, EMULSION INTRAVENOUS at 10:13

## 2025-07-21 RX ADMIN — PROPOFOL 120 MG: 10 INJECTION, EMULSION INTRAVENOUS at 09:31

## 2025-07-21 ASSESSMENT — PAIN DESCRIPTION - PAIN TYPE
TYPE: SURGICAL PAIN

## 2025-07-21 ASSESSMENT — FIBROSIS 4 INDEX: FIB4 SCORE: 5.95

## 2025-07-21 ASSESSMENT — PAIN SCALES - GENERAL: PAIN_LEVEL: 0

## 2025-07-21 NOTE — OR NURSING
1246- Pt sitting up in recliner. Pt was dressed with assist, pat care completed, pt was wet with urine, brief changed.  VSS. Caregivers to bedside.  Report to Hermelinda RITTER.

## 2025-07-21 NOTE — OR NURSING
1246: Report from Mami RITTER.    1250: Patient education completed, family denies further questions.     1300: DC'd to care of family post uneventful stay in PACU 2.

## 2025-07-21 NOTE — DISCHARGE INSTRUCTIONS
If any questions arise, call your provider.      MEDICATIONS: Resume taking daily medication.  Take prescribed pain medication with food.  If no medication is prescribed, you may take non-aspirin pain medication if needed.  PAIN MEDICATION CAN BE VERY CONSTIPATING.  Take a stool softener or laxative such as senokot, pericolace, or milk of magnesia if needed.    What to Expect Post Anesthesia    Rest and take it easy for the first 24 hours.  A responsible adult is recommended to remain with you during that time.  It is normal to feel sleepy.  We encourage you to not do anything that requires balance, judgment or coordination.    FOR 24 HOURS DO NOT:  Drive, operate machinery or run household appliances.  Drink beer or alcoholic beverages.  Make important decisions or sign legal documents.    To avoid nausea, slowly advance diet as tolerated, avoiding spicy or greasy foods for the first day.  Add more substantial food to your diet according to your provider's instructions.  Babies can be fed formula or breast milk as soon as they are hungry.  INCREASE FLUIDS AND FIBER TO AVOID CONSTIPATION.    MILD FLU-LIKE SYMPTOMS ARE NORMAL.  YOU MAY EXPERIENCE GENERALIZED MUSCLE ACHES, THROAT IRRITATION, HEADACHE AND/OR SOME NAUSEA.        Bronchoscopy Discharge Instructions  Home Care Instructions    ACTIVITY: Rest and take it easy for the first 24 hours.  A responsible adult is recommended to remain with you during that time.  It is normal to feel sleepy.  We encourage you to not do anything that requires balance, judgment or coordination.    The medicine you had during the bronchoscopy will make you sleepy.    FOR 24 HOURS DO NOT:  Drive, operate machinery or run household appliances.  Drink beer or alcoholic beverages.  Make important decisions or sign legal documents.  Engage in activity that requires sharp judgment and reflexes for 24 hours    SPECIAL INSTRUCTIONS:  -Call you doctor and go to the ER if you are coughing up  more than 2 tablespoons of bright red blood. -Call your doctor and go to the ER if you experience acute onset of shortness of breath and/or increased chest pain. -Call your doctor and go to the ER if you develop a fever greater than 101.5 degrees Fahrenheit.     Bronchoscopy is a procedure to look inside your windpipe and bronchial tubes.  An anesthetic solution is sprayed in your throat to make it numb.    You may experience a mild sore throat, hoarseness, fever up to 101?F, and /or coughing up small amounts of blood immediately following your bronchoscopy, especially if a biopsy was performed.  The discomfort should subside in 24-48 hours.    Do not smoke for 6-8 hours after the procedure to decrease your risk of coughing and /or bleeding.    Do not drink fluids or eat until your gag reflex returns, for two hours after the bronchoscopy.  Otherwise you will not feel the food or fluid in your throat, and it may go down your windpipe and cause you to choke.    Take ice chips or slowly sip cool fluids to make sure your gag reflex has returned.  Avoid hot fluids from the microwave for several hours.    After 2 hours or when you get home you may take throat lozenges or gargle with salt water if your throat is sore.  Drink liquids to help dryness in your mouth and throat.    Resume your normal activities the following day.    MEDICATIONS: Resume taking daily medication as directed by your doctor.      A follow-up appointment should be arranged with your doctor in 1 week to get the results of the bronchoscopy and any tests done during the procedure; call to schedule.      You should CALL YOUR PHYSICIAN if you develop:  Fever greater than 101?F  You cough up more than a teaspoon of blood other than blood-tinged mucus  You have increasing amounts of bleeding from coughing after the bronchoscopy  You are wheezing  You develop any unusual signs or symptoms or have any questions                You should call 911 if you  develop problems with breathing or chest pain.    If you are unable to contact your doctor or surgical center, you should go to the nearest emergency room or urgent care center.  Physician's telephone #:    [phone]     You may receive a survey in the mail within the next two weeks and we ask that you take a few moments to complete the survey and return it to us.  Our goal is to provide you with very good care and we value your comments.

## 2025-07-21 NOTE — H&P
"Pulmonary History & Physical Note    Date of Service  7/21/2025    Primary Care Physician  Teo Reyes M.D.      Code Status  Prior    Chief Complaint  No chief complaint on file.      History of Presenting Illness  From Dr. Dsouza's note: \"This patient is a 64 y.o. female presenting for evaluation of RUL lung mass found incidentally on CTA neck during admission in December for acute CVA. PMHx is significant for CAD s/p PCI, PVD s/p L carotid stent placement in December and on ASA and brilinta. She has a hx of GIB and pyelonephritis last fall. She is a former smoker with 40 pk year hx and quit 12/2024 after her recent hospital stay. She is also tx for dyslipidemia and hypothyroid. She is currently in rehab due to RLE weakness following CVA. She worked mainly in  and retail. She has cats at home. No family hx of atopic or autoimmune disease. Pt herself has no hx of pulmonary disease. On her CTA neck 12/25/24 she was noted to have b/l paraseptal emphysema and RUL 2.4x2.5 cm nodule  with ? Cavitary portion. No prior imaging. No CT chest was ordered. Pt denies chronic cough, sob, f/c, chest pain or weight loss. \"    She is here today for a biopsy of the RUL lesion.     Review of Systems  No new complaints.     Past Medical History   has a past medical history of Depression, Dysphagia, Essential hypertension, Heart failure (HCC), Hypercholesteremia, Hypothyroidism, Mixed hyperlipidemia, and Stroke (HCC) (2024).    Surgical History   has a past surgical history that includes tubal ligation; dental extraction(s); carpal tunnel release; pr upper gi endoscopy,diagnosis (N/A, 8/11/2021); pr colonoscopy-flexible (N/A, 8/11/2021); and pr cystoscopy,insert ureteral stent (Left, 10/17/2024).     Family History  Family History   Problem Relation Age of Onset    Heart Disease Mother     Dementia Father     Hyperlipidemia Sister     Hyperlipidemia Brother         Family history reviewed with patient. There is no " family history that is pertinent to the chief complaint.     Social History   reports that she has been smoking cigarettes. She started smoking about 51 years ago. She has a 51.6 pack-year smoking history. She has never used smokeless tobacco. She reports current alcohol use of about 7.2 oz of alcohol per week. She reports current drug use. Drug: Inhaled.    Allergies  Allergies[1]    Medications  Prior to Admission Medications   Prescriptions Last Dose Informant Patient Reported? Taking?   acetaminophen (TYLENOL) 500 MG Tab   No No   Sig: Take 2 Tablets by mouth every 6 hours as needed for Mild Pain.   atorvastatin (LIPITOR) 80 MG tablet   No No   Sig: Take 1 Tablet by mouth every evening.   bethanechol (URECHOLINE) 5 MG tablet   Yes No   Sig: Take 5 mg by mouth every morning.   busPIRone (BUSPAR) 7.5 MG tablet   No No   Sig: Take 1 Tablet by mouth 3 times a day.   gabapentin (NEURONTIN) 300 MG Cap   Yes No   Sig: Take 300 mg by mouth 3 times a day.   ipratropium-albuterol (DUONEB) 0.5-2.5 (3) MG/3ML nebulizer solution   No No   Sig: Take 3 mL by nebulization every four hours as needed for Shortness of Breath.   levothyroxine (SYNTHROID) 88 MCG Tab   No No   Sig: Take 1 Tablet by mouth every morning on an empty stomach.   nystatin (MYCOSTATIN) 176590 UNIT/GM Cream topical cream   Yes No   Sig: Apply  topically.   ondansetron (ZOFRAN ODT) 4 MG TABLET DISPERSIBLE   No No   Sig: Take 1 Tablet by mouth every four hours as needed for Nausea/Vomiting (give PO if IV route is unavailable.).   polyethylene glycol/lytes (MIRALAX) Pack   No No   Sig: Take 1 Packet by mouth 1 time a day as needed (if no bowel movement in last 2 days).   senna-docusate (PERICOLACE OR SENOKOT S) 8.6-50 MG Tab   No No   Sig: Take 2 Tablets by mouth every evening.   venlafaxine XR (EFFEXOR XR) 75 MG CAPSULE SR 24 HR   No No   Sig: Take 1 Capsule by mouth every day.      Facility-Administered Medications: None       Physical Exam  Temp:  [36.4 °C  "(97.6 °F)] 36.4 °C (97.6 °F)  Pulse:  [62] 62  Resp:  [18] 18  BP: (156)/(80) 156/80  SpO2:  [99 %] 99 %  Blood Pressure : (!) 156/80   Temperature: 36.4 °C (97.6 °F)   Pulse: 62   Respiration: 18   Pulse Oximetry: 99 %       Physical Exam  Vitals reviewed. Exam conducted with a chaperone present.   Constitutional:       General: She is not in acute distress.     Appearance: She is obese. She is not ill-appearing, toxic-appearing or diaphoretic.   HENT:      Nose: Nose normal.      Mouth/Throat:      Mouth: Mucous membranes are moist.   Eyes:      Pupils: Pupils are equal, round, and reactive to light.   Cardiovascular:      Rate and Rhythm: Normal rate.      Pulses: Normal pulses.   Pulmonary:      Effort: Pulmonary effort is normal.   Skin:     General: Skin is warm.      Capillary Refill: Capillary refill takes less than 2 seconds.      Coloration: Skin is not jaundiced.   Neurological:      General: No focal deficit present.      Mental Status: She is alert.   Psychiatric:         Behavior: Behavior normal.         Laboratory:          No results for input(s): \"ALTSGPT\", \"ASTSGOT\", \"ALKPHOSPHAT\", \"TBILIRUBIN\", \"DBILIRUBIN\", \"GAMMAGT\", \"AMYLASE\", \"LIPASE\", \"ALB\", \"PREALBUMIN\", \"GLUCOSE\" in the last 72 hours.      No results for input(s): \"NTPROBNP\" in the last 72 hours.      No results for input(s): \"TROPONINT\" in the last 72 hours.    Imaging:  DX-CHEST-LIMITED (1 VIEW)    (Results Pending)   DX-PORTABLE FLUORO > 1 HOUR    (Results Pending)       Assessment/Plan:      Lung mass- (present on admission)  Assessment & Plan  Plan for ion robotic bronchoscopy with tissue sampling today         Total consult time: 50 minutes which included time spent on chart review, personally reviewing pertinent images and labs, time spent counseling and educating the patient and/or family members, and coordinating care with the healthcare team to include consultants.     Javy Sotelo DO, [facility]  Staff Pulmonologist and " Intensivist  [facility]     Please note that this dictation was created using voice recognition software. The accuracy of the dictation is limited to the abilities of the software. I have made every reasonable attempt to correct obvious errors, but I expect that there are errors of grammar and possibly content that I did not discover before finalizing the note.          [1]   Allergies  Allergen Reactions    Codeine Unspecified     Unknown reaction

## 2025-07-21 NOTE — ANESTHESIA PREPROCEDURE EVALUATION
Case: [id number] Date/Time: 07/21/25 0915    Procedure: FIBER OPTIC BRONCHOSCOPY WITH POSSIBLE WASH, BRUSH, BRONCHOALVEOLAR LAVAGE, BIOPSY, FINE NEEDLE ASPIRATION AND NAVIGATION, ROBOTICS    Pre-op diagnosis: MASS OF UPPER LOBE OF RIGHT LUNG, FORMER SMOKER    Location:  ENDOSCOPIC ULTRASOUND ROOM / SURGERY [facility]    Surgeons: Javy Sotelo D.O.            Incidental lung mass finding during stroke evaluation 12/24.    Relevant Problems   NEURO   (positive) Acute CVA (cerebrovascular accident) (HCC)      CARDIAC   (positive) Bilateral carotid artery stenosis   (positive) Coronary artery disease due to lipid rich plaque   (positive) Essential hypertension   (positive) STEMI (ST elevation myocardial infarction) (HCC) (Resolved)      GI   (positive) Gastroesophageal reflux disease without esophagitis      ENDO   (positive) Acquired hypothyroidism      Other   (positive) Hx of coronary artery disease   (positive) Lung mass     Lab Results   Component Value Date/Time    SODIUM 142 12/28/2024 03:07 AM    POTASSIUM 3.4 (L) 12/28/2024 03:07 AM    CHLORIDE 113 (H) 12/28/2024 03:07 AM    CO2 17 (L) 12/28/2024 03:07 AM    GLUCOSE 86 12/28/2024 03:07 AM    BUN 15 12/28/2024 03:07 AM    CREATININE 0.44 (L) 12/28/2024 03:07 AM      Lab Results   Component Value Date/Time    WBC 6.1 12/28/2024 03:07 AM    RBC 3.34 (L) 12/28/2024 03:07 AM    HEMOGLOBIN 10.3 (L) 12/28/2024 03:07 AM    HEMATOCRIT 30.7 (L) 12/28/2024 03:07 AM    MCV 91.9 12/28/2024 03:07 AM    MCH 30.8 12/28/2024 03:07 AM    MCHC 33.6 12/28/2024 03:07 AM    MPV 10.2 12/28/2024 03:07 AM    NEUTSPOLYS 69.00 12/26/2024 04:10 AM    LYMPHOCYTES 16.50 (L) 12/26/2024 04:10 AM    MONOCYTES 13.00 12/26/2024 04:10 AM    EOSINOPHILS 0.30 12/26/2024 04:10 AM    BASOPHILS 0.60 12/26/2024 04:10 AM    HYPOCHROMIA 1+ 04/11/2023 12:12 AM    ANISOCYTOSIS 1+ 04/11/2023 12:12 AM      ECG: NSR    Physical Exam    Airway   Mallampati: II  TM distance: >3 FB  Neck ROM: full        Cardiovascular - normal exam  Rhythm: regular  Rate: normal    (-) murmur     Dental - normal exam           Pulmonary - normal exam   Abdominal    Neurological - normal exam                 Anesthesia Plan    ASA 3   ASA physical status 3 criteria: CVA or TIA - history (> 3 months), MI or angina - history (> 3 months) and CAD (>3 months)    Plan - general       Airway plan will be ETT          Induction: intravenous    Postoperative Plan: Postoperative administration of opioids is intended.    Pertinent diagnostic labs and testing reviewed    Informed Consent:    Anesthetic plan and risks discussed with patient.    Use of blood products discussed with: patient whom consented to blood products.

## 2025-07-21 NOTE — ANESTHESIA POSTPROCEDURE EVALUATION
Patient: Marisol Brown    Procedure Summary       Date: 07/21/25 Room / Location:  ENDOSCOPIC ULTRASOUND ROOM / SURGERY [facility]    Anesthesia Start: 0928 Anesthesia Stop: 1113    Procedure: FIBER OPTIC BRONCHOSCOPY WITH  WASH, BRONCHOALVEOLAR LAVAGE, BIOPSY, FINE NEEDLE ASPIRATION AND NAVIGATION, ROBOTICS (Chest) Diagnosis:       Mass of upper lobe of right lung      (MASS OF UPPER LOBE OF RIGHT LUNG)    Surgeons: Javy Sotelo D.O. Responsible Provider: James Henriquez M.D.    Anesthesia Type: general ASA Status: 3            Final Anesthesia Type: general  Last vitals  BP   Blood Pressure : 133/68    Temp   36.3 °C (97.3 °F)    Pulse   84   Resp   15    SpO2   97 %      Anesthesia Post Evaluation    Patient location during evaluation: PACU  Patient participation: complete - patient participated  Level of consciousness: awake and alert  Pain score: 0    Airway patency: patent  Anesthetic complications: no  Cardiovascular status: hemodynamically stable  Respiratory status: acceptable  Hydration status: euvolemic    PONV: none        No notable events documented.     Nurse Pain Score: 0 (NPRS)

## 2025-07-21 NOTE — ANESTHESIA PROCEDURE NOTES
Airway    Date/Time: 7/21/2025 9:32 AM    Performed by: James Henriquez M.D.  Authorized by: James Henriquez M.D.    Location:  OR  Urgency:  Elective  Indications for Airway Management:  Anesthesia      Spontaneous Ventilation: absent    Sedation Level:  Deep  Preoxygenated: Yes    Patient Position:  Sniffing  Mask Difficulty Assessment:  1 - vent by mask  Final Airway Type:  Endotracheal airway  Final Endotracheal Airway:  ETT  Cuffed: Yes    Technique Used for Successful ETT Placement:  Direct laryngoscopy    Insertion Site:  Oral  Blade Type:  Linh  Laryngoscope Blade/Videolaryngoscope Blade Size:  3  ETT Size (mm):  8.0  Measured from:  Teeth  ETT to Teeth (cm):  21  Placement Verified by: auscultation and capnometry    Cormack-Lehane Classification:  Grade I - full view of glottis  Number of Attempts at Approach:  1

## 2025-07-21 NOTE — PROGRESS NOTES
ALEX with +adenocarcinoma  PET with only RUL involvement   PFTs would indicate that she is a candidate for surgical resection.  Referrals to Oncology and Thoracic surgery placed.

## 2025-07-21 NOTE — OR NURSING
1105 - Pt to PACU from procedure room. Report from anesthesia and OR RN. On 8L O2 via mask. Respirations even and unlabored. VSS. Pt awake on arrival. Pt denies pain or nausea.    1135 - xray notified pt has CXR ordered.     1140 - Pt tolerating PO fluids    1150 - Xray at bedside    1207 - Given IS and instructed on use with goal of 2200, pt currently inhaling volumes of approx 750.     1218 - awaiting xray read    1220 - Pt contact updated.    1224 - Report given to phase II RN Mami. Verbalize understanding and all questions answered. Patient transported to phase II via gurney with chart and all personal belongings, escorted by CNA.

## 2025-07-21 NOTE — ANESTHESIA TIME REPORT
Anesthesia Start and Stop Event Times       Date Time Event    7/21/2025 0905 Ready for Procedure     0928 Anesthesia Start     1113 Anesthesia Stop          Responsible Staff  07/21/25      Name Role Begin End    James Henriquez M.D. Anesth 0928 1113          Overtime Reason:  no overtime (within assigned shift)    Comments:

## 2025-07-21 NOTE — PROCEDURES
Fiberoptic Bronchoscopy/Ion Robotic bronchoscopy      Date/Time of Procedure:TD@     Indication(s): Tissue sampling RUL nodule    Consent: Informed, written consent was obtained prior to the procedure; risks, benefits, & alternatives were discussed.    Universal Protocol/Time Out: A Time Out with checklist was performed prior to the procedure to ensure correct identification of the patient and procedure.    Pre-Procedure Diagnosis: RUL nodule/mass     Allergies:ALLER@     Sedation/Analgesia/Anesthesia: Sedation as per anesthesia.    Additional Modalities:    [X] Fluoroscopy  [X] Radial Ultrasound  [_] Linear Endobronchial Ultrasound  [X] Rapid On-Site Evaluation  [_] Other: _    Route of Entry:  [_] Nasal [_] Oral [X] ET tube [_] Trach [_] LMA      Findings: After the patient is adequately anesthetized (see procedure for anesthesia), the flexible bronchoscope was passed through the endotracheal tube visualizing the distal trachea:  Trachea: Scant mucus.  Otherwise unremarkable.  Akanksha: Scant mucus.  Otherwise unremarkable.  Right lung: RUL, RML, and RLL all level one subsegments visualized.  Moderate secretions.  Otherwise unremarkable.  Left lung: SHANI, Lingula and LLL , All level one subsegments visualized.  Unremarkable.    Robotic Navigational Bronchoscopy    Robotic navigation bronchoscopy was performed with Ion platform.  Partial registration was used.    Ion robotic catheter was used to engage the posterior segment of right upper lung.  Target lesion is about 3-4 cm in diameter.   Under navigational guidance the ion robotic catheter was advanced to 1.0 cm away from the planned target.     Radial EBUS was performed to confirm that the location of the nodule is concentric..     Transbronchial needle aspiration was performed with 21 gauge needle through the extended working channel catheter.  Total 7 samples were collected.  Samples sent for pathology.    Transbronchial biopsy was performed with 2 mm forceps  through the extended working channel catheter.  Total 7 samples were collected.  Samples sent for pathology    Bronchial alveolar lavage was performed the extended working channel catheter.  Instilled 30 cc of NS, suction returned with 30 cc of NS.  Samples sent for cytology.    ALEX findings: Non-small carcinoma       1. SIZE OF NEEDLE   21-gauge Olympus     2. STATIONS SAMPLED  Right upper lobe     3. ALEX CONFIRMATION  + lymphocyte sample      Specimens: _  [X_] Bronchoalveolar Lavage (BAL): Right upper lobe  [  ] Wash:    [  ] Brush:    [X_] Transbronchial Needle Aspiration (TBNA): Right upper lobe  [  ] Endobronchial Biopsy (Endo):    [X_] Transbronchial Biopsy (TBBx): _Right upper lobe  [_] Other:   [_] Veran Navigational Bronchoscopy:   [_] Endobronchial Ultrasound (EBUS) TBNA:       Javy Sotelo DO, [facility]  Staff Pulmonologist and Intensivist  [facility]     Please note that this dictation was created using voice recognition software. The accuracy of the dictation is limited to the abilities of the software. I have made every reasonable attempt to correct obvious errors, but I expect that there are errors of grammar and possibly content that I did not discover before finalizing the note.

## 2025-07-23 LAB
BACTERIA SPEC RESP CULT: NORMAL
GRAM STN SPEC: NORMAL
SIGNIFICANT IND 70042: NORMAL
SITE SITE: NORMAL
SOURCE SOURCE: NORMAL

## 2025-07-28 ENCOUNTER — PATIENT OUTREACH (OUTPATIENT)
Dept: ONCOLOGY | Facility: MEDICAL CENTER | Age: 65
End: 2025-07-28
Payer: MEDICARE

## 2025-07-28 DIAGNOSIS — R91.8 LUNG MASS: ICD-10-CM

## 2025-07-28 DIAGNOSIS — C34.11 MALIGNANT NEOPLASM OF UPPER LOBE OF RIGHT LUNG (HCC): Primary | ICD-10-CM

## 2025-07-28 NOTE — PROGRESS NOTES
Referral received, request to assist patient in getting PET and MRI scheduled prior to oncology consult.  Pt does live in [place].  Unsure if patient has been given path results yet.  Reached out to pulmonologist regarding this. He said he told her it's cancer, but he did not have final path at that time.    Call placed to patient, left voice message.  Chart note says she resides at [facility].  Call Placed to them, verified she still resides there.  Left message for Tisha  for information regarding patient and ability to do additional scans/treatment while there.

## 2025-07-28 NOTE — PROGRESS NOTES
Tisha, from [facility] returned call.  She reported had been on phone earlier with [facility] getting PET and MRI scheduled for Wednesday.  She provided information that patient has appointment with [facility] Aug 5th at 10:30 am.  Pt told her she does not want chemo, but would be open to surgery.  Asked Tisha if patient would be able to remain at facility if needed treatment. She said it would depend on what was needed and how frequent if needed to transport to [place].  Thanked her for information.

## 2025-07-30 ENCOUNTER — HOSPITAL ENCOUNTER (OUTPATIENT)
Dept: RADIOLOGY | Facility: MEDICAL CENTER | Age: 65
End: 2025-07-30
Attending: STUDENT IN AN ORGANIZED HEALTH CARE EDUCATION/TRAINING PROGRAM
Payer: MEDICARE

## 2025-07-30 DIAGNOSIS — R91.8 LUNG MASS: ICD-10-CM

## 2025-07-30 DIAGNOSIS — C34.11 MALIGNANT NEOPLASM OF UPPER LOBE OF RIGHT LUNG (HCC): ICD-10-CM

## 2025-07-30 LAB — GLUCOSE BLD-MCNC: 79 MG/DL (ref 65–99)

## 2025-07-30 PROCEDURE — 700117 HCHG RX CONTRAST REV CODE 255: Mod: JZ | Performed by: STUDENT IN AN ORGANIZED HEALTH CARE EDUCATION/TRAINING PROGRAM

## 2025-07-30 PROCEDURE — A9552 F18 FDG: HCPCS

## 2025-07-30 PROCEDURE — 70553 MRI BRAIN STEM W/O & W/DYE: CPT

## 2025-07-30 PROCEDURE — A9579 GAD-BASE MR CONTRAST NOS,1ML: HCPCS | Mod: JZ | Performed by: STUDENT IN AN ORGANIZED HEALTH CARE EDUCATION/TRAINING PROGRAM

## 2025-07-30 RX ORDER — GADOTERIDOL 279.3 MG/ML
18 INJECTION INTRAVENOUS ONCE
Status: COMPLETED | OUTPATIENT
Start: 2025-07-30 | End: 2025-07-30

## 2025-07-30 RX ADMIN — GADOTERIDOL 18 ML: 279.3 INJECTION, SOLUTION INTRAVENOUS at 14:52

## 2025-08-06 ENCOUNTER — PATIENT OUTREACH (OUTPATIENT)
Dept: ONCOLOGY | Facility: MEDICAL CENTER | Age: 65
End: 2025-08-06
Payer: MEDICARE

## 2025-08-13 ENCOUNTER — PATIENT OUTREACH (OUTPATIENT)
Dept: ONCOLOGY | Facility: MEDICAL CENTER | Age: 65
End: 2025-08-13
Payer: MEDICARE

## 2025-08-15 ENCOUNTER — HOSPITAL ENCOUNTER (OUTPATIENT)
Dept: HEMATOLOGY ONCOLOGY | Facility: MEDICAL CENTER | Age: 65
End: 2025-08-15
Attending: STUDENT IN AN ORGANIZED HEALTH CARE EDUCATION/TRAINING PROGRAM
Payer: MEDICARE

## 2025-08-15 ENCOUNTER — PATIENT OUTREACH (OUTPATIENT)
Dept: ONCOLOGY | Facility: MEDICAL CENTER | Age: 65
End: 2025-08-15
Payer: MEDICARE

## 2025-08-15 VITALS
HEART RATE: 82 BPM | DIASTOLIC BLOOD PRESSURE: 58 MMHG | SYSTOLIC BLOOD PRESSURE: 104 MMHG | WEIGHT: 196.98 LBS | HEIGHT: 66 IN | TEMPERATURE: 98.7 F | OXYGEN SATURATION: 96 % | BODY MASS INDEX: 31.66 KG/M2

## 2025-08-15 DIAGNOSIS — C34.91 SQUAMOUS CELL LUNG CANCER, RIGHT (HCC): Primary | ICD-10-CM

## 2025-08-15 PROCEDURE — 99212 OFFICE O/P EST SF 10 MIN: CPT | Performed by: STUDENT IN AN ORGANIZED HEALTH CARE EDUCATION/TRAINING PROGRAM

## 2025-08-15 ASSESSMENT — ENCOUNTER SYMPTOMS
SHORTNESS OF BREATH: 0
SORE THROAT: 0
BLURRED VISION: 0
ABDOMINAL PAIN: 0
WEIGHT LOSS: 0
FEVER: 0
NECK PAIN: 0
SPUTUM PRODUCTION: 0
WHEEZING: 0
FOCAL WEAKNESS: 0
HEADACHES: 0
DEPRESSION: 0
NAUSEA: 0
CHILLS: 0
HEARTBURN: 0
DIZZINESS: 0
ORTHOPNEA: 0
PALPITATIONS: 0
COUGH: 0
VOMITING: 0
TINGLING: 0
MEMORY LOSS: 0
SENSORY CHANGE: 0
TREMORS: 0
WEAKNESS: 1
BRUISES/BLEEDS EASILY: 0

## 2025-08-15 ASSESSMENT — FIBROSIS 4 INDEX: FIB4 SCORE: 5.13

## 2025-08-18 ENCOUNTER — PRE-ADMISSION TESTING (OUTPATIENT)
Dept: ADMISSIONS | Facility: MEDICAL CENTER | Age: 65
End: 2025-08-18
Attending: STUDENT IN AN ORGANIZED HEALTH CARE EDUCATION/TRAINING PROGRAM
Payer: MEDICARE

## 2025-08-25 ENCOUNTER — HOSPITAL ENCOUNTER (INPATIENT)
Facility: MEDICAL CENTER | Age: 65
LOS: 3 days | End: 2025-08-28
Attending: STUDENT IN AN ORGANIZED HEALTH CARE EDUCATION/TRAINING PROGRAM | Admitting: STUDENT IN AN ORGANIZED HEALTH CARE EDUCATION/TRAINING PROGRAM
Payer: MEDICARE

## 2025-08-25 ENCOUNTER — ANESTHESIA EVENT (OUTPATIENT)
Dept: SURGERY | Facility: MEDICAL CENTER | Age: 65
End: 2025-08-25
Payer: MEDICARE

## 2025-08-25 ENCOUNTER — ANESTHESIA (OUTPATIENT)
Dept: SURGERY | Facility: MEDICAL CENTER | Age: 65
End: 2025-08-25
Payer: MEDICARE

## 2025-08-25 LAB
ANION GAP SERPL CALC-SCNC: 12 MMOL/L (ref 7–16)
BUN SERPL-MCNC: 12 MG/DL (ref 8–22)
CALCIUM SERPL-MCNC: 8.7 MG/DL (ref 8.5–10.5)
CHLORIDE SERPL-SCNC: 110 MMOL/L (ref 96–112)
CO2 SERPL-SCNC: 19 MMOL/L (ref 20–33)
CREAT SERPL-MCNC: 0.81 MG/DL (ref 0.5–1.4)
ERYTHROCYTE [DISTWIDTH] IN BLOOD BY AUTOMATED COUNT: 47.5 FL (ref 35.9–50)
GFR SERPLBLD CREATININE-BSD FMLA CKD-EPI: 80 ML/MIN/1.73 M 2
GLUCOSE SERPL-MCNC: 137 MG/DL (ref 65–99)
HCT VFR BLD AUTO: 41.8 % (ref 37–47)
HGB BLD-MCNC: 13 G/DL (ref 12–16)
MCH RBC QN AUTO: 27 PG (ref 27–33)
MCHC RBC AUTO-ENTMCNC: 31.1 G/DL (ref 32.2–35.5)
MCV RBC AUTO: 86.7 FL (ref 81.4–97.8)
PLATELET # BLD AUTO: 230 K/UL (ref 164–446)
PMV BLD AUTO: 9.4 FL (ref 9–12.9)
POTASSIUM SERPL-SCNC: 4.2 MMOL/L (ref 3.6–5.5)
RBC # BLD AUTO: 4.82 M/UL (ref 4.2–5.4)
SODIUM SERPL-SCNC: 141 MMOL/L (ref 135–145)
WBC # BLD AUTO: 15.5 K/UL (ref 4.8–10.8)

## 2025-08-25 PROCEDURE — 160002 HCHG RECOVERY MINUTES (STAT): Performed by: STUDENT IN AN ORGANIZED HEALTH CARE EDUCATION/TRAINING PROGRAM

## 2025-08-25 PROCEDURE — 80048 BASIC METABOLIC PNL TOTAL CA: CPT

## 2025-08-25 PROCEDURE — A9270 NON-COVERED ITEM OR SERVICE: HCPCS | Performed by: STUDENT IN AN ORGANIZED HEALTH CARE EDUCATION/TRAINING PROGRAM

## 2025-08-25 PROCEDURE — 88305 TISSUE EXAM BY PATHOLOGIST: CPT | Mod: 59 | Performed by: PATHOLOGY

## 2025-08-25 PROCEDURE — 160031 HCHG SURGERY MINUTES - 1ST 30 MINS LEVEL 5: Performed by: STUDENT IN AN ORGANIZED HEALTH CARE EDUCATION/TRAINING PROGRAM

## 2025-08-25 PROCEDURE — 700111 HCHG RX REV CODE 636 W/ 250 OVERRIDE (IP): Mod: JZ | Performed by: STUDENT IN AN ORGANIZED HEALTH CARE EDUCATION/TRAINING PROGRAM

## 2025-08-25 PROCEDURE — 700105 HCHG RX REV CODE 258: Performed by: STUDENT IN AN ORGANIZED HEALTH CARE EDUCATION/TRAINING PROGRAM

## 2025-08-25 PROCEDURE — 160193 HCHG PACU STANDARD - 1ST 60 MINS: Performed by: STUDENT IN AN ORGANIZED HEALTH CARE EDUCATION/TRAINING PROGRAM

## 2025-08-25 PROCEDURE — 770001 HCHG ROOM/CARE - MED/SURG/GYN PRIV*

## 2025-08-25 PROCEDURE — 88309 TISSUE EXAM BY PATHOLOGIST: CPT | Performed by: PATHOLOGY

## 2025-08-25 PROCEDURE — 502714 HCHG ROBOTIC SURGERY SERVICES: Performed by: STUDENT IN AN ORGANIZED HEALTH CARE EDUCATION/TRAINING PROGRAM

## 2025-08-25 PROCEDURE — 85027 COMPLETE CBC AUTOMATED: CPT

## 2025-08-25 PROCEDURE — C1729 CATH, DRAINAGE: HCPCS | Performed by: STUDENT IN AN ORGANIZED HEALTH CARE EDUCATION/TRAINING PROGRAM

## 2025-08-25 PROCEDURE — 160015 HCHG STAT PREOP MINUTES: Performed by: STUDENT IN AN ORGANIZED HEALTH CARE EDUCATION/TRAINING PROGRAM

## 2025-08-25 PROCEDURE — 160192 HCHG ANESTHESIA COMPLEX: Performed by: STUDENT IN AN ORGANIZED HEALTH CARE EDUCATION/TRAINING PROGRAM

## 2025-08-25 PROCEDURE — 700101 HCHG RX REV CODE 250: Performed by: STUDENT IN AN ORGANIZED HEALTH CARE EDUCATION/TRAINING PROGRAM

## 2025-08-25 PROCEDURE — 36415 COLL VENOUS BLD VENIPUNCTURE: CPT

## 2025-08-25 PROCEDURE — 160042 HCHG SURGERY MINUTES - EA ADDL 1 MIN LEVEL 5: Performed by: STUDENT IN AN ORGANIZED HEALTH CARE EDUCATION/TRAINING PROGRAM

## 2025-08-25 PROCEDURE — 160048 HCHG OR STATISTICAL LEVEL 1-5: Performed by: STUDENT IN AN ORGANIZED HEALTH CARE EDUCATION/TRAINING PROGRAM

## 2025-08-25 PROCEDURE — 700102 HCHG RX REV CODE 250 W/ 637 OVERRIDE(OP): Performed by: STUDENT IN AN ORGANIZED HEALTH CARE EDUCATION/TRAINING PROGRAM

## 2025-08-25 PROCEDURE — 700111 HCHG RX REV CODE 636 W/ 250 OVERRIDE (IP): Performed by: STUDENT IN AN ORGANIZED HEALTH CARE EDUCATION/TRAINING PROGRAM

## 2025-08-25 PROCEDURE — 88307 TISSUE EXAM BY PATHOLOGIST: CPT | Mod: 59 | Performed by: PATHOLOGY

## 2025-08-25 RX ORDER — BUPIVACAINE HYDROCHLORIDE AND EPINEPHRINE 5; 5 MG/ML; UG/ML
INJECTION, SOLUTION EPIDURAL; INTRACAUDAL; PERINEURAL
Status: DISCONTINUED | OUTPATIENT
Start: 2025-08-25 | End: 2025-08-25 | Stop reason: HOSPADM

## 2025-08-25 RX ORDER — LEVOTHYROXINE SODIUM 75 UG/1
75 TABLET ORAL
COMMUNITY

## 2025-08-25 RX ORDER — CALCIUM CARBONATE 500 MG/1
1000 TABLET, CHEWABLE ORAL EVERY 8 HOURS PRN
Status: DISCONTINUED | OUTPATIENT
Start: 2025-08-25 | End: 2025-08-28 | Stop reason: HOSPADM

## 2025-08-25 RX ORDER — OXYCODONE HYDROCHLORIDE 5 MG/1
5 TABLET ORAL
Status: DISCONTINUED | OUTPATIENT
Start: 2025-08-25 | End: 2025-08-28 | Stop reason: HOSPADM

## 2025-08-25 RX ORDER — CALCIUM CARBONATE 500 MG/1
1000 TABLET, CHEWABLE ORAL EVERY 8 HOURS PRN
COMMUNITY

## 2025-08-25 RX ORDER — BETHANECHOL CHLORIDE 10 MG/1
5 TABLET ORAL EVERY MORNING
Status: DISCONTINUED | OUTPATIENT
Start: 2025-08-26 | End: 2025-08-28 | Stop reason: HOSPADM

## 2025-08-25 RX ORDER — DIPHENHYDRAMINE HYDROCHLORIDE 50 MG/ML
12.5 INJECTION, SOLUTION INTRAMUSCULAR; INTRAVENOUS
Status: DISCONTINUED | OUTPATIENT
Start: 2025-08-25 | End: 2025-08-25 | Stop reason: HOSPADM

## 2025-08-25 RX ORDER — IBUPROFEN 800 MG/1
800 TABLET, FILM COATED ORAL 3 TIMES DAILY PRN
Status: DISCONTINUED | OUTPATIENT
Start: 2025-08-28 | End: 2025-08-27

## 2025-08-25 RX ORDER — CEFAZOLIN SODIUM 1 G/3ML
INJECTION, POWDER, FOR SOLUTION INTRAMUSCULAR; INTRAVENOUS PRN
Status: DISCONTINUED | OUTPATIENT
Start: 2025-08-25 | End: 2025-08-25 | Stop reason: SURG

## 2025-08-25 RX ORDER — KETOROLAC TROMETHAMINE 15 MG/ML
15 INJECTION, SOLUTION INTRAMUSCULAR; INTRAVENOUS EVERY 6 HOURS
Status: DISCONTINUED | OUTPATIENT
Start: 2025-08-25 | End: 2025-08-27

## 2025-08-25 RX ORDER — PHENYLEPHRINE HYDROCHLORIDE 10 MG/ML
INJECTION, SOLUTION INTRAMUSCULAR; INTRAVENOUS; SUBCUTANEOUS PRN
Status: DISCONTINUED | OUTPATIENT
Start: 2025-08-25 | End: 2025-08-25 | Stop reason: SURG

## 2025-08-25 RX ORDER — HYDROMORPHONE HYDROCHLORIDE 1 MG/ML
0.2 INJECTION, SOLUTION INTRAMUSCULAR; INTRAVENOUS; SUBCUTANEOUS
Status: DISCONTINUED | OUTPATIENT
Start: 2025-08-25 | End: 2025-08-25 | Stop reason: HOSPADM

## 2025-08-25 RX ORDER — SODIUM CHLORIDE, SODIUM LACTATE, POTASSIUM CHLORIDE, CALCIUM CHLORIDE 600; 310; 30; 20 MG/100ML; MG/100ML; MG/100ML; MG/100ML
INJECTION, SOLUTION INTRAVENOUS CONTINUOUS
Status: DISCONTINUED | OUTPATIENT
Start: 2025-08-25 | End: 2025-08-25 | Stop reason: HOSPADM

## 2025-08-25 RX ORDER — SODIUM CHLORIDE, SODIUM LACTATE, POTASSIUM CHLORIDE, CALCIUM CHLORIDE 600; 310; 30; 20 MG/100ML; MG/100ML; MG/100ML; MG/100ML
INJECTION, SOLUTION INTRAVENOUS
Status: DISCONTINUED | OUTPATIENT
Start: 2025-08-25 | End: 2025-08-25 | Stop reason: SURG

## 2025-08-25 RX ORDER — GABAPENTIN 300 MG/1
300 CAPSULE ORAL 3 TIMES DAILY
Status: DISCONTINUED | OUTPATIENT
Start: 2025-08-25 | End: 2025-08-28 | Stop reason: HOSPADM

## 2025-08-25 RX ORDER — BISACODYL 10 MG
10 SUPPOSITORY, RECTAL RECTAL EVERY 8 HOURS PRN
Status: DISCONTINUED | OUTPATIENT
Start: 2025-08-25 | End: 2025-08-28 | Stop reason: HOSPADM

## 2025-08-25 RX ORDER — IPRATROPIUM BROMIDE AND ALBUTEROL SULFATE 2.5; .5 MG/3ML; MG/3ML
3 SOLUTION RESPIRATORY (INHALATION) EVERY 4 HOURS PRN
Status: DISCONTINUED | OUTPATIENT
Start: 2025-08-25 | End: 2025-08-27

## 2025-08-25 RX ORDER — LABETALOL HYDROCHLORIDE 5 MG/ML
5 INJECTION, SOLUTION INTRAVENOUS
Status: DISCONTINUED | OUTPATIENT
Start: 2025-08-25 | End: 2025-08-25 | Stop reason: HOSPADM

## 2025-08-25 RX ORDER — ONDANSETRON 2 MG/ML
INJECTION INTRAMUSCULAR; INTRAVENOUS PRN
Status: DISCONTINUED | OUTPATIENT
Start: 2025-08-25 | End: 2025-08-25 | Stop reason: SURG

## 2025-08-25 RX ORDER — BISACODYL 10 MG
10 SUPPOSITORY, RECTAL RECTAL EVERY 8 HOURS PRN
COMMUNITY

## 2025-08-25 RX ORDER — NITROFURANTOIN 25; 75 MG/1; MG/1
100 CAPSULE ORAL 2 TIMES DAILY
COMMUNITY

## 2025-08-25 RX ORDER — POLYETHYLENE GLYCOL 3350 17 G/17G
17 POWDER, FOR SOLUTION ORAL
Status: DISCONTINUED | OUTPATIENT
Start: 2025-08-25 | End: 2025-08-28 | Stop reason: HOSPADM

## 2025-08-25 RX ORDER — HYDROMORPHONE HYDROCHLORIDE 1 MG/ML
0.4 INJECTION, SOLUTION INTRAMUSCULAR; INTRAVENOUS; SUBCUTANEOUS
Status: DISCONTINUED | OUTPATIENT
Start: 2025-08-25 | End: 2025-08-25 | Stop reason: HOSPADM

## 2025-08-25 RX ORDER — VENLAFAXINE HYDROCHLORIDE 75 MG/1
75 CAPSULE, EXTENDED RELEASE ORAL DAILY
Status: DISCONTINUED | OUTPATIENT
Start: 2025-08-26 | End: 2025-08-28 | Stop reason: HOSPADM

## 2025-08-25 RX ORDER — DIPHENHYDRAMINE HCL 25 MG
25 TABLET ORAL EVERY 6 HOURS PRN
Status: DISCONTINUED | OUTPATIENT
Start: 2025-08-25 | End: 2025-08-28 | Stop reason: HOSPADM

## 2025-08-25 RX ORDER — ENOXAPARIN SODIUM 100 MG/ML
40 INJECTION SUBCUTANEOUS DAILY
Status: DISCONTINUED | OUTPATIENT
Start: 2025-08-26 | End: 2025-08-28 | Stop reason: HOSPADM

## 2025-08-25 RX ORDER — DEXAMETHASONE SODIUM PHOSPHATE 4 MG/ML
INJECTION, SOLUTION INTRA-ARTICULAR; INTRALESIONAL; INTRAMUSCULAR; INTRAVENOUS; SOFT TISSUE PRN
Status: DISCONTINUED | OUTPATIENT
Start: 2025-08-25 | End: 2025-08-25 | Stop reason: SURG

## 2025-08-25 RX ORDER — ATORVASTATIN CALCIUM 80 MG/1
80 TABLET, FILM COATED ORAL EVERY EVENING
Status: DISCONTINUED | OUTPATIENT
Start: 2025-08-25 | End: 2025-08-28 | Stop reason: HOSPADM

## 2025-08-25 RX ORDER — AMOXICILLIN 250 MG
1 CAPSULE ORAL 2 TIMES DAILY
Status: DISCONTINUED | OUTPATIENT
Start: 2025-08-25 | End: 2025-08-28 | Stop reason: HOSPADM

## 2025-08-25 RX ORDER — HYDROMORPHONE HYDROCHLORIDE 1 MG/ML
0.1 INJECTION, SOLUTION INTRAMUSCULAR; INTRAVENOUS; SUBCUTANEOUS
Status: DISCONTINUED | OUTPATIENT
Start: 2025-08-25 | End: 2025-08-25 | Stop reason: HOSPADM

## 2025-08-25 RX ORDER — ROCURONIUM BROMIDE 10 MG/ML
INJECTION, SOLUTION INTRAVENOUS PRN
Status: DISCONTINUED | OUTPATIENT
Start: 2025-08-25 | End: 2025-08-25 | Stop reason: SURG

## 2025-08-25 RX ORDER — OXYCODONE HCL 5 MG/5 ML
10 SOLUTION, ORAL ORAL
Status: COMPLETED | OUTPATIENT
Start: 2025-08-25 | End: 2025-08-25

## 2025-08-25 RX ORDER — HYDRALAZINE HYDROCHLORIDE 20 MG/ML
5 INJECTION INTRAMUSCULAR; INTRAVENOUS
Status: DISCONTINUED | OUTPATIENT
Start: 2025-08-25 | End: 2025-08-25 | Stop reason: HOSPADM

## 2025-08-25 RX ORDER — IPRATROPIUM BROMIDE AND ALBUTEROL SULFATE 2.5; .5 MG/3ML; MG/3ML
3 SOLUTION RESPIRATORY (INHALATION)
Status: DISCONTINUED | OUTPATIENT
Start: 2025-08-25 | End: 2025-08-25 | Stop reason: HOSPADM

## 2025-08-25 RX ORDER — SODIUM CHLORIDE, SODIUM LACTATE, POTASSIUM CHLORIDE, CALCIUM CHLORIDE 600; 310; 30; 20 MG/100ML; MG/100ML; MG/100ML; MG/100ML
INJECTION, SOLUTION INTRAVENOUS CONTINUOUS
Status: DISCONTINUED | OUTPATIENT
Start: 2025-08-25 | End: 2025-08-27

## 2025-08-25 RX ORDER — LABETALOL HYDROCHLORIDE 5 MG/ML
INJECTION, SOLUTION INTRAVENOUS PRN
Status: DISCONTINUED | OUTPATIENT
Start: 2025-08-25 | End: 2025-08-25 | Stop reason: HOSPADM

## 2025-08-25 RX ORDER — EPHEDRINE SULFATE 50 MG/ML
5 INJECTION, SOLUTION INTRAVENOUS
Status: DISCONTINUED | OUTPATIENT
Start: 2025-08-25 | End: 2025-08-25 | Stop reason: HOSPADM

## 2025-08-25 RX ORDER — ACETAMINOPHEN 500 MG
1000 TABLET ORAL EVERY 8 HOURS PRN
Status: ON HOLD | COMMUNITY
End: 2025-08-25

## 2025-08-25 RX ORDER — SODIUM CHLORIDE, SODIUM LACTATE, POTASSIUM CHLORIDE, CALCIUM CHLORIDE 600; 310; 30; 20 MG/100ML; MG/100ML; MG/100ML; MG/100ML
INJECTION, SOLUTION INTRAVENOUS CONTINUOUS
Status: ACTIVE | OUTPATIENT
Start: 2025-08-25 | End: 2025-08-25

## 2025-08-25 RX ORDER — DIPHENHYDRAMINE HYDROCHLORIDE 50 MG/ML
25 INJECTION, SOLUTION INTRAMUSCULAR; INTRAVENOUS EVERY 6 HOURS PRN
Status: DISCONTINUED | OUTPATIENT
Start: 2025-08-25 | End: 2025-08-28 | Stop reason: HOSPADM

## 2025-08-25 RX ORDER — ONDANSETRON 2 MG/ML
4 INJECTION INTRAMUSCULAR; INTRAVENOUS EVERY 4 HOURS PRN
Status: DISCONTINUED | OUTPATIENT
Start: 2025-08-25 | End: 2025-08-28 | Stop reason: HOSPADM

## 2025-08-25 RX ORDER — BUSPIRONE HYDROCHLORIDE 10 MG/1
7.5 TABLET ORAL 3 TIMES DAILY
Status: DISCONTINUED | OUTPATIENT
Start: 2025-08-25 | End: 2025-08-28 | Stop reason: HOSPADM

## 2025-08-25 RX ORDER — SODIUM PHOSPHATE,MONO-DIBASIC 19G-7G/118
1 ENEMA (ML) RECTAL EVERY 8 HOURS PRN
COMMUNITY

## 2025-08-25 RX ORDER — OXYCODONE HCL 5 MG/5 ML
5 SOLUTION, ORAL ORAL
Status: COMPLETED | OUTPATIENT
Start: 2025-08-25 | End: 2025-08-25

## 2025-08-25 RX ORDER — LEVOTHYROXINE SODIUM 75 UG/1
75 TABLET ORAL
Status: DISCONTINUED | OUTPATIENT
Start: 2025-08-26 | End: 2025-08-28 | Stop reason: HOSPADM

## 2025-08-25 RX ORDER — OXYCODONE HYDROCHLORIDE 10 MG/1
10 TABLET ORAL
Status: DISCONTINUED | OUTPATIENT
Start: 2025-08-25 | End: 2025-08-28 | Stop reason: HOSPADM

## 2025-08-25 RX ORDER — LIDOCAINE HYDROCHLORIDE 20 MG/ML
INJECTION, SOLUTION EPIDURAL; INFILTRATION; INTRACAUDAL; PERINEURAL PRN
Status: DISCONTINUED | OUTPATIENT
Start: 2025-08-25 | End: 2025-08-25 | Stop reason: SURG

## 2025-08-25 RX ORDER — ONDANSETRON 2 MG/ML
4 INJECTION INTRAMUSCULAR; INTRAVENOUS
Status: DISCONTINUED | OUTPATIENT
Start: 2025-08-25 | End: 2025-08-25 | Stop reason: HOSPADM

## 2025-08-25 RX ORDER — HYDROMORPHONE HYDROCHLORIDE 1 MG/ML
0.5 INJECTION, SOLUTION INTRAMUSCULAR; INTRAVENOUS; SUBCUTANEOUS
Status: DISCONTINUED | OUTPATIENT
Start: 2025-08-25 | End: 2025-08-28 | Stop reason: HOSPADM

## 2025-08-25 RX ADMIN — FENTANYL CITRATE 25 MCG: 50 INJECTION, SOLUTION INTRAMUSCULAR; INTRAVENOUS at 15:58

## 2025-08-25 RX ADMIN — FENTANYL CITRATE 50 MCG: 50 INJECTION, SOLUTION INTRAMUSCULAR; INTRAVENOUS at 13:15

## 2025-08-25 RX ADMIN — ROCURONIUM BROMIDE 20 MG: 10 INJECTION INTRAVENOUS at 13:51

## 2025-08-25 RX ADMIN — ATORVASTATIN CALCIUM 80 MG: 80 TABLET, FILM COATED ORAL at 21:27

## 2025-08-25 RX ADMIN — LIDOCAINE HYDROCHLORIDE 100 MG: 20 INJECTION, SOLUTION EPIDURAL; INFILTRATION; INTRACAUDAL; PERINEURAL at 12:45

## 2025-08-25 RX ADMIN — KETOROLAC TROMETHAMINE 15 MG: 15 INJECTION, SOLUTION INTRAMUSCULAR; INTRAVENOUS at 21:27

## 2025-08-25 RX ADMIN — OXYCODONE HYDROCHLORIDE 10 MG: 5 SOLUTION ORAL at 15:57

## 2025-08-25 RX ADMIN — CEFAZOLIN 2 G: 1 INJECTION, POWDER, FOR SOLUTION INTRAMUSCULAR; INTRAVENOUS at 12:45

## 2025-08-25 RX ADMIN — BUSPIRONE HYDROCHLORIDE 7.5 MG: 10 TABLET ORAL at 18:23

## 2025-08-25 RX ADMIN — OXYCODONE HYDROCHLORIDE 10 MG: 10 TABLET ORAL at 18:24

## 2025-08-25 RX ADMIN — FENTANYL CITRATE 50 MCG: 50 INJECTION, SOLUTION INTRAMUSCULAR; INTRAVENOUS at 12:45

## 2025-08-25 RX ADMIN — LABETALOL HYDROCHLORIDE 5 MG: 5 INJECTION, SOLUTION INTRAVENOUS at 13:17

## 2025-08-25 RX ADMIN — FENTANYL CITRATE 25 MCG: 50 INJECTION, SOLUTION INTRAMUSCULAR; INTRAVENOUS at 16:29

## 2025-08-25 RX ADMIN — FENTANYL CITRATE 25 MCG: 50 INJECTION, SOLUTION INTRAMUSCULAR; INTRAVENOUS at 16:51

## 2025-08-25 RX ADMIN — GABAPENTIN 300 MG: 300 CAPSULE ORAL at 23:19

## 2025-08-25 RX ADMIN — SODIUM CHLORIDE, POTASSIUM CHLORIDE, SODIUM LACTATE AND CALCIUM CHLORIDE: 600; 310; 30; 20 INJECTION, SOLUTION INTRAVENOUS at 18:25

## 2025-08-25 RX ADMIN — ROCURONIUM BROMIDE 60 MG: 10 INJECTION INTRAVENOUS at 12:45

## 2025-08-25 RX ADMIN — PROPOFOL 150 MG: 10 INJECTION, EMULSION INTRAVENOUS at 12:45

## 2025-08-25 RX ADMIN — FENTANYL CITRATE 50 MCG: 50 INJECTION, SOLUTION INTRAMUSCULAR; INTRAVENOUS at 12:56

## 2025-08-25 RX ADMIN — ROCURONIUM BROMIDE 30 MG: 10 INJECTION INTRAVENOUS at 13:25

## 2025-08-25 RX ADMIN — FENTANYL CITRATE 50 MCG: 50 INJECTION, SOLUTION INTRAMUSCULAR; INTRAVENOUS at 15:33

## 2025-08-25 RX ADMIN — DEXAMETHASONE SODIUM PHOSPHATE 8 MG: 4 INJECTION INTRA-ARTICULAR; INTRALESIONAL; INTRAMUSCULAR; INTRAVENOUS; SOFT TISSUE at 12:45

## 2025-08-25 RX ADMIN — HYDROMORPHONE HYDROCHLORIDE 0.5 MG: 1 INJECTION, SOLUTION INTRAMUSCULAR; INTRAVENOUS; SUBCUTANEOUS at 19:30

## 2025-08-25 RX ADMIN — GABAPENTIN 300 MG: 300 CAPSULE ORAL at 18:23

## 2025-08-25 RX ADMIN — ONDANSETRON 4 MG: 2 INJECTION INTRAMUSCULAR; INTRAVENOUS at 15:29

## 2025-08-25 RX ADMIN — SUGAMMADEX 200 MG: 100 INJECTION, SOLUTION INTRAVENOUS at 15:29

## 2025-08-25 RX ADMIN — FENTANYL CITRATE 25 MCG: 50 INJECTION, SOLUTION INTRAMUSCULAR; INTRAVENOUS at 15:14

## 2025-08-25 RX ADMIN — SODIUM CHLORIDE, POTASSIUM CHLORIDE, SODIUM LACTATE AND CALCIUM CHLORIDE: 600; 310; 30; 20 INJECTION, SOLUTION INTRAVENOUS at 12:39

## 2025-08-25 RX ADMIN — PHENYLEPHRINE HYDROCHLORIDE 50 MCG: 10 INJECTION INTRAVENOUS at 13:35

## 2025-08-25 RX ADMIN — FENTANYL CITRATE 25 MCG: 50 INJECTION, SOLUTION INTRAMUSCULAR; INTRAVENOUS at 16:15

## 2025-08-25 RX ADMIN — LABETALOL HYDROCHLORIDE 5 MG: 5 INJECTION, SOLUTION INTRAVENOUS at 13:10

## 2025-08-25 RX ADMIN — PHENYLEPHRINE HYDROCHLORIDE 50 MCG: 10 INJECTION INTRAVENOUS at 14:19

## 2025-08-25 RX ADMIN — BUSPIRONE HYDROCHLORIDE 7.5 MG: 10 TABLET ORAL at 23:19

## 2025-08-25 RX ADMIN — FENTANYL CITRATE 25 MCG: 50 INJECTION, SOLUTION INTRAMUSCULAR; INTRAVENOUS at 15:27

## 2025-08-25 ASSESSMENT — PAIN DESCRIPTION - PAIN TYPE
TYPE: ACUTE PAIN

## 2025-08-25 ASSESSMENT — PAIN SCALES - GENERAL: PAIN_LEVEL: 2

## 2025-08-25 ASSESSMENT — FIBROSIS 4 INDEX: FIB4 SCORE: 5.13

## 2025-08-26 ENCOUNTER — APPOINTMENT (OUTPATIENT)
Dept: RADIOLOGY | Facility: MEDICAL CENTER | Age: 65
End: 2025-08-26
Attending: STUDENT IN AN ORGANIZED HEALTH CARE EDUCATION/TRAINING PROGRAM
Payer: MEDICARE

## 2025-08-26 LAB
ANION GAP SERPL CALC-SCNC: 10 MMOL/L (ref 7–16)
BASOPHILS # BLD AUTO: 0.4 % (ref 0–1.8)
BASOPHILS # BLD: 0.04 K/UL (ref 0–0.12)
BUN SERPL-MCNC: 12 MG/DL (ref 8–22)
CALCIUM SERPL-MCNC: 8.7 MG/DL (ref 8.5–10.5)
CHLORIDE SERPL-SCNC: 109 MMOL/L (ref 96–112)
CO2 SERPL-SCNC: 20 MMOL/L (ref 20–33)
CREAT SERPL-MCNC: 0.8 MG/DL (ref 0.5–1.4)
EOSINOPHIL # BLD AUTO: 0.02 K/UL (ref 0–0.51)
EOSINOPHIL NFR BLD: 0.2 % (ref 0–6.9)
ERYTHROCYTE [DISTWIDTH] IN BLOOD BY AUTOMATED COUNT: 47.1 FL (ref 35.9–50)
ERYTHROCYTE [DISTWIDTH] IN BLOOD BY AUTOMATED COUNT: 47.8 FL (ref 35.9–50)
GFR SERPLBLD CREATININE-BSD FMLA CKD-EPI: 81 ML/MIN/1.73 M 2
GLUCOSE SERPL-MCNC: 124 MG/DL (ref 65–99)
HCT VFR BLD AUTO: 33.3 % (ref 37–47)
HCT VFR BLD AUTO: 34.7 % (ref 37–47)
HGB BLD-MCNC: 10.5 G/DL (ref 12–16)
HGB BLD-MCNC: 11 G/DL (ref 12–16)
IMM GRANULOCYTES # BLD AUTO: 0.03 K/UL (ref 0–0.11)
IMM GRANULOCYTES NFR BLD AUTO: 0.3 % (ref 0–0.9)
LYMPHOCYTES # BLD AUTO: 1.71 K/UL (ref 1–4.8)
LYMPHOCYTES NFR BLD: 17 % (ref 22–41)
MCH RBC QN AUTO: 27.3 PG (ref 27–33)
MCH RBC QN AUTO: 27.3 PG (ref 27–33)
MCHC RBC AUTO-ENTMCNC: 31.5 G/DL (ref 32.2–35.5)
MCHC RBC AUTO-ENTMCNC: 31.7 G/DL (ref 32.2–35.5)
MCV RBC AUTO: 86.1 FL (ref 81.4–97.8)
MCV RBC AUTO: 86.5 FL (ref 81.4–97.8)
MONOCYTES # BLD AUTO: 1.37 K/UL (ref 0–0.85)
MONOCYTES NFR BLD AUTO: 13.6 % (ref 0–13.4)
NEUTROPHILS # BLD AUTO: 6.87 K/UL (ref 1.82–7.42)
NEUTROPHILS NFR BLD: 68.5 % (ref 44–72)
NRBC # BLD AUTO: 0 K/UL
NRBC BLD-RTO: 0 /100 WBC (ref 0–0.2)
PATHOLOGY CONSULT NOTE: NORMAL
PLATELET # BLD AUTO: 246 K/UL (ref 164–446)
PLATELET # BLD AUTO: 253 K/UL (ref 164–446)
PMV BLD AUTO: 9.5 FL (ref 9–12.9)
PMV BLD AUTO: 9.7 FL (ref 9–12.9)
POTASSIUM SERPL-SCNC: 4.5 MMOL/L (ref 3.6–5.5)
RBC # BLD AUTO: 3.85 M/UL (ref 4.2–5.4)
RBC # BLD AUTO: 4.03 M/UL (ref 4.2–5.4)
SODIUM SERPL-SCNC: 139 MMOL/L (ref 135–145)
WBC # BLD AUTO: 10 K/UL (ref 4.8–10.8)
WBC # BLD AUTO: 10 K/UL (ref 4.8–10.8)

## 2025-08-26 PROCEDURE — 85027 COMPLETE CBC AUTOMATED: CPT

## 2025-08-26 PROCEDURE — A9270 NON-COVERED ITEM OR SERVICE: HCPCS | Performed by: STUDENT IN AN ORGANIZED HEALTH CARE EDUCATION/TRAINING PROGRAM

## 2025-08-26 PROCEDURE — 36415 COLL VENOUS BLD VENIPUNCTURE: CPT

## 2025-08-26 PROCEDURE — 770001 HCHG ROOM/CARE - MED/SURG/GYN PRIV*

## 2025-08-26 PROCEDURE — 71045 X-RAY EXAM CHEST 1 VIEW: CPT

## 2025-08-26 PROCEDURE — 80048 BASIC METABOLIC PNL TOTAL CA: CPT

## 2025-08-26 PROCEDURE — 700111 HCHG RX REV CODE 636 W/ 250 OVERRIDE (IP): Mod: JZ | Performed by: STUDENT IN AN ORGANIZED HEALTH CARE EDUCATION/TRAINING PROGRAM

## 2025-08-26 PROCEDURE — 700102 HCHG RX REV CODE 250 W/ 637 OVERRIDE(OP): Performed by: STUDENT IN AN ORGANIZED HEALTH CARE EDUCATION/TRAINING PROGRAM

## 2025-08-26 PROCEDURE — 700105 HCHG RX REV CODE 258: Performed by: STUDENT IN AN ORGANIZED HEALTH CARE EDUCATION/TRAINING PROGRAM

## 2025-08-26 PROCEDURE — 85025 COMPLETE CBC W/AUTO DIFF WBC: CPT

## 2025-08-26 RX ORDER — SODIUM CHLORIDE, SODIUM LACTATE, POTASSIUM CHLORIDE, AND CALCIUM CHLORIDE .6; .31; .03; .02 G/100ML; G/100ML; G/100ML; G/100ML
1000 INJECTION, SOLUTION INTRAVENOUS ONCE
Status: COMPLETED | OUTPATIENT
Start: 2025-08-26 | End: 2025-08-26

## 2025-08-26 RX ADMIN — KETOROLAC TROMETHAMINE 15 MG: 15 INJECTION, SOLUTION INTRAMUSCULAR; INTRAVENOUS at 05:12

## 2025-08-26 RX ADMIN — KETOROLAC TROMETHAMINE 15 MG: 15 INJECTION, SOLUTION INTRAMUSCULAR; INTRAVENOUS at 17:41

## 2025-08-26 RX ADMIN — BUSPIRONE HYDROCHLORIDE 7.5 MG: 10 TABLET ORAL at 17:42

## 2025-08-26 RX ADMIN — GABAPENTIN 300 MG: 300 CAPSULE ORAL at 07:34

## 2025-08-26 RX ADMIN — VENLAFAXINE HYDROCHLORIDE 75 MG: 75 CAPSULE, EXTENDED RELEASE ORAL at 05:11

## 2025-08-26 RX ADMIN — SODIUM CHLORIDE, POTASSIUM CHLORIDE, SODIUM LACTATE AND CALCIUM CHLORIDE 1000 ML: 600; 310; 30; 20 INJECTION, SOLUTION INTRAVENOUS at 20:10

## 2025-08-26 RX ADMIN — BETHANECHOL CHLORIDE 5 MG: 10 TABLET ORAL at 05:11

## 2025-08-26 RX ADMIN — LEVOTHYROXINE SODIUM 75 MCG: 0.07 TABLET ORAL at 05:11

## 2025-08-26 RX ADMIN — KETOROLAC TROMETHAMINE 15 MG: 15 INJECTION, SOLUTION INTRAMUSCULAR; INTRAVENOUS at 10:30

## 2025-08-26 RX ADMIN — OXYCODONE HYDROCHLORIDE 10 MG: 10 TABLET ORAL at 08:41

## 2025-08-26 RX ADMIN — OXYCODONE HYDROCHLORIDE 5 MG: 5 TABLET ORAL at 05:12

## 2025-08-26 RX ADMIN — ENOXAPARIN SODIUM 40 MG: 100 INJECTION SUBCUTANEOUS at 08:42

## 2025-08-26 RX ADMIN — GABAPENTIN 300 MG: 300 CAPSULE ORAL at 17:42

## 2025-08-26 RX ADMIN — SENNOSIDES, DOCUSATE SODIUM 1 TABLET: 50; 8.6 TABLET, FILM COATED ORAL at 05:11

## 2025-08-26 RX ADMIN — ATORVASTATIN CALCIUM 80 MG: 80 TABLET, FILM COATED ORAL at 17:42

## 2025-08-26 RX ADMIN — BUSPIRONE HYDROCHLORIDE 7.5 MG: 10 TABLET ORAL at 07:35

## 2025-08-26 ASSESSMENT — COGNITIVE AND FUNCTIONAL STATUS - GENERAL
SUGGESTED CMS G CODE MODIFIER MOBILITY: CK
MOVING TO AND FROM BED TO CHAIR: A LITTLE
DRESSING REGULAR LOWER BODY CLOTHING: A LOT
TURNING FROM BACK TO SIDE WHILE IN FLAT BAD: A LITTLE
MOBILITY SCORE: 16
EATING MEALS: A LITTLE
MOVING FROM LYING ON BACK TO SITTING ON SIDE OF FLAT BED: A LITTLE
DAILY ACTIVITIY SCORE: 17
TOILETING: A LOT
CLIMB 3 TO 5 STEPS WITH RAILING: A LOT
STANDING UP FROM CHAIR USING ARMS: A LITTLE
HELP NEEDED FOR BATHING: A LITTLE
WALKING IN HOSPITAL ROOM: A LOT
SUGGESTED CMS G CODE MODIFIER DAILY ACTIVITY: CK
DRESSING REGULAR UPPER BODY CLOTHING: A LITTLE

## 2025-08-26 ASSESSMENT — PAIN DESCRIPTION - PAIN TYPE
TYPE: ACUTE PAIN

## 2025-08-26 ASSESSMENT — LIFESTYLE VARIABLES
HOW MANY TIMES IN THE PAST YEAR HAVE YOU HAD 5 OR MORE DRINKS IN A DAY: 0
ON A TYPICAL DAY WHEN YOU DRINK ALCOHOL HOW MANY DRINKS DO YOU HAVE: 1
ALCOHOL_USE: YES
CONSUMPTION TOTAL: NEGATIVE
HAVE YOU EVER FELT YOU SHOULD CUT DOWN ON YOUR DRINKING: NO
EVER FELT BAD OR GUILTY ABOUT YOUR DRINKING: NO
TOTAL SCORE: 0
HAVE PEOPLE ANNOYED YOU BY CRITICIZING YOUR DRINKING: NO
AVERAGE NUMBER OF DAYS PER WEEK YOU HAVE A DRINK CONTAINING ALCOHOL: 0
EVER HAD A DRINK FIRST THING IN THE MORNING TO STEADY YOUR NERVES TO GET RID OF A HANGOVER: NO
TOTAL SCORE: 0
TOTAL SCORE: 0
DOES PATIENT WANT TO STOP DRINKING: NO

## 2025-08-26 ASSESSMENT — PATIENT HEALTH QUESTIONNAIRE - PHQ9
2. FEELING DOWN, DEPRESSED, IRRITABLE, OR HOPELESS: NOT AT ALL
SUM OF ALL RESPONSES TO PHQ9 QUESTIONS 1 AND 2: 0
1. LITTLE INTEREST OR PLEASURE IN DOING THINGS: NOT AT ALL

## 2025-08-27 ENCOUNTER — APPOINTMENT (OUTPATIENT)
Dept: RADIOLOGY | Facility: MEDICAL CENTER | Age: 65
End: 2025-08-27
Attending: STUDENT IN AN ORGANIZED HEALTH CARE EDUCATION/TRAINING PROGRAM
Payer: MEDICARE

## 2025-08-27 LAB
ANION GAP SERPL CALC-SCNC: 9 MMOL/L (ref 7–16)
BUN SERPL-MCNC: 17 MG/DL (ref 8–22)
CALCIUM SERPL-MCNC: 8.1 MG/DL (ref 8.5–10.5)
CHLORIDE SERPL-SCNC: 109 MMOL/L (ref 96–112)
CO2 SERPL-SCNC: 20 MMOL/L (ref 20–33)
CREAT SERPL-MCNC: 1.16 MG/DL (ref 0.5–1.4)
ERYTHROCYTE [DISTWIDTH] IN BLOOD BY AUTOMATED COUNT: 49.4 FL (ref 35.9–50)
GFR SERPLBLD CREATININE-BSD FMLA CKD-EPI: 52 ML/MIN/1.73 M 2
GLUCOSE SERPL-MCNC: 118 MG/DL (ref 65–99)
HCT VFR BLD AUTO: 32.4 % (ref 37–47)
HGB BLD-MCNC: 10.1 G/DL (ref 12–16)
MCH RBC QN AUTO: 27.5 PG (ref 27–33)
MCHC RBC AUTO-ENTMCNC: 31.2 G/DL (ref 32.2–35.5)
MCV RBC AUTO: 88.3 FL (ref 81.4–97.8)
PLATELET # BLD AUTO: 249 K/UL (ref 164–446)
PMV BLD AUTO: 10.5 FL (ref 9–12.9)
POTASSIUM SERPL-SCNC: 4.5 MMOL/L (ref 3.6–5.5)
RBC # BLD AUTO: 3.67 M/UL (ref 4.2–5.4)
SODIUM SERPL-SCNC: 138 MMOL/L (ref 135–145)
WBC # BLD AUTO: 8 K/UL (ref 4.8–10.8)

## 2025-08-27 PROCEDURE — 97535 SELF CARE MNGMENT TRAINING: CPT

## 2025-08-27 PROCEDURE — 71045 X-RAY EXAM CHEST 1 VIEW: CPT

## 2025-08-27 PROCEDURE — 97166 OT EVAL MOD COMPLEX 45 MIN: CPT

## 2025-08-27 PROCEDURE — 97163 PT EVAL HIGH COMPLEX 45 MIN: CPT

## 2025-08-27 PROCEDURE — 85027 COMPLETE CBC AUTOMATED: CPT

## 2025-08-27 PROCEDURE — 770001 HCHG ROOM/CARE - MED/SURG/GYN PRIV*

## 2025-08-27 PROCEDURE — 80048 BASIC METABOLIC PNL TOTAL CA: CPT

## 2025-08-27 PROCEDURE — A9270 NON-COVERED ITEM OR SERVICE: HCPCS | Performed by: STUDENT IN AN ORGANIZED HEALTH CARE EDUCATION/TRAINING PROGRAM

## 2025-08-27 PROCEDURE — 700102 HCHG RX REV CODE 250 W/ 637 OVERRIDE(OP): Performed by: STUDENT IN AN ORGANIZED HEALTH CARE EDUCATION/TRAINING PROGRAM

## 2025-08-27 PROCEDURE — 700111 HCHG RX REV CODE 636 W/ 250 OVERRIDE (IP): Mod: JZ | Performed by: STUDENT IN AN ORGANIZED HEALTH CARE EDUCATION/TRAINING PROGRAM

## 2025-08-27 PROCEDURE — 36415 COLL VENOUS BLD VENIPUNCTURE: CPT

## 2025-08-27 RX ORDER — IPRATROPIUM BROMIDE AND ALBUTEROL SULFATE 2.5; .5 MG/3ML; MG/3ML
3 SOLUTION RESPIRATORY (INHALATION)
Status: DISCONTINUED | OUTPATIENT
Start: 2025-08-27 | End: 2025-08-28 | Stop reason: HOSPADM

## 2025-08-27 RX ADMIN — GABAPENTIN 300 MG: 300 CAPSULE ORAL at 23:59

## 2025-08-27 RX ADMIN — KETOROLAC TROMETHAMINE 15 MG: 15 INJECTION, SOLUTION INTRAMUSCULAR; INTRAVENOUS at 05:13

## 2025-08-27 RX ADMIN — SENNOSIDES, DOCUSATE SODIUM 1 TABLET: 50; 8.6 TABLET, FILM COATED ORAL at 05:11

## 2025-08-27 RX ADMIN — BUSPIRONE HYDROCHLORIDE 7.5 MG: 10 TABLET ORAL at 00:20

## 2025-08-27 RX ADMIN — BETHANECHOL CHLORIDE 5 MG: 10 TABLET ORAL at 05:11

## 2025-08-27 RX ADMIN — OXYCODONE HYDROCHLORIDE 5 MG: 5 TABLET ORAL at 20:05

## 2025-08-27 RX ADMIN — GABAPENTIN 300 MG: 300 CAPSULE ORAL at 00:20

## 2025-08-27 RX ADMIN — BUSPIRONE HYDROCHLORIDE 7.5 MG: 10 TABLET ORAL at 08:18

## 2025-08-27 RX ADMIN — BUSPIRONE HYDROCHLORIDE 7.5 MG: 10 TABLET ORAL at 15:16

## 2025-08-27 RX ADMIN — OXYCODONE HYDROCHLORIDE 10 MG: 10 TABLET ORAL at 15:17

## 2025-08-27 RX ADMIN — ENOXAPARIN SODIUM 40 MG: 100 INJECTION SUBCUTANEOUS at 16:58

## 2025-08-27 RX ADMIN — ATORVASTATIN CALCIUM 80 MG: 80 TABLET, FILM COATED ORAL at 16:58

## 2025-08-27 RX ADMIN — GABAPENTIN 300 MG: 300 CAPSULE ORAL at 08:19

## 2025-08-27 RX ADMIN — OXYCODONE HYDROCHLORIDE 5 MG: 5 TABLET ORAL at 05:11

## 2025-08-27 RX ADMIN — KETOROLAC TROMETHAMINE 15 MG: 15 INJECTION, SOLUTION INTRAMUSCULAR; INTRAVENOUS at 00:20

## 2025-08-27 RX ADMIN — BUSPIRONE HYDROCHLORIDE 7.5 MG: 10 TABLET ORAL at 23:59

## 2025-08-27 RX ADMIN — LEVOTHYROXINE SODIUM 75 MCG: 0.07 TABLET ORAL at 05:13

## 2025-08-27 RX ADMIN — OXYCODONE HYDROCHLORIDE 5 MG: 5 TABLET ORAL at 08:26

## 2025-08-27 RX ADMIN — VENLAFAXINE HYDROCHLORIDE 75 MG: 75 CAPSULE, EXTENDED RELEASE ORAL at 05:10

## 2025-08-27 RX ADMIN — SENNOSIDES, DOCUSATE SODIUM 1 TABLET: 50; 8.6 TABLET, FILM COATED ORAL at 16:58

## 2025-08-27 RX ADMIN — GABAPENTIN 300 MG: 300 CAPSULE ORAL at 15:15

## 2025-08-27 ASSESSMENT — COGNITIVE AND FUNCTIONAL STATUS - GENERAL
DRESSING REGULAR UPPER BODY CLOTHING: A LITTLE
STANDING UP FROM CHAIR USING ARMS: A LITTLE
HELP NEEDED FOR BATHING: A LITTLE
TOILETING: A LITTLE
DRESSING REGULAR LOWER BODY CLOTHING: A LITTLE
DAILY ACTIVITIY SCORE: 20
CLIMB 3 TO 5 STEPS WITH RAILING: A LOT
SUGGESTED CMS G CODE MODIFIER DAILY ACTIVITY: CJ
MOBILITY SCORE: 17
TURNING FROM BACK TO SIDE WHILE IN FLAT BAD: A LITTLE
MOVING TO AND FROM BED TO CHAIR: A LITTLE
MOVING FROM LYING ON BACK TO SITTING ON SIDE OF FLAT BED: A LITTLE
WALKING IN HOSPITAL ROOM: A LITTLE
SUGGESTED CMS G CODE MODIFIER MOBILITY: CK

## 2025-08-27 ASSESSMENT — PAIN DESCRIPTION - PAIN TYPE
TYPE: ACUTE PAIN

## 2025-08-27 ASSESSMENT — GAIT ASSESSMENTS
GAIT LEVEL OF ASSIST: MINIMAL ASSIST
DISTANCE (FEET): 10
ASSISTIVE DEVICE: FRONT WHEEL WALKER
DEVIATION: ANTALGIC;BRADYKINETIC;SHUFFLED GAIT

## 2025-08-27 ASSESSMENT — ACTIVITIES OF DAILY LIVING (ADL): TOILETING: REQUIRES ASSIST

## 2025-08-28 ENCOUNTER — APPOINTMENT (OUTPATIENT)
Dept: RADIOLOGY | Facility: MEDICAL CENTER | Age: 65
End: 2025-08-28
Attending: STUDENT IN AN ORGANIZED HEALTH CARE EDUCATION/TRAINING PROGRAM
Payer: MEDICARE

## 2025-08-28 VITALS
WEIGHT: 189.38 LBS | HEART RATE: 101 BPM | SYSTOLIC BLOOD PRESSURE: 90 MMHG | OXYGEN SATURATION: 88 % | TEMPERATURE: 99.7 F | HEIGHT: 66 IN | DIASTOLIC BLOOD PRESSURE: 61 MMHG | BODY MASS INDEX: 30.44 KG/M2 | RESPIRATION RATE: 17 BRPM

## 2025-08-28 LAB
ANION GAP SERPL CALC-SCNC: 11 MMOL/L (ref 7–16)
BUN SERPL-MCNC: 11 MG/DL (ref 8–22)
CALCIUM SERPL-MCNC: 8.3 MG/DL (ref 8.5–10.5)
CHLORIDE SERPL-SCNC: 105 MMOL/L (ref 96–112)
CO2 SERPL-SCNC: 20 MMOL/L (ref 20–33)
CREAT SERPL-MCNC: 0.83 MG/DL (ref 0.5–1.4)
ERYTHROCYTE [DISTWIDTH] IN BLOOD BY AUTOMATED COUNT: 47.3 FL (ref 35.9–50)
GFR SERPLBLD CREATININE-BSD FMLA CKD-EPI: 78 ML/MIN/1.73 M 2
GLUCOSE SERPL-MCNC: 101 MG/DL (ref 65–99)
HCT VFR BLD AUTO: 34.5 % (ref 37–47)
HGB BLD-MCNC: 11 G/DL (ref 12–16)
MCH RBC QN AUTO: 27.2 PG (ref 27–33)
MCHC RBC AUTO-ENTMCNC: 31.9 G/DL (ref 32.2–35.5)
MCV RBC AUTO: 85.4 FL (ref 81.4–97.8)
PLATELET # BLD AUTO: 264 K/UL (ref 164–446)
PMV BLD AUTO: 9.9 FL (ref 9–12.9)
POTASSIUM SERPL-SCNC: 4.1 MMOL/L (ref 3.6–5.5)
RBC # BLD AUTO: 4.04 M/UL (ref 4.2–5.4)
SODIUM SERPL-SCNC: 136 MMOL/L (ref 135–145)
WBC # BLD AUTO: 8.3 K/UL (ref 4.8–10.8)

## 2025-08-28 PROCEDURE — 85027 COMPLETE CBC AUTOMATED: CPT

## 2025-08-28 PROCEDURE — 700102 HCHG RX REV CODE 250 W/ 637 OVERRIDE(OP): Performed by: STUDENT IN AN ORGANIZED HEALTH CARE EDUCATION/TRAINING PROGRAM

## 2025-08-28 PROCEDURE — 71045 X-RAY EXAM CHEST 1 VIEW: CPT

## 2025-08-28 PROCEDURE — 80048 BASIC METABOLIC PNL TOTAL CA: CPT

## 2025-08-28 PROCEDURE — 36415 COLL VENOUS BLD VENIPUNCTURE: CPT

## 2025-08-28 PROCEDURE — A9270 NON-COVERED ITEM OR SERVICE: HCPCS | Performed by: STUDENT IN AN ORGANIZED HEALTH CARE EDUCATION/TRAINING PROGRAM

## 2025-08-28 RX ADMIN — SENNOSIDES, DOCUSATE SODIUM 1 TABLET: 50; 8.6 TABLET, FILM COATED ORAL at 04:50

## 2025-08-28 RX ADMIN — LEVOTHYROXINE SODIUM 75 MCG: 0.07 TABLET ORAL at 04:50

## 2025-08-28 RX ADMIN — BETHANECHOL CHLORIDE 5 MG: 10 TABLET ORAL at 04:50

## 2025-08-28 RX ADMIN — GABAPENTIN 300 MG: 300 CAPSULE ORAL at 07:37

## 2025-08-28 RX ADMIN — BUSPIRONE HYDROCHLORIDE 7.5 MG: 10 TABLET ORAL at 07:37

## 2025-08-28 RX ADMIN — VENLAFAXINE HYDROCHLORIDE 75 MG: 75 CAPSULE, EXTENDED RELEASE ORAL at 04:50

## 2025-08-28 RX ADMIN — OXYCODONE HYDROCHLORIDE 5 MG: 5 TABLET ORAL at 07:37

## 2025-08-28 RX ADMIN — OXYCODONE HYDROCHLORIDE 10 MG: 10 TABLET ORAL at 03:05

## 2025-08-28 ASSESSMENT — PAIN DESCRIPTION - PAIN TYPE
TYPE: ACUTE PAIN

## 2025-10-13 ENCOUNTER — APPOINTMENT (OUTPATIENT)
Dept: NEUROLOGY | Facility: MEDICAL CENTER | Age: 65
End: 2025-10-13
Attending: PSYCHIATRY & NEUROLOGY
Payer: MEDICARE

## (undated) DEVICE — DRAPE IOBAN II INCISE 23X17 - (10EA/BX 4BX/CA)

## (undated) DEVICE — DRESSING SURGICAL NUKNIT 6X9 (10EA/BX)

## (undated) DEVICE — ELECTRODE 850 FOAM ADHESIVE - HYDROGEL RADIOTRNSPRNT (50/PK)

## (undated) DEVICE — TUBE CHEST SOFT STRAIGHT 24FR (10EA/BX)

## (undated) DEVICE — SPONGE GAUZESTER 4 X 4 4PLY - (128PK/CA)

## (undated) DEVICE — SUTURE 2-0 VICRYL PLUS CT-2 - 27 INCH (36/BX)

## (undated) DEVICE — SPONGE GAUZE NON-STERILE 4X4 - (2000/CA 10PK/CA)

## (undated) DEVICE — SYRINGE SAFETY 5 ML 18 GA X 1-1/2 BLUNT LL (100/BX 4BX/CA)

## (undated) DEVICE — ELECTRODE DUAL RETURN W/ CORD - (50/PK)

## (undated) DEVICE — SCOPE DIGITAL URETEROSCOPE DISPOSABLE (10EA/PK)

## (undated) DEVICE — PACK CYSTO III (2EA/CA)

## (undated) DEVICE — GLOVE BIOGEL PI INDICATOR SZ 7.0 SURGICAL PF LF - (50/BX 4BX/CA)

## (undated) DEVICE — TUBING CLEARLINK DUO-VENT - C-FLO (48EA/CA)

## (undated) DEVICE — SET EXTENSION WITH 2 PORTS (48EA/CA) ***PART #2C8610 IS A SUBSTITUTE*****

## (undated) DEVICE — SYRINGE 10 ML CONTROL LL (25EA/BX 4BX/CA)

## (undated) DEVICE — SENSOR OXIMETER ADULT SPO2 RD SET (20EA/BX)

## (undated) DEVICE — STAPLER SUREFORM 60 12 MM (6EA/BX)

## (undated) DEVICE — WATER IRRIGATION STERILE 1000ML (12EA/CA)

## (undated) DEVICE — FORCEP RADIAL JAW 4 STANDARD CAPACITY W/NEEDLE 240CM (40EA/BX)

## (undated) DEVICE — LACTATED RINGERS INJ 1000 ML - (14EA/CA 60CA/PF)

## (undated) DEVICE — CATHETER IV SAFETY 20 GA X 1-1/4 (50/BX)

## (undated) DEVICE — GOWN SURGEONS LARGE - (32/CA)

## (undated) DEVICE — CANNULA O2 COMFORT SOFT EAR ADULT 7 FT TUBING (50/CA)

## (undated) DEVICE — BLOCK BITE ENDOSCOPIC 2809 - (100/BX) INTERMEDIATE

## (undated) DEVICE — SYRINGE 30 ML LL (56/BX)

## (undated) DEVICE — KIT CUSTOM PROCEDURE SINGLE FOR ENDO (15/CA)

## (undated) DEVICE — SUTURE 0 VICRYL PLUS UR-6 - 27 INCH (36/BX)

## (undated) DEVICE — SUTURE GENERAL

## (undated) DEVICE — WIRE GUIDE SENSOR DUAL FLEX - 5/BX

## (undated) DEVICE — Device

## (undated) DEVICE — PROBE ENDOSCOPIC PERIPHERAL VISION ION 1.5 IF1000 (1/EA)

## (undated) DEVICE — COVER LIGHT HANDLE ALC PLUS DISP (18EA/BX)

## (undated) DEVICE — CANISTER SUCTION 3000ML MECHANICAL FILTER AUTO SHUTOFF MEDI-VAC NONSTERILE LF DISP (40EA/CA)

## (undated) DEVICE — SYRINGE SAFETY 10 ML 18 GA X 1 1/2 BLUNT LL (100/BX 4BX/CA)

## (undated) DEVICE — SENSOR SPO2 ADULT LNCS ADTX (20/BX) ORDER ITEM #19593

## (undated) DEVICE — SOD. CHL. INJ. 0.9% 1000 ML - (14EA/CA 60CA/PF)

## (undated) DEVICE — RELOAD 12MM STAPLER SUREFORM 60 GREEN (12EA/BX)

## (undated) DEVICE — BALLOON FOR EBUS SCOPE (20EA/BX)

## (undated) DEVICE — SUTURE 2-0 VICRYL PLUS CT-1 36 (36PK/BX)"

## (undated) DEVICE — CAPTIVATOR II-15MM ROUND STIFF  (40/BX)

## (undated) DEVICE — SODIUM CHL. IRRIGATION 0.9% 3000ML (4EA/CA 65CA/PF)

## (undated) DEVICE — ELECTRODE MONOPOLAR ANGLED CUTTING LOOP DIAM 0.35 YELLOW 24FR (6EA/PK)

## (undated) DEVICE — SYSTEM CHEST DRAIN ADULT/PEDS W/AUTO TRANSFUSION CAPABILITY SAHARA (6EA/CA)

## (undated) DEVICE — GOWN WARMING STANDARD FLEX - (30/CA)

## (undated) DEVICE — SUTURE 0 SILK CT-1 (36PK/BX)

## (undated) DEVICE — SET LEADWIRE 5 LEAD BEDSIDE DISPOSABLE ECG (1SET OF 5/EA)

## (undated) DEVICE — ELECTRODE 5MM LHK LAPSCP STERILE DISP- MEGADYNE (5/CA)

## (undated) DEVICE — KIT CUSTOM PROCEDURE SINGLE FOR ENDO  (15/CA)

## (undated) DEVICE — PACK DAVINCI GENERAL (3EA/CA)

## (undated) DEVICE — SHEET TRANSVERSE LAP - (12EA/CA)

## (undated) DEVICE — RELOAD 12MM STAPLER SUREFORM 60 BLUE (12EA/BX)

## (undated) DEVICE — GLOVE SZ 7.5 BIOGEL PI MICRO - PF LF (50PR/BX)

## (undated) DEVICE — NEPTUNE 4 PORT MANIFOLD - (20/PK)

## (undated) DEVICE — SYRINGE 12 CC LUER TIP - (80/BX) OBSOLETE ITEM

## (undated) DEVICE — KIT  I.V. START (100EA/CA)

## (undated) DEVICE — GLOVE BIOGEL SZ 6.5 SURGICAL PF LTX (50PR/BX 4BX/CA)

## (undated) DEVICE — BOVIE BLADE COATED &INSULATED - 25/PK

## (undated) DEVICE — CANISTER SUCTION RIGID RED 1500CC (40EA/CA)

## (undated) DEVICE — TRAP POLYP E-TRAP (25EA/BX)

## (undated) DEVICE — TUBE CONNECTING SUCTION - CLEAR PLASTIC STERILE 72 IN (50EA/CA)

## (undated) DEVICE — WATER IRRIG. STER 3000 ML - (4/CA)

## (undated) DEVICE — SUCTION INSTRUMENT YANKAUER BULBOUS TIP W/O VENT (50EA/CA)

## (undated) DEVICE — BAG DRAINAGE LINGEMAN CYSTO FOR GE/OEC 2600/2800 TABLES (20EA/CA)

## (undated) DEVICE — CATHETER GUIDING ION (1/EA)

## (undated) DEVICE — MASK ANESTHESIA ADULT  - (100/CA)

## (undated) DEVICE — GLOVE, LITE (PAIR)

## (undated) DEVICE — NEEDLE BIOPSY FLEXISION OD19 GA IF1000 (5EA/BX)

## (undated) DEVICE — SLEEVE VASO DVT COMPRESSION CALF MED - (10PR/CA)

## (undated) DEVICE — SPONGE DRAIN 4 X 4IN 6-PLY - (2/PK25PK/BX12BX/CS)

## (undated) DEVICE — SUTURE 0 SILK TIES (36PK/BX)

## (undated) DEVICE — DRAPE ARM BOX OF 20

## (undated) DEVICE — GOWN SURGEONS X-LARGE - DISP. (30/CA)

## (undated) DEVICE — SUTURE 4-0 MONOCRYL PLUS PS-2 - 27 INCH (36/BX)

## (undated) DEVICE — TOWEL STOP TIMEOUT SAFETY FLAG (40EA/CA)

## (undated) DEVICE — COVER FOOT UNIVERSAL DISP. - (25EA/CA)

## (undated) DEVICE — SYSTEM CLEARIFY VISUALIZATION (10EA/PK)

## (undated) DEVICE — MASK WITH FACE SHIELD (25/BX 4BX/CA)

## (undated) DEVICE — CHLORAPREP 26 ML APPLICATOR - ORANGE TINT(25/CA)

## (undated) DEVICE — TRAY CATHETER FOLEY URINE METER W/STATLOCK 350ML (10EA/CA)

## (undated) DEVICE — PAD PREP 24 X 48 CUFFED - (100/CA)

## (undated) DEVICE — CONNECTOR HOSE NEPTUNE FOR CYSTO ROOM

## (undated) DEVICE — PORT ACCESS MINISINGLE SITE GELPOINT (1/EA)

## (undated) DEVICE — SCISSORS 5MM CVD (6EA/BX)

## (undated) DEVICE — CONTAINER SPECIMEN BAG OR - STERILE 4 OZ W/LID (100EA/CA)

## (undated) DEVICE — SYRINGE DISP. 60 CC LL - (30/BX, 12BX/CA)**WHEN THESE ARE GONE ORDER #500206**

## (undated) DEVICE — DRAPE COLUMN BOX OF 20

## (undated) DEVICE — OBTURATOR BLADELESS STANDARD 8MM (6EA/BX)

## (undated) DEVICE — BAG RETRIEVAL 12/15 MM INZII (5EA/CA) THIS WILL REPLACE ITEM 75018

## (undated) DEVICE — TUBING LAPAROSCOPIC PLUME DEVICE (10EA/CA)

## (undated) DEVICE — BRONCHOSCOPE EXALT MODEL B REGULAR (10EA/BX)

## (undated) DEVICE — BAG IV VISION ION IF1000 (10EA/BX)

## (undated) DEVICE — TROCAR Z THREAD12MM OPTICAL - NON BLADED (6/BX)

## (undated) DEVICE — SEAL UNIVERSAL 5MM-12MM (10EA/BX)

## (undated) DEVICE — TUBE SUCTION YANKAUER 1/4 X 6FT (50EA/CA)"

## (undated) DEVICE — TUBE SUCTION YANKAUER  1/4 X 6FT (20EA/CA)"

## (undated) DEVICE — CONNECTOR Y TBG CRTY 5 IN 1 STERILE (50EA/CA)

## (undated) DEVICE — CONNECTOR HUBLESS DRAINAGE - ONE WAY (20/BX)

## (undated) DEVICE — SODIUM CHL IRRIGATION 0.9% 1000ML (12EA/CA)

## (undated) DEVICE — SYRINGE SAFETY 3 ML 18 GA X 1 1/2 BLUNT LL (100/BX 8BX/CA)

## (undated) DEVICE — GLOVE BIOGEL SZ 7 SURGICAL PF LTX - (50PR/BX 4BX/CA)

## (undated) DEVICE — SOD. CHL 10CC SYRINGE PREFILL - W/10 CC (30/BX)

## (undated) DEVICE — GLOVE BIOGEL SZ 7.5 SURGICAL PF LTX - (50PR/BX 4BX/CA)

## (undated) DEVICE — PENCIL ELECTSURG 10FT BTN SWH - (50/CA)